# Patient Record
Sex: FEMALE | Race: WHITE | NOT HISPANIC OR LATINO | Employment: OTHER | URBAN - METROPOLITAN AREA
[De-identification: names, ages, dates, MRNs, and addresses within clinical notes are randomized per-mention and may not be internally consistent; named-entity substitution may affect disease eponyms.]

---

## 2018-01-10 PROBLEM — R13.10 DYSPHAGIA: Status: ACTIVE | Noted: 2018-01-10

## 2019-03-02 ENCOUNTER — HOSPITAL ENCOUNTER (EMERGENCY)
Facility: HOSPITAL | Age: 64
Discharge: HOME | End: 2019-03-02
Attending: EMERGENCY MEDICINE
Payer: MEDICAID

## 2019-03-02 VITALS
WEIGHT: 139.31 LBS | HEIGHT: 65 IN | BODY MASS INDEX: 23.21 KG/M2 | RESPIRATION RATE: 16 BRPM | OXYGEN SATURATION: 98 % | TEMPERATURE: 99 F | HEART RATE: 77 BPM

## 2019-03-02 DIAGNOSIS — R05.9 COUGH: ICD-10-CM

## 2019-03-02 DIAGNOSIS — J40 BRONCHITIS: Primary | ICD-10-CM

## 2019-03-02 PROCEDURE — 25000242 PHARM REV CODE 250 ALT 637 W/ HCPCS: Performed by: EMERGENCY MEDICINE

## 2019-03-02 PROCEDURE — 99283 EMERGENCY DEPT VISIT LOW MDM: CPT | Mod: 25

## 2019-03-02 PROCEDURE — 94640 AIRWAY INHALATION TREATMENT: CPT

## 2019-03-02 RX ORDER — ALBUTEROL SULFATE 90 UG/1
2 AEROSOL, METERED RESPIRATORY (INHALATION) EVERY 6 HOURS PRN
Qty: 1 INHALER | Refills: 0 | Status: SHIPPED | OUTPATIENT
Start: 2019-03-02 | End: 2019-08-10

## 2019-03-02 RX ORDER — LEVOTHYROXINE SODIUM 25 UG/1
25 TABLET ORAL DAILY
COMMUNITY
End: 2020-06-25 | Stop reason: SDUPTHER

## 2019-03-02 RX ORDER — ATORVASTATIN CALCIUM 10 MG/1
10 TABLET, FILM COATED ORAL DAILY
COMMUNITY
End: 2020-07-06 | Stop reason: SDUPTHER

## 2019-03-02 RX ORDER — PROPRANOLOL HYDROCHLORIDE 80 MG/1
80 TABLET ORAL DAILY
COMMUNITY
End: 2020-12-03 | Stop reason: SDUPTHER

## 2019-03-02 RX ORDER — METFORMIN HYDROCHLORIDE 500 MG/1
500 TABLET ORAL 2 TIMES DAILY WITH MEALS
COMMUNITY
End: 2020-05-20

## 2019-03-02 RX ORDER — LISINOPRIL 20 MG/1
20 TABLET ORAL DAILY
COMMUNITY
End: 2020-07-06 | Stop reason: SDUPTHER

## 2019-03-02 RX ORDER — IPRATROPIUM BROMIDE AND ALBUTEROL SULFATE 2.5; .5 MG/3ML; MG/3ML
3 SOLUTION RESPIRATORY (INHALATION)
Status: COMPLETED | OUTPATIENT
Start: 2019-03-02 | End: 2019-03-02

## 2019-03-02 RX ADMIN — IPRATROPIUM BROMIDE AND ALBUTEROL SULFATE 3 ML: .5; 3 SOLUTION RESPIRATORY (INHALATION) at 01:03

## 2019-03-02 NOTE — ED PROVIDER NOTES
Encounter Date: 3/2/2019    SCRIBE #1 NOTE: I, Olga Figueroa, am scribing for, and in the presence of, Rowdy Zuniga MD.       History     Chief Complaint   Patient presents with    Cough     x weeks       Time seen by provider: 1:45 PM on 03/02/2019    Liv Decker is a 64 y.o. female who presents to the ED with an onset of worsening and productive cough starting 7 weeks ago with associated SOB and wheezing. She reports light green phlegm with the cough. The patient reports a sore throat starting 2 days ago. She has been taking OTC Mucinex periodically. The patient denies history of lung complications, fever, vomiting, diarrhea, chest pain, leg swelling, blood in urine or stool, painful urination, or any other symptoms at this time. She smoked for 40+ years but quit 6 years ago.       The history is provided by the patient.     Review of patient's allergies indicates:  No Known Allergies  Past Medical History:   Diagnosis Date    Anemia     Encounter for blood transfusion     GERD (gastroesophageal reflux disease)     Hyperlipidemia     Ulcer of the stomach and intestine     pos. for h. pylori     Past Surgical History:   Procedure Laterality Date    APPENDECTOMY      CHOLECYSTECTOMY      COLONOSCOPY N/A 3/18/2016    [facility] END[facility]    DILATION-ESOPHAGUS N/A 1/10/2018    [facility] by [facility] END[facility]    ESOPHAGOGASTRODUODENOSCOPY  04/15/2016    with dil    ESOPHAGOGASTRODUODENOSCOPY (EGD) N/A 1/10/2018    [facility] by [facility] END[facility]    ESOPHAGOGASTRODUODENOSCOPY (EGD) N/A 4/15/2016    Performed by Robre [facility]aya Jr., [facility] at [facility] ENDO    ESOPHAGOGASTRODUODENOSCOPY (EGD) N/A 3/18/2016    Per[facility] by [facility]    EYE SURGERY      TONSILLECTOMY      TUBAL LIGATION       Family History   Problem Relation Age of Onset    Heart disease Mother     Diabetes Father      Social History     Tobacco Use     Smoking status: Former Smoker     Packs/day: 0.50     Years: 45.00     Pack years: 22.50     Start date: 1959     Last attempt to quit: 2014     Years since quittin.8    Smokeless tobacco: Never Used   Substance Use Topics    Alcohol use: No    Drug use: No     Review of Systems   Constitutional: Negative for chills and fever.   HENT: Positive for sore throat. Negative for nosebleeds.    Eyes: Negative for visual disturbance.   Respiratory: Positive for cough (productive), shortness of breath and wheezing.    Cardiovascular: Negative for chest pain, palpitations and leg swelling.   Gastrointestinal: Negative for abdominal pain, blood in stool, diarrhea, nausea and vomiting.   Genitourinary: Negative for dysuria and hematuria.   Musculoskeletal: Negative for back pain and neck pain.   Skin: Negative for rash.   Neurological: Negative for seizures, syncope and headaches.     Physical Exam     Initial Vitals [19 1322]   BP Pulse Resp Temp SpO2   -- 80 16 99.1 °F (37.3 °C) 96 %      MAP       --         Physical Exam    Nursing note and vitals reviewed.  Constitutional: She appears well-developed and well-nourished. She is not diaphoretic. No distress.   HENT:   Head: Normocephalic and atraumatic.   Eyes: EOM are normal. Pupils are equal, round, and reactive to light.   Neck: Normal range of motion. Neck supple.   Cardiovascular: Normal rate, regular rhythm, normal heart sounds and intact distal pulses. Exam reveals no gallop and no friction rub.    No murmur heard.  Pulmonary/Chest: Breath sounds normal. No respiratory distress. She has no wheezes. She has no rhonchi. She has no rales.   Musculoskeletal: She exhibits no edema.   Neurological: She is alert and oriented to person, place, and time.   Skin: Skin is warm and dry.   Psychiatric: She has a normal mood and affect. Her behavior is normal. Judgment and thought content normal.       ED Course   Procedures  Labs Reviewed - No data to  display      Imaging Results          X-Ray Chest PA And Lateral (Final result)  Result time 03/02/19 14:05:17    Final result by Rai Hanna MD (03/02/19 14:05:17)                 Impression:      No acute cardiopulmonary abnormality appreciated radiographically.      Electronically signed by: Tony Hanna MD  Date:    03/02/2019  Time:    14:05             Narrative:    EXAMINATION:  XR CHEST PA AND LATERAL    CLINICAL HISTORY:  Cough    TECHNIQUE:  PA and lateral views of the chest were performed.    COMPARISON:  None    FINDINGS:  The cardiomediastinal silhouette is normal in appearance.  No pulmonary vascular congestion appreciated. No airspace consolidation or pulmonary mass. No significant volume of pleural fluid or pneumothorax.  There is probable right apical pleuroparenchymal scarring.  The bones appear osteopenic..                                 Medical Decision Making:   History:   Old Medical Records: I decided to obtain old medical records.  Initial Assessment:   64-year-old female presented with a chief complaint of a cough.  Differential Diagnosis:   Initial differential diagnosis included but not limited to pneumonia, bronchitis, and medication reaction.  Clinical Tests:   Radiological Study: Ordered and Reviewed  ED Management:  The patient was urgently evaluated in the emergency department, her evaluation was significant for an elderly female with a normal lung exam.  The patient's chest x-ray showed no acute abnormalities per my independent interpretation.  The patient was treated with a respiratory nebulizer treatment, with improvement in her symptoms. The patient may have a bronchitis.  She is stable for discharge to home.  She will be discharged home with an albuterol inhaler and she is to follow up with her PCP for further care and workup.            Scribe Attestation:   Scribe #1: I performed the above scribed service and the documentation accurately describes the services I  performed. I attest to the accuracy of the note.           I, Dr. Rowdy Zuniga, personally performed the services described in this documentation. All medical record entries made by the scribe were at my direction and in my presence.  I have reviewed the chart and agree that the record reflects my personal performance and is accurate and complete. Rowdy Zuniga MD.  4:31 PM 03/02/2019       Clinical Impression:       ICD-10-CM ICD-9-CM   1. Bronchitis J40 490   2. Cough R05 786.2         Disposition:   Disposition: Discharged  Condition: Stable                        Rowdy Zuniga MD  03/02/19 2746

## 2019-08-10 ENCOUNTER — TELEPHONE (OUTPATIENT)
Dept: HEPATOLOGY | Facility: HOSPITAL | Age: 64
End: 2019-08-10

## 2019-08-10 ENCOUNTER — HOSPITAL ENCOUNTER (OUTPATIENT)
Facility: HOSPITAL | Age: 64
Discharge: HOME | End: 2019-08-10
Attending: EMERGENCY MEDICINE | Admitting: HOSPITALIST
Payer: MEDICAID

## 2019-08-10 ENCOUNTER — CLINICAL SUPPORT (OUTPATIENT)
Dept: CARDIOLOGY | Facility: HOSPITAL | Age: 64
End: 2019-08-10
Attending: SPECIALIST
Payer: MEDICAID

## 2019-08-10 ENCOUNTER — HOSPITAL ENCOUNTER (OUTPATIENT)
Dept: RADIOLOGY | Facility: HOSPITAL | Age: 64
Discharge: HOME | End: 2019-08-10
Attending: SPECIALIST
Payer: MEDICAID

## 2019-08-10 VITALS
DIASTOLIC BLOOD PRESSURE: 54 MMHG | HEIGHT: 61 IN | RESPIRATION RATE: 17 BRPM | OXYGEN SATURATION: 96 % | WEIGHT: 120.06 LBS | SYSTOLIC BLOOD PRESSURE: 108 MMHG | TEMPERATURE: 98 F | BODY MASS INDEX: 22.67 KG/M2 | HEART RATE: 74 BPM

## 2019-08-10 DIAGNOSIS — R07.9 CHEST PAIN: Primary | ICD-10-CM

## 2019-08-10 DIAGNOSIS — R07.9 CHEST PAIN, UNSPECIFIED TYPE: Primary | ICD-10-CM

## 2019-08-10 DIAGNOSIS — Z72.0 TOBACCO ABUSE: Chronic | ICD-10-CM

## 2019-08-10 DIAGNOSIS — I25.10 CAD (CORONARY ARTERY DISEASE): ICD-10-CM

## 2019-08-10 DIAGNOSIS — I10 ESSENTIAL HYPERTENSION: Chronic | ICD-10-CM

## 2019-08-10 DIAGNOSIS — R79.89 POSITIVE D-DIMER: ICD-10-CM

## 2019-08-10 DIAGNOSIS — R11.0 NAUSEA: ICD-10-CM

## 2019-08-10 DIAGNOSIS — E11.69 TYPE 2 DIABETES MELLITUS WITH OTHER SPECIFIED COMPLICATION, WITHOUT LONG-TERM CURRENT USE OF INSULIN: ICD-10-CM

## 2019-08-10 DIAGNOSIS — E78.2 MIXED HYPERLIPIDEMIA: Chronic | ICD-10-CM

## 2019-08-10 DIAGNOSIS — R61 DIAPHORESIS: ICD-10-CM

## 2019-08-10 PROBLEM — E78.5 HYPERLIPIDEMIA: Chronic | Status: ACTIVE | Noted: 2019-08-10

## 2019-08-10 PROBLEM — E11.9 TYPE 2 DIABETES MELLITUS: Status: ACTIVE | Noted: 2019-08-10

## 2019-08-10 LAB
ALBUMIN SERPL BCP-MCNC: 3.8 G/DL (ref 3.5–5.2)
ALP SERPL-CCNC: 53 U/L (ref 55–135)
ALT SERPL W/O P-5'-P-CCNC: 17 U/L (ref 10–44)
ANION GAP SERPL CALC-SCNC: 10 MMOL/L (ref 8–16)
APTT PPP: 34 SEC (ref 26.2–34.7)
AST SERPL-CCNC: 19 U/L (ref 10–40)
BASOPHILS # BLD AUTO: 0.06 K/UL (ref 0–0.2)
BASOPHILS NFR BLD: 0.5 % (ref 0–1.9)
BILIRUB SERPL-MCNC: 0.4 MG/DL (ref 0.1–1)
BNP SERPL-MCNC: 26 PG/ML (ref 0–99)
BUN SERPL-MCNC: 16 MG/DL (ref 8–23)
CALCIUM SERPL-MCNC: 9.6 MG/DL (ref 8.7–10.5)
CHLORIDE SERPL-SCNC: 107 MMOL/L (ref 95–110)
CO2 SERPL-SCNC: 26 MMOL/L (ref 23–29)
CREAT SERPL-MCNC: 0.7 MG/DL (ref 0.5–1.4)
D DIMER PPP IA.FEU-MCNC: 1.46 UG/ML FEU
DIFFERENTIAL METHOD: ABNORMAL
EOSINOPHIL # BLD AUTO: 0.1 K/UL (ref 0–0.5)
EOSINOPHIL NFR BLD: 1.1 % (ref 0–8)
ERYTHROCYTE [DISTWIDTH] IN BLOOD BY AUTOMATED COUNT: 15.1 % (ref 11.5–14.5)
EST. GFR  (AFRICAN AMERICAN): >60 ML/MIN/1.73 M^2
EST. GFR  (NON AFRICAN AMERICAN): >60 ML/MIN/1.73 M^2
ESTIMATED AVG GLUCOSE: 123 MG/DL (ref 68–131)
GLUCOSE SERPL-MCNC: 127 MG/DL (ref 70–110)
HBA1C MFR BLD HPLC: 5.9 % (ref 4.5–6.2)
HCT VFR BLD AUTO: 42.7 % (ref 37–48.5)
HGB BLD-MCNC: 13.4 G/DL (ref 12–16)
IMM GRANULOCYTES # BLD AUTO: 0.07 K/UL (ref 0–0.04)
IMM GRANULOCYTES NFR BLD AUTO: 0.6 % (ref 0–0.5)
INR PPP: 1.1
LYMPHOCYTES # BLD AUTO: 2.9 K/UL (ref 1–4.8)
LYMPHOCYTES NFR BLD: 24 % (ref 18–48)
MAGNESIUM SERPL-MCNC: 1.6 MG/DL (ref 1.6–2.6)
MCH RBC QN AUTO: 28 PG (ref 27–31)
MCHC RBC AUTO-ENTMCNC: 31.4 G/DL (ref 32–36)
MCV RBC AUTO: 89 FL (ref 82–98)
MONOCYTES # BLD AUTO: 0.8 K/UL (ref 0.3–1)
MONOCYTES NFR BLD: 6.3 % (ref 4–15)
NEUTROPHILS # BLD AUTO: 8.2 K/UL (ref 1.8–7.7)
NEUTROPHILS NFR BLD: 67.5 % (ref 38–73)
NRBC BLD-RTO: 0 /100 WBC
PLATELET # BLD AUTO: 221 K/UL (ref 150–350)
PMV BLD AUTO: 12.4 FL (ref 9.2–12.9)
POTASSIUM SERPL-SCNC: 3.9 MMOL/L (ref 3.5–5.1)
PROT SERPL-MCNC: 7.6 G/DL (ref 6–8.4)
PROTHROMBIN TIME: 13.5 SEC (ref 11.7–14)
RBC # BLD AUTO: 4.79 M/UL (ref 4–5.4)
SODIUM SERPL-SCNC: 143 MMOL/L (ref 136–145)
TROPONIN I SERPL DL<=0.01 NG/ML-MCNC: <0.03 NG/ML (ref 0.02–0.04)
WBC # BLD AUTO: 12.09 K/UL (ref 3.9–12.7)

## 2019-08-10 PROCEDURE — 25500020 PHARM REV CODE 255: Performed by: EMERGENCY MEDICINE

## 2019-08-10 PROCEDURE — 85730 THROMBOPLASTIN TIME PARTIAL: CPT

## 2019-08-10 PROCEDURE — 96374 THER/PROPH/DIAG INJ IV PUSH: CPT | Mod: XE

## 2019-08-10 PROCEDURE — 25000003 PHARM REV CODE 250: Performed by: NURSE PRACTITIONER

## 2019-08-10 PROCEDURE — 99285 EMERGENCY DEPT VISIT HI MDM: CPT

## 2019-08-10 PROCEDURE — 83036 HEMOGLOBIN GLYCOSYLATED A1C: CPT

## 2019-08-10 PROCEDURE — 83880 ASSAY OF NATRIURETIC PEPTIDE: CPT

## 2019-08-10 PROCEDURE — 83735 ASSAY OF MAGNESIUM: CPT

## 2019-08-10 PROCEDURE — 84484 ASSAY OF TROPONIN QUANT: CPT | Mod: 91

## 2019-08-10 PROCEDURE — 93017 CV STRESS TEST TRACING ONLY: CPT

## 2019-08-10 PROCEDURE — 63600175 PHARM REV CODE 636 W HCPCS: Performed by: NURSE PRACTITIONER

## 2019-08-10 PROCEDURE — 36415 COLL VENOUS BLD VENIPUNCTURE: CPT

## 2019-08-10 PROCEDURE — G0378 HOSPITAL OBSERVATION PER HR: HCPCS

## 2019-08-10 PROCEDURE — 96375 TX/PRO/DX INJ NEW DRUG ADDON: CPT | Mod: XE

## 2019-08-10 PROCEDURE — 85025 COMPLETE CBC W/AUTO DIFF WBC: CPT

## 2019-08-10 PROCEDURE — 85379 FIBRIN DEGRADATION QUANT: CPT

## 2019-08-10 PROCEDURE — 80053 COMPREHEN METABOLIC PANEL: CPT

## 2019-08-10 PROCEDURE — A9502 TC99M TETROFOSMIN: HCPCS

## 2019-08-10 PROCEDURE — 85610 PROTHROMBIN TIME: CPT

## 2019-08-10 PROCEDURE — 94760 N-INVAS EAR/PLS OXIMETRY 1: CPT

## 2019-08-10 PROCEDURE — 84484 ASSAY OF TROPONIN QUANT: CPT

## 2019-08-10 PROCEDURE — 93005 ELECTROCARDIOGRAM TRACING: CPT | Mod: XE

## 2019-08-10 RX ORDER — LEVOTHYROXINE SODIUM 25 UG/1
25 TABLET ORAL
Status: DISCONTINUED | OUTPATIENT
Start: 2019-08-10 | End: 2019-08-10 | Stop reason: HOSPADM

## 2019-08-10 RX ORDER — NITROGLYCERIN 0.4 MG/1
0.4 TABLET SUBLINGUAL EVERY 5 MIN PRN
Status: DISCONTINUED | OUTPATIENT
Start: 2019-08-10 | End: 2019-08-10 | Stop reason: HOSPADM

## 2019-08-10 RX ORDER — ACETAMINOPHEN 325 MG/1
650 TABLET ORAL EVERY 8 HOURS PRN
Status: DISCONTINUED | OUTPATIENT
Start: 2019-08-10 | End: 2019-08-10 | Stop reason: HOSPADM

## 2019-08-10 RX ORDER — ATORVASTATIN CALCIUM 10 MG/1
10 TABLET, FILM COATED ORAL DAILY
Status: DISCONTINUED | OUTPATIENT
Start: 2019-08-10 | End: 2019-08-10 | Stop reason: HOSPADM

## 2019-08-10 RX ORDER — NITROGLYCERIN 0.4 MG/1
0.4 TABLET SUBLINGUAL EVERY 5 MIN PRN
Qty: 60 TABLET | Refills: 0 | Status: SHIPPED | OUTPATIENT
Start: 2019-08-10 | End: 2020-02-14

## 2019-08-10 RX ORDER — ONDANSETRON 2 MG/ML
4 INJECTION INTRAMUSCULAR; INTRAVENOUS EVERY 8 HOURS PRN
Status: DISCONTINUED | OUTPATIENT
Start: 2019-08-10 | End: 2019-08-10 | Stop reason: HOSPADM

## 2019-08-10 RX ORDER — SODIUM CHLORIDE 0.9 % (FLUSH) 0.9 %
10 SYRINGE (ML) INJECTION
Status: DISCONTINUED | OUTPATIENT
Start: 2019-08-10 | End: 2019-08-10 | Stop reason: HOSPADM

## 2019-08-10 RX ORDER — BISACODYL 10 MG
10 SUPPOSITORY, RECTAL RECTAL DAILY PRN
Status: DISCONTINUED | OUTPATIENT
Start: 2019-08-10 | End: 2019-08-10 | Stop reason: HOSPADM

## 2019-08-10 RX ORDER — ENOXAPARIN SODIUM 100 MG/ML
40 INJECTION SUBCUTANEOUS EVERY 24 HOURS
Status: DISCONTINUED | OUTPATIENT
Start: 2019-08-10 | End: 2019-08-10 | Stop reason: HOSPADM

## 2019-08-10 RX ORDER — PANTOPRAZOLE SODIUM 40 MG/1
40 TABLET, DELAYED RELEASE ORAL DAILY
Status: DISCONTINUED | OUTPATIENT
Start: 2019-08-10 | End: 2019-08-10 | Stop reason: HOSPADM

## 2019-08-10 RX ORDER — MORPHINE SULFATE 2 MG/ML
1 INJECTION, SOLUTION INTRAMUSCULAR; INTRAVENOUS EVERY 4 HOURS PRN
Status: DISCONTINUED | OUTPATIENT
Start: 2019-08-10 | End: 2019-08-10 | Stop reason: HOSPADM

## 2019-08-10 RX ORDER — LISINOPRIL 20 MG/1
20 TABLET ORAL DAILY
Status: DISCONTINUED | OUTPATIENT
Start: 2019-08-10 | End: 2019-08-10 | Stop reason: HOSPADM

## 2019-08-10 RX ADMIN — MORPHINE SULFATE 1 MG: 2 INJECTION, SOLUTION INTRAMUSCULAR; INTRAVENOUS at 06:08

## 2019-08-10 RX ADMIN — IOHEXOL 100 ML: 350 INJECTION, SOLUTION INTRAVENOUS at 04:08

## 2019-08-10 RX ADMIN — PANTOPRAZOLE SODIUM 40 MG: 40 TABLET, DELAYED RELEASE ORAL at 09:08

## 2019-08-10 RX ADMIN — ONDANSETRON 4 MG: 2 INJECTION INTRAMUSCULAR; INTRAVENOUS at 05:08

## 2019-08-10 RX ADMIN — LISINOPRIL 20 MG: 20 TABLET ORAL at 09:08

## 2019-08-10 RX ADMIN — LEVOTHYROXINE SODIUM 25 MCG: 25 TABLET ORAL at 09:08

## 2019-08-10 RX ADMIN — ATORVASTATIN CALCIUM 10 MG: 10 TABLET, FILM COATED ORAL at 09:08

## 2019-08-10 RX ADMIN — NITROGLYCERIN 1 INCH: 20 OINTMENT TOPICAL at 03:08

## 2019-08-10 NOTE — NURSING
Instructed patient on discharge instructions, follow up appointment and new prescription. She verbalized understanding. Patient discharged home at this time.

## 2019-08-10 NOTE — HOSPITAL COURSE
"Admitted overnight for cardiac workup and PE ruled out. Cardiac labs and stress test negative. All other labs essentially normal. Non Cardiac CTA "There are scattered coronary arterial vascular calcifications, with the heart upper limits of normal in size."     Encouraged to stop smoking and discuss treatment with PCP.     Patient is pain free agreeable to discharge home and follow up with PCP as scheduled on 8/26/19.  NTG 0.4 SL ordered for d/c, NP did patient education with pt to verbalized understanding.   "

## 2019-08-10 NOTE — CONSULTS
[facility]  Cardiology  Consult Note    Patient Name: Liv Decker  MRN: [id number]  Admission Date: 8/10/2019  Hospital Length of Stay: 0 days  Code Status: Full Code   Attending Provider: Tony June DO   Consulting Provider: Conrado Edwards MD  Primary Care Physician: Gemma Banerjee Jr, MD  Principal Problem:Chest pain    Patient information was obtained from patient and ER records.     Consults  Subjective:     Chief Complaint:  Chest pain     HPI: Pt states today while at work around 10 pm she developed a sudden onset of left sided chest pain located to the area under her left breast with radiation to her left arm.  She describes the pain as a sharp sensation with associated nausea, diaphoresis and lightheadedness.  She rated the pain a 7/10 intensity at its worse.  She was give 4 ESASA and nitro spray with improvement in her chest pain PTA. No pain free will get a nuclear stress test today    Past Medical History:   Diagnosis Date    Anemia     Diabetes mellitus     Encounter for blood transfusion     GERD (gastroesophageal reflux disease)     Hyperlipidemia     MI (myocardial infarction)     Ulcer of the stomach and intestine     pos. for h. pylori       Past Surgical History:   Procedure Laterality Date    APPENDECTOMY      CHOLECYSTECTOMY      COLONOSCOPY N/A 3/18/2016    Performed by [facility] at [facility] ENDO    DILATION-ESOPHAGUS N/A 1/10/2018    Performed by [facility] Jr[facility] ENDO    ESOPHAGOGASTRODUODENOSCOPY  04/15/2016    with dil    ESOPHAGOGASTRODUODENOSCOPY (EGD) N/A 1/10/2018    Performed by Rober [facility] Jr[facility] ENDO    ESOPHAGOGASTRODUODENOSCOPY (EGD) N/A 4/15/2016    Performed by Rober [facility] Jr[facility] ENDO    ESOPHAGOGASTRODUODENOSCOPY (EGD) N/A 3/18/2016    [facility]    EYE SURGERY      TONSILLECTOMY      TUBAL LIGATION         Review of patient's allergies  indicates:  No Known Allergies    No current facility-administered medications on file prior to encounter.      Current Outpatient Medications on File Prior to Encounter   Medication Sig    atorvastatin (LIPITOR) 10 MG tablet Take 10 mg by mouth once daily.    levothyroxine (SYNTHROID) 25 MCG tablet Take 25 mcg by mouth once daily.    lisinopril (PRINIVIL,ZESTRIL) 20 MG tablet Take 20 mg by mouth once daily.    metFORMIN (GLUCOPHAGE) 500 MG tablet Take 500 mg by mouth 2 (two) times daily with meals.    pantoprazole (PROTONIX) 40 MG tablet Take 40 mg by mouth once daily.    propranolol (INDERAL) 80 MG tablet Take 80 mg by mouth once daily.     [DISCONTINUED] albuterol (PROVENTIL/VENTOLIN HFA) 90 mcg/actuation inhaler Inhale 2 puffs into the lungs every 6 (six) hours as needed for Wheezing or Shortness of Breath. Rescue    [DISCONTINUED] ondansetron (ZOFRAN) 4 MG tablet Take 1 tablet (4 mg total) by mouth every 8 (eight) hours as needed.    [DISCONTINUED] oxycodone-acetaminophen (PERCOCET) 5-325 mg per tablet Take 1-2 tablets by mouth every 4 (four) hours as needed for Pain.     Family History     Problem Relation (Age of Onset)    Diabetes Father    Heart disease Mother        Tobacco Use    Smoking status: Current Every Day Smoker     Packs/day: 0.25     Years: 45.00     Pack years: 11.25     Types: Cigarettes     Start date: 1959     Last attempt to quit: 2014     Years since quittin.2    Smokeless tobacco: Never Used   Substance and Sexual Activity    Alcohol use: No     Frequency: Never    Drug use: No    Sexual activity: Never     Review of Systems   Constitution: Negative.   Eyes: Negative.    Cardiovascular: Positive for chest pain.   Endocrine: Negative.    Skin: Negative.    Musculoskeletal: Negative.    Gastrointestinal: Negative.    Genitourinary: Negative.    Neurological: Negative.      Objective:     Vital Signs (Most Recent):  Temp: 98 °F (36.7 °C) (08/10/19 08)  Pulse: 70  (08/10/19 1000)  Resp: 20 (08/10/19 1000)  BP: 115/61 (08/10/19 0831)  SpO2: 97 % (08/10/19 0831) Vital Signs (24h Range):  Temp:  [97.6 °F (36.4 °C)-98 °F (36.7 °C)] 98 °F (36.7 °C)  Pulse:  [62-70] 70  Resp:  [18-33] 20  SpO2:  [97 %-99 %] 97 %  BP: (115-155)/(60-67) 115/61     Weight: 54.5 kg (120 lb 0.7 oz)  Body mass index is 22.68 kg/m².    SpO2: 97 %  O2 Device (Oxygen Therapy): room air    No intake or output data in the 24 hours ending 08/10/19 1051    Lines/Drains/Airways     Peripheral Intravenous Line                 Peripheral IV - Single Lumen 08/10/19 20 G Left Antecubital less than 1 day                Physical Exam   Constitutional: She is oriented to person, place, and time. She appears well-developed.   Eyes: Pupils are equal, round, and reactive to light.   Neck: Normal range of motion.   Cardiovascular: Normal rate.   Pulmonary/Chest: Effort normal.   Musculoskeletal: Normal range of motion.   Neurological: She is alert and oriented to person, place, and time.       Significant Labs:   Blood Culture: No results for input(s): LABBLOO in the last 48 hours., CMP   Recent Labs   Lab 08/10/19  0229      K 3.9      CO2 26   *   BUN 16   CREATININE 0.7   CALCIUM 9.6   PROT 7.6   ALBUMIN 3.8   BILITOT 0.4   ALKPHOS 53*   AST 19   ALT 17   ANIONGAP 10   ESTGFRAFRICA >60.0   EGFRNONAA >60.0   , CBC   Recent Labs   Lab 08/10/19  0229   WBC 12.09   HGB 13.4   HCT 42.7      , INR   Recent Labs   Lab 08/10/19  0229   INR 1.1   , Lipid Panel No results for input(s): CHOL, HDL, LDLCALC, TRIG, CHOLHDL in the last 48 hours. and Troponin   Recent Labs   Lab 08/10/19  0229 08/10/19  0824   TROPONINI <0.030 <0.030       Significant Imaging:   Assessment and Plan:     Active Diagnoses:    Diagnosis Date Noted POA    PRINCIPAL PROBLEM:  Chest pain [R07.9] 08/10/2019 Yes    Type 2 diabetes mellitus [E11.9] 08/10/2019 Unknown    Hyperlipidemia [E78.5] 08/10/2019 Yes     Chronic    Tobacco  abuse [Z72.0] 08/10/2019 Yes     Chronic    HTN (hypertension) [I10] 08/10/2019 Yes     Chronic      Problems Resolved During this Admission:       VTE Risk Mitigation (From admission, onward)        Ordered     enoxaparin injection 40 mg  Daily      08/10/19 0602     IP VTE HIGH RISK PATIENT  Once      08/10/19 0602          Thank you for your consult. I will sign off. Please contact us if you have any additional questions.    Conrado Edwards MD  Cardiology   [facility]

## 2019-08-10 NOTE — PROGRESS NOTES
@  11:10  PATIENT INJECTED WITH 9mCi Tc99m MYOVIEW IV FOR RESTING IMAGES.    REMAIN NPO STATUS.    YESI ZUÑIGA

## 2019-08-10 NOTE — PROGRESS NOTES
@  12:13  PATIENT INJECTED WITH 25mci Tc99m MYOVIEW IV FOR STRESS IMAGES.    RELEASE NPO STATUS FROM NUCLEAR MEDICINE.      YESI ZUÑIGA

## 2019-08-10 NOTE — H&P
[facility]  History & Physical    Patient Name: Liv Decker  MRN: [id number]  Admission Date: 8/10/2019  Attending Physician: Tony June MD  Primary Care Provider: Gemma Banerjee Jr, MD         Patient information was obtained from patient and ER records.     Subjective:     Principal Problem:Chest pain    Chief Complaint:   Chief Complaint   Patient presents with    Chest Pain     left side, worse with deep breathing        HPI: 64 year old  female presents to the ED withcomplaints of chest pain     PMHx: HTN, CAD, HLP, Hypothyroidism and tobacco use  Fx: CAD/T2DM, CHF    Pt states today while at work around 10 pm she developed a sudden onset of left sided chest pain located to the area under her left breast with radiation to her left arm.  She describes the pain as a sharp sensation with associated nausea, diaphoresis and lightheadedness.  She rated the pain a 7/10 intensity at its worse.  She was give 4 ESASA and nitro spray with improvement in her chest pain PTA.    Past Medical History:   Diagnosis Date    Anemia     Encounter for blood transfusion     GERD (gastroesophageal reflux disease)     Hyperlipidemia     MI (myocardial infarction)     Ulcer of the stomach and intestine     pos. for h. pylori       Past Surgical History:   Procedure Laterality Date    APPENDECTOMY      CHOLECYSTECTOMY      COLONOSCOPY N/A 3/18/2016    [facility]    DILATION-ESOPHAGUS N/A 1/10/2018    [facility]    ESOPHAGOGASTRODUODENOSCOPY  04/15/2016    with dil    ESOPHAGOGASTRODUODENOSCOPY (EGD) N/A 1/10/2018    [facility]    ESOPHAGOGASTRODUODENOSCOPY (EGD) N/A 4/15/2016    Per[facility] at [facility]    ESOPHAGOGASTRODUODENOSCOPY (EGD) N/A 3/18/2016    [facility] by [facility] at [facility]    EYE SURGERY       TONSILLECTOMY      TUBAL LIGATION         Review of patient's allergies indicates:  No Known Allergies    No current facility-administered medications on file prior to encounter.      Current Outpatient Medications on File Prior to Encounter   Medication Sig    atorvastatin (LIPITOR) 10 MG tablet Take 10 mg by mouth once daily.    levothyroxine (SYNTHROID) 25 MCG tablet Take 25 mcg by mouth once daily.    lisinopril (PRINIVIL,ZESTRIL) 20 MG tablet Take 20 mg by mouth once daily.    metFORMIN (GLUCOPHAGE) 500 MG tablet Take 500 mg by mouth 2 (two) times daily with meals.    pantoprazole (PROTONIX) 40 MG tablet Take 40 mg by mouth once daily.    propranolol (INDERAL) 80 MG tablet Take 80 mg by mouth once daily.     [DISCONTINUED] albuterol (PROVENTIL/VENTOLIN HFA) 90 mcg/actuation inhaler Inhale 2 puffs into the lungs every 6 (six) hours as needed for Wheezing or Shortness of Breath. Rescue    [DISCONTINUED] ondansetron (ZOFRAN) 4 MG tablet Take 1 tablet (4 mg total) by mouth every 8 (eight) hours as needed.    [DISCONTINUED] oxycodone-acetaminophen (PERCOCET) 5-325 mg per tablet Take 1-2 tablets by mouth every 4 (four) hours as needed for Pain.     Family History     Problem Relation (Age of Onset)    Diabetes Father    Heart disease Mother        Tobacco Use    Smoking status: Former Smoker     Packs/day: 0.50     Years: 45.00     Pack years: 22.50     Start date: 1959     Last attempt to quit: 2014     Years since quittin.2    Smokeless tobacco: Never Used   Substance and Sexual Activity    Alcohol use: No    Drug use: No    Sexual activity: Never     Review of Systems   Constitutional: Positive for diaphoresis and fatigue. Negative for activity change, chills and fever.   HENT: Negative for congestion, drooling, ear pain, hearing loss, nosebleeds, rhinorrhea, sinus pressure, sore throat and trouble swallowing.    Eyes: Negative for pain and visual disturbance.   Respiratory: Negative for  apnea, cough, choking, chest tightness, shortness of breath and wheezing.    Cardiovascular: Positive for chest pain. Negative for palpitations and leg swelling.   Gastrointestinal: Negative for abdominal distention, abdominal pain, blood in stool, constipation, diarrhea, nausea and vomiting.   Endocrine: Negative for polydipsia, polyphagia and polyuria.   Genitourinary: Negative for decreased urine volume, dysuria, flank pain, frequency and hematuria.   Musculoskeletal: Negative for back pain, gait problem, joint swelling, myalgias, neck pain and neck stiffness.   Skin: Negative for rash and wound.   Neurological: Positive for light-headedness and numbness. Negative for dizziness, tremors, seizures, syncope, facial asymmetry, speech difficulty, weakness and headaches.   Psychiatric/Behavioral: Negative for behavioral problems, confusion, hallucinations and sleep disturbance. The patient is not nervous/anxious.      Objective:     Vital Signs (Most Recent):  Temp: 97.6 °F (36.4 °C) (08/10/19 0227)  Pulse: 66 (08/10/19 0410)  Resp: 20 (08/10/19 0410)  BP: (!) 142/62 (08/10/19 0410)  SpO2: 98 % (08/10/19 0410) Vital Signs (24h Range):  Temp:  [97.6 °F (36.4 °C)] 97.6 °F (36.4 °C)  Pulse:  [64-67] 66  Resp:  [20-33] 20  SpO2:  [98 %-99 %] 98 %  BP: (130-155)/(61-67) 142/62     Weight: 54.4 kg (120 lb)  Body mass index is 22.67 kg/m².    Physical Exam   Constitutional: She is oriented to person, place, and time. She appears well-developed.   Eyes: Pupils are equal, round, and reactive to light.   Neck: Normal range of motion. Neck supple.   Cardiovascular: Normal rate, regular rhythm, normal heart sounds and intact distal pulses.   Pulmonary/Chest: Effort normal and breath sounds normal.   Abdominal: Soft. Bowel sounds are normal.   Musculoskeletal: Normal range of motion.   Neurological: She is oriented to person, place, and time.   Skin: Skin is warm and dry. Capillary refill takes less than 2 seconds.   Psychiatric:  She has a normal mood and affect.         CRANIAL NERVES     CN III, IV, VI   Pupils are equal, round, and reactive to light.    CN V   Facial sensation intact.     CN VII   Facial expression full, symmetric.     CN VIII   CN VIII normal.     CN IX, X   CN IX normal.   CN X normal.     CN XI   CN XI normal.     CN XII   CN XII normal.        Significant Labs:   CBC:   Recent Labs   Lab 08/10/19  0229   WBC 12.09   HGB 13.4   HCT 42.7        CMP:   Recent Labs   Lab 08/10/19  0229      K 3.9      CO2 26   *   BUN 16   CREATININE 0.7   CALCIUM 9.6   PROT 7.6   ALBUMIN 3.8   BILITOT 0.4   ALKPHOS 53*   AST 19   ALT 17   ANIONGAP 10   EGFRNONAA >60.0     Cardiac Markers:   Recent Labs   Lab 08/10/19  0229   BNP 26     Coagulation:   Recent Labs   Lab 08/10/19  0229   INR 1.1   APTT 34.0     Lipid Panel: No results for input(s): CHOL, HDL, LDLCALC, TRIG, CHOLHDL in the last 48 hours.  Magnesium:   Recent Labs   Lab 08/10/19  0229   MG 1.6     Troponin:   Recent Labs   Lab 08/10/19  0229   TROPONINI <0.030     TSH: No results for input(s): TSH in the last 4320 hours.  Urine Studies: No results for input(s): COLORU, APPEARANCEUA, PHUR, SPECGRAV, PROTEINUA, GLUCUA, KETONESU, BILIRUBINUA, OCCULTUA, NITRITE, UROBILINOGEN, LEUKOCYTESUR, RBCUA, WBCUA, BACTERIA, SQUAMEPITHEL, HYALINECASTS in the last 48 hours.    Invalid input(s): WRIGHTSUR  All pertinent labs within the past 24 hours have been reviewed.    Significant Imaging: I have reviewed all pertinent imaging results/findings within the past 24 hours.   Cta Chest Non-coronary (pe Study)    Result Date: 8/10/2019  CMS MANDATED QUALITY DATA-CT RADIATION-436 HISTORY: Acute chest pain, pulmonary embolism suspected, positive d-dimer. FINDINGS: Thin axial imaging through the chest was performed with 100 mL Omnipaque 350 IV contrast, with sagittal and coronal images, MIPS, and or 3D reconstructions performed. All CT exams at this facility use dose  modulation, iterative reconstruction, and or weight based dosing when appropriate to reduce radiation dose to as low as reasonably achievable. Comparison to multiple prior exams. There are no pulmonary arterial filling defects to suggest pulmonary thromboembolism. The central pulmonary arteries are normal in caliber. The aorta enhances normally, with scattered calcified plaque, and no aortic dissection or evidence of aortic intramural hematoma. There are scattered coronary arterial vascular calcifications, with the heart upper limits of normal in size. There is no pericardial effusion, with no mediastinal or hilar lymph node enlargement. No mediastinal mass or fluid collection. The lungs are normally expanded, with tiny scattered centrilobular nodular opacities with faint groundglass densities, in the apical segment of the right lower lobe and in the right upper lobe anteriorly, suggesting nonspecific bronchiolitis. There is apical subpleural atelectasis, with no consolidation or evidence of interstitial pulmonary edema. No pleural effusion or pneumothorax. The central airways are patent, with no bronchiectasis. Images of the upper abdomen show cholecystectomy clips, and are otherwise unremarkable.     IMPRESSION:   1. Negative for pulmonary thromboembolism.   2. Coronary arterial vascular calcifications.   3. Mild scattered nonspecific bronchiolitis in the right upper lobe and right lower lobe.     Electronically Signed by Raimundo KERNS on 8/10/2019 4:23 AM      Assessment/Plan:     * Chest pain  - Cardiac disease seen on CT of chest  - Trend CE/Troponin  - 2 D echo complete  - if negative stress test  -Cards Consult  - ASA-O2-statin- nitrate paste  -VTE LMWH      Type 2 diabetes mellitus  - Low dose Novolog s/s insulin  - Hold Metformin for now till all cardiac testing complete  HGA1C      Hyperlipidemia  -LFTs stable continue statin      Tobacco abuse  - cessation discussed and encourage  (pt states she  smokes about 8 cigarettes daily)      HTN (hypertension)  - continue home medications        VTE Risk Mitigation (From admission, onward)    None             Jayda Coker, NP  Department of Hospital Medicine   [facility]

## 2019-08-10 NOTE — ED PROVIDER NOTES
Encounter Date: 8/10/2019       History     Chief Complaint   Patient presents with    Chest Pain     left side, worse with deep breathing     HPI   64-year-old female with past medical history of hypertension, hyperlipidemia, diabetes, GERD, prior reported MI, who presents with chest pain that began at 11:30 p.m. tonight.  She reports gradual onset of pain, located in the lower middle/left chest, sharp, constant, worse with deep inspiration, no specific alleviating factors.  Was also diaphoretic and nauseous when the pain started, did not throw up.  Has had congestion and a dry cough for the last several days.  She received 325 of aspirin and 1 spray of nitroglycerin during transport with EMS.      Review of patient's allergies indicates:  No Known Allergies  Past Medical History:   Diagnosis Date    Anemia     Encounter for blood transfusion     GERD (gastroesophageal reflux disease)     Hyperlipidemia     MI (myocardial infarction)     Ulcer of the stomach and intestine     pos. for h. pylori     Past Surgical History:   Procedure Laterality Date    APPENDECTOMY      CHOLECYSTECTOMY      COLONOSCOPY N/A 3/18/2016    [facility]    DILATION-ESOPHAGUS N/A 1/10/2018    [facility] by [facility]    ESOPHAGOGASTRODUODENOSCOPY  04/15/2016    with dil    ESOPHAGOGASTRODUODENOSCOPY (EGD) N/A 1/10/2018    Performed by [facility] at [facility]    ESOPHAGOGASTRODUODENOSCOPY (EGD) N/A 4/15/2016    Performed by Rober [facility] at [facility] END[facility]    ESOPHAGOGASTRODUODENOSCOPY (EGD) N/A 3/18/2016    [facility] at [facility]    EYE SURGERY      TONSILLECTOMY      TUBAL LIGATION       Family History   Problem Relation Age of Onset    Heart disease Mother     Diabetes Father      Social History     Tobacco Use    Smoking status: Former Smoker     Packs/day: 0.50     Years: 45.00     Pack years: 22.50     Start  date: 1959     Last attempt to quit: 2014     Years since quittin.2    Smokeless tobacco: Never Used   Substance Use Topics    Alcohol use: No    Drug use: No     Review of Systems   Constitutional: Positive for diaphoresis. Negative for chills and fever.   HENT: Positive for congestion. Negative for sore throat.    Eyes: Negative for pain and discharge.   Respiratory: Positive for cough. Negative for shortness of breath.    Cardiovascular: Positive for chest pain. Negative for leg swelling.   Gastrointestinal: Positive for nausea. Negative for abdominal pain, constipation, diarrhea and vomiting.   Genitourinary: Negative for dysuria and hematuria.   Musculoskeletal: Negative for back pain and neck pain.   Skin: Negative for rash and wound.   Neurological: Negative for syncope and headaches.   Psychiatric/Behavioral: Negative for behavioral problems and confusion.       Physical Exam     Initial Vitals [08/10/19 0227]   BP Pulse Resp Temp SpO2   138/64 67 20 97.6 °F (36.4 °C) 99 %      MAP       --         Physical Exam    Nursing note and vitals reviewed.  Constitutional: She appears well-developed and well-nourished. She is not diaphoretic.    female who appears stated age, sitting upright in bed   HENT:   Head: Normocephalic and atraumatic.   Nose: Nose normal.   Mouth/Throat: Oropharynx is clear and moist. No oropharyngeal exudate.   Eyes: Conjunctivae and EOM are normal. Pupils are equal, round, and reactive to light.   Neck: Normal range of motion. No JVD present.   Cardiovascular: Normal rate, regular rhythm, normal heart sounds and intact distal pulses. Exam reveals no gallop and no friction rub.    No murmur heard.  2+ radial and DP pulses symmetric bilaterally   Pulmonary/Chest: Breath sounds normal. No stridor. No respiratory distress. She has no wheezes.   Abdominal: Soft. Bowel sounds are normal. She exhibits no distension. There is no tenderness. There is no rebound and no  guarding.   Musculoskeletal: She exhibits no edema or tenderness.   Neurological: She is alert and oriented to person, place, and time.   Skin: Skin is warm and dry. No rash noted.   Psychiatric: She has a normal mood and affect. Her behavior is normal. Thought content normal.         ED Course   Procedures  Labs Reviewed - No data to display  EKG Readings: (Independently Interpreted)   Initial Reading: No STEMI. Rhythm: Normal Sinus Rhythm. Heart Rate: 60. Ectopy: No Ectopy. Conduction: Normal. ST Segments: Normal ST Segments. T Waves Flipped: AVL.       Imaging Results    None                APC / Resident Notes:   SELIN Decker is a 64 y.o. female with PMH of hypertension, hyperlipidemia, diabetes, GERD, who presents with chest pain.  Vital signs notable for overall stability. Physical exam remarkable for no acute focal findings.  DDx - ACS, GERD, pneumonia, viral URI, costochondritis, aortic dissection.  Patient's age precludes Perc negative, but overall have a lower suspicion for PE  Will evaluate with workup including labs, chest x-ray.  EKG largely unremarkable as above.  Patient has already received aspirin and nitroglycerin during transport..  H Taiwo Robbins MD  EM - PGY3  2:52 AM      Patient did have improvement in her chest pain shortly after arrival to emergency department.  Her labs were significant for no major electrolyte abnormalities, normal renal function, no leukocytosis, stable hemoglobin.  D-dimer was positive, so head CT PE which was negative for pulmonary embolism but did show coronary calcifications.  Given patient's heart score of 4, believe she would benefit from admission for further cardiac workup and monitoring.  [facility] has admitted the patient.  5:25 AM                   Clinical Impression:       ICD-10-CM ICD-9-CM   1. Chest pain R07.9 786.50   2. Positive D-dimer R79.89 790.92   3. Nausea R11.0 787.02   4. Diaphoresis R61 780.8                                 Eulalio Robbins MD  Resident  08/10/19 0545

## 2019-08-10 NOTE — SUBJECTIVE & OBJECTIVE
Past Medical History:   Diagnosis Date    Anemia     Encounter for blood transfusion     GERD (gastroesophageal reflux disease)     Hyperlipidemia     MI (myocardial infarction)     Ulcer of the stomach and intestine     pos. for h. pylori       Past Surgical History:   Procedure Laterality Date    APPENDECTOMY      CHOLECYSTECTOMY      COLONOSCOPY N/A 3/18/2016    Performed by Rober Zelaya Jr., MD at [facility] END[facility]    DILATION-ESOPHAGUS N/A 1/10/2018    Performed by Rober Zelaya Jr., MD at [facility] END[facility]    ESOPHAGOGASTRODUODENOSCOPY  04/15/2016    with dil    ESOPHAGOGASTRODUODENOSCOPY (EGD) N/A 1/10/2018    Performed by Rober Zelaya Jr., MD at [facility] END[facility]    ESOPHAGOGASTRODUODENOSCOPY (EGD) N/A 4/15/2016    Performed by Rober Zelaya Jr., MD at [facility] END[facility]    ESOPHAGOGASTRODUODENOSCOPY (EGD) N/A 3/18/2016    [facility] at [facility]    EYE SURGERY      TONSILLECTOMY      TUBAL LIGATION         Review of patient's allergies indicates:  No Known Allergies    No current facility-administered medications on file prior to encounter.      Current Outpatient Medications on File Prior to Encounter   Medication Sig    atorvastatin (LIPITOR) 10 MG tablet Take 10 mg by mouth once daily.    levothyroxine (SYNTHROID) 25 MCG tablet Take 25 mcg by mouth once daily.    lisinopril (PRINIVIL,ZESTRIL) 20 MG tablet Take 20 mg by mouth once daily.    metFORMIN (GLUCOPHAGE) 500 MG tablet Take 500 mg by mouth 2 (two) times daily with meals.    pantoprazole (PROTONIX) 40 MG tablet Take 40 mg by mouth once daily.    propranolol (INDERAL) 80 MG tablet Take 80 mg by mouth once daily.     [DISCONTINUED] albuterol (PROVENTIL/VENTOLIN HFA) 90 mcg/actuation inhaler Inhale 2 puffs into the lungs every 6 (six) hours as needed for Wheezing or Shortness of Breath. Rescue    [DISCONTINUED] ondansetron (ZOFRAN) 4 MG tablet Take 1 tablet (4 mg total) by mouth every 8 (eight) hours as needed.     [DISCONTINUED] oxycodone-acetaminophen (PERCOCET) 5-325 mg per tablet Take 1-2 tablets by mouth every 4 (four) hours as needed for Pain.     Family History     Problem Relation (Age of Onset)    Diabetes Father    Heart disease Mother        Tobacco Use    Smoking status: Former Smoker     Packs/day: 0.50     Years: 45.00     Pack years: 22.50     Start date: 1959     Last attempt to quit: 2014     Years since quittin.2    Smokeless tobacco: Never Used   Substance and Sexual Activity    Alcohol use: No    Drug use: No    Sexual activity: Never     Review of Systems   Constitutional: Positive for diaphoresis and fatigue. Negative for activity change, chills and fever.   HENT: Negative for congestion, drooling, ear pain, hearing loss, nosebleeds, rhinorrhea, sinus pressure, sore throat and trouble swallowing.    Eyes: Negative for pain and visual disturbance.   Respiratory: Negative for apnea, cough, choking, chest tightness, shortness of breath and wheezing.    Cardiovascular: Positive for chest pain. Negative for palpitations and leg swelling.   Gastrointestinal: Negative for abdominal distention, abdominal pain, blood in stool, constipation, diarrhea, nausea and vomiting.   Endocrine: Negative for polydipsia, polyphagia and polyuria.   Genitourinary: Negative for decreased urine volume, dysuria, flank pain, frequency and hematuria.   Musculoskeletal: Negative for back pain, gait problem, joint swelling, myalgias, neck pain and neck stiffness.   Skin: Negative for rash and wound.   Neurological: Positive for light-headedness and numbness. Negative for dizziness, tremors, seizures, syncope, facial asymmetry, speech difficulty, weakness and headaches.   Psychiatric/Behavioral: Negative for behavioral problems, confusion, hallucinations and sleep disturbance. The patient is not nervous/anxious.      Objective:     Vital Signs (Most Recent):  Temp: 97.6 °F (36.4 °C) (08/10/19 0227)  Pulse: 66 (08/10/19  0410)  Resp: 20 (08/10/19 0410)  BP: (!) 142/62 (08/10/19 0410)  SpO2: 98 % (08/10/19 0410) Vital Signs (24h Range):  Temp:  [97.6 °F (36.4 °C)] 97.6 °F (36.4 °C)  Pulse:  [64-67] 66  Resp:  [20-33] 20  SpO2:  [98 %-99 %] 98 %  BP: (130-155)/(61-67) 142/62     Weight: 54.4 kg (120 lb)  Body mass index is 22.67 kg/m².    Physical Exam   Constitutional: She is oriented to person, place, and time. She appears well-developed.   Eyes: Pupils are equal, round, and reactive to light.   Neck: Normal range of motion. Neck supple.   Cardiovascular: Normal rate, regular rhythm, normal heart sounds and intact distal pulses.   Pulmonary/Chest: Effort normal and breath sounds normal.   Abdominal: Soft. Bowel sounds are normal.   Musculoskeletal: Normal range of motion.   Neurological: She is oriented to person, place, and time.   Skin: Skin is warm and dry. Capillary refill takes less than 2 seconds.   Psychiatric: She has a normal mood and affect.         CRANIAL NERVES     CN III, IV, VI   Pupils are equal, round, and reactive to light.    CN V   Facial sensation intact.     CN VII   Facial expression full, symmetric.     CN VIII   CN VIII normal.     CN IX, X   CN IX normal.   CN X normal.     CN XI   CN XI normal.     CN XII   CN XII normal.        Significant Labs:   CBC:   Recent Labs   Lab 08/10/19  0229   WBC 12.09   HGB 13.4   HCT 42.7        CMP:   Recent Labs   Lab 08/10/19  0229      K 3.9      CO2 26   *   BUN 16   CREATININE 0.7   CALCIUM 9.6   PROT 7.6   ALBUMIN 3.8   BILITOT 0.4   ALKPHOS 53*   AST 19   ALT 17   ANIONGAP 10   EGFRNONAA >60.0     Cardiac Markers:   Recent Labs   Lab 08/10/19  0229   BNP 26     Coagulation:   Recent Labs   Lab 08/10/19  0229   INR 1.1   APTT 34.0     Lipid Panel: No results for input(s): CHOL, HDL, LDLCALC, TRIG, CHOLHDL in the last 48 hours.  Magnesium:   Recent Labs   Lab 08/10/19  0229   MG 1.6     Troponin:   Recent Labs   Lab 08/10/19  0229   TROPONINI  <0.030     TSH: No results for input(s): TSH in the last 4320 hours.  Urine Studies: No results for input(s): COLORU, APPEARANCEUA, PHUR, SPECGRAV, PROTEINUA, GLUCUA, KETONESU, BILIRUBINUA, OCCULTUA, NITRITE, UROBILINOGEN, LEUKOCYTESUR, RBCUA, WBCUA, BACTERIA, SQUAMEPITHEL, HYALINECASTS in the last 48 hours.    Invalid input(s): WRIGHTSUR  All pertinent labs within the past 24 hours have been reviewed.    Significant Imaging: I have reviewed all pertinent imaging results/findings within the past 24 hours.   Cta Chest Non-coronary (pe Study)    Result Date: 8/10/2019  CMS MANDATED QUALITY DATA-CT RADIATION-436 HISTORY: Acute chest pain, pulmonary embolism suspected, positive d-dimer. FINDINGS: Thin axial imaging through the chest was performed with 100 mL Omnipaque 350 IV contrast, with sagittal and coronal images, MIPS, and or 3D reconstructions performed. All CT exams at this facility use dose modulation, iterative reconstruction, and or weight based dosing when appropriate to reduce radiation dose to as low as reasonably achievable. Comparison to multiple prior exams. There are no pulmonary arterial filling defects to suggest pulmonary thromboembolism. The central pulmonary arteries are normal in caliber. The aorta enhances normally, with scattered calcified plaque, and no aortic dissection or evidence of aortic intramural hematoma. There are scattered coronary arterial vascular calcifications, with the heart upper limits of normal in size. There is no pericardial effusion, with no mediastinal or hilar lymph node enlargement. No mediastinal mass or fluid collection. The lungs are normally expanded, with tiny scattered centrilobular nodular opacities with faint groundglass densities, in the apical segment of the right lower lobe and in the right upper lobe anteriorly, suggesting nonspecific bronchiolitis. There is apical subpleural atelectasis, with no consolidation or evidence of interstitial pulmonary edema. No  pleural effusion or pneumothorax. The central airways are patent, with no bronchiectasis. Images of the upper abdomen show cholecystectomy clips, and are otherwise unremarkable.     IMPRESSION:   1. Negative for pulmonary thromboembolism.   2. Coronary arterial vascular calcifications.   3. Mild scattered nonspecific bronchiolitis in the right upper lobe and right lower lobe.     Electronically Signed by Raimundo KERNS on 8/10/2019 4:23 AM

## 2019-08-10 NOTE — ASSESSMENT & PLAN NOTE
- Cardiac disease seen on CT of chest  - Trend CE/Troponin  - 2 D echo complete  - if negative stress test  -Cards Consult  - ASA-O2-statin- nitrate paste  -VTE LMWH

## 2019-08-10 NOTE — ASSESSMENT & PLAN NOTE
- Low dose Novolog s/s insulin  - Hold Metformin for now till all cardiac testing complete  HGA1C

## 2019-08-10 NOTE — PROGRESS NOTES
@  13:47   OBTAINED STRESS IMAGES.          @  14:30  NUCLEAR MEDICINE MYOPERFUSION STUDY COMPLETED.      YESI ZUÑIGA

## 2019-08-10 NOTE — DISCHARGE SUMMARY
"Counts include 234 beds at the [facility] Medicine  Discharge Summary      Patient Name: Liv Decker  MRN: [id number]  Admission Date: 8/10/2019  Hospital Length of Stay: 0 days  Discharge Date and Time:  08/10/2019 4:36 PM  Attending Physician: Tony June DO   Discharging Provider: Keke Guy NP  Primary Care Provider: Gemma Banerjee Jr, MD      HPI:   64 year old  female presents to the ED withcomplaints of chest pain     PMHx: HTN, CAD, HLP, Hypothyroidism and tobacco use  Fx: CAD/T2DM, CHF    Pt states today while at work around 10 pm she developed a sudden onset of left sided chest pain located to the area under her left breast with radiation to her left arm.  She describes the pain as a sharp sensation with associated nausea, diaphoresis and lightheadedness.  She rated the pain a 7/10 intensity at its worse.  She was give 4 ESASA and nitro spray with improvement in her chest pain PTA.    * No surgery found *      Hospital Course:   Admitted overnight for cardiac workup and PE ruled out. Cardiac labs and stress test negative. All other labs essentially normal. Non Cardiac CTA "There are scattered coronary arterial vascular calcifications, with the heart upper limits of normal in size."     Encouraged to stop smoking and discuss treatment with PCP.     Patient is pain free agreeable to discharge home and follow up with PCP as scheduled on 8/26/19.  NTG 0.4 SL ordered for d/c, NP did patient education with pt to verbalized understanding.      Consults:   Consults (From admission, onward)        Status Ordering Provider     Inpatient consult to Cardiology  Once     Provider:  Ciara Her MD    Completed SINA ANDRE      Patient was seen by Dr. Walls, neg stress test thus MD signed off.     * Chest pain-resolved as of 8/10/2019  - Cardiac disease seen on CT of chest  - Trend CE/Troponin  - 2 D echo complete  - if negative stress test  -Cards Consult  - ASA-O2-statin- " nitrate paste  -VTE LMWH      HTN (hypertension)  - continue home medications  - -continue outpatient regimen and POC with PCP          Tobacco abuse  - cessation discussed and encourage  (pt states she smokes about 8 cigarettes daily)      Hyperlipidemia  -LFTs stable continue statin  -continue outpatient regimen and POC with PCP      Type 2 diabetes mellitus  - HbgA1c is <7% well controlled  -continue outpatient regimen and POC with PCP            Final Active Diagnoses:    Diagnosis Date Noted POA    Type 2 diabetes mellitus [E11.9] 08/10/2019 Unknown    Hyperlipidemia [E78.5] 08/10/2019 Yes     Chronic    Tobacco abuse [Z72.0] 08/10/2019 Yes     Chronic    HTN (hypertension) [I10] 08/10/2019 Yes     Chronic      Problems Resolved During this Admission:    Diagnosis Date Noted Date Resolved POA    PRINCIPAL PROBLEM:  Chest pain [R07.9] 08/10/2019 08/10/2019 Yes       Discharged Condition: stable    Disposition: Home or Self Care    Follow Up:  Follow-up Information     Gemma Banerjee Jr, MD On 8/26/2019.    Specialty:  Family Medicine  Contact information:  [address] [place] [id number]  [phone]                 Patient Instructions:      Diet diabetic     Activity as tolerated       Significant Diagnostic Studies: Labs: All labs within the past 24 hours have been reviewed  Microbiology: Blood Culture No results found for: LABBLOO and Urine Culture  No results found for: LABURIN  Radiology: X-Ray: CXR: X-Ray Chest 1 View (CXR): No results found for this visit on 08/10/19.  CT scan: CT ABDOMEN PELVIS WITH CONTRAST: No results found for this visit on 08/10/19.  Nuclear Medicine:   Cardiac Graphics: Echocardiogram: 2D echo with color flow doppler: No results found for this or any previous visit. and Stress Test:     Pending Diagnostic Studies:     None         Medications:  Reconciled Home Medications:      Medication List      CONTINUE taking these medications    atorvastatin 10 MG tablet  Commonly  known as:  LIPITOR  Take 10 mg by mouth once daily.     levothyroxine 25 MCG tablet  Commonly known as:  SYNTHROID  Take 25 mcg by mouth once daily.     lisinopril 20 MG tablet  Commonly known as:  PRINIVIL,ZESTRIL  Take 20 mg by mouth once daily.     metFORMIN 500 MG tablet  Commonly known as:  GLUCOPHAGE  Take 500 mg by mouth 2 (two) times daily with meals.     pantoprazole 40 MG tablet  Commonly known as:  PROTONIX  Take 40 mg by mouth once daily.     propranolol 80 MG tablet  Commonly known as:  INDERAL  Take 80 mg by mouth once daily.            Indwelling Lines/Drains at time of discharge:   Lines/Drains/Airways          None          Time spent on the discharge of patient: 40 minutes  Patient was seen and examined on the date of discharge and determined to be suitable for discharge.    Patient discussed with Dr. Garcia.    Follow up with PCP 8/26 as scheduled.  Ntg 0.4 sl ordered for d/c, NP did patient edu with pt to verbalize understanding.   Encouraged to stop smoking        Keke Guy NP  Department of Hospital Medicine  formerly [facility]

## 2019-08-10 NOTE — HPI
64 year old  female presents to the ED withcomplaints of chest pain     PMHx: HTN, CAD, HLP, Hypothyroidism and tobacco use  Fx: CAD/T2DM, CHF    Pt states today while at work around 10 pm she developed a sudden onset of left sided chest pain located to the area under her left breast with radiation to her left arm.  She describes the pain as a sharp sensation with associated nausea, diaphoresis and lightheadedness.  She rated the pain a 7/10 intensity at its worse.  She was give 4 ESASA and nitro spray with improvement in her chest pain PTA.

## 2019-08-11 LAB
CV PHARM DOSE: 0.4 MG
CV STRESS BASE HR: 74 BPM
DIASTOLIC BLOOD PRESSURE: 71 MMHG
OHS CV CPX 85 PERCENT MAX PREDICTED HEART RATE MALE: 127
OHS CV CPX MAX PREDICTED HEART RATE: 150
OHS CV CPX PATIENT IS FEMALE: 1
OHS CV CPX PATIENT IS MALE: 0
OHS CV CPX PEAK DIASTOLIC BLOOD PRESSURE: 50 MMHG
OHS CV CPX PEAK HEAR RATE: 90 BPM
OHS CV CPX PEAK RATE PRESSURE PRODUCT: 9900
OHS CV CPX PEAK SYSTOLIC BLOOD PRESSURE: 110 MMHG
OHS CV CPX PERCENT MAX PREDICTED HEART RATE ACHIEVED: 60
OHS CV CPX RATE PRESSURE PRODUCT PRESENTING: 8954
STRESS ECHO POST EXERCISE DUR MIN: 4 MINUTES
STRESS ECHO TARGET HR: 132.6 BPM
SYSTOLIC BLOOD PRESSURE: 121 MMHG

## 2020-02-14 ENCOUNTER — OFFICE VISIT (OUTPATIENT)
Dept: FAMILY MEDICINE | Facility: CLINIC | Age: 65
End: 2020-02-14
Payer: MEDICARE

## 2020-02-14 VITALS
HEART RATE: 64 BPM | BODY MASS INDEX: 21.14 KG/M2 | SYSTOLIC BLOOD PRESSURE: 114 MMHG | OXYGEN SATURATION: 97 % | WEIGHT: 112 LBS | DIASTOLIC BLOOD PRESSURE: 80 MMHG | HEIGHT: 61 IN

## 2020-02-14 DIAGNOSIS — E03.9 ACQUIRED HYPOTHYROIDISM: ICD-10-CM

## 2020-02-14 DIAGNOSIS — Z11.59 ENCOUNTER FOR HEPATITIS C SCREENING TEST FOR LOW RISK PATIENT: ICD-10-CM

## 2020-02-14 DIAGNOSIS — E11.9 TYPE 2 DIABETES MELLITUS WITHOUT COMPLICATION, WITHOUT LONG-TERM CURRENT USE OF INSULIN: Primary | ICD-10-CM

## 2020-02-14 DIAGNOSIS — E55.9 VITAMIN D DEFICIENCY: ICD-10-CM

## 2020-02-14 DIAGNOSIS — E78.5 DYSLIPIDEMIA: ICD-10-CM

## 2020-02-14 DIAGNOSIS — K27.9 PUD (PEPTIC ULCER DISEASE): ICD-10-CM

## 2020-02-14 DIAGNOSIS — Z78.0 MENOPAUSE: ICD-10-CM

## 2020-02-14 DIAGNOSIS — F17.210 CIGARETTE SMOKER: ICD-10-CM

## 2020-02-14 DIAGNOSIS — I10 ESSENTIAL HYPERTENSION: ICD-10-CM

## 2020-02-14 DIAGNOSIS — Z12.31 SCREENING MAMMOGRAM, ENCOUNTER FOR: ICD-10-CM

## 2020-02-14 PROCEDURE — 99204 OFFICE O/P NEW MOD 45 MIN: CPT | Mod: S$GLB,,, | Performed by: NURSE PRACTITIONER

## 2020-02-14 PROCEDURE — 99406 PR TOBACCO USE CESSATION INTERMEDIATE 3-10 MINUTES: ICD-10-PCS | Mod: S$GLB,,, | Performed by: NURSE PRACTITIONER

## 2020-02-14 PROCEDURE — 3074F PR MOST RECENT SYSTOLIC BLOOD PRESSURE < 130 MM HG: ICD-10-PCS | Mod: S$GLB,,, | Performed by: NURSE PRACTITIONER

## 2020-02-14 PROCEDURE — 3074F SYST BP LT 130 MM HG: CPT | Mod: S$GLB,,, | Performed by: NURSE PRACTITIONER

## 2020-02-14 PROCEDURE — 3008F PR BODY MASS INDEX (BMI) DOCUMENTED: ICD-10-PCS | Mod: S$GLB,,, | Performed by: NURSE PRACTITIONER

## 2020-02-14 PROCEDURE — 1100F PTFALLS ASSESS-DOCD GE2>/YR: CPT | Mod: S$GLB,,, | Performed by: NURSE PRACTITIONER

## 2020-02-14 PROCEDURE — 3079F DIAST BP 80-89 MM HG: CPT | Mod: S$GLB,,, | Performed by: NURSE PRACTITIONER

## 2020-02-14 PROCEDURE — 3288F FALL RISK ASSESSMENT DOCD: CPT | Mod: S$GLB,,, | Performed by: NURSE PRACTITIONER

## 2020-02-14 PROCEDURE — 3288F PR FALLS RISK ASSESSMENT DOCUMENTED: ICD-10-PCS | Mod: S$GLB,,, | Performed by: NURSE PRACTITIONER

## 2020-02-14 PROCEDURE — 1100F PR PT FALLS ASSESS DOC 2+ FALLS/FALL W/INJURY/YR: ICD-10-PCS | Mod: S$GLB,,, | Performed by: NURSE PRACTITIONER

## 2020-02-14 PROCEDURE — 3044F PR MOST RECENT HEMOGLOBIN A1C LEVEL <7.0%: ICD-10-PCS | Mod: S$GLB,,, | Performed by: NURSE PRACTITIONER

## 2020-02-14 PROCEDURE — 99406 BEHAV CHNG SMOKING 3-10 MIN: CPT | Mod: S$GLB,,, | Performed by: NURSE PRACTITIONER

## 2020-02-14 PROCEDURE — 3044F HG A1C LEVEL LT 7.0%: CPT | Mod: S$GLB,,, | Performed by: NURSE PRACTITIONER

## 2020-02-14 PROCEDURE — 3008F BODY MASS INDEX DOCD: CPT | Mod: S$GLB,,, | Performed by: NURSE PRACTITIONER

## 2020-02-14 PROCEDURE — 3079F PR MOST RECENT DIASTOLIC BLOOD PRESSURE 80-89 MM HG: ICD-10-PCS | Mod: S$GLB,,, | Performed by: NURSE PRACTITIONER

## 2020-02-14 PROCEDURE — 99204 PR OFFICE/OUTPT VISIT, NEW, LEVL IV, 45-59 MIN: ICD-10-PCS | Mod: S$GLB,,, | Performed by: NURSE PRACTITIONER

## 2020-02-14 RX ORDER — ERGOCALCIFEROL 1.25 MG/1
50000 CAPSULE ORAL
COMMUNITY
End: 2020-05-20 | Stop reason: SDUPTHER

## 2020-02-14 NOTE — PROGRESS NOTES
SUBJECTIVE:    Patient ID: Liv Decker is a 65 y.o. female.    Chief Complaint: Establish Care (pt has list of meds tb//set up for foot exam// has an appt for eye exam// order for mammogram,dexa scan )    Pt here for new pt appt- [facility] Ban[facility]  Pt denies any c/o's today.  Hx of HTN- well controlled on medication  Hx of DM2 on metformin for past year or so- reports had labs drawn about 3 months ago for Dr. Banerjee and told everything looked good  Just restarted smoking about 3 months ago- smoking 1/2 ppd now but hx of 40pyh.  Denies any history of COPD however mild hyperexpansion noted on chest x-ray.  Denies any wheezing, occasional short of breath with heavy exertion though resolves quickly with rest, no use of inhalers.  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   Latest known visit with results is:   Admission on 08/10/2019, Discharged on 08/10/2019   Component Date Value Ref Range Status    WBC 08/10/2019 12.09  3.90 - 12.70 K/uL Final    RBC 08/10/2019 4.79  4.00 - 5.40 M/uL Final    Hemoglobin 08/10/2019 13.4  12.0 - 16.0 g/dL Final    Hematocrit 08/10/2019 42.7  37.0 - 48.5 % Final    Mean Corpuscular Volume 08/10/2019 89  82 - 98 fL Final    Mean Corpuscular Hemoglobin 08/10/2019 28.0  27.0 - 31.0 pg Final    Mean Corpuscular Hemoglobin Conc 08/10/2019 31.4* 32.0 - 36.0 g/dL Final    RDW 08/10/2019 15.1* 11.5 - 14.5 % Final    Platelets 08/10/2019 221  150 - 350 K/uL Final    MPV 08/10/2019 12.4  9.2 - 12.9 fL Final    Immature Granulocytes 08/10/2019 0.6* 0.0 - 0.5 % Final    Gran # (ANC) 08/10/2019 8.2* 1.8 - 7.7 K/uL Final    Immature Grans (Abs) 08/10/2019 0.07* 0.00 - 0.04 K/uL Final    Lymph # 08/10/2019 2.9  1.0 - 4.8 K/uL Final     Mono # 08/10/2019 0.8  0.3 - 1.0 K/uL Final    Eos # 08/10/2019 0.1  0.0 - 0.5 K/uL Final    Baso # 08/10/2019 0.06  0.00 - 0.20 K/uL Final    nRBC 08/10/2019 0  0 /100 WBC Final    Gran% 08/10/2019 67.5  38.0 - 73.0 % Final    Lymph% 08/10/2019 24.0  18.0 - 48.0 % Final    Mono% 08/10/2019 6.3  4.0 - 15.0 % Final    Eosinophil% 08/10/2019 1.1  0.0 - 8.0 % Final    Basophil% 08/10/2019 0.5  0.0 - 1.9 % Final    Differential Method 08/10/2019 Automated   Final    Sodium 08/10/2019 143  136 - 145 mmol/L Final    Potassium 08/10/2019 3.9  3.5 - 5.1 mmol/L Final    Chloride 08/10/2019 107  95 - 110 mmol/L Final    CO2 08/10/2019 26  23 - 29 mmol/L Final    Glucose 08/10/2019 127* 70 - 110 mg/dL Final    BUN, Bld 08/10/2019 16  8 - 23 mg/dL Final    Creatinine 08/10/2019 0.7  0.5 - 1.4 mg/dL Final    Calcium 08/10/2019 9.6  8.7 - 10.5 mg/dL Final    Total Protein 08/10/2019 7.6  6.0 - 8.4 g/dL Final    Albumin 08/10/2019 3.8  3.5 - 5.2 g/dL Final    Total Bilirubin 08/10/2019 0.4  0.1 - 1.0 mg/dL Final    Alkaline Phosphatase 08/10/2019 53* 55 - 135 U/L Final    AST 08/10/2019 19  10 - 40 U/L Final    ALT 08/10/2019 17  10 - 44 U/L Final    Anion Gap 08/10/2019 10  8 - 16 mmol/L Final    eGFR if African American 08/10/2019 >60.0  >60 mL/min/1.73 m^2 Final    eGFR if non African American 08/10/2019 >60.0  >60 mL/min/1.73 m^2 Final    Troponin I 08/10/2019 <0.030  0.020 - 0.040 ng/mL Final    BNP 08/10/2019 26  0 - 99 pg/mL Final    D-Dimer 08/10/2019 1.46* <0.50 ug/mL FEU Final    aPTT 08/10/2019 34.0  26.2 - 34.7 sec Final    Magnesium 08/10/2019 1.6  1.6 - 2.6 mg/dL Final    PT 08/10/2019 13.5  11.7 - 14.0 sec Final    INR 08/10/2019 1.1   Final    Hemoglobin A1C 08/10/2019 5.9  4.5 - 6.2 % Final    Estimated Avg Glucose 08/10/2019 123  68 - 131 mg/dL Final    Troponin I 08/10/2019 <0.030  0.020 - 0.040 ng/mL Final    Troponin I 08/10/2019 <0.030  0.020 - 0.040 ng/mL Final    Target  HR 08/10/2019 132.60  bpm Final    Exercise duration (min) 08/10/2019 4  minutes Final    dose 08/10/2019 0.4  mg Final    HR at rest 08/10/2019 74.0  bpm Final    Systolic blood pressure 08/10/2019 121.0  mmHg Final    Diastolic blood pressure 08/10/2019 71.0  mmHg Final    RPP 08/10/2019 8,954   Final    Peak HR 08/10/2019 90.0  bpm Final    Peak Systolic BP 08/10/2019 110.0  mmHg Final    Peak Diatolic BP 08/10/2019 50.0  mmHg Final    Peak RPP 08/10/2019 9,900   Final    85% Max Predicted HR 08/10/2019 127   Final    % Max HR Achieved 08/10/2019 60   Final    Max Predicted HR 08/10/2019 150   Final    OHS CV CPX PATIENT IS MALE 08/10/2019 0   Final    [facility] CV CPX PATIENT IS FEMALE 08/10/2019 1   Final       Past Medical History:   Diagnosis Date    Anemia     Diabetes mellitus     Encounter for blood transfusion     GERD (gastroesophageal reflux disease)     Hyperlipidemia     MI (myocardial infarction)     Ulcer of the stomach and intestine     pos. for h. pylori     Past Surgical History:   Procedure Laterality Date    APPENDECTOMY      CHOLECYSTECTOMY      COLONOSCOPY N/A 3/18/2016    Procedure: COLONOSCOPY;  Surgeon: Rober Zelaya Jr., MD;  Location: [facility];  Service: Endoscopy;  Laterality: N/A;    ESOPHAGOGASTRODUODENOSCOPY  04/15/2016    with dil    EYE SURGERY      TONSILLECTOMY      TUBAL LIGATION       Family History   Problem Relation Age of Onset    Heart disease Mother     Diabetes Father        Marital Status:   Alcohol History:  reports that she does not drink alcohol.  Tobacco History:  reports that she has been smoking cigarettes. She started smoking about 61 years ago. She has a 11.25 pack-year smoking history. She has never used smokeless tobacco.  Drug History:  reports that she does not use drugs.    Review of patient's allergies indicates:  No Known Allergies    Current Outpatient Medications:     atorvastatin (LIPITOR) 10 MG tablet, Take 10 mg by  "mouth once daily., Disp: , Rfl:     ergocalciferol (VITAMIN D2) 50,000 unit Cap, Take 50,000 Units by mouth every 7 days., Disp: , Rfl:     levothyroxine (SYNTHROID) 25 MCG tablet, Take 25 mcg by mouth once daily., Disp: , Rfl:     lisinopril (PRINIVIL,ZESTRIL) 20 MG tablet, Take 20 mg by mouth once daily., Disp: , Rfl:     metFORMIN (GLUCOPHAGE) 500 MG tablet, Take 500 mg by mouth 2 (two) times daily with meals., Disp: , Rfl:     pantoprazole (PROTONIX) 40 MG tablet, Take 40 mg by mouth once daily., Disp: , Rfl:     propranolol (INDERAL) 80 MG tablet, Take 80 mg by mouth once daily. , Disp: , Rfl:     Review of Systems   Constitutional: Negative for appetite change, chills, fever and unexpected weight change.   Respiratory: Negative for cough, shortness of breath and wheezing.    Cardiovascular: Positive for leg swelling (at end of shift). Negative for chest pain and palpitations.   Gastrointestinal: Negative for abdominal pain, constipation and diarrhea.   Genitourinary: Negative for dysuria, frequency and hematuria.   Musculoskeletal: Negative for back pain and gait problem.   Skin: Negative for rash.   Neurological: Positive for headaches (improved with propranolol). Negative for dizziness, syncope and numbness.   Psychiatric/Behavioral: Negative for dysphoric mood. The patient is not nervous/anxious.           Objective:      Vitals:    02/14/20 0901   BP: 114/80   Pulse: 64   SpO2: 97%   Weight: 50.8 kg (112 lb)   Height: 5' 1" (1.549 m)     Physical Exam   Constitutional: She is oriented to person, place, and time. She appears well-developed and well-nourished.   Thin white female   HENT:   Head: Normocephalic and atraumatic.   Right Ear: Tympanic membrane and ear canal normal.   Left Ear: Tympanic membrane and ear canal normal.   Mouth/Throat: Mucous membranes are normal. No posterior oropharyngeal erythema.   Neck: Neck supple. Carotid bruit is not present.   Cardiovascular: Normal rate and regular " rhythm. Exam reveals no gallop and no friction rub.   No murmur heard.  Pulses:       Dorsalis pedis pulses are 1+ on the right side, and 1+ on the left side.   Pulmonary/Chest: Effort normal and breath sounds normal. No respiratory distress. She has no wheezes. She has no rales.   Abdominal: Soft. She exhibits no distension. There is no tenderness.   Musculoskeletal: She exhibits no edema.        Right foot: There is no deformity.        Left foot: There is no deformity.   Feet:   Right Foot:   Protective Sensation: 5 sites tested. 5 sites sensed.   Skin Integrity: Negative for ulcer, erythema, warmth or callus.   Left Foot:   Protective Sensation: 5 sites tested. 5 sites sensed.   Skin Integrity: Negative for ulcer, erythema, warmth (Bilateral toes cool to touch left greater than right with slight purplish discoloration) or callus.   Lymphadenopathy:     She has no cervical adenopathy.   Neurological: She is alert and oriented to person, place, and time. She has normal strength. Gait normal.   Skin: Skin is warm and dry. No rash noted.   Psychiatric: She has a normal mood and affect.   Nursing note and vitals reviewed.      Assistance with smoking cessation was offered, including:  [x]  Medications  [x]  Counseling  []  Printed Information on Smoking Cessation  [x]  Referral to a Smoking Cessation Program    Patient was counseled regarding smoking for 3-10 minutes.    Assessment:       1. Type 2 diabetes mellitus without complication, without long-term current use of insulin    2. Essential hypertension    3. Dyslipidemia    4. Acquired hypothyroidism    5. PUD (peptic ulcer disease)    6. Menopause    7. Screening mammogram, encounter for    8. Cigarette smoker    9. Vitamin D deficiency    10. Encounter for hepatitis C screening test for low risk patient           Plan:       Type 2 diabetes mellitus without complication, without long-term current use of insulin  -     CBC auto differential; Future; Expected  date: 02/14/2020  -     Comprehensive metabolic panel; Future; Expected date: 02/14/2020  -     Lipid panel; Future; Expected date: 02/14/2020  -     TSH w/reflex to FT4; Future; Expected date: 02/14/2020  -     Microalbumin/creatinine urine ratio; Future  -     Urinalysis, Reflex to Urine Culture Urine, Clean Catch; Future  -     Hemoglobin A1c; Future; Expected date: 02/14/2020  -will request most recent labs from Dr. Banerjee however advised to repeat labs before follow-up visit in 3 months    Essential hypertension  -BP well controlled, continue current med    Dyslipidemia  -recheck lipids before next visit    Acquired hypothyroidism  -stable    PUD (peptic ulcer disease)  -well controlled on PPI    Menopause  -     DXA Bone Density Spine And Hip; Future; Expected date: 02/14/2020  -cautioned patient is high risk for osteoporosis giving thin build and smoking, will send for DEXA    Screening mammogram, encounter for  -     Mammo Digital Screening Bilat; Future; Expected date: 02/14/2020    Cigarette smoker  -smoking cessation counseling provided    Vitamin D deficiency  -     Vitamin D; Future; Expected date: 02/14/2020    Encounter for hepatitis C screening test for low risk patient  -     Hepatitis C Ab w/ Rfx to HCV RNA, Quant; Future; Expected date: 02/14/2020      Follow up in about 3 months (around 5/14/2020) for Labs before next visit, Diabetes, HTN.        2/14/2020 Liv Parrish NP

## 2020-02-17 ENCOUNTER — TELEPHONE (OUTPATIENT)
Dept: FAMILY MEDICINE | Facility: CLINIC | Age: 65
End: 2020-02-17

## 2020-02-28 ENCOUNTER — HOSPITAL ENCOUNTER (OUTPATIENT)
Dept: RADIOLOGY | Facility: HOSPITAL | Age: 65
Discharge: HOME | End: 2020-02-28
Attending: NURSE PRACTITIONER
Payer: MEDICARE

## 2020-02-28 DIAGNOSIS — Z12.31 SCREENING MAMMOGRAM, ENCOUNTER FOR: ICD-10-CM

## 2020-02-28 DIAGNOSIS — Z78.0 MENOPAUSE: ICD-10-CM

## 2020-02-28 PROCEDURE — 77067 SCR MAMMO BI INCL CAD: CPT | Mod: TC,PO

## 2020-02-28 PROCEDURE — 77080 DXA BONE DENSITY AXIAL: CPT | Mod: TC,PO

## 2020-02-28 NOTE — PROGRESS NOTES
Please call patient let her know bone density test shows osteoporosis which places her at increased risk for bone fractures.  I recommend treatment for osteoporosis however given history of peptic ulcer disease would recommend Prolia 60mg injections every 6 months instead of the oral medication usually used- will need prolia treatment plan started.  Also recommend calcium supplement 600-1200 mg daily and weight-bearing exercise.  Repeat DEXA in 2 years

## 2020-03-02 ENCOUNTER — TELEPHONE (OUTPATIENT)
Dept: FAMILY MEDICINE | Facility: CLINIC | Age: 65
End: 2020-03-02

## 2020-03-02 DIAGNOSIS — M81.0 AGE-RELATED OSTEOPOROSIS WITHOUT CURRENT PATHOLOGICAL FRACTURE: ICD-10-CM

## 2020-03-02 RX ORDER — CALCIUM CARBONATE 600 MG
600 TABLET ORAL DAILY
COMMUNITY

## 2020-03-02 NOTE — PROGRESS NOTES
Spoke to patient with mammogram and dexa scan results verbatim per Liv. Verbalized understanding on all. Calcium added to chart. Agrees to Prolia injection every 6 months.  I told patient to call our office if she doesn't hear anything on Prolia in the next week or so. To pool to get Prolia injection protocol going.

## 2020-03-02 NOTE — TELEPHONE ENCOUNTER
----- Message from Liv Parrish NP sent at 2/28/2020  3:19 PM CST -----  Please call patient let her know bone density test shows osteoporosis which places her at increased risk for bone fractures.  I recommend treatment for osteoporosis however given history of peptic ulcer disease would recommend Prolia 60mg injections every 6 months instead of the oral medication usually used- will need prolia treatment plan started.  Also recommend calcium supplement 600-1200 mg daily and weight-bearing exercise.  Repeat DEXA in 2 years    Result Notes for Mammo Digital Screening Bilat w/ Ben     Notes recorded by Liv Parrish NP on 2/28/2020 at 3:19 PM CST  Please let patient know mammogram was negative- repeat in 1 year

## 2020-03-03 PROBLEM — M81.0 AGE-RELATED OSTEOPOROSIS WITHOUT CURRENT PATHOLOGICAL FRACTURE: Status: ACTIVE | Noted: 2020-03-03

## 2020-04-16 ENCOUNTER — OFFICE VISIT (OUTPATIENT)
Dept: FAMILY MEDICINE | Facility: CLINIC | Age: 65
End: 2020-04-16
Payer: MEDICARE

## 2020-04-16 ENCOUNTER — OFFICE VISIT (OUTPATIENT)
Dept: PRIMARY CARE CLINIC | Facility: CLINIC | Age: 65
End: 2020-04-16
Payer: MEDICARE

## 2020-04-16 ENCOUNTER — TELEPHONE (OUTPATIENT)
Dept: FAMILY MEDICINE | Facility: CLINIC | Age: 65
End: 2020-04-16

## 2020-04-16 VITALS
TEMPERATURE: 98 F | OXYGEN SATURATION: 98 % | SYSTOLIC BLOOD PRESSURE: 118 MMHG | DIASTOLIC BLOOD PRESSURE: 72 MMHG | HEART RATE: 68 BPM

## 2020-04-16 DIAGNOSIS — E11.9 TYPE 2 DIABETES MELLITUS WITHOUT COMPLICATION, WITHOUT LONG-TERM CURRENT USE OF INSULIN: ICD-10-CM

## 2020-04-16 DIAGNOSIS — Z20.822 SUSPECTED COVID-19 VIRUS INFECTION: Primary | ICD-10-CM

## 2020-04-16 DIAGNOSIS — R05.9 COUGH: Primary | ICD-10-CM

## 2020-04-16 DIAGNOSIS — F17.210 CIGARETTE SMOKER: ICD-10-CM

## 2020-04-16 PROCEDURE — 3288F FALL RISK ASSESSMENT DOCD: CPT | Mod: 95,,, | Performed by: NURSE PRACTITIONER

## 2020-04-16 PROCEDURE — 3044F PR MOST RECENT HEMOGLOBIN A1C LEVEL <7.0%: ICD-10-PCS | Mod: 95,,, | Performed by: NURSE PRACTITIONER

## 2020-04-16 PROCEDURE — 99203 PR OFFICE/OUTPT VISIT, NEW, LEVL III, 30-44 MIN: ICD-10-PCS | Mod: S$GLB,,, | Performed by: INTERNAL MEDICINE

## 2020-04-16 PROCEDURE — 1100F PR PT FALLS ASSESS DOC 2+ FALLS/FALL W/INJURY/YR: ICD-10-PCS | Mod: 95,,, | Performed by: NURSE PRACTITIONER

## 2020-04-16 PROCEDURE — 3288F PR FALLS RISK ASSESSMENT DOCUMENTED: ICD-10-PCS | Mod: 95,,, | Performed by: NURSE PRACTITIONER

## 2020-04-16 PROCEDURE — U0002 COVID-19 LAB TEST NON-CDC: HCPCS

## 2020-04-16 PROCEDURE — 1100F PTFALLS ASSESS-DOCD GE2>/YR: CPT | Mod: 95,,, | Performed by: NURSE PRACTITIONER

## 2020-04-16 PROCEDURE — 99203 OFFICE O/P NEW LOW 30 MIN: CPT | Mod: S$GLB,,, | Performed by: INTERNAL MEDICINE

## 2020-04-16 PROCEDURE — 99212 OFFICE O/P EST SF 10 MIN: CPT | Mod: 95,,, | Performed by: NURSE PRACTITIONER

## 2020-04-16 PROCEDURE — 99212 PR OFFICE/OUTPT VISIT, EST, LEVL II, 10-19 MIN: ICD-10-PCS | Mod: 95,,, | Performed by: NURSE PRACTITIONER

## 2020-04-16 PROCEDURE — 3044F HG A1C LEVEL LT 7.0%: CPT | Mod: 95,,, | Performed by: NURSE PRACTITIONER

## 2020-04-16 RX ORDER — BENZONATATE 200 MG/1
200 CAPSULE ORAL 3 TIMES DAILY PRN
Qty: 30 CAPSULE | Refills: 0 | Status: SHIPPED | OUTPATIENT
Start: 2020-04-16 | End: 2020-04-26

## 2020-04-16 NOTE — PATIENT INSTRUCTIONS
Instructions for Patients with Confirmed or Suspected COVID-19    If you are awaiting your test result, you will either be called or it will be released to the patient portal.  If you have any questions about your test, please visit [email or web address] or call our COVID-19 information line at [phone].       Stay home and stay away from family members and friends. The CDC says, you can leave home after these three things have happened: 1) You have had no fever for at least 72 hours (that is three full days of no fever without the use of medicine that reduces fevers) 2) AND other symptoms have improved (for example, when your cough or shortness of breath have improved) 3) AND at least 7 days have passed since your symptoms first appeared.   Separate yourself from other people and animals in your home.   Call ahead before visiting your doctor.   Wear a facemask.   Cover your coughs and sneezes.   Wash your hands often with soap and water; hand  can be used, too.   Avoid sharing personal household items.   Wipe down surfaces used daily.   Monitor your symptoms. Seek prompt medical attention if your illness is worsening (e.g., difficulty breathing).    Before seeking care, call your healthcare provider.   If you have a medical emergency and need to call 911, notify the dispatch personnel that you have, or are being evaluated for COVID-19. If possible, put on a facemask before emergency medical services arrive.        Recommended precautions for household members, intimate partners, and caregivers in a home setting of a patient with symptomatic laboratory-confirmed COVID-19 or a patient under investigation.  Household members, intimate partners, and caregivers in the home setting awaiting tests results have close contact with a person with symptomatic, laboratory-confirmed COVID-19 or a person under investigation. Close contacts should monitor their health; they should call their  provider right away if they develop symptoms suggestive of COVID-19 (e.g., fever, cough, shortness of breath).    Close contacts should also follow these recommendations:   Make sure that you understand and can help the patient follow their provider's instructions for medication(s) and care. You should help the patient with basic needs in the home and provide support for getting groceries, prescriptions, and other personal needs.   Monitor the patient's symptoms. If the patient is getting sicker, call his or her healthcare provider and tell them that the patient has laboratory-confirmed COVID-19. If the patient has a medical emergency and you need to call 911, notify the dispatch personnel that the patient has, or is being evaluated for COVID-19.   Household members should stay in another room or be  from the patient. Household members should use a separate bedroom and bathroom, if available.   Prohibit visitors.   Household members should care for any pets in the home.   Make sure that shared spaces in the home have good air flow, such as by an air conditioner or an opened window, weather permitting.   Perform hand hygiene frequently. Wash your hands often with soap and water for at least 20 seconds or use an alcohol-based hand  (that contains > 60% alcohol) covering all surfaces of your hands and rubbing them together until they feel dry. Soap and water should be used preferentially.   Avoid touching your eyes, nose, and mouth.   The patient should wear a facemask. If the patient is not able to wear a facemask (for example, because it causes trouble breathing), caregivers should wear a mask when they are in the same room as the patient.   Wear a disposable facemask and gloves when you touch or have contact with the patient's blood, stool, or body fluids, such as saliva, sputum, nasal mucus, vomit, urine.  o Throw out disposable facemasks and gloves after using them. Do not  reuse.  o When removing personal protective equipment, first remove and dispose of gloves. Then, immediately clean your hands with soap and water or alcohol-based hand . Next, remove and dispose of facemask, and immediately clean your hands again with soap and water or alcohol-based hand .   You should not share dishes, drinking glasses, cups, eating utensils, towels, bedding, or other items with the patient. After the patient uses these items, you should wash them thoroughly (see below Wash laundry thoroughly).   Clean all high-touch surfaces, such as counters, tabletops, doorknobs, bathroom fixtures, toilets, phones, keyboards, tablets, and bedside tables, every day. Also, clean any surfaces that may have blood, stool, or body fluids on them.   Use a household cleaning spray or wipe, according to the label instructions. Labels contain instructions for safe and effective use of the cleaning product including precautions you should take when applying the product, such as wearing gloves and making sure you have good ventilation during use of the product.   Wash laundry thoroughly.  o Immediately remove and wash clothes or bedding that have blood, stool, or body fluids on them.  o Wear disposable gloves while handling soiled items and keep soiled items away from your body. Clean your hands (with soap and water or an alcohol-based hand ) immediately after removing your gloves.  o Read and follow directions on labels of laundry or clothing items and detergent. In general, using a normal laundry detergent according to washing machine instructions and dry thoroughly using the warmest temperatures recommended on the clothing label.   Place all used disposable gloves, facemasks, and other contaminated items in a lined container before disposing of them with other household waste. Clean your hands (with soap and water or an alcohol-based hand ) immediately after handling these  items. Soap and water should be used preferentially if hands are visibly dirty.   Discuss any additional questions with your state or local health department or healthcare provider. Check available hours when contacting your local health department.    For more information see CDC link below.      https://www.cdc.gov/coronavirus/2019-ncov/hcp/guidance-prevent-spread.html#precautions        Sources:  [facility], [facility] and \[facility]\""

## 2020-04-16 NOTE — PROGRESS NOTES
Subjective:        The chief complaint leading to consultation is: cough, congestion, sore throat  The patient location is:  Home  Visit type: Virtual visit with synchronous audio/video or audio only  This was a video visit in lieu of in-person visit due to the coronavirus emergency. Patient acknowledged and consented to the video visit encounter.     Pt reports started 3 days ago with progressive dry cough and chest congestion, mild sore throat, fairly severe HA. Denies any fever/chills. Tired, run down feeling. Reports yesterday felt slightly SOB, none today. Denies CP, n/v/d, no loss of taste/smell. 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  Admits still smokign but has tried to cut back some since she's not feeling well.      Past Surgical History:   Procedure Laterality Date    APPENDECTOMY      CHOLECYSTECTOMY      COLONOSCOPY N/A 3/18/2016    Procedure: COLONOSCOPY;  Surgeon: Rober Zelaya Jr., MD;  Location: [facility];  Service: Endoscopy;  Laterality: N/A;    ESOPHAGOGASTRODUODENOSCOPY  04/15/2016    with dil    EYE SURGERY      TONSILLECTOMY      TUBAL LIGATION       Past Medical History:   Diagnosis Date    Anemia     Diabetes mellitus     Encounter for blood transfusion     GERD (gastroesophageal reflux disease)     Hyperlipidemia     MI (myocardial infarction)     Ulcer of the stomach and intestine     pos. for h. pylori     Family History   Problem Relation Age of Onset    Heart disease Mother     Diabetes Father         Social History:   Marital Status:   Alcohol History:  reports that she does not drink alcohol.  Tobacco History:  reports that she has been smoking cigarettes. She started smoking about 61 years ago. She has a 11.25 pack-year smoking history. She has never used smokeless tobacco.  Drug History:  reports that she does not use drugs.    Review of patient's allergies  indicates:  No Known Allergies    Current Outpatient Medications   Medication Sig Dispense Refill    atorvastatin (LIPITOR) 10 MG tablet Take 10 mg by mouth once daily.      calcium carbonate (OS-JOSELYN) 600 mg calcium (1,500 mg) Tab Take 600 mg by mouth once daily.      ergocalciferol (VITAMIN D2) 50,000 unit Cap Take 50,000 Units by mouth every 7 days.      levothyroxine (SYNTHROID) 25 MCG tablet Take 25 mcg by mouth once daily.      lisinopril (PRINIVIL,ZESTRIL) 20 MG tablet Take 20 mg by mouth once daily.      metFORMIN (GLUCOPHAGE) 500 MG tablet Take 500 mg by mouth 2 (two) times daily with meals.      pantoprazole (PROTONIX) 40 MG tablet Take 40 mg by mouth once daily.      propranolol (INDERAL) 80 MG tablet Take 80 mg by mouth once daily.        No current facility-administered medications for this visit.        Review of Systems   Constitutional: Positive for fatigue. Negative for chills and fever.   HENT: Positive for rhinorrhea and sore throat. Negative for congestion, ear pain, postnasal drip and sinus pain.    Respiratory: Positive for cough and shortness of breath. Negative for wheezing.    Cardiovascular: Negative for chest pain.   Gastrointestinal: Negative for diarrhea, nausea and vomiting.   Skin: Negative for rash.   Neurological: Positive for headaches. Negative for dizziness.         Objective:        Physical Exam:   Physical Exam   Constitutional: She is oriented to person, place, and time. She appears well-developed and well-nourished.   No distress, talking in full sentences without dyspnea   Neurological: She is alert and oriented to person, place, and time.            Assessment:       1. Suspected Covid-19 Virus Infection    2. Type 2 diabetes mellitus without complication, without long-term current use of insulin    3. Cigarette smoker      Plan:   Suspected Covid-19 Virus Infection  -pt advised given symptoms and exposure to COVID + pt I recommend COVID19 testing- referred to  [facility] COVID clinic. Discussed importance of rest and hydration and needs to quit smoking completely given current illness. Reviewed worsening signs/symptoms to report which would prompt ER evaluation. Advised return to work instructions will be based on test results but for now no working for minimum of 72 hours.    Type 2 DM  -stable on last labs     Cigarette smoker  -strongly cautioned to quit smoking and advised smoking/COPD/DM place pt at higher risk for respiratory complications/death if she does have COVID19 infection    Follow up if symptoms worsen or fail to improve. Will f/u with pt after test results    Total time spent with patient: 15    Each patient to whom he or she provides medical services by telemedicine is:  (1) informed of the relationship between the physician and patient and the respective role of any other health care provider with respect to management of the patient; and (2) notified that he or she may decline to receive medical services by telemedicine and may withdraw from such care at any time.    This note was created using [employer] voice recognition software that occasionally misinterprets phrases or words.

## 2020-04-16 NOTE — PROGRESS NOTES
Patient ID: Liv Decker is a 65 y.o. female with diabetes, anemia, CAD s/p MI, HLD     Chief Complaint: No chief complaint on file.      HPI   HA, body ache and cough     Date of symptom onset  3 days        Recent Travel? 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   5 other people at home, no sx.           Past Surgical History:   Procedure Laterality Date    APPENDECTOMY      CHOLECYSTECTOMY      COLONOSCOPY N/A 3/18/2016    Procedure: COLONOSCOPY;  Surgeon: Rober Zelaya Jr., MD;  Location: [facility];  Service: Endoscopy;  Laterality: N/A;    ESOPHAGOGASTRODUODENOSCOPY  04/15/2016    with dil    EYE SURGERY      TONSILLECTOMY      TUBAL LIGATION         Past Medical History:   Diagnosis Date    Anemia     Diabetes mellitus     Encounter for blood transfusion     GERD (gastroesophageal reflux disease)     Hyperlipidemia     MI (myocardial infarction)     Ulcer of the stomach and intestine     pos. for h. pylori       Social History     Socioeconomic History    Marital status:      Spouse name: Not on file    Number of children: 4    Years of education: Not on file    Highest education level: Not on file   Occupational History    Occupation: CNA   Social Needs    Financial resource strain: Not on file    Food insecurity:     Worry: Not on file     Inability: Not on file    Transportation needs:     Medical: Not on file     Non-medical: Not on file   Tobacco Use    Smoking status: Current Every Day Smoker     Packs/day: 0.25     Years: 45.00     Pack years: 11.25     Types: Cigarettes     Start date: 1/1/1959    Smokeless tobacco: Never Used    Tobacco comment: current 1/4ppd, prior use was 1ppd for 45 years   Substance and Sexual Activity    Alcohol use: No     Frequency: Never    Drug use: No    Sexual activity: Never   Lifestyle    Physical activity:     Days per week: Not on file     Minutes per  session: Not on file    Stress: Not on file   Relationships    Social connections:     Talks on phone: Not on file     Gets together: Not on file     Attends Hindu service: Not on file     Active member of club or organization: Not on file     Attends meetings of clubs or organizations: Not on file     Relationship status: Not on file   Other Topics Concern    Not on file   Social History Narrative    Not on file       Review of Systems   Constitutional: Negative for fatigue and fever.   HENT: Negative for congestion and sore throat.    Respiratory: Negative for chest tightness.    Gastrointestinal: Negative for diarrhea, nausea and vomiting.        Objective:   LMP 01/10/1994 (LMP Unknown)     Physical Exam   Constitutional: She is oriented to person, place, and time. She appears well-developed and well-nourished.   HENT:   Head: Normocephalic.   Eyes: Conjunctivae and EOM are normal.   Cardiovascular: Normal rate and regular rhythm.   Pulmonary/Chest: Effort normal and breath sounds normal.   Lymphadenopathy:     She has no cervical adenopathy.   Neurological: She is alert and oriented to person, place, and time.   Vitals reviewed.          Lab Results   Component Value Date    WBC 12.09 08/10/2019    HGB 13.4 08/10/2019    HCT 42.7 08/10/2019     08/10/2019    ALT 17 08/10/2019    AST 19 08/10/2019     08/10/2019    K 3.9 08/10/2019     08/10/2019    CREATININE 0.7 08/10/2019    BUN 16 08/10/2019    CO2 26 08/10/2019    INR 1.1 08/10/2019    HGBA1C 5.9 08/10/2019       Current Outpatient Medications on File Prior to Visit   Medication Sig Dispense Refill    atorvastatin (LIPITOR) 10 MG tablet Take 10 mg by mouth once daily.      calcium carbonate (OS-JOSELYN) 600 mg calcium (1,500 mg) Tab Take 600 mg by mouth once daily.      ergocalciferol (VITAMIN D2) 50,000 unit Cap Take 50,000 Units by mouth every 7 days.      levothyroxine (SYNTHROID) 25 MCG tablet Take 25 mcg by mouth once daily.       lisinopril (PRINIVIL,ZESTRIL) 20 MG tablet Take 20 mg by mouth once daily.      metFORMIN (GLUCOPHAGE) 500 MG tablet Take 500 mg by mouth 2 (two) times daily with meals.      pantoprazole (PROTONIX) 40 MG tablet Take 40 mg by mouth once daily.      propranolol (INDERAL) 80 MG tablet Take 80 mg by mouth once daily.        No current facility-administered medications on file prior to visit.        Assessment:   65 y.o. female with with diabetes, anemia, CAD s/p MI, HLD presenting with sx concerning for viral illness     Plan:          Pt was tested for COVID in clinic today and that she would be notified of results as soon as available.   Discussed with patient that she could have COVID-19 or another viral illness and would take precautions for COVID such as limit travel outside of home.  Drink plenty of fluids. Take tylenol/ibuprofen as needed. Given instruction on when to seek further evaluation in urgent care or ED if develops worsening symptoms such as shortness of breath, chest pain.     I have prescribed tessalon for help with sx mgmt     I am referring pt to the covid home monitoring program .     Allie Lomas MD  Internal Medicine- Geriatrics

## 2020-04-16 NOTE — TELEPHONE ENCOUNTER
----- Message from Liv Moody sent at 4/16/2020 10:03 AM CDT -----  Pt is having a deep cough and headaches.Runny nose but no fever but is feeling very bad. Pt would like to be tested fo Covid 19. Please advise. Pt #[phone]

## 2020-04-17 LAB — SARS-COV-2 RNA RESP QL NAA+PROBE: NOT DETECTED

## 2020-04-30 ENCOUNTER — PATIENT MESSAGE (OUTPATIENT)
Dept: FAMILY MEDICINE | Facility: CLINIC | Age: 65
End: 2020-04-30

## 2020-05-05 ENCOUNTER — TELEPHONE (OUTPATIENT)
Dept: FAMILY MEDICINE | Facility: CLINIC | Age: 65
End: 2020-05-05

## 2020-05-05 NOTE — TELEPHONE ENCOUNTER
LMOR that fasting lab is due prior to office visit, if she feels comfortable and that she would need to go to [facility] [place] [facility].

## 2020-05-07 ENCOUNTER — TELEPHONE (OUTPATIENT)
Dept: FAMILY MEDICINE | Facility: CLINIC | Age: 65
End: 2020-05-07

## 2020-05-14 ENCOUNTER — TELEPHONE (OUTPATIENT)
Dept: FAMILY MEDICINE | Facility: CLINIC | Age: 65
End: 2020-05-14

## 2020-05-14 NOTE — TELEPHONE ENCOUNTER
----- Message from Lilly Eisenberg sent at 5/13/2020  4:56 PM CDT -----  vm @ 4:36 pt called stated she needs to cancel her appt davi due to work and would like a call back to r/s. cb # [phone]

## 2020-05-20 ENCOUNTER — OFFICE VISIT (OUTPATIENT)
Dept: FAMILY MEDICINE | Facility: CLINIC | Age: 65
End: 2020-05-20
Payer: MEDICARE

## 2020-05-20 VITALS
DIASTOLIC BLOOD PRESSURE: 82 MMHG | SYSTOLIC BLOOD PRESSURE: 124 MMHG | WEIGHT: 113 LBS | HEIGHT: 61 IN | HEART RATE: 72 BPM | BODY MASS INDEX: 21.34 KG/M2 | TEMPERATURE: 98 F

## 2020-05-20 DIAGNOSIS — E55.9 VITAMIN D DEFICIENCY: ICD-10-CM

## 2020-05-20 DIAGNOSIS — E78.5 DM TYPE 2 WITH DIABETIC DYSLIPIDEMIA: Primary | ICD-10-CM

## 2020-05-20 DIAGNOSIS — E78.5 DYSLIPIDEMIA: ICD-10-CM

## 2020-05-20 DIAGNOSIS — M81.0 AGE-RELATED OSTEOPOROSIS WITHOUT CURRENT PATHOLOGICAL FRACTURE: ICD-10-CM

## 2020-05-20 DIAGNOSIS — E11.69 DM TYPE 2 WITH DIABETIC DYSLIPIDEMIA: Primary | ICD-10-CM

## 2020-05-20 DIAGNOSIS — F17.210 CIGARETTE SMOKER: ICD-10-CM

## 2020-05-20 DIAGNOSIS — F32.A MILD DEPRESSION: ICD-10-CM

## 2020-05-20 DIAGNOSIS — I10 ESSENTIAL HYPERTENSION: Chronic | ICD-10-CM

## 2020-05-20 DIAGNOSIS — E03.9 ACQUIRED HYPOTHYROIDISM: ICD-10-CM

## 2020-05-20 PROCEDURE — 3079F DIAST BP 80-89 MM HG: CPT | Mod: S$GLB,,, | Performed by: NURSE PRACTITIONER

## 2020-05-20 PROCEDURE — 1101F PR PT FALLS ASSESS DOC 0-1 FALLS W/OUT INJ PAST YR: ICD-10-PCS | Mod: S$GLB,,, | Performed by: NURSE PRACTITIONER

## 2020-05-20 PROCEDURE — 1101F PT FALLS ASSESS-DOCD LE1/YR: CPT | Mod: S$GLB,,, | Performed by: NURSE PRACTITIONER

## 2020-05-20 PROCEDURE — 3044F PR MOST RECENT HEMOGLOBIN A1C LEVEL <7.0%: ICD-10-PCS | Mod: S$GLB,,, | Performed by: NURSE PRACTITIONER

## 2020-05-20 PROCEDURE — 3008F BODY MASS INDEX DOCD: CPT | Mod: S$GLB,,, | Performed by: NURSE PRACTITIONER

## 2020-05-20 PROCEDURE — 3044F HG A1C LEVEL LT 7.0%: CPT | Mod: S$GLB,,, | Performed by: NURSE PRACTITIONER

## 2020-05-20 PROCEDURE — 99214 OFFICE O/P EST MOD 30 MIN: CPT | Mod: S$GLB,,, | Performed by: NURSE PRACTITIONER

## 2020-05-20 PROCEDURE — 3074F PR MOST RECENT SYSTOLIC BLOOD PRESSURE < 130 MM HG: ICD-10-PCS | Mod: S$GLB,,, | Performed by: NURSE PRACTITIONER

## 2020-05-20 PROCEDURE — 3074F SYST BP LT 130 MM HG: CPT | Mod: S$GLB,,, | Performed by: NURSE PRACTITIONER

## 2020-05-20 PROCEDURE — 3008F PR BODY MASS INDEX (BMI) DOCUMENTED: ICD-10-PCS | Mod: S$GLB,,, | Performed by: NURSE PRACTITIONER

## 2020-05-20 PROCEDURE — 99214 PR OFFICE/OUTPT VISIT, EST, LEVL IV, 30-39 MIN: ICD-10-PCS | Mod: S$GLB,,, | Performed by: NURSE PRACTITIONER

## 2020-05-20 PROCEDURE — 3079F PR MOST RECENT DIASTOLIC BLOOD PRESSURE 80-89 MM HG: ICD-10-PCS | Mod: S$GLB,,, | Performed by: NURSE PRACTITIONER

## 2020-05-20 RX ORDER — METFORMIN HYDROCHLORIDE 1000 MG/1
1000 TABLET ORAL
Qty: 90 TABLET | Refills: 1 | Status: SHIPPED | OUTPATIENT
Start: 2020-05-20 | End: 2020-11-18 | Stop reason: SDUPTHER

## 2020-05-20 RX ORDER — METFORMIN HYDROCHLORIDE 500 MG/1
500 TABLET ORAL 2 TIMES DAILY WITH MEALS
Qty: 180 TABLET | Refills: 1 | Status: CANCELLED | OUTPATIENT
Start: 2020-05-20

## 2020-05-20 RX ORDER — FAMOTIDINE 20 MG/1
20 TABLET, FILM COATED ORAL DAILY
COMMUNITY
Start: 2020-04-14 | End: 2022-10-10

## 2020-05-20 RX ORDER — ERGOCALCIFEROL 1.25 MG/1
50000 CAPSULE ORAL
Qty: 12 CAPSULE | Refills: 1 | Status: SHIPPED | OUTPATIENT
Start: 2020-05-20 | End: 2020-08-18

## 2020-05-20 RX ORDER — ESCITALOPRAM OXALATE 10 MG/1
10 TABLET ORAL DAILY
Qty: 90 TABLET | Refills: 1 | Status: SHIPPED | OUTPATIENT
Start: 2020-05-20 | End: 2020-11-18 | Stop reason: SDUPTHER

## 2020-05-20 RX ORDER — METFORMIN HYDROCHLORIDE 1000 MG/1
1000 TABLET ORAL DAILY
COMMUNITY
Start: 2020-04-05 | End: 2020-05-20 | Stop reason: SDUPTHER

## 2020-05-20 NOTE — PROGRESS NOTES
SUBJECTIVE:    Patient ID: Liv Decker is a 65 y.o. female.    Chief Complaint: Follow-up (3 month//list of meds tb )    Pt here for regular f/u. Reports overall doing okay. Got over her respiratory illness from last month- COVID test was negative.  Doesn't monitor blood sugars at home  Admits to increase in stress- feeling anxious and mildly depressed over issues with her children. Had to kick her daughter out of the house but still has other kids still living with her. Denies SI, feels overwhelmed at times and anxious/worried.  Had eye exam in Feb, clinic on - Dr. 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 for h. pylori     Past Surgical History:   Procedure Laterality Date    APPENDECTOMY      CHOLECYSTECTOMY      COLONOSCOPY N/A 3/18/2016    Procedure: COLONOSCOPY;  Surgeon: Rober Zelaya Jr., MD;  [facility];  Service: Endoscopy;  Laterality: N/A;    ESOPHAGOGASTRODUODENOSCOPY  04/15/2016    with dil    EYE SURGERY      TONSILLECTOMY      TUBAL LIGATION       Family History   Problem Relation Age of Onset    Heart disease Mother     Diabetes Father        Marital Status:   Alcohol History:  reports that she does not drink alcohol.  Tobacco History:  reports that she has been smoking cigarettes. She started smoking about 61 years ago. She has a 22.50 pack-year smoking history. She has never used smokeless tobacco.  Drug History:  reports that she does not use drugs.    Health Maintenance  Topics with due status: Not Due       Topic Last Completion Date    Colonoscopy 01/01/2012    TETANUS VACCINE 03/21/2019    Foot Exam 02/14/2020    Mammogram 02/28/2020    DEXA SCAN 02/28/2020     Immunization History   Administered Date(s) Administered    Influenza 09/09/2009, 10/08/2010, 10/20/2011, 10/02/2012, 10/18/2013, 10/03/2014, 10/15/2015, 10/20/2016    Influenza - Quadrivalent - PF (6 months and older) 10/15/2018, 10/16/2019    Influenza A (H1N1) 2009 Monovalent - IM 10/23/2009    Pneumococcal Polysaccharide - 23 Valent 03/21/2019    Tdap 03/21/2019    Zoster 03/21/2019       Review of patient's allergies indicates:  No Known Allergies    Current Outpatient Medications:     atorvastatin (LIPITOR) 10 MG tablet, Take 10 mg by mouth once daily., Disp: , Rfl:     calcium carbonate (OS-JOSELYN) 600 mg calcium (1,500 mg) Tab, Take 600 mg by mouth once daily., Disp: , Rfl:     ergocalciferol (VITAMIN D2) 50,000 unit Cap, Take 1 capsule (50,000 Units total) by mouth every 7 days., Disp: 12 capsule, Rfl: 1    famotidine (PEPCID) 20 MG tablet, , Disp: , Rfl:     levothyroxine (SYNTHROID) 25 MCG tablet, Take 25 mcg by mouth once daily., Disp: , Rfl:     lisinopril (PRINIVIL,ZESTRIL) 20 MG tablet, Take 20 mg by mouth once daily., Disp: , Rfl:     metFORMIN (GLUCOPHAGE) 1000 MG tablet, Take 1 tablet (1,000 mg total) by mouth daily with breakfast., Disp: 90 tablet, Rfl: 1    pantoprazole (PROTONIX) 40 MG tablet, Take 40 mg by mouth once daily., Disp: , Rfl:     propranolol (INDERAL) 80 MG tablet, Take 80 mg by mouth once daily. , Disp: , Rfl:     escitalopram oxalate (LEXAPRO) 10 MG tablet, Take 1 tablet (10 mg total) by mouth once daily., Disp: 90 tablet, Rfl: 1    Review of Systems   Constitutional: Negative for appetite change, chills, fever and unexpected weight change.   Respiratory: Positive for cough (mild, nonproductive). Negative for shortness of breath and wheezing.    Cardiovascular: Positive for  "leg swelling (at end of shift). Negative for chest pain and palpitations.   Gastrointestinal: Negative for abdominal pain, constipation and diarrhea.   Genitourinary: Negative for dysuria, frequency and hematuria.   Musculoskeletal: Negative for back pain and gait problem.   Skin: Negative for rash.   Neurological: Negative for dizziness, syncope, numbness and headaches.   Psychiatric/Behavioral: Positive for dysphoric mood. Negative for self-injury and suicidal ideas. The patient is nervous/anxious.           Objective:      Vitals:    05/20/20 0803   BP: 124/82   Pulse: 72   Temp: 97.6 °F (36.4 °C)   Weight: 51.3 kg (113 lb)   Height: 5' 1" (1.549 m)     Physical Exam   Constitutional: She is oriented to person, place, and time. She appears well-developed and well-nourished.   Thin white female   HENT:   Head: Normocephalic and atraumatic.   Right Ear: Tympanic membrane and ear canal normal.   Left Ear: Tympanic membrane and ear canal normal.   Mouth/Throat: Mucous membranes are normal. No posterior oropharyngeal erythema.   Neck: Neck supple. Carotid bruit is not present.   Cardiovascular: Normal rate and regular rhythm. Exam reveals no gallop and no friction rub.   No murmur heard.  Pulmonary/Chest: Effort normal and breath sounds normal. No respiratory distress. She has no wheezes. She has no rales.   Abdominal: Soft. She exhibits no distension. There is no tenderness.   Musculoskeletal: She exhibits no edema.   Lymphadenopathy:     She has no cervical adenopathy.   Neurological: She is alert and oriented to person, place, and time. She has normal strength. Gait normal.   Skin: Skin is warm and dry. No rash noted.   Psychiatric: She has a normal mood and affect.   Nursing note and vitals reviewed.        Assessment:       1. DM type 2 with diabetic dyslipidemia    2. Essential hypertension    3. Dyslipidemia    4. Acquired hypothyroidism    5. Vitamin D deficiency    6. Mild depression    7. Age-related " osteoporosis without current pathological fracture    8. Cigarette smoker           Plan:       DM type 2 with diabetic dyslipidemia  -     metFORMIN (GLUCOPHAGE) 1000 MG tablet; Take 1 tablet (1,000 mg total) by mouth daily with breakfast.  Dispense: 90 tablet; Refill: 1  -pt reports only taking metformin once a day- read that it could be dangerous medication. Will have labs and A1C drawn today    Essential hypertension  -BP well controlled    Dyslipidemia  -labs to be drawn today    Acquired hypothyroidism    Vitamin D deficiency  -     ergocalciferol (VITAMIN D2) 50,000 unit Cap; Take 1 capsule (50,000 Units total) by mouth every 7 days.  Dispense: 12 capsule; Refill: 1    Mild depression  -     escitalopram oxalate (LEXAPRO) 10 MG tablet; Take 1 tablet (10 mg total) by mouth once daily.  Dispense: 90 tablet; Refill: 1  -pt admits to increase in anxiety and mild depressive symptoms- denies any SI. Discussed options and pt agreeable to start lexapro. Cautioned to call for any worsening in symptoms after starting med and will f/u in 8 weeks    Age-related osteoporosis without current pathological fracture  -hasn't received prolia injection yet d/t COVID19 restrictions    Cigarette smoker  Assistance with smoking cessation was offered, including:  []  Medications  [x]  Counseling  []  Printed Information on Smoking Cessation  [x]  Referral to a Smoking Cessation Program    Patient was counseled regarding smoking for 3-10 minutes. Pt admits to smoking a little more, back up to 1/2ppd- declines referral and states she's quit previously but wants to try and get her anxiety treated first      Follow up in about 8 weeks (around 7/15/2020) for Diabetes, HTN; labs today.        5/20/2020 Liv Parrish NP

## 2020-05-21 ENCOUNTER — PATIENT MESSAGE (OUTPATIENT)
Dept: FAMILY MEDICINE | Facility: CLINIC | Age: 65
End: 2020-05-21

## 2020-05-21 LAB
25(OH)D3 SERPL-MCNC: 88 NG/ML (ref 30–100)
ALBUMIN SERPL-MCNC: 4 G/DL (ref 3.6–5.1)
ALBUMIN/CREAT UR: 28 MCG/MG CREAT
ALBUMIN/GLOB SERPL: 1.7 (CALC) (ref 1–2.5)
ALP SERPL-CCNC: 52 U/L (ref 37–153)
ALT SERPL-CCNC: 8 U/L (ref 6–29)
AST SERPL-CCNC: 11 U/L (ref 10–35)
BASOPHILS # BLD AUTO: 66 CELLS/UL (ref 0–200)
BASOPHILS NFR BLD AUTO: 0.8 %
BILIRUB SERPL-MCNC: 0.3 MG/DL (ref 0.2–1.2)
BUN SERPL-MCNC: 17 MG/DL (ref 7–25)
BUN/CREAT SERPL: NORMAL (CALC) (ref 6–22)
CALCIUM SERPL-MCNC: 9.6 MG/DL (ref 8.6–10.4)
CHLORIDE SERPL-SCNC: 101 MMOL/L (ref 98–110)
CHOLEST SERPL-MCNC: 189 MG/DL
CHOLEST/HDLC SERPL: 4.2 (CALC)
CO2 SERPL-SCNC: 29 MMOL/L (ref 20–32)
CREAT SERPL-MCNC: 0.65 MG/DL (ref 0.5–0.99)
CREAT UR-MCNC: 47 MG/DL (ref 20–275)
EOSINOPHIL # BLD AUTO: 141 CELLS/UL (ref 15–500)
EOSINOPHIL NFR BLD AUTO: 1.7 %
ERYTHROCYTE [DISTWIDTH] IN BLOOD BY AUTOMATED COUNT: 14.3 % (ref 11–15)
GFRSERPLBLD MDRD-ARVRAT: 93 ML/MIN/1.73M2
GLOBULIN SER CALC-MCNC: 2.3 G/DL (CALC) (ref 1.9–3.7)
GLUCOSE SERPL-MCNC: 88 MG/DL (ref 65–99)
HBA1C MFR BLD: 5.5 % OF TOTAL HGB
HCT VFR BLD AUTO: 40.5 % (ref 35–45)
HCV AB S/CO SERPL IA: 0.1
HCV AB SERPL QL IA: NORMAL
HDLC SERPL-MCNC: 45 MG/DL
HGB BLD-MCNC: 13.3 G/DL (ref 11.7–15.5)
LDLC SERPL CALC-MCNC: 100 MG/DL (CALC)
LYMPHOCYTES # BLD AUTO: 2390 CELLS/UL (ref 850–3900)
LYMPHOCYTES NFR BLD AUTO: 28.8 %
MCH RBC QN AUTO: 29.3 PG (ref 27–33)
MCHC RBC AUTO-ENTMCNC: 32.8 G/DL (ref 32–36)
MCV RBC AUTO: 89.2 FL (ref 80–100)
MICROALBUMIN UR-MCNC: 1.3 MG/DL
MONOCYTES # BLD AUTO: 681 CELLS/UL (ref 200–950)
MONOCYTES NFR BLD AUTO: 8.2 %
NEUTROPHILS # BLD AUTO: 5022 CELLS/UL (ref 1500–7800)
NEUTROPHILS NFR BLD AUTO: 60.5 %
NONHDLC SERPL-MCNC: 144 MG/DL (CALC)
PLATELET # BLD AUTO: 168 THOUSAND/UL (ref 140–400)
PMV BLD REES-ECKER: 12.5 FL (ref 7.5–12.5)
POTASSIUM SERPL-SCNC: 4.9 MMOL/L (ref 3.5–5.3)
PROT SERPL-MCNC: 6.3 G/DL (ref 6.1–8.1)
RBC # BLD AUTO: 4.54 MILLION/UL (ref 3.8–5.1)
SODIUM SERPL-SCNC: 140 MMOL/L (ref 135–146)
TRIGL SERPL-MCNC: 332 MG/DL
TSH SERPL-ACNC: 2.29 MIU/L (ref 0.4–4.5)
WBC # BLD AUTO: 8.3 THOUSAND/UL (ref 3.8–10.8)

## 2020-05-22 PROBLEM — K76.0 FATTY LIVER: Status: ACTIVE | Noted: 2020-05-22

## 2020-05-22 LAB
APPEARANCE UR: CLEAR
BACTERIA #/AREA URNS HPF: ABNORMAL /HPF
BACTERIA UR CULT: ABNORMAL
BACTERIA UR CULT: NORMAL
BILIRUB UR QL STRIP: NEGATIVE
COLOR UR: YELLOW
GLUCOSE UR QL STRIP: NEGATIVE
HGB UR QL STRIP: NEGATIVE
HYALINE CASTS #/AREA URNS LPF: ABNORMAL /LPF
KETONES UR QL STRIP: NEGATIVE
LEUKOCYTE ESTERASE UR QL STRIP: ABNORMAL
NITRITE UR QL STRIP: NEGATIVE
PH UR STRIP: 5.5 [PH] (ref 5–8)
PROT UR QL STRIP: NEGATIVE
RBC #/AREA URNS HPF: ABNORMAL /HPF
SP GR UR STRIP: 1.02 (ref 1–1.03)
SQUAMOUS #/AREA URNS HPF: ABNORMAL /HPF
WBC #/AREA URNS HPF: ABNORMAL /HPF

## 2020-06-25 DIAGNOSIS — E03.9 ACQUIRED HYPOTHYROIDISM: ICD-10-CM

## 2020-06-25 DIAGNOSIS — K21.9 GASTROESOPHAGEAL REFLUX DISEASE, ESOPHAGITIS PRESENCE NOT SPECIFIED: Primary | ICD-10-CM

## 2020-06-25 RX ORDER — LEVOTHYROXINE SODIUM 25 UG/1
25 TABLET ORAL DAILY
Qty: 90 TABLET | Refills: 1 | Status: SHIPPED | OUTPATIENT
Start: 2020-06-25 | End: 2021-01-04 | Stop reason: SDUPTHER

## 2020-06-25 RX ORDER — PANTOPRAZOLE SODIUM 40 MG/1
40 TABLET, DELAYED RELEASE ORAL DAILY
Qty: 90 TABLET | Refills: 1 | Status: SHIPPED | OUTPATIENT
Start: 2020-06-25 | End: 2021-01-04 | Stop reason: SDUPTHER

## 2020-06-25 NOTE — TELEPHONE ENCOUNTER
----- Message from Donald Maravilla sent at 6/25/2020 12:11 PM CDT -----  Regarding: refills  Refills pantoprazol,    Levothyroxine  Pharm [facility]   Pt [phone]

## 2020-07-06 ENCOUNTER — TELEPHONE (OUTPATIENT)
Dept: FAMILY MEDICINE | Facility: CLINIC | Age: 65
End: 2020-07-06

## 2020-07-06 ENCOUNTER — LAB VISIT (OUTPATIENT)
Dept: PRIMARY CARE CLINIC | Facility: CLINIC | Age: 65
End: 2020-07-06
Payer: MEDICARE

## 2020-07-06 ENCOUNTER — OFFICE VISIT (OUTPATIENT)
Dept: FAMILY MEDICINE | Facility: CLINIC | Age: 65
End: 2020-07-06
Payer: MEDICARE

## 2020-07-06 VITALS
TEMPERATURE: 99 F | DIASTOLIC BLOOD PRESSURE: 70 MMHG | WEIGHT: 110 LBS | OXYGEN SATURATION: 95 % | HEART RATE: 86 BPM | BODY MASS INDEX: 20.77 KG/M2 | SYSTOLIC BLOOD PRESSURE: 160 MMHG | HEIGHT: 61 IN

## 2020-07-06 VITALS — TEMPERATURE: 98 F

## 2020-07-06 DIAGNOSIS — R05.9 COUGH: ICD-10-CM

## 2020-07-06 DIAGNOSIS — I10 ESSENTIAL HYPERTENSION: Chronic | ICD-10-CM

## 2020-07-06 DIAGNOSIS — R06.02 SHORTNESS OF BREATH: ICD-10-CM

## 2020-07-06 DIAGNOSIS — R05.9 COUGH: Primary | ICD-10-CM

## 2020-07-06 PROCEDURE — 99213 PR OFFICE/OUTPT VISIT, EST, LEVL III, 20-29 MIN: ICD-10-PCS | Mod: S$GLB,,, | Performed by: NURSE PRACTITIONER

## 2020-07-06 PROCEDURE — 1101F PT FALLS ASSESS-DOCD LE1/YR: CPT | Mod: S$GLB,,, | Performed by: NURSE PRACTITIONER

## 2020-07-06 PROCEDURE — U0003 INFECTIOUS AGENT DETECTION BY NUCLEIC ACID (DNA OR RNA); SEVERE ACUTE RESPIRATORY SYNDROME CORONAVIRUS 2 (SARS-COV-2) (CORONAVIRUS DISEASE [COVID-19]), AMPLIFIED PROBE TECHNIQUE, MAKING USE OF HIGH THROUGHPUT TECHNOLOGIES AS DESCRIBED BY CMS-2020-01-R: HCPCS

## 2020-07-06 PROCEDURE — 3008F BODY MASS INDEX DOCD: CPT | Mod: S$GLB,,, | Performed by: NURSE PRACTITIONER

## 2020-07-06 PROCEDURE — 3078F PR MOST RECENT DIASTOLIC BLOOD PRESSURE < 80 MM HG: ICD-10-PCS | Mod: S$GLB,,, | Performed by: NURSE PRACTITIONER

## 2020-07-06 PROCEDURE — 1101F PR PT FALLS ASSESS DOC 0-1 FALLS W/OUT INJ PAST YR: ICD-10-PCS | Mod: S$GLB,,, | Performed by: NURSE PRACTITIONER

## 2020-07-06 PROCEDURE — 3077F PR MOST RECENT SYSTOLIC BLOOD PRESSURE >= 140 MM HG: ICD-10-PCS | Mod: S$GLB,,, | Performed by: NURSE PRACTITIONER

## 2020-07-06 PROCEDURE — 99213 OFFICE O/P EST LOW 20 MIN: CPT | Mod: S$GLB,,, | Performed by: NURSE PRACTITIONER

## 2020-07-06 PROCEDURE — 3077F SYST BP >= 140 MM HG: CPT | Mod: S$GLB,,, | Performed by: NURSE PRACTITIONER

## 2020-07-06 PROCEDURE — 3078F DIAST BP <80 MM HG: CPT | Mod: S$GLB,,, | Performed by: NURSE PRACTITIONER

## 2020-07-06 PROCEDURE — 3008F PR BODY MASS INDEX (BMI) DOCUMENTED: ICD-10-PCS | Mod: S$GLB,,, | Performed by: NURSE PRACTITIONER

## 2020-07-06 RX ORDER — DOXYCYCLINE 100 MG/1
100 CAPSULE ORAL EVERY 12 HOURS
Qty: 14 CAPSULE | Refills: 0 | Status: SHIPPED | OUTPATIENT
Start: 2020-07-06 | End: 2020-08-05

## 2020-07-06 RX ORDER — PREDNISONE 20 MG/1
40 TABLET ORAL DAILY
Qty: 10 TABLET | Refills: 0 | Status: SHIPPED | OUTPATIENT
Start: 2020-07-06 | End: 2020-07-11

## 2020-07-06 RX ORDER — ATORVASTATIN CALCIUM 10 MG/1
10 TABLET, FILM COATED ORAL DAILY
Qty: 90 TABLET | Refills: 1 | Status: SHIPPED | OUTPATIENT
Start: 2020-07-06 | End: 2021-01-04 | Stop reason: SDUPTHER

## 2020-07-06 RX ORDER — LISINOPRIL 20 MG/1
20 TABLET ORAL DAILY
Qty: 90 TABLET | Refills: 1 | Status: SHIPPED | OUTPATIENT
Start: 2020-07-06 | End: 2021-01-04 | Stop reason: SDUPTHER

## 2020-07-06 RX ORDER — ALBUTEROL SULFATE 90 UG/1
2 AEROSOL, METERED RESPIRATORY (INHALATION) EVERY 6 HOURS PRN
Qty: 18 G | Refills: 5 | Status: SHIPPED | OUTPATIENT
Start: 2020-07-06 | End: 2021-08-25 | Stop reason: SDUPTHER

## 2020-07-06 NOTE — LETTER
July 6, 2020      [facility]  [address] [facility] [address]  [place] [address]  Phone: [phone]  Fax: [phone]       Patient: Liv Decker   YOB: 1955  Date of Visit: 07/06/2020    To Whom It May Concern:    Merary Decker  was at [facility] on 07/06/2020. She may return to work/school once COVID19 test resulted and is negative with no restrictions. If you have any questions or concerns, or if I can be of further assistance, please do not hesitate to contact me.    Sincerely,            Liv Parrish NP

## 2020-07-06 NOTE — TELEPHONE ENCOUNTER
----- Message from Donald Maravilla sent at 7/6/2020 10:52 AM CDT -----  Regarding: appt  Pt is coughing and sob she has been doing this since Wednesday wants to come in. No fever  Pt [phone]

## 2020-07-06 NOTE — TELEPHONE ENCOUNTER
----- Message from Ruth Lucero sent at 7/6/2020  9:43 AM CDT -----  Vm- patient left 2 voicemails she said she is having trouble breathing  [phone]

## 2020-07-06 NOTE — PROGRESS NOTES
Patient ID: Liv Decker is a 65 y.o. female.    Chief Complaint: Cough (since last Thursday, no bottles// SW)    66 y/o female here for sick visit. C/oing of SOB and coughing. Went with her daughter to [place] last Wednesday and started experiencing SOB and coughing on Thursday so she came home. Reports that she was at a party the Saturday before and now multiple people who were at the party are sick with fever. +cough with thick, green mucous that has minimally improved with Mucinex. Denies fever, chills, diarrhea or loss of taste/smell.  Smoking 2 cigarettes a day. Was last treated for similar sxs in April. Doesn't have any inhalers to use            Past Medical History:   Diagnosis Date    Anemia     Diabetes mellitus     Encounter for blood transfusion     GERD (gastroesophageal reflux disease)     Hyperlipidemia     MI (myocardial infarction)     Ulcer of the stomach and intestine     pos. for h. pylori     Past Surgical History:   Procedure Laterality Date    APPENDECTOMY      CHOLECYSTECTOMY      COLONOSCOPY N/A 3/18/2016    Procedure: COLONOSCOPY;  Surgeon: Rober Zelaya Jr., MD;  Location: [facility];  Service: Endoscopy;  Laterality: N/A;    ESOPHAGOGASTRODUODENOSCOPY  04/15/2016    with dil    EYE SURGERY      TONSILLECTOMY      TUBAL LIGATION           Tobacco History:  reports that she has been smoking cigarettes. She started smoking about 61 years ago. She has a 22.50 pack-year smoking history. She has never used smokeless tobacco.      Review of patient's allergies indicates:  No Known Allergies    Current Outpatient Medications:     albuterol (PROAIR HFA) 90 mcg/actuation inhaler, Inhale 2 puffs into the lungs every 6 (six) hours as needed for Wheezing or Shortness of Breath. Rescue, Disp: 18 g, Rfl: 5    atorvastatin (LIPITOR) 10 MG tablet, Take 1 tablet (10 mg total) by mouth once daily., Disp: 90 tablet, Rfl: 1    calcium carbonate (OS-JOSELYN) 600 mg calcium (1,500  mg) Tab, Take 600 mg by mouth once daily., Disp: , Rfl:     doxycycline (VIBRAMYCIN) 100 MG Cap, Take 1 capsule (100 mg total) by mouth every 12 (twelve) hours., Disp: 14 capsule, Rfl: 0    ergocalciferol (VITAMIN D2) 50,000 unit Cap, Take 1 capsule (50,000 Units total) by mouth every 7 days., Disp: 12 capsule, Rfl: 1    escitalopram oxalate (LEXAPRO) 10 MG tablet, Take 1 tablet (10 mg total) by mouth once daily., Disp: 90 tablet, Rfl: 1    famotidine (PEPCID) 20 MG tablet, , Disp: , Rfl:     levothyroxine (SYNTHROID) 25 MCG tablet, Take 1 tablet (25 mcg total) by mouth once daily., Disp: 90 tablet, Rfl: 1    lisinopriL (PRINIVIL,ZESTRIL) 20 MG tablet, Take 1 tablet (20 mg total) by mouth once daily., Disp: 90 tablet, Rfl: 1    metFORMIN (GLUCOPHAGE) 1000 MG tablet, Take 1 tablet (1,000 mg total) by mouth daily with breakfast., Disp: 90 tablet, Rfl: 1    pantoprazole (PROTONIX) 40 MG tablet, Take 1 tablet (40 mg total) by mouth once daily., Disp: 90 tablet, Rfl: 1    predniSONE (DELTASONE) 20 MG tablet, Take 2 tablets (40 mg total) by mouth once daily. for 5 days, Disp: 10 tablet, Rfl: 0    propranolol (INDERAL) 80 MG tablet, Take 80 mg by mouth once daily. , Disp: , Rfl:     Review of Systems   Constitutional: Negative for chills and fever.   HENT: Negative for sinus pain and sore throat.    Respiratory: Positive for cough (productive cough with thick green sputum), shortness of breath and wheezing.    Cardiovascular: Positive for palpitations (when SOB). Negative for chest pain and leg swelling.   Gastrointestinal: Negative for abdominal pain, constipation, diarrhea, nausea and vomiting.   Genitourinary: Negative for dysuria, frequency and hematuria.   Musculoskeletal: Negative for back pain and gait problem.   Skin: Negative for rash.   Neurological: Negative for dizziness (occasional with position changes) and headaches.   Psychiatric/Behavioral: Negative for dysphoric mood. The patient is  "nervous/anxious (worried about COVID19 and also missing work).           Objective:      Vitals:    07/06/20 1509 07/06/20 1511 07/06/20 1552   BP: (!) 168/78 (!) 160/70    Pulse: 86     Temp: 98.5 °F (36.9 °C)     SpO2: 97%  95%   Weight: 49.9 kg (110 lb)     Height: 5' 1" (1.549 m)       Physical Exam  Vitals signs and nursing note reviewed.   Constitutional:       General: She is not in acute distress.     Appearance: Normal appearance. She is well-developed.      Comments: Thin white female   HENT:      Head: Normocephalic and atraumatic.      Right Ear: Tympanic membrane and ear canal normal.      Left Ear: Tympanic membrane and ear canal normal.      Mouth/Throat:      Pharynx: No posterior oropharyngeal erythema.   Neck:      Musculoskeletal: Neck supple.      Vascular: No carotid bruit.   Cardiovascular:      Rate and Rhythm: Normal rate and regular rhythm.      Heart sounds: No murmur. No friction rub. No gallop.    Pulmonary:      Effort: Pulmonary effort is normal. No respiratory distress.      Breath sounds: No stridor. Wheezing present. No rales.      Comments: Insp/Exp WH upper lobes bilat  Abdominal:      General: There is no distension.      Palpations: Abdomen is soft.      Tenderness: There is no abdominal tenderness.   Musculoskeletal:      Right lower leg: No edema.      Left lower leg: No edema.   Lymphadenopathy:      Cervical: No cervical adenopathy.   Skin:     General: Skin is warm and dry.      Findings: No rash.   Neurological:      Mental Status: She is alert and oriented to person, place, and time.      Gait: Gait normal.           Assessment:       1. Cough    2. Essential hypertension           Plan:       Cough  Comments:  Discussed that sx are likely d/t COPD exacerbation, but given recent contacts will send pt for COVID-19 testing. Advised to isolate until results are back. Discussed rescue inhaler use and also given #2 samples of anoro to use daily. Will need PFTs in 4-6 weeks. " Cautoined if SOB worsens recommend ER  Orders:  -     Cancel: COVID-19 Routine Screening  -     albuterol (PROAIR HFA) 90 mcg/actuation inhaler; Inhale 2 puffs into the lungs every 6 (six) hours as needed for Wheezing or Shortness of Breath. Rescue  Dispense: 18 g; Refill: 5  -     predniSONE (DELTASONE) 20 MG tablet; Take 2 tablets (40 mg total) by mouth once daily. for 5 days  Dispense: 10 tablet; Refill: 0  -     doxycycline (VIBRAMYCIN) 100 MG Cap; Take 1 capsule (100 mg total) by mouth every 12 (twelve) hours.  Dispense: 14 capsule; Refill: 0    Essential hypertension  Comments:  Pt reports home BP readings are lower- 130s/70s this morning. Continue current medications and recheck at next visit    -     lisinopriL (PRINIVIL,ZESTRIL) 20 MG tablet; Take 1 tablet (20 mg total) by mouth once daily.  Dispense: 90 tablet; Refill: 1  -     atorvastatin (LIPITOR) 10 MG tablet; Take 1 tablet (10 mg total) by mouth once daily.  Dispense: 90 tablet; Refill: 1      Follow up for as scheduled in 2 weeks.        7/6/2020 Liv Parrish NP

## 2020-07-06 NOTE — TELEPHONE ENCOUNTER
----- Message from Ruth Lucero sent at 7/6/2020  9:38 AM CDT -----  Vm- patient is calling and wants to be seen today

## 2020-07-06 NOTE — TELEPHONE ENCOUNTER
----- Message from Ruth Lucero sent at 7/6/2020 11:43 AM CDT -----  Vm-  patient is calling the nurse back

## 2020-07-08 LAB — SARS-COV-2 RNA RESP QL NAA+PROBE: NOT DETECTED

## 2020-07-09 ENCOUNTER — TELEPHONE (OUTPATIENT)
Dept: FAMILY MEDICINE | Facility: CLINIC | Age: 65
End: 2020-07-09

## 2020-07-09 NOTE — TELEPHONE ENCOUNTER
----- Message from Liv Parrish NP sent at 7/8/2020  7:57 AM CDT -----  Please call patient let her know COVID-19 test is negative

## 2020-07-15 ENCOUNTER — TELEPHONE (OUTPATIENT)
Dept: FAMILY MEDICINE | Facility: CLINIC | Age: 65
End: 2020-07-15

## 2020-07-15 NOTE — TELEPHONE ENCOUNTER
Spoke to pt regarding her appt on 7/22. Offered virtual visit pt refused wants to keep in office appt. Pt advised to wear mask//call upon arrival

## 2020-07-27 ENCOUNTER — TELEPHONE (OUTPATIENT)
Dept: FAMILY MEDICINE | Facility: CLINIC | Age: 65
End: 2020-07-27

## 2020-08-04 ENCOUNTER — TELEPHONE (OUTPATIENT)
Dept: FAMILY MEDICINE | Facility: CLINIC | Age: 65
End: 2020-08-04

## 2020-08-04 ENCOUNTER — TELEPHONE (OUTPATIENT)
Dept: INFUSION THERAPY | Facility: HOSPITAL | Age: 65
End: 2020-08-04

## 2020-08-04 DIAGNOSIS — M81.0 AGE-RELATED OSTEOPOROSIS WITHOUT CURRENT PATHOLOGICAL FRACTURE: ICD-10-CM

## 2020-08-04 DIAGNOSIS — E55.9 VITAMIN D DEFICIENCY: Primary | ICD-10-CM

## 2020-08-05 ENCOUNTER — OFFICE VISIT (OUTPATIENT)
Dept: FAMILY MEDICINE | Facility: CLINIC | Age: 65
End: 2020-08-05
Payer: MEDICARE

## 2020-08-05 VITALS
SYSTOLIC BLOOD PRESSURE: 112 MMHG | HEART RATE: 80 BPM | WEIGHT: 114.81 LBS | TEMPERATURE: 98 F | DIASTOLIC BLOOD PRESSURE: 84 MMHG | BODY MASS INDEX: 21.68 KG/M2 | HEIGHT: 61 IN

## 2020-08-05 DIAGNOSIS — E78.5 DYSLIPIDEMIA: ICD-10-CM

## 2020-08-05 DIAGNOSIS — F17.210 CIGARETTE SMOKER: ICD-10-CM

## 2020-08-05 DIAGNOSIS — I10 ESSENTIAL HYPERTENSION: ICD-10-CM

## 2020-08-05 DIAGNOSIS — M81.0 AGE-RELATED OSTEOPOROSIS WITHOUT CURRENT PATHOLOGICAL FRACTURE: ICD-10-CM

## 2020-08-05 DIAGNOSIS — Z12.2 ENCOUNTER FOR SCREENING FOR MALIGNANT NEOPLASM OF RESPIRATORY ORGANS: ICD-10-CM

## 2020-08-05 DIAGNOSIS — E78.5 DM TYPE 2 WITH DIABETIC DYSLIPIDEMIA: Primary | ICD-10-CM

## 2020-08-05 DIAGNOSIS — E03.9 ACQUIRED HYPOTHYROIDISM: ICD-10-CM

## 2020-08-05 DIAGNOSIS — E11.69 DM TYPE 2 WITH DIABETIC DYSLIPIDEMIA: Primary | ICD-10-CM

## 2020-08-05 PROCEDURE — 3008F BODY MASS INDEX DOCD: CPT | Mod: S$GLB,,, | Performed by: NURSE PRACTITIONER

## 2020-08-05 PROCEDURE — 1101F PR PT FALLS ASSESS DOC 0-1 FALLS W/OUT INJ PAST YR: ICD-10-PCS | Mod: S$GLB,,, | Performed by: NURSE PRACTITIONER

## 2020-08-05 PROCEDURE — 3074F SYST BP LT 130 MM HG: CPT | Mod: S$GLB,,, | Performed by: NURSE PRACTITIONER

## 2020-08-05 PROCEDURE — 3008F PR BODY MASS INDEX (BMI) DOCUMENTED: ICD-10-PCS | Mod: S$GLB,,, | Performed by: NURSE PRACTITIONER

## 2020-08-05 PROCEDURE — 3079F PR MOST RECENT DIASTOLIC BLOOD PRESSURE 80-89 MM HG: ICD-10-PCS | Mod: S$GLB,,, | Performed by: NURSE PRACTITIONER

## 2020-08-05 PROCEDURE — 1101F PT FALLS ASSESS-DOCD LE1/YR: CPT | Mod: S$GLB,,, | Performed by: NURSE PRACTITIONER

## 2020-08-05 PROCEDURE — 3079F DIAST BP 80-89 MM HG: CPT | Mod: S$GLB,,, | Performed by: NURSE PRACTITIONER

## 2020-08-05 PROCEDURE — 3074F PR MOST RECENT SYSTOLIC BLOOD PRESSURE < 130 MM HG: ICD-10-PCS | Mod: S$GLB,,, | Performed by: NURSE PRACTITIONER

## 2020-08-05 PROCEDURE — 99214 PR OFFICE/OUTPT VISIT, EST, LEVL IV, 30-39 MIN: ICD-10-PCS | Mod: S$GLB,,, | Performed by: NURSE PRACTITIONER

## 2020-08-05 PROCEDURE — 3044F HG A1C LEVEL LT 7.0%: CPT | Mod: S$GLB,,, | Performed by: NURSE PRACTITIONER

## 2020-08-05 PROCEDURE — 3044F PR MOST RECENT HEMOGLOBIN A1C LEVEL <7.0%: ICD-10-PCS | Mod: S$GLB,,, | Performed by: NURSE PRACTITIONER

## 2020-08-05 PROCEDURE — 99214 OFFICE O/P EST MOD 30 MIN: CPT | Mod: S$GLB,,, | Performed by: NURSE PRACTITIONER

## 2020-08-05 NOTE — PROGRESS NOTES
SUBJECTIVE:    Patient ID: Liv Decker is a 65 y.o. female.    Chief Complaint: Diabetes (follow up) and Bottles not brought    Pt here for regular f/u. Reports overall doing much better since last visit- got over her bronchitis/COPD exacerbation. Still smoking but has cut back to about 1/3ppd. Hasn't had to use resuce inhaler recently      Lab Visit on 07/06/2020   Component Date Value Ref Range Status    SARS-CoV2 (COVID-19) Qualitative P* 07/06/2020 Not Detected  Not Detected Final   Office Visit on 04/16/2020   Component Date Value Ref Range Status    SARS-CoV2 (COVID-19) Qualitative P* 04/16/2020 Not Detected  Not Detected Final   Office Visit on 02/14/2020   Component Date Value Ref Range Status    WBC 05/20/2020 8.3  3.8 - 10.8 Thousand/uL Final    RBC 05/20/2020 4.54  3.80 - 5.10 Million/uL Final    Hemoglobin 05/20/2020 13.3  11.7 - 15.5 g/dL Final    Hematocrit 05/20/2020 40.5  35.0 - 45.0 % Final    Mean Corpuscular Volume 05/20/2020 89.2  80.0 - 100.0 fL Final    Mean Corpuscular Hemoglobin 05/20/2020 29.3  27.0 - 33.0 pg Final    Mean Corpuscular Hemoglobin Conc 05/20/2020 32.8  32.0 - 36.0 g/dL Final    RDW 05/20/2020 14.3  11.0 - 15.0 % Final    Platelets 05/20/2020 168  140 - 400 Thousand/uL Final    MPV 05/20/2020 12.5  7.5 - 12.5 fL Final    Neutrophils Absolute 05/20/2020 5,022  1,500 - 7,800 cells/uL Final    Lymph # 05/20/2020 2,390  850 - 3,900 cells/uL Final    Mono # 05/20/2020 681  200 - 950 cells/uL Final    Eos # 05/20/2020 141  15 - 500 cells/uL Final    Baso # 05/20/2020 66  0 - 200 cells/uL Final    Neutrophils Relative 05/20/2020 60.5  % Final    Lymph% 05/20/2020 28.8  % Final    Mono% 05/20/2020 8.2  % Final    Eosinophil% 05/20/2020 1.7  % Final    Basophil% 05/20/2020 0.8  % Final    Glucose 05/20/2020 88  65 - 99 mg/dL Final    BUN, Bld 05/20/2020 17  7 - 25 mg/dL Final    Creatinine 05/20/2020 0.65  0.50 - 0.99 mg/dL Final    eGFR if non   05/20/2020 93  > OR = 60 mL/min/1.73m2 Final    eGFR if  05/20/2020 108  > OR = 60 mL/min/1.73m2 Final    BUN/Creatinine Ratio 05/20/2020 NOT APPLICABLE  6 - 22 (calc) Final    Sodium 05/20/2020 140  135 - 146 mmol/L Final    Potassium 05/20/2020 4.9  3.5 - 5.3 mmol/L Final    Chloride 05/20/2020 101  98 - 110 mmol/L Final    CO2 05/20/2020 29  20 - 32 mmol/L Final    Calcium 05/20/2020 9.6  8.6 - 10.4 mg/dL Final    Total Protein 05/20/2020 6.3  6.1 - 8.1 g/dL Final    Albumin 05/20/2020 4.0  3.6 - 5.1 g/dL Final    Globulin, Total 05/20/2020 2.3  1.9 - 3.7 g/dL (calc) Final    Albumin/Globulin Ratio 05/20/2020 1.7  1.0 - 2.5 (calc) Final    Total Bilirubin 05/20/2020 0.3  0.2 - 1.2 mg/dL Final    Alkaline Phosphatase 05/20/2020 52  37 - 153 U/L Final    AST 05/20/2020 11  10 - 35 U/L Final    ALT 05/20/2020 8  6 - 29 U/L Final    Cholesterol 05/20/2020 189  <200 mg/dL Final    HDL 05/20/2020 45* > OR = 50 mg/dL Final    Triglycerides 05/20/2020 332* <150 mg/dL Final    LDL Cholesterol 05/20/2020 100* mg/dL (calc) Final    Hdl/Cholesterol Ratio 05/20/2020 4.2  <5.0 (calc) Final    Non HDL Chol. (LDL+VLDL) 05/20/2020 144* <130 mg/dL (calc) Final    TSH w/reflex to FT4 05/20/2020 2.29  0.40 - 4.50 mIU/L Final    Creatinine, Random Ur 05/20/2020 47  20 - 275 mg/dL Final    Microalb, Ur 05/20/2020 1.3  See Note: mg/dL Final    Microalb Creat Ratio 05/20/2020 28  <30 mcg/mg creat Final    Color, UA 05/20/2020 YELLOW  YELLOW Final    Appearance, UA 05/20/2020 CLEAR  CLEAR Final    Specific Gravity, UA 05/20/2020 1.016  1.001 - 1.035 Final    pH, UA 05/20/2020 5.5  5.0 - 8.0 Final    Glucose, UA 05/20/2020 NEGATIVE  NEGATIVE Final    Bilirubin, UA 05/20/2020 NEGATIVE  NEGATIVE Final    Ketones, UA 05/20/2020 NEGATIVE  NEGATIVE Final    Occult Blood UA 05/20/2020 NEGATIVE  NEGATIVE Final    Protein, UA 05/20/2020 NEGATIVE  NEGATIVE Final    Nitrite, UA  05/20/2020 NEGATIVE  NEGATIVE Final    Leukocytes, UA 05/20/2020 1+* NEGATIVE Final    WBC Casts, UA 05/20/2020 0-5  < OR = 5 /HPF Final    RBC Casts, UA 05/20/2020 NONE SEEN  < OR = 2 /HPF Final    Squam Epithel, UA 05/20/2020 0-5  < OR = 5 /HPF Final    Bacteria, UA 05/20/2020 NONE SEEN  NONE SEEN /HPF Final    Hyaline Casts, UA 05/20/2020 NONE SEEN  NONE SEEN /LPF Final    Reflexive Urine Culture 05/20/2020 CULTURE INDICATED - RESULTS TO FOLLOW   Final    Hemoglobin A1C 05/20/2020 5.5  <5.7 % of total Hgb Final    Vitamin D, 25-OH, Total 05/20/2020 88  30 - 100 ng/mL Final    Urine Culture, Routine 05/20/2020    Final    Hepatitis C Ab 05/20/2020 NON-REACTIVE  NON-REACTIVE Final    Signal/Cutoff 05/20/2020 0.10  <1.00 Final       Past Medical History:   Diagnosis Date    Anemia     Diabetes mellitus     Encounter for blood transfusion     GERD (gastroesophageal reflux disease)     Hyperlipidemia     MI (myocardial infarction)     Ulcer of the stomach and intestine     pos. for h. pylori     Past Surgical History:   Procedure Laterality Date    APPENDECTOMY      CHOLECYSTECTOMY      COLONOSCOPY N/A 3/18/2016    Procedure: COLONOSCOPY;  Surgeon: Rober Zelaya Jr., MD;  Location: [facility];  Service: Endoscopy;  Laterality: N/A;    ESOPHAGOGASTRODUODENOSCOPY  04/15/2016    with dil    EYE SURGERY      TONSILLECTOMY      TUBAL LIGATION       Family History   Problem Relation Age of Onset    Heart disease Mother     Diabetes Father        Marital Status:   Alcohol History:  reports no history of alcohol use.  Tobacco History:  reports that she has been smoking cigarettes. She started smoking about 61 years ago. She has a 22.50 pack-year smoking history. She has never used smokeless tobacco.  Drug History:  reports no history of drug use.    Health Maintenance Topics with due status: Not Due       Topic Last Completion Date    Colorectal Cancer Screening 01/01/2012    TETANUS  VACCINE 03/21/2019    Influenza Vaccine 10/16/2019    Mammogram 02/28/2020    DEXA SCAN 02/28/2020    Foot Exam 05/20/2020    Lipid Panel 05/20/2020    Hemoglobin A1c 05/20/2020     Immunization History   Administered Date(s) Administered    Influenza 09/09/2009, 10/08/2010, 10/20/2011, 10/02/2012, 10/18/2013, 10/03/2014, 10/15/2015, 10/20/2016    Influenza - Quadrivalent - PF (6 months and older) 10/15/2018, 10/16/2019    Influenza A (H1N1) 2009 Monovalent - IM 10/23/2009    Pneumococcal Polysaccharide - 23 Valent 03/21/2019    Tdap 03/21/2019    Zoster 03/21/2019       Review of patient's allergies indicates:  No Known Allergies    Current Outpatient Medications:     albuterol (PROAIR HFA) 90 mcg/actuation inhaler, Inhale 2 puffs into the lungs every 6 (six) hours as needed for Wheezing or Shortness of Breath. Rescue, Disp: 18 g, Rfl: 5    atorvastatin (LIPITOR) 10 MG tablet, Take 1 tablet (10 mg total) by mouth once daily., Disp: 90 tablet, Rfl: 1    calcium carbonate (OS-JOSELYN) 600 mg calcium (1,500 mg) Tab, Take 600 mg by mouth once daily., Disp: , Rfl:     ergocalciferol (VITAMIN D2) 50,000 unit Cap, Take 1 capsule (50,000 Units total) by mouth every 7 days., Disp: 12 capsule, Rfl: 1    escitalopram oxalate (LEXAPRO) 10 MG tablet, Take 1 tablet (10 mg total) by mouth once daily., Disp: 90 tablet, Rfl: 1    famotidine (PEPCID) 20 MG tablet, , Disp: , Rfl:     levothyroxine (SYNTHROID) 25 MCG tablet, Take 1 tablet (25 mcg total) by mouth once daily., Disp: 90 tablet, Rfl: 1    lisinopriL (PRINIVIL,ZESTRIL) 20 MG tablet, Take 1 tablet (20 mg total) by mouth once daily., Disp: 90 tablet, Rfl: 1    metFORMIN (GLUCOPHAGE) 1000 MG tablet, Take 1 tablet (1,000 mg total) by mouth daily with breakfast., Disp: 90 tablet, Rfl: 1    pantoprazole (PROTONIX) 40 MG tablet, Take 1 tablet (40 mg total) by mouth once daily., Disp: 90 tablet, Rfl: 1    propranolol (INDERAL) 80 MG tablet, Take 80 mg by mouth once  "daily. , Disp: , Rfl:     Review of Systems   Constitutional: Negative for chills and fever.   HENT: Negative for sinus pain and sore throat.    Respiratory: Positive for cough (improved, occas white) and shortness of breath. Negative for wheezing.    Cardiovascular: Negative for chest pain, palpitations and leg swelling.   Gastrointestinal: Negative for abdominal pain, constipation, diarrhea, nausea and vomiting.   Genitourinary: Negative for dysuria, frequency and hematuria.   Musculoskeletal: Negative for back pain and gait problem.   Skin: Negative for rash.   Neurological: Negative for dizziness (occasional with position changes) and headaches.          Objective:      Vitals:    08/05/20 1536   BP: 112/84   Pulse: 80   Temp: 97.8 °F (36.6 °C)   Weight: 52.1 kg (114 lb 12.8 oz)   Height: 5' 1" (1.549 m)     Physical Exam  Vitals signs and nursing note reviewed.   Constitutional:       General: She is not in acute distress.     Appearance: Normal appearance. She is well-developed.      Comments: Thin white female   HENT:      Head: Normocephalic and atraumatic.      Right Ear: Tympanic membrane and ear canal normal.      Left Ear: Tympanic membrane and ear canal normal.      Mouth/Throat:      Pharynx: No posterior oropharyngeal erythema.   Neck:      Musculoskeletal: Neck supple.      Vascular: No carotid bruit.   Cardiovascular:      Rate and Rhythm: Normal rate and regular rhythm.      Heart sounds: No murmur. No friction rub. No gallop.    Pulmonary:      Effort: Pulmonary effort is normal. No respiratory distress.      Breath sounds: No stridor. No wheezing or rales.      Comments: Slightly diminished throughout  Abdominal:      General: There is no distension.      Palpations: Abdomen is soft.      Tenderness: There is no abdominal tenderness.   Musculoskeletal:      Right lower leg: No edema.      Left lower leg: No edema.   Lymphadenopathy:      Cervical: No cervical adenopathy.   Skin:     General: Skin " is warm and dry.      Findings: No rash.   Neurological:      General: No focal deficit present.      Mental Status: She is alert and oriented to person, place, and time.      Gait: Gait normal.           Assessment:       1. DM type 2 with diabetic dyslipidemia    2. Dyslipidemia    3. Acquired hypothyroidism    4. Essential hypertension    5. Age-related osteoporosis without current pathological fracture    6. Cigarette smoker    7. Encounter for screening for malignant neoplasm of respiratory organs           Plan:       DM type 2 with diabetic dyslipidemia  Comments:  well controlled   Orders:  -     Hemoglobin A1C; Future; Expected date: 08/05/2020    Dyslipidemia  Comments:  TG elevated on last lipids, recheck before next visit  Orders:  -     Lipid Panel; Future; Expected date: 08/05/2020  -     Comprehensive metabolic panel; Future; Expected date: 08/05/2020    Acquired hypothyroidism  Comments:  stable    Essential hypertension  Comments:  BP well controlled    Age-related osteoporosis without current pathological fracture  Comments:  prolia injection scheduled for next week    Cigarette smoker  Comments:  smoking cessation counseling provided- strongly encouraged her to quit/cut back further. pt agrees to referral, plan for PFTs at next visit  Orders:  -     CT Chest Lung Screening Low Dose; Future; Expected date: 08/05/2020  -     Ambulatory referral/consult to Smoking Cessation Program; Future; Expected date: 08/12/2020    Encounter for screening for malignant neoplasm of respiratory organs  -     CT Chest Lung Screening Low Dose; Future; Expected date: 08/05/2020    Assistance with smoking cessation was offered, including:  [x]  Medications  [x]  Counseling  []  Printed Information on Smoking Cessation  [x]  Referral to a Smoking Cessation Program    Patient was counseled regarding smoking for 3-10 minutes.    Follow up in about 4 months (around 12/5/2020) for Diabetes, HTN, Labs before next visit,  lipids.        8/5/2020 Liv Parrish NP

## 2020-08-07 LAB
ALBUMIN SERPL-MCNC: 3.9 G/DL (ref 3.6–5.1)
ALBUMIN/GLOB SERPL: 1.8 (CALC) (ref 1–2.5)
ALP SERPL-CCNC: 50 U/L (ref 37–153)
ALT SERPL-CCNC: 8 U/L (ref 6–29)
AST SERPL-CCNC: 12 U/L (ref 10–35)
BILIRUB SERPL-MCNC: 0.4 MG/DL (ref 0.2–1.2)
BUN SERPL-MCNC: 12 MG/DL (ref 7–25)
BUN/CREAT SERPL: ABNORMAL (CALC) (ref 6–22)
CALCIUM SERPL-MCNC: 9.2 MG/DL (ref 8.6–10.4)
CHLORIDE SERPL-SCNC: 104 MMOL/L (ref 98–110)
CHOLEST SERPL-MCNC: 153 MG/DL
CHOLEST/HDLC SERPL: 3.9 (CALC)
CO2 SERPL-SCNC: 30 MMOL/L (ref 20–32)
CREAT SERPL-MCNC: 0.64 MG/DL (ref 0.5–0.99)
GFRSERPLBLD MDRD-ARVRAT: 94 ML/MIN/1.73M2
GLOBULIN SER CALC-MCNC: 2.2 G/DL (CALC) (ref 1.9–3.7)
GLUCOSE SERPL-MCNC: 107 MG/DL (ref 65–99)
HBA1C MFR BLD: 5.4 % OF TOTAL HGB
HDLC SERPL-MCNC: 39 MG/DL
LDLC SERPL CALC-MCNC: 81 MG/DL (CALC)
NONHDLC SERPL-MCNC: 114 MG/DL (CALC)
POTASSIUM SERPL-SCNC: 4.4 MMOL/L (ref 3.5–5.3)
PROT SERPL-MCNC: 6.1 G/DL (ref 6.1–8.1)
SODIUM SERPL-SCNC: 140 MMOL/L (ref 135–146)
TRIGL SERPL-MCNC: 240 MG/DL

## 2020-08-12 ENCOUNTER — INFUSION (OUTPATIENT)
Dept: INFUSION THERAPY | Facility: HOSPITAL | Age: 65
End: 2020-08-12
Attending: NURSE PRACTITIONER
Payer: MEDICARE

## 2020-08-12 VITALS
DIASTOLIC BLOOD PRESSURE: 79 MMHG | OXYGEN SATURATION: 98 % | BODY MASS INDEX: 21.86 KG/M2 | TEMPERATURE: 97 F | HEART RATE: 71 BPM | WEIGHT: 115.69 LBS | SYSTOLIC BLOOD PRESSURE: 175 MMHG | RESPIRATION RATE: 18 BRPM

## 2020-08-12 DIAGNOSIS — M81.0 AGE-RELATED OSTEOPOROSIS WITHOUT CURRENT PATHOLOGICAL FRACTURE: Primary | ICD-10-CM

## 2020-08-12 PROCEDURE — 96372 THER/PROPH/DIAG INJ SC/IM: CPT

## 2020-08-12 PROCEDURE — 63600175 PHARM REV CODE 636 W HCPCS: Mod: JG | Performed by: NURSE PRACTITIONER

## 2020-08-12 RX ADMIN — DENOSUMAB 60 MG: 60 INJECTION SUBCUTANEOUS at 02:08

## 2020-08-12 NOTE — PLAN OF CARE
Patient given prolia per MD orders. Tolerated well. Patient calcium 9.2 creatinine 0.64. taking supplements and denies recent dental work.

## 2020-08-13 ENCOUNTER — TELEPHONE (OUTPATIENT)
Dept: INFUSION THERAPY | Facility: HOSPITAL | Age: 65
End: 2020-08-13

## 2020-08-26 ENCOUNTER — HOSPITAL ENCOUNTER (OUTPATIENT)
Dept: RADIOLOGY | Facility: HOSPITAL | Age: 65
Discharge: HOME | End: 2020-08-26
Attending: NURSE PRACTITIONER
Payer: MEDICARE

## 2020-08-26 DIAGNOSIS — F17.210 CIGARETTE SMOKER: ICD-10-CM

## 2020-08-26 DIAGNOSIS — Z12.2 ENCOUNTER FOR SCREENING FOR MALIGNANT NEOPLASM OF RESPIRATORY ORGANS: ICD-10-CM

## 2020-08-26 PROCEDURE — G0297 LDCT FOR LUNG CA SCREEN: HCPCS | Mod: TC,PO

## 2020-09-02 ENCOUNTER — TELEPHONE (OUTPATIENT)
Dept: SMOKING CESSATION | Facility: CLINIC | Age: 65
End: 2020-09-02

## 2020-09-02 NOTE — TELEPHONE ENCOUNTER
I called patient for a scheduled tobacco cessation intake appointment today. She didn't make it in to the clinic and I was unable to reach her by phone at this time. I did leave a message with my name & contact number for a return call.

## 2020-09-16 ENCOUNTER — TELEPHONE (OUTPATIENT)
Dept: SMOKING CESSATION | Facility: CLINIC | Age: 65
End: 2020-09-16

## 2020-09-16 NOTE — TELEPHONE ENCOUNTER
I called patient today to offer her an opportunity to reschedule her tobacco cessation intake appointment. I was unable to reach her today. I did leave my name and contact number for a return call.

## 2020-10-08 ENCOUNTER — TELEPHONE (OUTPATIENT)
Dept: SMOKING CESSATION | Facility: CLINIC | Age: 65
End: 2020-10-08

## 2020-10-08 NOTE — TELEPHONE ENCOUNTER
I called patient today for tobacco cessation follow up, but I had to leave message. I did leave my name and contact number ([phone]) for a return call.

## 2020-10-24 ENCOUNTER — IMMUNIZATION (OUTPATIENT)
Dept: URGENT CARE | Facility: CLINIC | Age: 65
End: 2020-10-24
Payer: MEDICARE

## 2020-10-24 DIAGNOSIS — Z23 NEED FOR INFLUENZA VACCINATION: ICD-10-CM

## 2020-10-24 PROCEDURE — G0008 ADMIN INFLUENZA VIRUS VAC: HCPCS | Mod: S$GLB,,, | Performed by: EMERGENCY MEDICINE

## 2020-10-24 PROCEDURE — G0008 PR ADMIN INFLUENZA VIRUS VAC: ICD-10-PCS | Mod: S$GLB,,, | Performed by: EMERGENCY MEDICINE

## 2020-10-24 PROCEDURE — 90694 PR FLU VACCINE, QAIIV, INACTIV, NO PRSV, 0.5 ML, IM: ICD-10-PCS | Mod: S$GLB,,, | Performed by: EMERGENCY MEDICINE

## 2020-10-24 PROCEDURE — 90694 VACC AIIV4 NO PRSRV 0.5ML IM: CPT | Mod: S$GLB,,, | Performed by: EMERGENCY MEDICINE

## 2020-11-09 DIAGNOSIS — E11.69 DM TYPE 2 WITH DIABETIC DYSLIPIDEMIA: ICD-10-CM

## 2020-11-09 DIAGNOSIS — E78.5 DM TYPE 2 WITH DIABETIC DYSLIPIDEMIA: ICD-10-CM

## 2020-11-09 DIAGNOSIS — E55.9 VITAMIN D DEFICIENCY: Primary | ICD-10-CM

## 2020-11-09 RX ORDER — ERGOCALCIFEROL 1.25 MG/1
50000 CAPSULE ORAL
Qty: 12 CAPSULE | Refills: 1 | Status: SHIPPED | OUTPATIENT
Start: 2020-11-09 | End: 2021-06-07 | Stop reason: SDUPTHER

## 2020-11-09 NOTE — TELEPHONE ENCOUNTER
----- Message from Mireya Mosher sent at 11/9/2020  9:48 AM CST -----  Regarding: Refill  Contact: Liv Decker  Needs refill on her Vitamin D  Send to [facility] on    Pt# [phone]

## 2020-11-18 DIAGNOSIS — E11.69 DM TYPE 2 WITH DIABETIC DYSLIPIDEMIA: ICD-10-CM

## 2020-11-18 DIAGNOSIS — F32.A MILD DEPRESSION: ICD-10-CM

## 2020-11-18 DIAGNOSIS — E78.5 DM TYPE 2 WITH DIABETIC DYSLIPIDEMIA: ICD-10-CM

## 2020-11-18 RX ORDER — METFORMIN HYDROCHLORIDE 1000 MG/1
1000 TABLET ORAL
Qty: 90 TABLET | Refills: 1 | Status: SHIPPED | OUTPATIENT
Start: 2020-11-18 | End: 2021-09-21 | Stop reason: SDUPTHER

## 2020-11-18 RX ORDER — ESCITALOPRAM OXALATE 10 MG/1
10 TABLET ORAL DAILY
Qty: 90 TABLET | Refills: 1 | Status: SHIPPED | OUTPATIENT
Start: 2020-11-18 | End: 2021-06-07 | Stop reason: SDUPTHER

## 2020-11-18 NOTE — TELEPHONE ENCOUNTER
----- Message from Lilly Eisenberg sent at 11/18/2020 12:26 PM CST -----  Pt calling for refills on Metformin 1000 mg and Escitalopram 20 mg to [facility] on [address].    # [phone]

## 2020-11-20 ENCOUNTER — OFFICE VISIT (OUTPATIENT)
Dept: FAMILY MEDICINE | Facility: CLINIC | Age: 65
End: 2020-11-20
Payer: MEDICARE

## 2020-11-20 VITALS
HEIGHT: 61 IN | SYSTOLIC BLOOD PRESSURE: 168 MMHG | WEIGHT: 116 LBS | BODY MASS INDEX: 21.9 KG/M2 | HEART RATE: 88 BPM | DIASTOLIC BLOOD PRESSURE: 84 MMHG

## 2020-11-20 DIAGNOSIS — J44.1 COPD EXACERBATION: Primary | ICD-10-CM

## 2020-11-20 PROCEDURE — 3077F SYST BP >= 140 MM HG: CPT | Mod: S$GLB,,, | Performed by: PHYSICIAN ASSISTANT

## 2020-11-20 PROCEDURE — 99213 OFFICE O/P EST LOW 20 MIN: CPT | Mod: S$GLB,,, | Performed by: PHYSICIAN ASSISTANT

## 2020-11-20 PROCEDURE — 3077F PR MOST RECENT SYSTOLIC BLOOD PRESSURE >= 140 MM HG: ICD-10-PCS | Mod: S$GLB,,, | Performed by: PHYSICIAN ASSISTANT

## 2020-11-20 PROCEDURE — 3079F PR MOST RECENT DIASTOLIC BLOOD PRESSURE 80-89 MM HG: ICD-10-PCS | Mod: S$GLB,,, | Performed by: PHYSICIAN ASSISTANT

## 2020-11-20 PROCEDURE — 3079F DIAST BP 80-89 MM HG: CPT | Mod: S$GLB,,, | Performed by: PHYSICIAN ASSISTANT

## 2020-11-20 PROCEDURE — 3008F BODY MASS INDEX DOCD: CPT | Mod: S$GLB,,, | Performed by: PHYSICIAN ASSISTANT

## 2020-11-20 PROCEDURE — 3008F PR BODY MASS INDEX (BMI) DOCUMENTED: ICD-10-PCS | Mod: S$GLB,,, | Performed by: PHYSICIAN ASSISTANT

## 2020-11-20 PROCEDURE — 99213 PR OFFICE/OUTPT VISIT, EST, LEVL III, 20-29 MIN: ICD-10-PCS | Mod: S$GLB,,, | Performed by: PHYSICIAN ASSISTANT

## 2020-11-20 RX ORDER — PREDNISONE 20 MG/1
20 TABLET ORAL 2 TIMES DAILY
Qty: 10 TABLET | Refills: 0 | Status: SHIPPED | OUTPATIENT
Start: 2020-11-20 | End: 2020-11-25

## 2020-11-20 RX ORDER — AZITHROMYCIN 250 MG/1
TABLET, FILM COATED ORAL
Qty: 6 TABLET | Refills: 0 | Status: SHIPPED | OUTPATIENT
Start: 2020-11-20 | End: 2020-11-25

## 2020-11-20 NOTE — PATIENT INSTRUCTIONS
COPD Flare    You have had a flare-up of your COPD.  COPD, or chronic obstructive pulmonary disease, is a common lung disease. It causes your airways to become irritated and narrower. This makes it harder for you to breathe. Emphysema and chronic bronchitis are both types of COPD. This is a chronic condition, which means you always have it. Sometimes it gets worse. When this happens, it is called a flare-up.  Symptoms of COPD  People with COPD may have symptoms most of the time. In a flare-up, your symptoms get worse. These symptoms may mean you are having a flare-up:  · Shortness of breath, shallow or rapid breathing, or wheezing that gets worse  · Lung infection  · Cough that gets worse  · More mucus, thicker mucus or mucus of a different color  · Tiredness, decreased energy, or trouble doing your usual activities  · Fever  · Chest tightness  · Your symptoms dont get better even when you use your usual medicines, inhalers, and nebulizer  · Trouble talking  · You feel confused  Causes of flare-ups  Unfortunately, a flare-up can happen even though you did everything right, and you followed your doctors instructions. Some causes of flare-ups are:  · Smoking or secondhand smoke  · Colds, the flu, or respiratory infections  · Air pollution  · Sudden change in the weather  · Dust, irritating chemicals, or strong fumes  · Not taking your medicines as prescribed  Home care  Here are some things you can do at home to treat a flare-up:  · Try not to panic. This makes it harder to breathe, and keeps you from doing the right things.  · Dont smoke or be around others who are smoking.  · Try to drink more fluids than usual during a flare-up, unless your doctor has told you not to because of heart and kidney problems. More fluids can help loosen the mucus.  · Use your inhalers and nebulizer, if you have one, as you have been told to.  · If you were given antibiotics, take them until they are used up or your doctor tells you  to stop. Its important to finish the antibiotics, even though you feel better. This will make sure the infection has cleared.  · If you were given prednisone or another steroid, finish it even if you feel better.  Preventing a flare-up  Even though flare-ups happen, the best way to treat one is to prevent it before it starts. Here are some pointers:  · Dont smoke or be around others who are smoking.  · Take your medicines as you have been told.  · Talk with your doctor about getting a flu shot every year. Also find out if you need a pneumonia shot.  · If there is a weather advisory warning to stay indoors, try to stay inside when possible.  · Try to eat healthy and get plenty of sleep.  · Try to avoid things that usually set you off, like dust, chemical fumes, hairsprays, or strong perfumes.  Follow-up care  Follow up with your healthcare provider, or as advised.  If a culture was done, you will be told if your treatment needs to be changed. You can call as directed for the results.  If X-rays were done, you will be notified of any new findings that may affect your care.  Call 911  Call 911 if any of these occur:  · You have trouble breathing  · You feel confused or its difficult to wake you up  · You faint or lose consciousness  · You have a rapid heart rate  · You have new pain in your chest, arm, shoulder, neck or upper back  When to seek medical advice  Call your healthcare provider right away if any of these occur:  · Wheezing or shortness of breath gets worse  · You need to use your inhalers more often than usual without relief  · Fever of 100.4°F (38ºC) or higher, or as directed by your healthcare provider  · Coughing up lots of dark-colored or bloody mucus (sputum)  · Chest pain with each breath  · You do not start to get better within 24 hours  · Swelling of your ankles gets worse  · Dizziness or weakness  Date Last Reviewed: 9/1/2016  © 2019-0759 [employer]. [address] [facility],  [place] [address]. All rights reserved. This information is not intended as a substitute for professional medical care. Always follow your healthcare professional's instructions.

## 2020-11-20 NOTE — PROGRESS NOTES
SUBJECTIVE:    Patient ID: Liv Decker is a 65 y.o. female.    Chief Complaint: Cough (no bottles, no refills , eye exam done 2/2020-DJ)    64 yo wf presents for urgent evaluation of cough that started earlier this week. Hx of COPD and had previously been on maintenance inhaler but it seems that she ran out of samples and never called to get full script sent in. She was planning to get a spirometry done as well but has failed to get this as well. CT of the lungs low dose was normal in Aug. She just wants to get on top of this before it gets worse. Thinks that her sputum may be getting thicker..      Office Visit on 08/05/2020   Component Date Value Ref Range Status    Cholesterol 08/06/2020 153  <200 mg/dL Final    HDL 08/06/2020 39* > OR = 50 mg/dL Final    Triglycerides 08/06/2020 240* <150 mg/dL Final    LDL Cholesterol 08/06/2020 81  mg/dL (calc) Final    HDL/Cholesterol Ratio 08/06/2020 3.9  <5.0 (calc) Final    Non HDL Chol. (LDL+VLDL) 08/06/2020 114  <130 mg/dL (calc) Final    Glucose 08/06/2020 107* 65 - 99 mg/dL Final    BUN 08/06/2020 12  7 - 25 mg/dL Final    Creatinine 08/06/2020 0.64  0.50 - 0.99 mg/dL Final    eGFR if non African American 08/06/2020 94  > OR = 60 mL/min/1.73m2 Final    eGFR if African American 08/06/2020 109  > OR = 60 mL/min/1.73m2 Final    BUN/Creatinine Ratio 08/06/2020 NOT APPLICABLE  6 - 22 (calc) Final    Sodium 08/06/2020 140  135 - 146 mmol/L Final    Potassium 08/06/2020 4.4  3.5 - 5.3 mmol/L Final    Chloride 08/06/2020 104  98 - 110 mmol/L Final    CO2 08/06/2020 30  20 - 32 mmol/L Final    Calcium 08/06/2020 9.2  8.6 - 10.4 mg/dL Final    Total Protein 08/06/2020 6.1  6.1 - 8.1 g/dL Final    Albumin 08/06/2020 3.9  3.6 - 5.1 g/dL Final    Globulin, Total 08/06/2020 2.2  1.9 - 3.7 g/dL (calc) Final    Albumin/Globulin Ratio 08/06/2020 1.8  1.0 - 2.5 (calc) Final    Total Bilirubin 08/06/2020 0.4  0.2 - 1.2 mg/dL Final    Alkaline  Phosphatase 08/06/2020 50  37 - 153 U/L Final    AST 08/06/2020 12  10 - 35 U/L Final    ALT 08/06/2020 8  6 - 29 U/L Final    Hemoglobin A1C 08/06/2020 5.4  <5.7 % of total Hgb Final   Lab Visit on 07/06/2020   Component Date Value Ref Range Status    SARS-CoV2 (COVID-19) Qualitative P* 07/06/2020 Not Detected  Not Detected Final       Past Medical History:   Diagnosis Date    Anemia     Diabetes mellitus     Encounter for blood transfusion     GERD (gastroesophageal reflux disease)     Hyperlipidemia     MI (myocardial infarction)     Ulcer of the stomach and intestine     pos. for h. pylori     Past Surgical History:   Procedure Laterality Date    APPENDECTOMY      CHOLECYSTECTOMY      COLONOSCOPY N/A 3/18/2016    Procedure: COLONOSCOPY;  Surgeon: Rober Zelaya Jr., MD;  Location: [facility];  Service: Endoscopy;  Laterality: N/A;    ESOPHAGOGASTRODUODENOSCOPY  04/15/2016    with dil    EYE SURGERY      TONSILLECTOMY      TUBAL LIGATION       Family History   Problem Relation Age of Onset    Heart disease Mother     Diabetes Father        Marital Status:   Alcohol History:  reports no history of alcohol use.  Tobacco History:  reports that she has been smoking cigarettes. She started smoking about 61 years ago. She has a 22.50 pack-year smoking history. She has never used smokeless tobacco.  Drug History:  reports no history of drug use.    Review of patient's allergies indicates:  No Known Allergies    Current Outpatient Medications:     albuterol (PROAIR HFA) 90 mcg/actuation inhaler, Inhale 2 puffs into the lungs every 6 (six) hours as needed for Wheezing or Shortness of Breath. Rescue, Disp: 18 g, Rfl: 5    atorvastatin (LIPITOR) 10 MG tablet, Take 1 tablet (10 mg total) by mouth once daily., Disp: 90 tablet, Rfl: 1    azithromycin (Z-LUDMILA) 250 MG tablet, Take 2 tablets by mouth on day 1; Take 1 tablet by mouth on days 2-5, Disp: 6 tablet, Rfl: 0    calcium carbonate (OS-JOSELYN)  600 mg calcium (1,500 mg) Tab, Take 600 mg by mouth once daily., Disp: , Rfl:     ergocalciferol (ERGOCALCIFEROL) 50,000 unit Cap, Take 1 capsule (50,000 Units total) by mouth every 7 days., Disp: 12 capsule, Rfl: 1    escitalopram oxalate (LEXAPRO) 10 MG tablet, Take 1 tablet (10 mg total) by mouth once daily., Disp: 90 tablet, Rfl: 1    famotidine (PEPCID) 20 MG tablet, , Disp: , Rfl:     levothyroxine (SYNTHROID) 25 MCG tablet, Take 1 tablet (25 mcg total) by mouth once daily., Disp: 90 tablet, Rfl: 1    lisinopriL (PRINIVIL,ZESTRIL) 20 MG tablet, Take 1 tablet (20 mg total) by mouth once daily., Disp: 90 tablet, Rfl: 1    metFORMIN (GLUCOPHAGE) 1000 MG tablet, Take 1 tablet (1,000 mg total) by mouth daily with breakfast., Disp: 90 tablet, Rfl: 1    pantoprazole (PROTONIX) 40 MG tablet, Take 1 tablet (40 mg total) by mouth once daily., Disp: 90 tablet, Rfl: 1    predniSONE (DELTASONE) 20 MG tablet, Take 1 tablet (20 mg total) by mouth 2 (two) times daily. for 5 days, Disp: 10 tablet, Rfl: 0    propranolol (INDERAL) 80 MG tablet, Take 80 mg by mouth once daily. , Disp: , Rfl:     Review of Systems   Constitutional: Negative for chills and fever.   HENT: Negative for sinus pain and sore throat.    Respiratory: Positive for cough (productive cough with thick green sputum), shortness of breath and wheezing.    Cardiovascular: Positive for palpitations (when SOB). Negative for chest pain and leg swelling.   Gastrointestinal: Negative for abdominal pain, constipation, diarrhea, nausea and vomiting.   Genitourinary: Negative for dysuria, frequency and hematuria.   Musculoskeletal: Negative for back pain and gait problem.   Skin: Negative for rash.   Neurological: Negative for dizziness (occasional with position changes) and headaches.   Psychiatric/Behavioral: Negative for dysphoric mood. The patient is nervous/anxious (worried about COVID19 and also missing work).           Objective:      Vitals:    11/20/20  "1119 11/20/20 1123   BP: (!) 160/90 (!) 168/84   Pulse: 88 88   Weight: 52.6 kg (116 lb)    Height: 5' 1" (1.549 m)      Physical Exam  Vitals signs and nursing note reviewed.   Constitutional:       General: She is not in acute distress.     Appearance: Normal appearance. She is well-developed.      Comments: Thin white female   HENT:      Head: Normocephalic and atraumatic.      Right Ear: Tympanic membrane and ear canal normal.      Left Ear: Tympanic membrane and ear canal normal.      Mouth/Throat:      Pharynx: No posterior oropharyngeal erythema.   Neck:      Musculoskeletal: Neck supple.      Vascular: No carotid bruit.   Cardiovascular:      Rate and Rhythm: Normal rate and regular rhythm.      Heart sounds: No murmur. No friction rub. No gallop.    Pulmonary:      Effort: Pulmonary effort is normal. No respiratory distress.      Breath sounds: No stridor. No wheezing or rales.      Comments: Slightly diminished throughout  Abdominal:      General: There is no distension.      Palpations: Abdomen is soft.      Tenderness: There is no abdominal tenderness.   Musculoskeletal:      Right lower leg: No edema.      Left lower leg: No edema.   Lymphadenopathy:      Cervical: No cervical adenopathy.   Skin:     General: Skin is warm and dry.      Findings: No rash.   Neurological:      General: No focal deficit present.      Mental Status: She is alert and oriented to person, place, and time.      Gait: Gait normal.           Assessment:       1. COPD exacerbation         Plan:       COPD exacerbation  Comments:  zpack/prednisone today. .Rest and push fluids. Consider anoro maintenance and PFTs at next followup as scheduled with Liv.  Orders:  -     azithromycin (Z-LUDMILA) 250 MG tablet; Take 2 tablets by mouth on day 1; Take 1 tablet by mouth on days 2-5  Dispense: 6 tablet; Refill: 0  -     predniSONE (DELTASONE) 20 MG tablet; Take 1 tablet (20 mg total) by mouth 2 (two) times daily. for 5 days  Dispense: 10 " tablet; Refill: 0      Follow up if symptoms worsen or fail to improve, for as scheduled.        11/20/2020 Varghese Newell PA-C

## 2020-11-30 ENCOUNTER — TELEPHONE (OUTPATIENT)
Dept: FAMILY MEDICINE | Facility: CLINIC | Age: 65
End: 2020-11-30

## 2020-12-03 RX ORDER — PROPRANOLOL HYDROCHLORIDE 80 MG/1
80 TABLET ORAL DAILY
Qty: 90 TABLET | Refills: 1 | Status: SHIPPED | OUTPATIENT
Start: 2020-12-03 | End: 2021-06-07 | Stop reason: SDUPTHER

## 2020-12-03 NOTE — TELEPHONE ENCOUNTER
----- Message from Lilly Eisenberg sent at 12/3/2020 11:45 AM CST -----  Pt calling for refill on Propranolol 80 mg to [facility] on  cb # [phone]

## 2020-12-07 ENCOUNTER — TELEPHONE (OUTPATIENT)
Dept: FAMILY MEDICINE | Facility: CLINIC | Age: 65
End: 2020-12-07

## 2020-12-07 ENCOUNTER — HOSPITAL ENCOUNTER (OUTPATIENT)
Dept: RADIOLOGY | Facility: HOSPITAL | Age: 65
Discharge: HOME | End: 2020-12-07
Attending: NURSE PRACTITIONER
Payer: MEDICARE

## 2020-12-07 ENCOUNTER — OFFICE VISIT (OUTPATIENT)
Dept: FAMILY MEDICINE | Facility: CLINIC | Age: 65
End: 2020-12-07
Payer: MEDICARE

## 2020-12-07 VITALS
SYSTOLIC BLOOD PRESSURE: 138 MMHG | TEMPERATURE: 98 F | DIASTOLIC BLOOD PRESSURE: 78 MMHG | HEART RATE: 100 BPM | OXYGEN SATURATION: 97 % | HEIGHT: 61 IN | WEIGHT: 114 LBS | BODY MASS INDEX: 21.52 KG/M2

## 2020-12-07 DIAGNOSIS — J44.1 COPD EXACERBATION: ICD-10-CM

## 2020-12-07 DIAGNOSIS — R06.02 SHORTNESS OF BREATH: ICD-10-CM

## 2020-12-07 DIAGNOSIS — R05.9 COUGH: ICD-10-CM

## 2020-12-07 DIAGNOSIS — R06.02 SHORTNESS OF BREATH: Primary | ICD-10-CM

## 2020-12-07 PROCEDURE — 99213 OFFICE O/P EST LOW 20 MIN: CPT | Mod: S$GLB,,, | Performed by: NURSE PRACTITIONER

## 2020-12-07 PROCEDURE — 3008F PR BODY MASS INDEX (BMI) DOCUMENTED: ICD-10-PCS | Mod: S$GLB,,, | Performed by: NURSE PRACTITIONER

## 2020-12-07 PROCEDURE — 99213 PR OFFICE/OUTPT VISIT, EST, LEVL III, 20-29 MIN: ICD-10-PCS | Mod: S$GLB,,, | Performed by: NURSE PRACTITIONER

## 2020-12-07 PROCEDURE — 3078F DIAST BP <80 MM HG: CPT | Mod: S$GLB,,, | Performed by: NURSE PRACTITIONER

## 2020-12-07 PROCEDURE — 71046 X-RAY EXAM CHEST 2 VIEWS: CPT | Mod: TC,PO

## 2020-12-07 PROCEDURE — 3075F SYST BP GE 130 - 139MM HG: CPT | Mod: S$GLB,,, | Performed by: NURSE PRACTITIONER

## 2020-12-07 PROCEDURE — 3075F PR MOST RECENT SYSTOLIC BLOOD PRESS GE 130-139MM HG: ICD-10-PCS | Mod: S$GLB,,, | Performed by: NURSE PRACTITIONER

## 2020-12-07 PROCEDURE — 3078F PR MOST RECENT DIASTOLIC BLOOD PRESSURE < 80 MM HG: ICD-10-PCS | Mod: S$GLB,,, | Performed by: NURSE PRACTITIONER

## 2020-12-07 PROCEDURE — 3008F BODY MASS INDEX DOCD: CPT | Mod: S$GLB,,, | Performed by: NURSE PRACTITIONER

## 2020-12-07 RX ORDER — PREDNISONE 10 MG/1
TABLET ORAL
Qty: 28 TABLET | Refills: 0 | Status: SHIPPED | OUTPATIENT
Start: 2020-12-07 | End: 2020-12-16

## 2020-12-07 RX ORDER — CODEINE PHOSPHATE AND GUAIFENESIN 10; 100 MG/5ML; MG/5ML
10 SOLUTION ORAL EVERY 6 HOURS PRN
Qty: 160 ML | Refills: 0 | Status: SHIPPED | OUTPATIENT
Start: 2020-12-07 | End: 2020-12-09 | Stop reason: SDUPTHER

## 2020-12-07 NOTE — TELEPHONE ENCOUNTER
----- Message from Gill Valdez NP sent at 12/7/2020  9:37 AM CST -----  Please let pt know lungs look okay- no signs of infection. Continue medications as we discussed.

## 2020-12-07 NOTE — PROGRESS NOTES
SUBJECTIVE:    Patient ID: Liv Decker is a 65 y.o. female.    Chief Complaint: Cough (- 2 weeks, no bottles, declines refills, declines eye exam, discuss pna-DJ) and Shortness of Breath    Patient presents with complaints of continued cough and shortness of breath.  Seen on 11/20 with similar symptoms.  At that time was given a Z-Jerrod and 5 days of prednisone.  Patient states she felt mildly better and then all symptoms came back.  History of COPD still current everyday smoker.  However, reports she has not smoked since symptoms started.  On chart review, it appears there are plans for patient to have PFTs completed soon.  Currently using albuterol inhaler 2-3 times per day.  Reports cough but not really coughing much up.  Also feels like she cannot catch her breath.  Denies any wheezing.  Denies fever or chills.  Sats 97% in office.      Office Visit on 08/05/2020   Component Date Value Ref Range Status    Cholesterol 08/06/2020 153  <200 mg/dL Final    HDL 08/06/2020 39* > OR = 50 mg/dL Final    Triglycerides 08/06/2020 240* <150 mg/dL Final    LDL Cholesterol 08/06/2020 81  mg/dL (calc) Final    HDL/Cholesterol Ratio 08/06/2020 3.9  <5.0 (calc) Final    Non HDL Chol. (LDL+VLDL) 08/06/2020 114  <130 mg/dL (calc) Final    Glucose 08/06/2020 107* 65 - 99 mg/dL Final    BUN 08/06/2020 12  7 - 25 mg/dL Final    Creatinine 08/06/2020 0.64  0.50 - 0.99 mg/dL Final    eGFR if non African American 08/06/2020 94  > OR = 60 mL/min/1.73m2 Final    eGFR if African American 08/06/2020 109  > OR = 60 mL/min/1.73m2 Final    BUN/Creatinine Ratio 08/06/2020 NOT APPLICABLE  6 - 22 (calc) Final    Sodium 08/06/2020 140  135 - 146 mmol/L Final    Potassium 08/06/2020 4.4  3.5 - 5.3 mmol/L Final    Chloride 08/06/2020 104  98 - 110 mmol/L Final    CO2 08/06/2020 30  20 - 32 mmol/L Final    Calcium 08/06/2020 9.2  8.6 - 10.4 mg/dL Final    Total Protein 08/06/2020 6.1  6.1 - 8.1 g/dL Final    Albumin  08/06/2020 3.9  3.6 - 5.1 g/dL Final    Globulin, Total 08/06/2020 2.2  1.9 - 3.7 g/dL (calc) Final    Albumin/Globulin Ratio 08/06/2020 1.8  1.0 - 2.5 (calc) Final    Total Bilirubin 08/06/2020 0.4  0.2 - 1.2 mg/dL Final    Alkaline Phosphatase 08/06/2020 50  37 - 153 U/L Final    AST 08/06/2020 12  10 - 35 U/L Final    ALT 08/06/2020 8  6 - 29 U/L Final    Hemoglobin A1C 08/06/2020 5.4  <5.7 % of total Hgb Final   Lab Visit on 07/06/2020   Component Date Value Ref Range Status    SARS-CoV2 (COVID-19) Qualitative P* 07/06/2020 Not Detected  Not Detected Final       Past Medical History:   Diagnosis Date    Anemia     Diabetes mellitus     Encounter for blood transfusion     GERD (gastroesophageal reflux disease)     Hyperlipidemia     MI (myocardial infarction)     Ulcer of the stomach and intestine     pos. for h. pylori     Past Surgical History:   Procedure Laterality Date    APPENDECTOMY      CHOLECYSTECTOMY      COLONOSCOPY N/A 3/18/2016    Procedure: COLONOSCOPY;  Surgeon: Rober Zelaya Jr., MD;  Location: [facility];  Service: Endoscopy;  Laterality: N/A;    ESOPHAGOGASTRODUODENOSCOPY  04/15/2016    with dil    EYE SURGERY      TONSILLECTOMY      TUBAL LIGATION       Family History   Problem Relation Age of Onset    Heart disease Mother     Diabetes Father        Marital Status:   Alcohol History:  reports no history of alcohol use.  Tobacco History:  reports that she has been smoking cigarettes. She started smoking about 61 years ago. She has a 22.50 pack-year smoking history. She has never used smokeless tobacco.  Drug History:  reports no history of drug use.    Review of patient's allergies indicates:  No Known Allergies    Current Outpatient Medications:     albuterol (PROAIR HFA) 90 mcg/actuation inhaler, Inhale 2 puffs into the lungs every 6 (six) hours as needed for Wheezing or Shortness of Breath. Rescue, Disp: 18 g, Rfl: 5    atorvastatin (LIPITOR) 10 MG tablet,  Take 1 tablet (10 mg total) by mouth once daily., Disp: 90 tablet, Rfl: 1    calcium carbonate (OS-JOSELYN) 600 mg calcium (1,500 mg) Tab, Take 600 mg by mouth once daily., Disp: , Rfl:     ergocalciferol (ERGOCALCIFEROL) 50,000 unit Cap, Take 1 capsule (50,000 Units total) by mouth every 7 days., Disp: 12 capsule, Rfl: 1    escitalopram oxalate (LEXAPRO) 10 MG tablet, Take 1 tablet (10 mg total) by mouth once daily., Disp: 90 tablet, Rfl: 1    famotidine (PEPCID) 20 MG tablet, , Disp: , Rfl:     guaifenesin-codeine 100-10 mg/5 ml (TUSSI-ORGANIDIN NR)  mg/5 mL syrup, Take 10 mLs by mouth every 6 (six) hours as needed for Cough., Disp: 160 mL, Rfl: 0    levothyroxine (SYNTHROID) 25 MCG tablet, Take 1 tablet (25 mcg total) by mouth once daily., Disp: 90 tablet, Rfl: 1    lisinopriL (PRINIVIL,ZESTRIL) 20 MG tablet, Take 1 tablet (20 mg total) by mouth once daily., Disp: 90 tablet, Rfl: 1    metFORMIN (GLUCOPHAGE) 1000 MG tablet, Take 1 tablet (1,000 mg total) by mouth daily with breakfast., Disp: 90 tablet, Rfl: 1    pantoprazole (PROTONIX) 40 MG tablet, Take 1 tablet (40 mg total) by mouth once daily., Disp: 90 tablet, Rfl: 1    predniSONE (DELTASONE) 10 MG tablet, Take 2 tablets (20 mg total) by mouth 2 (two) times daily for 5 days, THEN 1 tablet (10 mg total) 2 (two) times daily for 4 days., Disp: 28 tablet, Rfl: 0    propranoloL (INDERAL) 80 MG tablet, Take 1 tablet (80 mg total) by mouth once daily., Disp: 90 tablet, Rfl: 1    Review of Systems   Constitutional: Negative for chills and fever.   HENT: Negative for congestion, ear pain, sinus pressure, sinus pain and sore throat.    Eyes: Negative for pain and redness.   Respiratory: Positive for cough, chest tightness and shortness of breath. Negative for wheezing.    Cardiovascular: Negative for chest pain.   Gastrointestinal: Negative for nausea and vomiting.   Genitourinary: Negative for dysuria.   Musculoskeletal: Negative for myalgias and neck  "pain.   Skin: Negative.    Neurological: Negative for dizziness, weakness and headaches.          Objective:      Vitals:    12/07/20 0823   BP: 138/78   Pulse: 100   Temp: 98.1 °F (36.7 °C)   SpO2: 97%   Weight: 51.7 kg (114 lb)   Height: 5' 1" (1.549 m)     Body mass index is 21.54 kg/m².  Physical Exam  Constitutional:       Appearance: She is well-developed.   HENT:      Head: Normocephalic.      Nose: Nose normal.   Eyes:      Pupils: Pupils are equal, round, and reactive to light.   Neck:      Musculoskeletal: Normal range of motion.   Cardiovascular:      Rate and Rhythm: Normal rate and regular rhythm.   Pulmonary:      Effort: Pulmonary effort is normal.      Breath sounds: Examination of the right-lower field reveals rhonchi. Rhonchi present. No wheezing.   Abdominal:      Palpations: Abdomen is soft.   Musculoskeletal: Normal range of motion.   Lymphadenopathy:      Cervical: No cervical adenopathy.   Skin:     General: Skin is warm and dry.   Neurological:      Mental Status: She is alert and oriented to person, place, and time.           Assessment:       1. Shortness of breath    2. COPD exacerbation    3. Cough         Plan:       Shortness of breath  -     X-Ray Chest PA And Lateral; Future; Expected date: 12/07/2020    COPD exacerbation  -     predniSONE (DELTASONE) 10 MG tablet; Take 2 tablets (20 mg total) by mouth 2 (two) times daily for 5 days, THEN 1 tablet (10 mg total) 2 (two) times daily for 4 days.  Dispense: 28 tablet; Refill: 0  -     guaifenesin-codeine 100-10 mg/5 ml (TUSSI-ORGANIDIN NR)  mg/5 mL syrup; Take 10 mLs by mouth every 6 (six) hours as needed for Cough.  Dispense: 160 mL; Refill: 0    Cough  -     guaifenesin-codeine 100-10 mg/5 ml (TUSSI-ORGANIDIN NR)  mg/5 mL syrup; Take 10 mLs by mouth every 6 (six) hours as needed for Cough.  Dispense: 160 mL; Refill: 0    Will get CXR to r/o signs of pneumonia. Will treat with longer steroid taper and cough medication given. " Consider PFTs once she is feeling better.    Follow up if symptoms worsen or fail to improve.

## 2020-12-08 LAB
25(OH)D3 SERPL-MCNC: 91 NG/ML (ref 30–100)
HBA1C MFR BLD: 5.7 % OF TOTAL HGB

## 2020-12-09 ENCOUNTER — OFFICE VISIT (OUTPATIENT)
Dept: FAMILY MEDICINE | Facility: CLINIC | Age: 65
End: 2020-12-09
Payer: MEDICARE

## 2020-12-09 VITALS
SYSTOLIC BLOOD PRESSURE: 138 MMHG | HEART RATE: 78 BPM | OXYGEN SATURATION: 95 % | WEIGHT: 116 LBS | DIASTOLIC BLOOD PRESSURE: 86 MMHG | BODY MASS INDEX: 21.9 KG/M2 | HEIGHT: 61 IN

## 2020-12-09 DIAGNOSIS — F17.210 CIGARETTE SMOKER: ICD-10-CM

## 2020-12-09 DIAGNOSIS — J44.9 CHRONIC OBSTRUCTIVE PULMONARY DISEASE, UNSPECIFIED COPD TYPE: Primary | ICD-10-CM

## 2020-12-09 DIAGNOSIS — E11.69 DM TYPE 2 WITH DIABETIC DYSLIPIDEMIA: ICD-10-CM

## 2020-12-09 DIAGNOSIS — E78.5 DM TYPE 2 WITH DIABETIC DYSLIPIDEMIA: ICD-10-CM

## 2020-12-09 DIAGNOSIS — M81.0 AGE-RELATED OSTEOPOROSIS WITHOUT CURRENT PATHOLOGICAL FRACTURE: ICD-10-CM

## 2020-12-09 DIAGNOSIS — I10 ESSENTIAL HYPERTENSION: ICD-10-CM

## 2020-12-09 DIAGNOSIS — E03.9 ACQUIRED HYPOTHYROIDISM: ICD-10-CM

## 2020-12-09 DIAGNOSIS — E78.5 DYSLIPIDEMIA: ICD-10-CM

## 2020-12-09 PROCEDURE — 99406 BEHAV CHNG SMOKING 3-10 MIN: CPT | Mod: S$GLB,,, | Performed by: NURSE PRACTITIONER

## 2020-12-09 PROCEDURE — 1101F PR PT FALLS ASSESS DOC 0-1 FALLS W/OUT INJ PAST YR: ICD-10-PCS | Mod: S$GLB,,, | Performed by: NURSE PRACTITIONER

## 2020-12-09 PROCEDURE — 3288F FALL RISK ASSESSMENT DOCD: CPT | Mod: S$GLB,,, | Performed by: NURSE PRACTITIONER

## 2020-12-09 PROCEDURE — 3044F HG A1C LEVEL LT 7.0%: CPT | Mod: S$GLB,,, | Performed by: NURSE PRACTITIONER

## 2020-12-09 PROCEDURE — 99214 PR OFFICE/OUTPT VISIT, EST, LEVL IV, 30-39 MIN: ICD-10-PCS | Mod: S$GLB,,, | Performed by: NURSE PRACTITIONER

## 2020-12-09 PROCEDURE — 1101F PT FALLS ASSESS-DOCD LE1/YR: CPT | Mod: S$GLB,,, | Performed by: NURSE PRACTITIONER

## 2020-12-09 PROCEDURE — 3008F PR BODY MASS INDEX (BMI) DOCUMENTED: ICD-10-PCS | Mod: S$GLB,,, | Performed by: NURSE PRACTITIONER

## 2020-12-09 PROCEDURE — 3079F PR MOST RECENT DIASTOLIC BLOOD PRESSURE 80-89 MM HG: ICD-10-PCS | Mod: S$GLB,,, | Performed by: NURSE PRACTITIONER

## 2020-12-09 PROCEDURE — 99406 PR TOBACCO USE CESSATION INTERMEDIATE 3-10 MINUTES: ICD-10-PCS | Mod: S$GLB,,, | Performed by: NURSE PRACTITIONER

## 2020-12-09 PROCEDURE — 3288F PR FALLS RISK ASSESSMENT DOCUMENTED: ICD-10-PCS | Mod: S$GLB,,, | Performed by: NURSE PRACTITIONER

## 2020-12-09 PROCEDURE — 3008F BODY MASS INDEX DOCD: CPT | Mod: S$GLB,,, | Performed by: NURSE PRACTITIONER

## 2020-12-09 PROCEDURE — 3044F PR MOST RECENT HEMOGLOBIN A1C LEVEL <7.0%: ICD-10-PCS | Mod: S$GLB,,, | Performed by: NURSE PRACTITIONER

## 2020-12-09 PROCEDURE — 3079F DIAST BP 80-89 MM HG: CPT | Mod: S$GLB,,, | Performed by: NURSE PRACTITIONER

## 2020-12-09 PROCEDURE — 99214 OFFICE O/P EST MOD 30 MIN: CPT | Mod: S$GLB,,, | Performed by: NURSE PRACTITIONER

## 2020-12-09 PROCEDURE — 3075F SYST BP GE 130 - 139MM HG: CPT | Mod: S$GLB,,, | Performed by: NURSE PRACTITIONER

## 2020-12-09 PROCEDURE — 3075F PR MOST RECENT SYSTOLIC BLOOD PRESS GE 130-139MM HG: ICD-10-PCS | Mod: S$GLB,,, | Performed by: NURSE PRACTITIONER

## 2020-12-09 RX ORDER — CODEINE PHOSPHATE AND GUAIFENESIN 10; 100 MG/5ML; MG/5ML
10 SOLUTION ORAL EVERY 6 HOURS PRN
Qty: 160 ML | Refills: 0 | Status: SHIPPED | OUTPATIENT
Start: 2020-12-09 | End: 2021-08-25

## 2020-12-09 RX ORDER — UMECLIDINIUM BROMIDE AND VILANTEROL TRIFENATATE 62.5; 25 UG/1; UG/1
1 POWDER RESPIRATORY (INHALATION) DAILY
Qty: 60 EACH | Refills: 5 | Status: SHIPPED | OUTPATIENT
Start: 2020-12-09 | End: 2021-08-25 | Stop reason: SDUPTHER

## 2020-12-09 NOTE — PROGRESS NOTES
SUBJECTIVE:    Patient ID: Liv Decker is a 65 y.o. female.    Chief Complaint: Follow-up (No bottles - declined eye exam right now - tk)    Pt here for regular f/u. Seen here 2 days ago with Gill for persistent cough, WH and SOB. Was initially treated for COPD exacerbation late November with abx/steroids. Gill extended steroids and gave her sample of trelegy. Pt reports overall feeling much better. Continues to cough but not as bad, able to lay down at night and sleep now taking cough medication. Still SOB with exertion.  Reports hasn't smoked for 2 weeks! Asking about chantix RX which she's taken in the past      Refill on 11/09/2020   Component Date Value Ref Range Status    Vitamin D, 25-OH, Total 12/07/2020 91  30 - 100 ng/mL Final    Hemoglobin A1C 12/07/2020 5.7* <5.7 % of total Hgb Final   Office Visit on 08/05/2020   Component Date Value Ref Range Status    Cholesterol 08/06/2020 153  <200 mg/dL Final    HDL 08/06/2020 39* > OR = 50 mg/dL Final    Triglycerides 08/06/2020 240* <150 mg/dL Final    LDL Cholesterol 08/06/2020 81  mg/dL (calc) Final    HDL/Cholesterol Ratio 08/06/2020 3.9  <5.0 (calc) Final    Non HDL Chol. (LDL+VLDL) 08/06/2020 114  <130 mg/dL (calc) Final    Glucose 08/06/2020 107* 65 - 99 mg/dL Final    BUN 08/06/2020 12  7 - 25 mg/dL Final    Creatinine 08/06/2020 0.64  0.50 - 0.99 mg/dL Final    eGFR if non African American 08/06/2020 94  > OR = 60 mL/min/1.73m2 Final    eGFR if African American 08/06/2020 109  > OR = 60 mL/min/1.73m2 Final    BUN/Creatinine Ratio 08/06/2020 NOT APPLICABLE  6 - 22 (calc) Final    Sodium 08/06/2020 140  135 - 146 mmol/L Final    Potassium 08/06/2020 4.4  3.5 - 5.3 mmol/L Final    Chloride 08/06/2020 104  98 - 110 mmol/L Final    CO2 08/06/2020 30  20 - 32 mmol/L Final    Calcium 08/06/2020 9.2  8.6 - 10.4 mg/dL Final    Total Protein 08/06/2020 6.1  6.1 - 8.1 g/dL Final    Albumin 08/06/2020 3.9  3.6 - 5.1 g/dL Final     Globulin, Total 08/06/2020 2.2  1.9 - 3.7 g/dL (calc) Final    Albumin/Globulin Ratio 08/06/2020 1.8  1.0 - 2.5 (calc) Final    Total Bilirubin 08/06/2020 0.4  0.2 - 1.2 mg/dL Final    Alkaline Phosphatase 08/06/2020 50  37 - 153 U/L Final    AST 08/06/2020 12  10 - 35 U/L Final    ALT 08/06/2020 8  6 - 29 U/L Final    Hemoglobin A1C 08/06/2020 5.4  <5.7 % of total Hgb Final   Lab Visit on 07/06/2020   Component Date Value Ref Range Status    SARS-CoV2 (COVID-19) Qualitative P* 07/06/2020 Not Detected  Not Detected Final       Past Medical History:   Diagnosis Date    Anemia     Diabetes mellitus     Encounter for blood transfusion     GERD (gastroesophageal reflux disease)     Hyperlipidemia     MI (myocardial infarction)     Ulcer of the stomach and intestine     pos. for h. pylori     Past Surgical History:   Procedure Laterality Date    APPENDECTOMY      CHOLECYSTECTOMY      COLONOSCOPY N/A 3/18/2016    Procedure: COLONOSCOPY;  Surgeon: Rober Zelaya Jr., MD;  Location: [facility];  Service: Endoscopy;  Laterality: N/A;    ESOPHAGOGASTRODUODENOSCOPY  04/15/2016    with dil    EYE SURGERY      TONSILLECTOMY      TUBAL LIGATION       Family History   Problem Relation Age of Onset    Heart disease Mother     Diabetes Father        Marital Status:   Alcohol History:  reports no history of alcohol use.  Tobacco History:  reports that she has been smoking cigarettes. She started smoking about 61 years ago. She has a 22.50 pack-year smoking history. She has never used smokeless tobacco.  Drug History:  reports no history of drug use.    Health Maintenance Topics with due status: Not Due       Topic Last Completion Date    Colorectal Cancer Screening 01/01/2012    TETANUS VACCINE 03/21/2019    Mammogram 02/28/2020    DEXA SCAN 02/28/2020    Foot Exam 05/20/2020    Lipid Panel 08/06/2020    Hemoglobin A1c 12/07/2020     Immunization History   Administered Date(s) Administered     Influenza 09/09/2009, 10/08/2010, 10/20/2011, 10/02/2012, 10/18/2013, 10/03/2014, 10/15/2015, 10/20/2016    Influenza - Quadrivalent - PF *Preferred* (6 months and older) 10/15/2018, 10/16/2019    Influenza A (H1N1) 2009 Monovalent - IM 10/23/2009    Pneumococcal Polysaccharide - 23 Valent 03/21/2019    Tdap 03/21/2019    Zoster 03/21/2019       Review of patient's allergies indicates:  No Known Allergies    Current Outpatient Medications:     albuterol (PROAIR HFA) 90 mcg/actuation inhaler, Inhale 2 puffs into the lungs every 6 (six) hours as needed for Wheezing or Shortness of Breath. Rescue, Disp: 18 g, Rfl: 5    atorvastatin (LIPITOR) 10 MG tablet, Take 1 tablet (10 mg total) by mouth once daily., Disp: 90 tablet, Rfl: 1    calcium carbonate (OS-JOSELYN) 600 mg calcium (1,500 mg) Tab, Take 600 mg by mouth once daily., Disp: , Rfl:     ergocalciferol (ERGOCALCIFEROL) 50,000 unit Cap, Take 1 capsule (50,000 Units total) by mouth every 7 days., Disp: 12 capsule, Rfl: 1    escitalopram oxalate (LEXAPRO) 10 MG tablet, Take 1 tablet (10 mg total) by mouth once daily., Disp: 90 tablet, Rfl: 1    famotidine (PEPCID) 20 MG tablet, , Disp: , Rfl:     guaifenesin-codeine 100-10 mg/5 ml (TUSSI-ORGANIDIN NR)  mg/5 mL syrup, Take 10 mLs by mouth every 6 (six) hours as needed for Cough., Disp: 160 mL, Rfl: 0    levothyroxine (SYNTHROID) 25 MCG tablet, Take 1 tablet (25 mcg total) by mouth once daily., Disp: 90 tablet, Rfl: 1    lisinopriL (PRINIVIL,ZESTRIL) 20 MG tablet, Take 1 tablet (20 mg total) by mouth once daily., Disp: 90 tablet, Rfl: 1    metFORMIN (GLUCOPHAGE) 1000 MG tablet, Take 1 tablet (1,000 mg total) by mouth daily with breakfast., Disp: 90 tablet, Rfl: 1    pantoprazole (PROTONIX) 40 MG tablet, Take 1 tablet (40 mg total) by mouth once daily., Disp: 90 tablet, Rfl: 1    predniSONE (DELTASONE) 10 MG tablet, Take 2 tablets (20 mg total) by mouth 2 (two) times daily for 5 days, THEN 1 tablet (10  "mg total) 2 (two) times daily for 4 days., Disp: 28 tablet, Rfl: 0    propranoloL (INDERAL) 80 MG tablet, Take 1 tablet (80 mg total) by mouth once daily., Disp: 90 tablet, Rfl: 1    umeclidinium-vilanteroL (ANORO ELLIPTA) 62.5-25 mcg/actuation DsDv, Inhale 1 puff into the lungs once daily. Controller, Disp: 60 each, Rfl: 5    Review of Systems   Constitutional: Negative for chills and fever.   HENT: Negative for sinus pain and sore throat.    Respiratory: Positive for cough (improving, nonproductive), shortness of breath and wheezing.    Cardiovascular: Negative for chest pain, palpitations and leg swelling.   Gastrointestinal: Negative for abdominal pain, constipation, diarrhea, nausea and vomiting.   Genitourinary: Negative for dysuria, frequency and hematuria.   Musculoskeletal: Negative for back pain and gait problem.   Skin: Negative for rash.   Neurological: Negative for dizziness (occasional with position changes) and headaches.          Objective:      Vitals:    12/09/20 1511   BP: 138/86   Pulse: 78   SpO2: 95%   Weight: 52.6 kg (116 lb)   Height: 5' 1" (1.549 m)     Physical Exam  Vitals signs and nursing note reviewed.   Constitutional:       General: She is not in acute distress.     Appearance: Normal appearance. She is well-developed.      Comments: Thin white female   HENT:      Head: Normocephalic and atraumatic.      Right Ear: Tympanic membrane and ear canal normal.      Left Ear: Tympanic membrane and ear canal normal.   Neck:      Musculoskeletal: Neck supple.      Vascular: No carotid bruit.   Cardiovascular:      Rate and Rhythm: Normal rate and regular rhythm.      Heart sounds: No murmur. No friction rub. No gallop.    Pulmonary:      Effort: Pulmonary effort is normal. No respiratory distress.      Breath sounds: No wheezing or rales.      Comments: Slightly diminished throughout otherwise clear  Abdominal:      General: There is no distension.      Palpations: Abdomen is soft.      " Tenderness: There is no abdominal tenderness.   Musculoskeletal:      Right lower leg: No edema.      Left lower leg: No edema.   Lymphadenopathy:      Cervical: No cervical adenopathy.   Skin:     General: Skin is warm and dry.      Findings: No rash.   Neurological:      General: No focal deficit present.      Mental Status: She is alert and oriented to person, place, and time.      Gait: Gait normal.           Assessment:       1. Chronic obstructive pulmonary disease, unspecified COPD type    2. DM type 2 with diabetic dyslipidemia    3. Dyslipidemia    4. Acquired hypothyroidism    5. Essential hypertension    6. Age-related osteoporosis without current pathological fracture    7. Cigarette smoker           Plan:       Chronic obstructive pulmonary disease, unspecified COPD type  Comments:  improved back on steroids- recommend PFTs in january and add anoro daily after trelegy sample completed  Orders:  -     Complete PFT with bronchodilator; Future; Expected date: 01/07/2021  -     Ambulatory referral/consult to Smoking Cessation Program; Future; Expected date: 12/16/2020  -     guaifenesin-codeine 100-10 mg/5 ml (TUSSI-ORGANIDIN NR)  mg/5 mL syrup; Take 10 mLs by mouth every 6 (six) hours as needed for Cough.  Dispense: 160 mL; Refill: 0  -     umeclidinium-vilanteroL (ANORO ELLIPTA) 62.5-25 mcg/actuation DsDv; Inhale 1 puff into the lungs once daily. Controller  Dispense: 60 each; Refill: 5    DM type 2 with diabetic dyslipidemia  Comments:  reviewed recent labs, well controlled    Dyslipidemia  Comments:  LDL well controlled though TG remain high on last labs    Acquired hypothyroidism  Comments:  stable    Essential hypertension  Comments:  BP well controlled    Age-related osteoporosis without current pathological fracture  Comments:  now on prolia    Cigarette smoker  Comments:  pt requesting referral to cessation clinic to see if they can provide assistance with chantix  Orders:  -     Ambulatory  referral/consult to Smoking Cessation Program; Future; Expected date: 12/16/2020    Assistance with smoking cessation was offered, including:  [x]  Medications  [x]  Counseling  []  Printed Information on Smoking Cessation  [x]  Referral to a Smoking Cessation Program    Patient was counseled regarding smoking for 3-10 minutes.    Follow up in about 3 months (around 3/9/2021) for Diabetes, COPD.        12/9/2020 Liv Parrish NP

## 2020-12-21 ENCOUNTER — TELEPHONE (OUTPATIENT)
Dept: SMOKING CESSATION | Facility: CLINIC | Age: 65
End: 2020-12-21

## 2020-12-21 NOTE — TELEPHONE ENCOUNTER
I called patient today for tobacco cessation follow up,she was schedule for clinic intake visit this morning at 8:00 am. She did not make the appointment so I called to offer her an opportunity to reschedule.  I had to leave message at this time.  I did leave my name and contact number ([phone]) for a return call.

## 2021-01-04 DIAGNOSIS — E03.9 ACQUIRED HYPOTHYROIDISM: ICD-10-CM

## 2021-01-04 DIAGNOSIS — K21.9 GASTROESOPHAGEAL REFLUX DISEASE: ICD-10-CM

## 2021-01-04 RX ORDER — ATORVASTATIN CALCIUM 10 MG/1
10 TABLET, FILM COATED ORAL DAILY
Qty: 90 TABLET | Refills: 1 | Status: SHIPPED | OUTPATIENT
Start: 2021-01-04 | End: 2021-07-14 | Stop reason: SDUPTHER

## 2021-01-04 RX ORDER — LISINOPRIL 20 MG/1
20 TABLET ORAL DAILY
Qty: 90 TABLET | Refills: 1 | Status: SHIPPED | OUTPATIENT
Start: 2021-01-04 | End: 2021-07-14 | Stop reason: SDUPTHER

## 2021-01-04 RX ORDER — LEVOTHYROXINE SODIUM 25 UG/1
25 TABLET ORAL DAILY
Qty: 90 TABLET | Refills: 1 | Status: SHIPPED | OUTPATIENT
Start: 2021-01-04 | End: 2021-07-14 | Stop reason: SDUPTHER

## 2021-01-04 RX ORDER — PANTOPRAZOLE SODIUM 40 MG/1
40 TABLET, DELAYED RELEASE ORAL DAILY
Qty: 90 TABLET | Refills: 1 | Status: SHIPPED | OUTPATIENT
Start: 2021-01-04 | End: 2021-03-31

## 2021-02-17 ENCOUNTER — TELEPHONE (OUTPATIENT)
Dept: INFUSION THERAPY | Facility: HOSPITAL | Age: 66
End: 2021-02-17

## 2021-03-02 LAB
ALBUMIN SERPL-MCNC: 4.1 G/DL (ref 3.6–5.1)
ALBUMIN/CREAT UR: 52 MCG/MG CREAT
ALBUMIN/GLOB SERPL: 1.8 (CALC) (ref 1–2.5)
ALP SERPL-CCNC: 43 U/L (ref 37–153)
ALT SERPL-CCNC: 7 U/L (ref 6–29)
APPEARANCE UR: CLEAR
AST SERPL-CCNC: 10 U/L (ref 10–35)
BACTERIA #/AREA URNS HPF: ABNORMAL /HPF
BACTERIA UR CULT: ABNORMAL
BASOPHILS # BLD AUTO: 57 CELLS/UL (ref 0–200)
BASOPHILS NFR BLD AUTO: 0.8 %
BILIRUB SERPL-MCNC: 0.3 MG/DL (ref 0.2–1.2)
BILIRUB UR QL STRIP: NEGATIVE
BUN SERPL-MCNC: 9 MG/DL (ref 7–25)
BUN/CREAT SERPL: NORMAL (CALC) (ref 6–22)
CALCIUM SERPL-MCNC: 9.5 MG/DL (ref 8.6–10.4)
CHLORIDE SERPL-SCNC: 103 MMOL/L (ref 98–110)
CHOLEST SERPL-MCNC: 167 MG/DL
CHOLEST/HDLC SERPL: 3.6 (CALC)
CO2 SERPL-SCNC: 31 MMOL/L (ref 20–32)
COLOR UR: YELLOW
CREAT SERPL-MCNC: 0.54 MG/DL (ref 0.5–0.99)
CREAT UR-MCNC: 122 MG/DL (ref 20–275)
EOSINOPHIL # BLD AUTO: 107 CELLS/UL (ref 15–500)
EOSINOPHIL NFR BLD AUTO: 1.5 %
ERYTHROCYTE [DISTWIDTH] IN BLOOD BY AUTOMATED COUNT: 15.6 % (ref 11–15)
GFRSERPLBLD MDRD-ARVRAT: 98 ML/MIN/1.73M2
GLOBULIN SER CALC-MCNC: 2.3 G/DL (CALC) (ref 1.9–3.7)
GLUCOSE SERPL-MCNC: 124 MG/DL (ref 65–139)
GLUCOSE UR QL STRIP: NEGATIVE
HBA1C MFR BLD: 5.8 % OF TOTAL HGB
HCT VFR BLD AUTO: 39.8 % (ref 35–45)
HDLC SERPL-MCNC: 47 MG/DL
HGB BLD-MCNC: 12.9 G/DL (ref 11.7–15.5)
HGB UR QL STRIP: NEGATIVE
HYALINE CASTS #/AREA URNS LPF: ABNORMAL /LPF
KETONES UR QL STRIP: NEGATIVE
LDLC SERPL CALC-MCNC: 88 MG/DL (CALC)
LEUKOCYTE ESTERASE UR QL STRIP: NEGATIVE
LYMPHOCYTES # BLD AUTO: 1910 CELLS/UL (ref 850–3900)
LYMPHOCYTES NFR BLD AUTO: 26.9 %
MCH RBC QN AUTO: 27.2 PG (ref 27–33)
MCHC RBC AUTO-ENTMCNC: 32.4 G/DL (ref 32–36)
MCV RBC AUTO: 83.8 FL (ref 80–100)
MICROALBUMIN UR-MCNC: 6.3 MG/DL
MONOCYTES # BLD AUTO: 788 CELLS/UL (ref 200–950)
MONOCYTES NFR BLD AUTO: 11.1 %
NEUTROPHILS # BLD AUTO: 4239 CELLS/UL (ref 1500–7800)
NEUTROPHILS NFR BLD AUTO: 59.7 %
NITRITE UR QL STRIP: NEGATIVE
NONHDLC SERPL-MCNC: 120 MG/DL (CALC)
PH UR STRIP: 5.5 [PH] (ref 5–8)
PLATELET # BLD AUTO: 206 THOUSAND/UL (ref 140–400)
PMV BLD REES-ECKER: 12.2 FL (ref 7.5–12.5)
POTASSIUM SERPL-SCNC: 4.2 MMOL/L (ref 3.5–5.3)
PROT SERPL-MCNC: 6.4 G/DL (ref 6.1–8.1)
PROT UR QL STRIP: ABNORMAL
RBC # BLD AUTO: 4.75 MILLION/UL (ref 3.8–5.1)
RBC #/AREA URNS HPF: ABNORMAL /HPF
SODIUM SERPL-SCNC: 142 MMOL/L (ref 135–146)
SP GR UR STRIP: 1.02 (ref 1–1.03)
SQUAMOUS #/AREA URNS HPF: ABNORMAL /HPF
TRIGL SERPL-MCNC: 218 MG/DL
TSH SERPL-ACNC: 3.23 MIU/L (ref 0.4–4.5)
WBC # BLD AUTO: 7.1 THOUSAND/UL (ref 3.8–10.8)
WBC #/AREA URNS HPF: ABNORMAL /HPF

## 2021-03-03 ENCOUNTER — INFUSION (OUTPATIENT)
Dept: INFUSION THERAPY | Facility: HOSPITAL | Age: 66
End: 2021-03-03
Attending: NURSE PRACTITIONER
Payer: MEDICARE

## 2021-03-03 VITALS
HEART RATE: 78 BPM | DIASTOLIC BLOOD PRESSURE: 78 MMHG | WEIGHT: 117.19 LBS | BODY MASS INDEX: 21.57 KG/M2 | HEIGHT: 62 IN | TEMPERATURE: 98 F | SYSTOLIC BLOOD PRESSURE: 133 MMHG | OXYGEN SATURATION: 94 % | RESPIRATION RATE: 15 BRPM

## 2021-03-03 DIAGNOSIS — M81.0 AGE-RELATED OSTEOPOROSIS WITHOUT CURRENT PATHOLOGICAL FRACTURE: Primary | ICD-10-CM

## 2021-03-03 PROCEDURE — 96372 THER/PROPH/DIAG INJ SC/IM: CPT

## 2021-03-03 PROCEDURE — 63600175 PHARM REV CODE 636 W HCPCS: Mod: JG | Performed by: NURSE PRACTITIONER

## 2021-03-03 RX ADMIN — DENOSUMAB 60 MG: 60 INJECTION SUBCUTANEOUS at 01:03

## 2021-03-04 ENCOUNTER — TELEPHONE (OUTPATIENT)
Dept: INFUSION THERAPY | Facility: HOSPITAL | Age: 66
End: 2021-03-04

## 2021-03-09 ENCOUNTER — TELEPHONE (OUTPATIENT)
Dept: FAMILY MEDICINE | Facility: CLINIC | Age: 66
End: 2021-03-09

## 2021-03-31 ENCOUNTER — OFFICE VISIT (OUTPATIENT)
Dept: FAMILY MEDICINE | Facility: CLINIC | Age: 66
End: 2021-03-31
Payer: MEDICARE

## 2021-03-31 VITALS
WEIGHT: 114 LBS | OXYGEN SATURATION: 98 % | SYSTOLIC BLOOD PRESSURE: 118 MMHG | BODY MASS INDEX: 21.52 KG/M2 | HEART RATE: 80 BPM | HEIGHT: 61 IN | DIASTOLIC BLOOD PRESSURE: 62 MMHG

## 2021-03-31 DIAGNOSIS — F17.210 CIGARETTE SMOKER: ICD-10-CM

## 2021-03-31 DIAGNOSIS — J44.9 CHRONIC OBSTRUCTIVE PULMONARY DISEASE, UNSPECIFIED COPD TYPE: ICD-10-CM

## 2021-03-31 DIAGNOSIS — E11.69 DM TYPE 2 WITH DIABETIC DYSLIPIDEMIA: ICD-10-CM

## 2021-03-31 DIAGNOSIS — K27.9 PUD (PEPTIC ULCER DISEASE): Primary | ICD-10-CM

## 2021-03-31 DIAGNOSIS — Z12.31 OTHER SCREENING MAMMOGRAM: ICD-10-CM

## 2021-03-31 DIAGNOSIS — I10 ESSENTIAL HYPERTENSION: ICD-10-CM

## 2021-03-31 DIAGNOSIS — Z23 NEED FOR VACCINATION WITH 13-POLYVALENT PNEUMOCOCCAL CONJUGATE VACCINE: ICD-10-CM

## 2021-03-31 DIAGNOSIS — E78.5 DYSLIPIDEMIA: ICD-10-CM

## 2021-03-31 DIAGNOSIS — E03.9 ACQUIRED HYPOTHYROIDISM: ICD-10-CM

## 2021-03-31 DIAGNOSIS — E78.5 DM TYPE 2 WITH DIABETIC DYSLIPIDEMIA: ICD-10-CM

## 2021-03-31 DIAGNOSIS — M81.0 AGE-RELATED OSTEOPOROSIS WITHOUT CURRENT PATHOLOGICAL FRACTURE: ICD-10-CM

## 2021-03-31 PROCEDURE — 99214 PR OFFICE/OUTPT VISIT, EST, LEVL IV, 30-39 MIN: ICD-10-PCS | Mod: 25,S$GLB,, | Performed by: NURSE PRACTITIONER

## 2021-03-31 PROCEDURE — 1159F PR MEDICATION LIST DOCUMENTED IN MEDICAL RECORD: ICD-10-PCS | Mod: S$GLB,,, | Performed by: NURSE PRACTITIONER

## 2021-03-31 PROCEDURE — 90670 PNEUMOCOCCAL CONJUGATE VACCINE 13-VALENT LESS THAN 5YO & GREATER THAN: ICD-10-PCS | Mod: S$GLB,,, | Performed by: NURSE PRACTITIONER

## 2021-03-31 PROCEDURE — 3044F PR MOST RECENT HEMOGLOBIN A1C LEVEL <7.0%: ICD-10-PCS | Mod: S$GLB,,, | Performed by: NURSE PRACTITIONER

## 2021-03-31 PROCEDURE — 3008F BODY MASS INDEX DOCD: CPT | Mod: S$GLB,,, | Performed by: NURSE PRACTITIONER

## 2021-03-31 PROCEDURE — 3074F SYST BP LT 130 MM HG: CPT | Mod: S$GLB,,, | Performed by: NURSE PRACTITIONER

## 2021-03-31 PROCEDURE — 3044F HG A1C LEVEL LT 7.0%: CPT | Mod: S$GLB,,, | Performed by: NURSE PRACTITIONER

## 2021-03-31 PROCEDURE — 99214 OFFICE O/P EST MOD 30 MIN: CPT | Mod: 25,S$GLB,, | Performed by: NURSE PRACTITIONER

## 2021-03-31 PROCEDURE — 3078F PR MOST RECENT DIASTOLIC BLOOD PRESSURE < 80 MM HG: ICD-10-PCS | Mod: S$GLB,,, | Performed by: NURSE PRACTITIONER

## 2021-03-31 PROCEDURE — 1159F MED LIST DOCD IN RCRD: CPT | Mod: S$GLB,,, | Performed by: NURSE PRACTITIONER

## 2021-03-31 PROCEDURE — 90670 PCV13 VACCINE IM: CPT | Mod: S$GLB,,, | Performed by: NURSE PRACTITIONER

## 2021-03-31 PROCEDURE — 3074F PR MOST RECENT SYSTOLIC BLOOD PRESSURE < 130 MM HG: ICD-10-PCS | Mod: S$GLB,,, | Performed by: NURSE PRACTITIONER

## 2021-03-31 PROCEDURE — G0009 PNEUMOCOCCAL CONJUGATE VACCINE 13-VALENT LESS THAN 5YO & GREATER THAN: ICD-10-PCS | Mod: S$GLB,,, | Performed by: NURSE PRACTITIONER

## 2021-03-31 PROCEDURE — 3008F PR BODY MASS INDEX (BMI) DOCUMENTED: ICD-10-PCS | Mod: S$GLB,,, | Performed by: NURSE PRACTITIONER

## 2021-03-31 PROCEDURE — 3078F DIAST BP <80 MM HG: CPT | Mod: S$GLB,,, | Performed by: NURSE PRACTITIONER

## 2021-03-31 PROCEDURE — G0009 ADMIN PNEUMOCOCCAL VACCINE: HCPCS | Mod: S$GLB,,, | Performed by: NURSE PRACTITIONER

## 2021-03-31 RX ORDER — PANTOPRAZOLE SODIUM 40 MG/1
TABLET, DELAYED RELEASE ORAL
Qty: 90 TABLET | Refills: 1 | Status: SHIPPED | OUTPATIENT
Start: 2021-03-31 | End: 2021-08-23 | Stop reason: SDUPTHER

## 2021-03-31 RX ORDER — SUCRALFATE 1 G/1
1 TABLET ORAL 2 TIMES DAILY
Qty: 60 TABLET | Refills: 2 | Status: SHIPPED | OUTPATIENT
Start: 2021-03-31

## 2021-06-07 DIAGNOSIS — F32.A MILD DEPRESSION: ICD-10-CM

## 2021-06-07 DIAGNOSIS — E55.9 VITAMIN D DEFICIENCY: ICD-10-CM

## 2021-06-07 RX ORDER — PROPRANOLOL HYDROCHLORIDE 80 MG/1
80 TABLET ORAL DAILY
Qty: 90 TABLET | Refills: 1 | Status: SHIPPED | OUTPATIENT
Start: 2021-06-07 | End: 2021-12-13 | Stop reason: SDUPTHER

## 2021-06-07 RX ORDER — ESCITALOPRAM OXALATE 10 MG/1
10 TABLET ORAL DAILY
Qty: 90 TABLET | Refills: 1 | Status: SHIPPED | OUTPATIENT
Start: 2021-06-07 | End: 2021-12-13 | Stop reason: SDUPTHER

## 2021-06-07 RX ORDER — ERGOCALCIFEROL 1.25 MG/1
50000 CAPSULE ORAL
Qty: 12 CAPSULE | Refills: 1 | Status: SHIPPED | OUTPATIENT
Start: 2021-06-07 | End: 2021-08-23 | Stop reason: SDUPTHER

## 2021-07-14 DIAGNOSIS — E03.9 ACQUIRED HYPOTHYROIDISM: ICD-10-CM

## 2021-07-14 RX ORDER — ATORVASTATIN CALCIUM 10 MG/1
10 TABLET, FILM COATED ORAL DAILY
Qty: 90 TABLET | Refills: 1 | Status: SHIPPED | OUTPATIENT
Start: 2021-07-14 | End: 2022-03-17 | Stop reason: SDUPTHER

## 2021-07-14 RX ORDER — LEVOTHYROXINE SODIUM 25 UG/1
25 TABLET ORAL DAILY
Qty: 90 TABLET | Refills: 1 | Status: SHIPPED | OUTPATIENT
Start: 2021-07-14 | End: 2021-09-21

## 2021-07-14 RX ORDER — LISINOPRIL 20 MG/1
20 TABLET ORAL DAILY
Qty: 90 TABLET | Refills: 1 | Status: SHIPPED | OUTPATIENT
Start: 2021-07-14 | End: 2022-02-24 | Stop reason: SDUPTHER

## 2021-08-23 DIAGNOSIS — K27.9 PUD (PEPTIC ULCER DISEASE): ICD-10-CM

## 2021-08-23 DIAGNOSIS — E55.9 VITAMIN D DEFICIENCY: ICD-10-CM

## 2021-08-23 RX ORDER — ERGOCALCIFEROL 1.25 MG/1
50000 CAPSULE ORAL
Qty: 12 CAPSULE | Refills: 1 | Status: SHIPPED | OUTPATIENT
Start: 2021-08-23 | End: 2021-12-13 | Stop reason: SDUPTHER

## 2021-08-23 RX ORDER — PANTOPRAZOLE SODIUM 40 MG/1
TABLET, DELAYED RELEASE ORAL
Qty: 90 TABLET | Refills: 1 | Status: SHIPPED | OUTPATIENT
Start: 2021-08-23 | End: 2022-02-24 | Stop reason: SDUPTHER

## 2021-08-25 ENCOUNTER — OFFICE VISIT (OUTPATIENT)
Dept: FAMILY MEDICINE | Facility: CLINIC | Age: 66
End: 2021-08-25
Payer: MEDICARE

## 2021-08-25 VITALS
BODY MASS INDEX: 21.14 KG/M2 | HEIGHT: 61 IN | SYSTOLIC BLOOD PRESSURE: 120 MMHG | HEART RATE: 82 BPM | WEIGHT: 112 LBS | DIASTOLIC BLOOD PRESSURE: 68 MMHG

## 2021-08-25 DIAGNOSIS — E03.9 ACQUIRED HYPOTHYROIDISM: ICD-10-CM

## 2021-08-25 DIAGNOSIS — J44.9 CHRONIC OBSTRUCTIVE PULMONARY DISEASE, UNSPECIFIED COPD TYPE: ICD-10-CM

## 2021-08-25 DIAGNOSIS — F17.210 NICOTINE DEPENDENCE, CIGARETTES, UNCOMPLICATED: ICD-10-CM

## 2021-08-25 DIAGNOSIS — E11.69 DM TYPE 2 WITH DIABETIC DYSLIPIDEMIA: Primary | ICD-10-CM

## 2021-08-25 DIAGNOSIS — I10 ESSENTIAL HYPERTENSION: ICD-10-CM

## 2021-08-25 DIAGNOSIS — M81.0 AGE-RELATED OSTEOPOROSIS WITHOUT CURRENT PATHOLOGICAL FRACTURE: ICD-10-CM

## 2021-08-25 DIAGNOSIS — E78.5 DYSLIPIDEMIA: ICD-10-CM

## 2021-08-25 DIAGNOSIS — F17.210 CIGARETTE SMOKER: ICD-10-CM

## 2021-08-25 DIAGNOSIS — E78.5 DM TYPE 2 WITH DIABETIC DYSLIPIDEMIA: Primary | ICD-10-CM

## 2021-08-25 LAB — HBA1C MFR BLD: 5.6 %

## 2021-08-25 PROCEDURE — 83036 POCT HEMOGLOBIN A1C: ICD-10-PCS | Mod: QW,,, | Performed by: NURSE PRACTITIONER

## 2021-08-25 PROCEDURE — 83036 HEMOGLOBIN GLYCOSYLATED A1C: CPT | Mod: QW,,, | Performed by: NURSE PRACTITIONER

## 2021-08-25 PROCEDURE — 99214 OFFICE O/P EST MOD 30 MIN: CPT | Mod: S$GLB,,, | Performed by: NURSE PRACTITIONER

## 2021-08-25 PROCEDURE — 99214 PR OFFICE/OUTPT VISIT, EST, LEVL IV, 30-39 MIN: ICD-10-PCS | Mod: S$GLB,,, | Performed by: NURSE PRACTITIONER

## 2021-08-25 RX ORDER — ALBUTEROL SULFATE 90 UG/1
2 AEROSOL, METERED RESPIRATORY (INHALATION) EVERY 6 HOURS PRN
Qty: 18 G | Refills: 5 | Status: SHIPPED | OUTPATIENT
Start: 2021-08-25 | End: 2023-06-20 | Stop reason: SDUPTHER

## 2021-08-25 RX ORDER — UMECLIDINIUM BROMIDE AND VILANTEROL TRIFENATATE 62.5; 25 UG/1; UG/1
1 POWDER RESPIRATORY (INHALATION) DAILY
Qty: 60 EACH | Refills: 5 | Status: SHIPPED | OUTPATIENT
Start: 2021-08-25 | End: 2022-08-23 | Stop reason: SDUPTHER

## 2021-08-26 ENCOUNTER — TELEPHONE (OUTPATIENT)
Dept: FAMILY MEDICINE | Facility: CLINIC | Age: 66
End: 2021-08-26

## 2021-09-09 ENCOUNTER — TELEPHONE (OUTPATIENT)
Dept: FAMILY MEDICINE | Facility: CLINIC | Age: 66
End: 2021-09-09

## 2021-09-18 LAB
ALBUMIN SERPL-MCNC: 3.9 G/DL (ref 3.6–5.1)
ALBUMIN/CREAT UR: 30 MCG/MG CREAT
ALBUMIN/GLOB SERPL: 1.8 (CALC) (ref 1–2.5)
ALP SERPL-CCNC: 34 U/L (ref 37–153)
ALT SERPL-CCNC: 8 U/L (ref 6–29)
APPEARANCE UR: CLEAR
AST SERPL-CCNC: 11 U/L (ref 10–35)
BACTERIA #/AREA URNS HPF: NORMAL /HPF
BACTERIA UR CULT: NORMAL
BASOPHILS # BLD AUTO: 19 CELLS/UL (ref 0–200)
BASOPHILS NFR BLD AUTO: 0.3 %
BILIRUB SERPL-MCNC: 0.3 MG/DL (ref 0.2–1.2)
BILIRUB UR QL STRIP: NEGATIVE
BUN SERPL-MCNC: 18 MG/DL (ref 7–25)
BUN/CREAT SERPL: ABNORMAL (CALC) (ref 6–22)
CALCIUM SERPL-MCNC: 9.6 MG/DL (ref 8.6–10.4)
CAOX CRY #/AREA URNS HPF: NORMAL /HPF
CHLORIDE SERPL-SCNC: 102 MMOL/L (ref 98–110)
CHOLEST SERPL-MCNC: 177 MG/DL
CHOLEST/HDLC SERPL: 4.5 (CALC)
CO2 SERPL-SCNC: 30 MMOL/L (ref 20–32)
COLOR UR: YELLOW
CREAT SERPL-MCNC: 0.77 MG/DL (ref 0.5–0.99)
CREAT UR-MCNC: 97 MG/DL (ref 20–275)
EOSINOPHIL # BLD AUTO: 120 CELLS/UL (ref 15–500)
EOSINOPHIL NFR BLD AUTO: 1.9 %
ERYTHROCYTE [DISTWIDTH] IN BLOOD BY AUTOMATED COUNT: 13.9 % (ref 11–15)
GLOBULIN SER CALC-MCNC: 2.2 G/DL (CALC) (ref 1.9–3.7)
GLUCOSE SERPL-MCNC: 84 MG/DL (ref 65–99)
GLUCOSE UR QL STRIP: NEGATIVE
HBA1C MFR BLD: 5.3 % OF TOTAL HGB
HCT VFR BLD AUTO: 37.7 % (ref 35–45)
HDLC SERPL-MCNC: 39 MG/DL
HGB BLD-MCNC: 12.5 G/DL (ref 11.7–15.5)
HGB UR QL STRIP: NEGATIVE
HYALINE CASTS #/AREA URNS LPF: NORMAL /LPF
KETONES UR QL STRIP: NEGATIVE
LDLC SERPL CALC-MCNC: ABNORMAL MG/DL (CALC)
LEUKOCYTE ESTERASE UR QL STRIP: NEGATIVE
LYMPHOCYTES # BLD AUTO: 2268 CELLS/UL (ref 850–3900)
LYMPHOCYTES NFR BLD AUTO: 36 %
MCH RBC QN AUTO: 30.3 PG (ref 27–33)
MCHC RBC AUTO-ENTMCNC: 33.2 G/DL (ref 32–36)
MCV RBC AUTO: 91.5 FL (ref 80–100)
MICROALBUMIN UR-MCNC: 2.9 MG/DL
MONOCYTES # BLD AUTO: 573 CELLS/UL (ref 200–950)
MONOCYTES NFR BLD AUTO: 9.1 %
NEUTROPHILS # BLD AUTO: 3320 CELLS/UL (ref 1500–7800)
NEUTROPHILS NFR BLD AUTO: 52.7 %
NITRITE UR QL STRIP: NEGATIVE
NONHDLC SERPL-MCNC: 138 MG/DL (CALC)
PH UR STRIP: NORMAL [PH] (ref 5–8)
PLATELET # BLD AUTO: 130 THOUSAND/UL (ref 140–400)
PMV BLD REES-ECKER: 12 FL (ref 7.5–12.5)
POTASSIUM SERPL-SCNC: 4.2 MMOL/L (ref 3.5–5.3)
PROT SERPL-MCNC: 6.1 G/DL (ref 6.1–8.1)
PROT UR QL STRIP: NEGATIVE
RBC # BLD AUTO: 4.12 MILLION/UL (ref 3.8–5.1)
RBC #/AREA URNS HPF: NORMAL /HPF
SODIUM SERPL-SCNC: 139 MMOL/L (ref 135–146)
SP GR UR STRIP: 1.02 (ref 1–1.03)
SQUAMOUS #/AREA URNS HPF: NORMAL /HPF
T4 FREE SERPL-MCNC: 0.9 NG/DL (ref 0.8–1.8)
TRIGL SERPL-MCNC: 504 MG/DL
TSH SERPL-ACNC: 5.16 MIU/L (ref 0.4–4.5)
WBC # BLD AUTO: 6.3 THOUSAND/UL (ref 3.8–10.8)
WBC #/AREA URNS HPF: NORMAL /HPF

## 2021-09-20 DIAGNOSIS — E11.69 DM TYPE 2 WITH DIABETIC DYSLIPIDEMIA: ICD-10-CM

## 2021-09-20 DIAGNOSIS — E78.5 DM TYPE 2 WITH DIABETIC DYSLIPIDEMIA: ICD-10-CM

## 2021-09-21 RX ORDER — ICOSAPENT ETHYL 1000 MG/1
2 CAPSULE ORAL 2 TIMES DAILY
Qty: 360 CAPSULE | Refills: 1 | Status: SHIPPED | OUTPATIENT
Start: 2021-09-21 | End: 2022-03-30 | Stop reason: SDUPTHER

## 2021-09-21 RX ORDER — LEVOTHYROXINE SODIUM 50 UG/1
50 TABLET ORAL
Qty: 90 TABLET | Refills: 0 | Status: SHIPPED | OUTPATIENT
Start: 2021-09-21 | End: 2022-05-12 | Stop reason: SDUPTHER

## 2021-09-21 RX ORDER — METFORMIN HYDROCHLORIDE 1000 MG/1
1000 TABLET ORAL
Qty: 90 TABLET | Refills: 1 | Status: SHIPPED | OUTPATIENT
Start: 2021-09-21 | End: 2022-04-18 | Stop reason: SDUPTHER

## 2021-09-22 ENCOUNTER — INFUSION (OUTPATIENT)
Dept: INFUSION THERAPY | Facility: HOSPITAL | Age: 66
End: 2021-09-22
Attending: NURSE PRACTITIONER
Payer: MEDICARE

## 2021-09-22 VITALS
HEIGHT: 61 IN | WEIGHT: 113.13 LBS | DIASTOLIC BLOOD PRESSURE: 78 MMHG | BODY MASS INDEX: 21.36 KG/M2 | RESPIRATION RATE: 16 BRPM | OXYGEN SATURATION: 98 % | HEART RATE: 70 BPM | SYSTOLIC BLOOD PRESSURE: 134 MMHG | TEMPERATURE: 98 F

## 2021-09-22 DIAGNOSIS — M81.0 AGE-RELATED OSTEOPOROSIS WITHOUT CURRENT PATHOLOGICAL FRACTURE: Primary | ICD-10-CM

## 2021-09-22 PROCEDURE — 63600175 PHARM REV CODE 636 W HCPCS: Mod: JG | Performed by: NURSE PRACTITIONER

## 2021-09-22 PROCEDURE — 96372 THER/PROPH/DIAG INJ SC/IM: CPT

## 2021-09-22 RX ADMIN — DENOSUMAB 60 MG: 60 INJECTION SUBCUTANEOUS at 02:09

## 2021-11-10 ENCOUNTER — TELEPHONE (OUTPATIENT)
Dept: FAMILY MEDICINE | Facility: CLINIC | Age: 66
End: 2021-11-10
Payer: MEDICAID

## 2021-11-10 RX ORDER — TIZANIDINE 4 MG/1
4 TABLET ORAL EVERY 8 HOURS PRN
Qty: 21 TABLET | Refills: 0 | Status: SHIPPED | OUTPATIENT
Start: 2021-11-10 | End: 2021-11-20

## 2021-11-10 RX ORDER — NAPROXEN 500 MG/1
500 TABLET ORAL 2 TIMES DAILY PRN
Qty: 20 TABLET | Refills: 0 | Status: SHIPPED | OUTPATIENT
Start: 2021-11-10 | End: 2022-03-30

## 2021-12-13 DIAGNOSIS — E55.9 VITAMIN D DEFICIENCY: ICD-10-CM

## 2021-12-13 DIAGNOSIS — F32.A MILD DEPRESSION: ICD-10-CM

## 2021-12-13 RX ORDER — PROPRANOLOL HYDROCHLORIDE 80 MG/1
80 TABLET ORAL DAILY
Qty: 90 TABLET | Refills: 1 | Status: SHIPPED | OUTPATIENT
Start: 2021-12-13 | End: 2022-03-30

## 2021-12-13 RX ORDER — ERGOCALCIFEROL 1.25 MG/1
50000 CAPSULE ORAL
Qty: 12 CAPSULE | Refills: 1 | Status: SHIPPED | OUTPATIENT
Start: 2021-12-13 | End: 2022-02-24 | Stop reason: SDUPTHER

## 2021-12-13 RX ORDER — ESCITALOPRAM OXALATE 10 MG/1
10 TABLET ORAL DAILY
Qty: 90 TABLET | Refills: 1 | Status: SHIPPED | OUTPATIENT
Start: 2021-12-13 | End: 2022-03-30 | Stop reason: SDUPTHER

## 2021-12-13 NOTE — TELEPHONE ENCOUNTER
Spoke with pt asked her if she could come in for a NV pt stated she has to work the next 2 days and does not know if she can come in. Rx set up for you to send in

## 2021-12-13 NOTE — TELEPHONE ENCOUNTER
----- Message from Vandana Chacon sent at 12/13/2021  3:41 PM CST -----  Patient need a refill of her escitalopram oxalate,ergocalciferol.and her propranolol  called into [facility] on [employer] and  if any questions please give her a call at [phone]

## 2022-01-25 ENCOUNTER — CLINICAL SUPPORT (OUTPATIENT)
Dept: FAMILY MEDICINE | Facility: CLINIC | Age: 67
End: 2022-01-25
Payer: MEDICARE

## 2022-01-25 VITALS — TEMPERATURE: 98 F

## 2022-01-25 DIAGNOSIS — Z23 NEEDS FLU SHOT: Primary | ICD-10-CM

## 2022-01-25 PROCEDURE — 90662 FLU VACCINE - QUADRIVALENT - HIGH DOSE (65+) PRESERVATIVE FREE IM: ICD-10-PCS | Mod: S$GLB,,, | Performed by: NURSE PRACTITIONER

## 2022-01-25 PROCEDURE — G0008 ADMIN INFLUENZA VIRUS VAC: HCPCS | Mod: S$GLB,,, | Performed by: NURSE PRACTITIONER

## 2022-01-25 PROCEDURE — G0008 FLU VACCINE - QUADRIVALENT - HIGH DOSE (65+) PRESERVATIVE FREE IM: ICD-10-PCS | Mod: S$GLB,,, | Performed by: NURSE PRACTITIONER

## 2022-01-25 PROCEDURE — 90662 IIV NO PRSV INCREASED AG IM: CPT | Mod: S$GLB,,, | Performed by: NURSE PRACTITIONER

## 2022-02-24 DIAGNOSIS — E55.9 VITAMIN D DEFICIENCY: ICD-10-CM

## 2022-02-24 DIAGNOSIS — K27.9 PUD (PEPTIC ULCER DISEASE): ICD-10-CM

## 2022-02-24 RX ORDER — PANTOPRAZOLE SODIUM 40 MG/1
TABLET, DELAYED RELEASE ORAL
Qty: 90 TABLET | Refills: 1 | Status: SHIPPED | OUTPATIENT
Start: 2022-02-24 | End: 2022-08-23 | Stop reason: SDUPTHER

## 2022-02-24 RX ORDER — ERGOCALCIFEROL 1.25 MG/1
50000 CAPSULE ORAL
Qty: 12 CAPSULE | Refills: 1 | Status: SHIPPED | OUTPATIENT
Start: 2022-02-24 | End: 2022-07-14 | Stop reason: SDUPTHER

## 2022-02-24 RX ORDER — LISINOPRIL 20 MG/1
20 TABLET ORAL DAILY
Qty: 90 TABLET | Refills: 1 | Status: SHIPPED | OUTPATIENT
Start: 2022-02-24 | End: 2022-08-23 | Stop reason: SDUPTHER

## 2022-02-24 NOTE — TELEPHONE ENCOUNTER
----- Message from Kristel Daniel sent at 2/24/2022  8:56 AM CST -----  Pt needs refill on vitamin d, lisinopril, pantoprazole   [facility] on    [phone]

## 2022-03-09 ENCOUNTER — TELEPHONE (OUTPATIENT)
Dept: FAMILY MEDICINE | Facility: CLINIC | Age: 67
End: 2022-03-09
Payer: MEDICARE

## 2022-03-09 DIAGNOSIS — M81.0 AGE-RELATED OSTEOPOROSIS WITHOUT CURRENT PATHOLOGICAL FRACTURE: Primary | ICD-10-CM

## 2022-03-09 NOTE — TELEPHONE ENCOUNTER
----- Message from Taylor Mejias sent at 3/9/2022 11:06 AM CST -----  Please review patient's Appointment Desk for an upcoming PROLIA INJECTION appointment.       The following are needed for this appointment.   Please contact patient for any tests or follow-up needed:      ORDER.  Current / active in the Epic Therapy Plan, signed within a year.  LABS. Serum Calcium within 6 weeks of treatment.    DEXA SCAN within the last 2 years   DENTAL PRECAUTION CONFIRMED WITH PATIENT. No recent issues (infections, etc). No invasive procedures recently done or planned (root canal, extraction, implants, etc.)  A patient will need to bring a Dental Clearance signed by patient's dental provider if there are recent dental issues/procedures within the last 3 months.   FOLLOW-UP APPOINTMENT within the last 12 months with the diagnosis mentioned in the visit notes.         Any item unavailable by the day prior to the scheduled appointment will cause the appointment to be CANCELLED.        To reschedule appointments, please contact [facility] INFUSION SCHEDULING POOL or call [phone] or [phone].       Thank you,  [facility], Infusion Department

## 2022-03-16 ENCOUNTER — TELEPHONE (OUTPATIENT)
Dept: FAMILY MEDICINE | Facility: CLINIC | Age: 67
End: 2022-03-16
Payer: MEDICARE

## 2022-03-16 NOTE — TELEPHONE ENCOUNTER
----- Message from Kristel Daniel sent at 3/16/2022  2:00 PM CDT -----  Vm- pt needs to make an appt and get a refill, lmor to get when she needed to be seen and what medication she needs   [phone]

## 2022-03-17 DIAGNOSIS — I10 PRIMARY HYPERTENSION: Primary | ICD-10-CM

## 2022-03-17 DIAGNOSIS — E11.22 TYPE 2 DIABETES MELLITUS WITH STAGE 3 CHRONIC KIDNEY DISEASE, WITH LONG-TERM CURRENT USE OF INSULIN, UNSPECIFIED WHETHER STAGE 3A OR 3B CKD: ICD-10-CM

## 2022-03-17 DIAGNOSIS — N18.30 TYPE 2 DIABETES MELLITUS WITH STAGE 3 CHRONIC KIDNEY DISEASE, WITH LONG-TERM CURRENT USE OF INSULIN, UNSPECIFIED WHETHER STAGE 3A OR 3B CKD: ICD-10-CM

## 2022-03-17 DIAGNOSIS — E78.5 DYSLIPIDEMIA: ICD-10-CM

## 2022-03-17 DIAGNOSIS — Z79.4 TYPE 2 DIABETES MELLITUS WITH STAGE 3 CHRONIC KIDNEY DISEASE, WITH LONG-TERM CURRENT USE OF INSULIN, UNSPECIFIED WHETHER STAGE 3A OR 3B CKD: ICD-10-CM

## 2022-03-17 RX ORDER — ATORVASTATIN CALCIUM 10 MG/1
10 TABLET, FILM COATED ORAL DAILY
Qty: 90 TABLET | Refills: 0 | Status: SHIPPED | OUTPATIENT
Start: 2022-03-17 | End: 2022-08-23 | Stop reason: SDUPTHER

## 2022-03-17 NOTE — TELEPHONE ENCOUNTER
Pt has been informed that she needs fasting labs done before her appt on 3/30. Pt verbalzied understanding, refill request has been sent to .

## 2022-03-17 NOTE — TELEPHONE ENCOUNTER
----- Message from Kristel Daniel sent at 3/17/2022  3:02 PM CDT -----  Pt needs refill on atorvastatin   [facility] on    [phone]

## 2022-03-17 NOTE — TELEPHONE ENCOUNTER
Let pt know we will send in refill but she needs fasting labs done before her appt on the 30th with Liv.  Then send to  to send in.

## 2022-03-29 ENCOUNTER — PATIENT MESSAGE (OUTPATIENT)
Dept: FAMILY MEDICINE | Facility: CLINIC | Age: 67
End: 2022-03-29
Payer: MEDICARE

## 2022-03-30 ENCOUNTER — HOSPITAL ENCOUNTER (OUTPATIENT)
Dept: RADIOLOGY | Facility: HOSPITAL | Age: 67
Discharge: HOME | End: 2022-03-30
Attending: NURSE PRACTITIONER
Payer: MEDICARE

## 2022-03-30 ENCOUNTER — OFFICE VISIT (OUTPATIENT)
Dept: FAMILY MEDICINE | Facility: CLINIC | Age: 67
End: 2022-03-30
Payer: MEDICARE

## 2022-03-30 VITALS
WEIGHT: 108.19 LBS | BODY MASS INDEX: 20.42 KG/M2 | HEIGHT: 61 IN | OXYGEN SATURATION: 99 % | SYSTOLIC BLOOD PRESSURE: 120 MMHG | DIASTOLIC BLOOD PRESSURE: 70 MMHG | HEART RATE: 74 BPM

## 2022-03-30 DIAGNOSIS — F32.A MILD DEPRESSION: ICD-10-CM

## 2022-03-30 DIAGNOSIS — M81.0 AGE-RELATED OSTEOPOROSIS WITHOUT CURRENT PATHOLOGICAL FRACTURE: ICD-10-CM

## 2022-03-30 DIAGNOSIS — K27.9 PUD (PEPTIC ULCER DISEASE): Primary | ICD-10-CM

## 2022-03-30 DIAGNOSIS — E11.69 DM TYPE 2 WITH DIABETIC DYSLIPIDEMIA: ICD-10-CM

## 2022-03-30 DIAGNOSIS — E78.5 DM TYPE 2 WITH DIABETIC DYSLIPIDEMIA: ICD-10-CM

## 2022-03-30 DIAGNOSIS — E78.5 DYSLIPIDEMIA: ICD-10-CM

## 2022-03-30 DIAGNOSIS — Z12.31 OTHER SCREENING MAMMOGRAM: ICD-10-CM

## 2022-03-30 DIAGNOSIS — I10 ESSENTIAL HYPERTENSION: ICD-10-CM

## 2022-03-30 DIAGNOSIS — E03.9 ACQUIRED HYPOTHYROIDISM: ICD-10-CM

## 2022-03-30 DIAGNOSIS — R11.2 NON-INTRACTABLE VOMITING WITH NAUSEA, UNSPECIFIED VOMITING TYPE: ICD-10-CM

## 2022-03-30 LAB
ALBUMIN SERPL-MCNC: 3.9 G/DL (ref 3.6–5.1)
ALBUMIN/CREAT UR: 9 MCG/MG CREAT
ALBUMIN/GLOB SERPL: 1.8 (CALC) (ref 1–2.5)
ALP SERPL-CCNC: 34 U/L (ref 37–153)
ALT SERPL-CCNC: 10 U/L (ref 6–29)
APPEARANCE UR: ABNORMAL
AST SERPL-CCNC: 14 U/L (ref 10–35)
BACTERIA #/AREA URNS HPF: ABNORMAL /HPF
BACTERIA UR CULT: ABNORMAL
BACTERIA UR CULT: ABNORMAL
BASOPHILS # BLD AUTO: 69 CELLS/UL (ref 0–200)
BASOPHILS NFR BLD AUTO: 0.9 %
BILIRUB SERPL-MCNC: 0.5 MG/DL (ref 0.2–1.2)
BILIRUB UR QL STRIP: NEGATIVE
BUN SERPL-MCNC: 23 MG/DL (ref 7–25)
BUN/CREAT SERPL: ABNORMAL (CALC) (ref 6–22)
CALCIUM SERPL-MCNC: 10.2 MG/DL (ref 8.6–10.4)
CHLORIDE SERPL-SCNC: 96 MMOL/L (ref 98–110)
CHOLEST SERPL-MCNC: 132 MG/DL
CHOLEST/HDLC SERPL: 3.4 (CALC)
CO2 SERPL-SCNC: 31 MMOL/L (ref 20–32)
COLOR UR: YELLOW
CREAT SERPL-MCNC: 0.76 MG/DL (ref 0.5–0.99)
CREAT UR-MCNC: 93 MG/DL (ref 20–275)
EOSINOPHIL # BLD AUTO: 154 CELLS/UL (ref 15–500)
EOSINOPHIL NFR BLD AUTO: 2 %
ERYTHROCYTE [DISTWIDTH] IN BLOOD BY AUTOMATED COUNT: 12.9 % (ref 11–15)
GLOBULIN SER CALC-MCNC: 2.2 G/DL (CALC) (ref 1.9–3.7)
GLUCOSE SERPL-MCNC: 103 MG/DL (ref 65–99)
GLUCOSE UR QL STRIP: NEGATIVE
HBA1C MFR BLD: 5.5 % OF TOTAL HGB
HCT VFR BLD AUTO: 40.5 % (ref 35–45)
HDLC SERPL-MCNC: 39 MG/DL
HGB BLD-MCNC: 13.1 G/DL (ref 11.7–15.5)
HGB UR QL STRIP: NEGATIVE
HYALINE CASTS #/AREA URNS LPF: ABNORMAL /LPF
KETONES UR QL STRIP: NEGATIVE
LDLC SERPL CALC-MCNC: 65 MG/DL (CALC)
LEUKOCYTE ESTERASE UR QL STRIP: ABNORMAL
LYMPHOCYTES # BLD AUTO: 2510 CELLS/UL (ref 850–3900)
LYMPHOCYTES NFR BLD AUTO: 32.6 %
MCH RBC QN AUTO: 29.4 PG (ref 27–33)
MCHC RBC AUTO-ENTMCNC: 32.3 G/DL (ref 32–36)
MCV RBC AUTO: 91 FL (ref 80–100)
MICROALBUMIN UR-MCNC: 0.8 MG/DL
MONOCYTES # BLD AUTO: 685 CELLS/UL (ref 200–950)
MONOCYTES NFR BLD AUTO: 8.9 %
NEUTROPHILS # BLD AUTO: 4281 CELLS/UL (ref 1500–7800)
NEUTROPHILS NFR BLD AUTO: 55.6 %
NITRITE UR QL STRIP: NEGATIVE
NONHDLC SERPL-MCNC: 93 MG/DL (CALC)
PH UR STRIP: 7.5 [PH] (ref 5–8)
PLATELET # BLD AUTO: 126 THOUSAND/UL (ref 140–400)
PMV BLD REES-ECKER: 12.7 FL (ref 7.5–12.5)
POTASSIUM SERPL-SCNC: 3.9 MMOL/L (ref 3.5–5.3)
PROT SERPL-MCNC: 6.1 G/DL (ref 6.1–8.1)
PROT UR QL STRIP: NEGATIVE
RBC # BLD AUTO: 4.45 MILLION/UL (ref 3.8–5.1)
RBC #/AREA URNS HPF: ABNORMAL /HPF
SODIUM SERPL-SCNC: 136 MMOL/L (ref 135–146)
SP GR UR STRIP: 1.02 (ref 1–1.03)
SQUAMOUS #/AREA URNS HPF: ABNORMAL /HPF
TRIGL SERPL-MCNC: 213 MG/DL
TSH SERPL-ACNC: 1.89 MIU/L (ref 0.4–4.5)
WBC # BLD AUTO: 7.7 THOUSAND/UL (ref 3.8–10.8)
WBC #/AREA URNS HPF: ABNORMAL /HPF

## 2022-03-30 PROCEDURE — 1160F RVW MEDS BY RX/DR IN RCRD: CPT | Mod: S$GLB,,, | Performed by: NURSE PRACTITIONER

## 2022-03-30 PROCEDURE — 3074F SYST BP LT 130 MM HG: CPT | Mod: S$GLB,,, | Performed by: NURSE PRACTITIONER

## 2022-03-30 PROCEDURE — 99214 OFFICE O/P EST MOD 30 MIN: CPT | Mod: S$GLB,,, | Performed by: NURSE PRACTITIONER

## 2022-03-30 PROCEDURE — 99214 PR OFFICE/OUTPT VISIT, EST, LEVL IV, 30-39 MIN: ICD-10-PCS | Mod: S$GLB,,, | Performed by: NURSE PRACTITIONER

## 2022-03-30 PROCEDURE — 3044F HG A1C LEVEL LT 7.0%: CPT | Mod: S$GLB,,, | Performed by: NURSE PRACTITIONER

## 2022-03-30 PROCEDURE — 3061F PR NEG MICROALBUMINURIA RESULT DOCUMENTED/REVIEW: ICD-10-PCS | Mod: S$GLB,,, | Performed by: NURSE PRACTITIONER

## 2022-03-30 PROCEDURE — 1160F PR REVIEW ALL MEDS BY PRESCRIBER/CLIN PHARMACIST DOCUMENTED: ICD-10-PCS | Mod: S$GLB,,, | Performed by: NURSE PRACTITIONER

## 2022-03-30 PROCEDURE — 3066F PR DOCUMENTATION OF TREATMENT FOR NEPHROPATHY: ICD-10-PCS | Mod: S$GLB,,, | Performed by: NURSE PRACTITIONER

## 2022-03-30 PROCEDURE — 3288F PR FALLS RISK ASSESSMENT DOCUMENTED: ICD-10-PCS | Mod: S$GLB,,, | Performed by: NURSE PRACTITIONER

## 2022-03-30 PROCEDURE — 1101F PR PT FALLS ASSESS DOC 0-1 FALLS W/OUT INJ PAST YR: ICD-10-PCS | Mod: S$GLB,,, | Performed by: NURSE PRACTITIONER

## 2022-03-30 PROCEDURE — 3078F DIAST BP <80 MM HG: CPT | Mod: S$GLB,,, | Performed by: NURSE PRACTITIONER

## 2022-03-30 PROCEDURE — 3044F PR MOST RECENT HEMOGLOBIN A1C LEVEL <7.0%: ICD-10-PCS | Mod: S$GLB,,, | Performed by: NURSE PRACTITIONER

## 2022-03-30 PROCEDURE — 77063 BREAST TOMOSYNTHESIS BI: CPT | Mod: TC,PO

## 2022-03-30 PROCEDURE — 4010F PR ACE/ARB THEARPY RXD/TAKEN: ICD-10-PCS | Mod: S$GLB,,, | Performed by: NURSE PRACTITIONER

## 2022-03-30 PROCEDURE — 1159F PR MEDICATION LIST DOCUMENTED IN MEDICAL RECORD: ICD-10-PCS | Mod: S$GLB,,, | Performed by: NURSE PRACTITIONER

## 2022-03-30 PROCEDURE — 3078F PR MOST RECENT DIASTOLIC BLOOD PRESSURE < 80 MM HG: ICD-10-PCS | Mod: S$GLB,,, | Performed by: NURSE PRACTITIONER

## 2022-03-30 PROCEDURE — 3061F NEG MICROALBUMINURIA REV: CPT | Mod: S$GLB,,, | Performed by: NURSE PRACTITIONER

## 2022-03-30 PROCEDURE — 3288F FALL RISK ASSESSMENT DOCD: CPT | Mod: S$GLB,,, | Performed by: NURSE PRACTITIONER

## 2022-03-30 PROCEDURE — 3008F BODY MASS INDEX DOCD: CPT | Mod: S$GLB,,, | Performed by: NURSE PRACTITIONER

## 2022-03-30 PROCEDURE — 1101F PT FALLS ASSESS-DOCD LE1/YR: CPT | Mod: S$GLB,,, | Performed by: NURSE PRACTITIONER

## 2022-03-30 PROCEDURE — 4010F ACE/ARB THERAPY RXD/TAKEN: CPT | Mod: S$GLB,,, | Performed by: NURSE PRACTITIONER

## 2022-03-30 PROCEDURE — 3008F PR BODY MASS INDEX (BMI) DOCUMENTED: ICD-10-PCS | Mod: S$GLB,,, | Performed by: NURSE PRACTITIONER

## 2022-03-30 PROCEDURE — 77080 DXA BONE DENSITY AXIAL: CPT | Mod: TC,PO

## 2022-03-30 PROCEDURE — 3074F PR MOST RECENT SYSTOLIC BLOOD PRESSURE < 130 MM HG: ICD-10-PCS | Mod: S$GLB,,, | Performed by: NURSE PRACTITIONER

## 2022-03-30 PROCEDURE — 1159F MED LIST DOCD IN RCRD: CPT | Mod: S$GLB,,, | Performed by: NURSE PRACTITIONER

## 2022-03-30 PROCEDURE — 3066F NEPHROPATHY DOC TX: CPT | Mod: S$GLB,,, | Performed by: NURSE PRACTITIONER

## 2022-03-30 RX ORDER — ESCITALOPRAM OXALATE 20 MG/1
20 TABLET ORAL DAILY
Qty: 90 TABLET | Refills: 1 | Status: SHIPPED | OUTPATIENT
Start: 2022-03-30 | End: 2022-08-23 | Stop reason: SDUPTHER

## 2022-03-30 RX ORDER — ICOSAPENT ETHYL 1000 MG/1
2 CAPSULE ORAL 2 TIMES DAILY
Qty: 360 CAPSULE | Refills: 1 | Status: SHIPPED | OUTPATIENT
Start: 2022-03-30 | End: 2022-04-18 | Stop reason: SDUPTHER

## 2022-03-30 NOTE — PROGRESS NOTES
SUBJECTIVE:    Patient ID: Liv Decker is a 67 y.o. female.    Chief Complaint: Annual Exam (No bottles, reviewed from list, eye exam to be scheduled, foot exam setup, havent been able to eat x 1 week due to stomach hurting//dp)    Pt here for regular f/u- DM/COPD  Pt rpeorts for the past week has had increase in heartburn symptoms and nausea- reports anything she tries to eat she will throw it back up. Denies fever/chills. No change in BMs, having regular soft brown stool, no black/bloody stool. Reports called and has appt with Dr. Villegas 4/12/2022. Last had EGD in 2018 which showed nonbleeding gastric ulcer. Denies any recent NSAIDs. Reports started drinking protein meal replacement shake about 2 a day and tolerating that.   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 Granddaughter causes her a lot of stress and admits feels depressed about the situation      Refill on 03/17/2022   Component Date Value Ref Range Status    WBC 03/28/2022 7.7  3.8 - 10.8 Thousand/uL Final    RBC 03/28/2022 4.45  3.80 - 5.10 Million/uL Final    Hemoglobin 03/28/2022 13.1  11.7 - 15.5 g/dL Final    Hematocrit 03/28/2022 40.5  35.0 - 45.0 % Final    MCV 03/28/2022 91.0  80.0 - 100.0 fL Final    MCH 03/28/2022 29.4  27.0 - 33.0 pg Final    MCHC 03/28/2022 32.3  32.0 - 36.0 g/dL Final    RDW 03/28/2022 12.9  11.0 - 15.0 % Final    Platelets 03/28/2022 126 (A) 140 - 400 Thousand/uL Final    MPV 03/28/2022 12.7 (A) 7.5 - 12.5 fL Final    Neutrophils, Abs 03/28/2022 4,281  1,500 - 7,800 cells/uL Final    Lymph # 03/28/2022 2,510  850 - 3,900 cells/uL Final    Mono # 03/28/2022 685  200 - 950 cells/uL Final    Eos # 03/28/2022 154  15 - 500 cells/uL Final    Baso # 03/28/2022 69  0 - 200 cells/uL Final    Neutrophils Relative 03/28/2022 55.6  % Final    Lymph % 03/28/2022 32.6  % Final    Mono % 03/28/2022 8.9  % Final    Eosinophil % 03/28/2022  2.0  % Final    Basophil % 03/28/2022 0.9  % Final    Glucose 03/28/2022 103 (A) 65 - 99 mg/dL Final    BUN 03/28/2022 23  7 - 25 mg/dL Final    Creatinine 03/28/2022 0.76  0.50 - 0.99 mg/dL Final    eGFR if non African American 03/28/2022 81  > OR = 60 mL/min/1.73m2 Final    eGFR if  03/28/2022 94  > OR = 60 mL/min/1.73m2 Final    BUN/Creatinine Ratio 03/28/2022 NOT APPLICABLE  6 - 22 (calc) Final    Sodium 03/28/2022 136  135 - 146 mmol/L Final    Potassium 03/28/2022 3.9  3.5 - 5.3 mmol/L Final    Chloride 03/28/2022 96 (A) 98 - 110 mmol/L Final    CO2 03/28/2022 31  20 - 32 mmol/L Final    Calcium 03/28/2022 10.2  8.6 - 10.4 mg/dL Final    Total Protein 03/28/2022 6.1  6.1 - 8.1 g/dL Final    Albumin 03/28/2022 3.9  3.6 - 5.1 g/dL Final    Globulin, Total 03/28/2022 2.2  1.9 - 3.7 g/dL (calc) Final    Albumin/Globulin Ratio 03/28/2022 1.8  1.0 - 2.5 (calc) Final    Total Bilirubin 03/28/2022 0.5  0.2 - 1.2 mg/dL Final    Alkaline Phosphatase 03/28/2022 34 (A) 37 - 153 U/L Final    AST 03/28/2022 14  10 - 35 U/L Final    ALT 03/28/2022 10  6 - 29 U/L Final    Cholesterol 03/28/2022 132  <200 mg/dL Final    HDL 03/28/2022 39 (A) > OR = 50 mg/dL Final    Triglycerides 03/28/2022 213 (A) <150 mg/dL Final    LDL Cholesterol 03/28/2022 65  mg/dL (calc) Final    HDL/Cholesterol Ratio 03/28/2022 3.4  <5.0 (calc) Final    Non HDL Chol. (LDL+VLDL) 03/28/2022 93  <130 mg/dL (calc) Final    Creatinine, Urine 03/28/2022 93  20 - 275 mg/dL Final    Microalb, Ur 03/28/2022 0.8  See Note: mg/dL Final    Microalb/Creat Ratio 03/28/2022 9  <30 mcg/mg creat Final    TSH w/reflex to FT4 03/28/2022 1.89  0.40 - 4.50 mIU/L Final    Color, UA 03/28/2022 YELLOW  YELLOW Final    Appearance, UA 03/28/2022 CLOUDY (A) CLEAR Final    Specific [place], UA 03/28/2022 1.016  1.001 - 1.035 Final    pH, UA 03/28/2022 7.5  5.0 - 8.0 Final    Glucose, UA 03/28/2022 NEGATIVE  NEGATIVE Final     Bilirubin, UA 03/28/2022 NEGATIVE  NEGATIVE Final    Ketones, UA 03/28/2022 NEGATIVE  NEGATIVE Final    Occult Blood UA 03/28/2022 NEGATIVE  NEGATIVE Final    Protein, UA 03/28/2022 NEGATIVE  NEGATIVE Final    Nitrite, UA 03/28/2022 NEGATIVE  NEGATIVE Final    Leukocytes, UA 03/28/2022 2+ (A) NEGATIVE Final    WBC Casts, UA 03/28/2022 6-10 (A) < OR = 5 /HPF Final    RBC Casts, UA 03/28/2022 NONE SEEN  < OR = 2 /HPF Final    Squam Epithel, UA 03/28/2022 10-20 (A) < OR = 5 /HPF Final    Bacteria, UA 03/28/2022 MODERATE (A) NONE SEEN /HPF Final    Hyaline Casts, UA 03/28/2022 NONE SEEN  NONE SEEN /LPF Final    Reflexive Urine Culture 03/28/2022    Final    Urine Culture, Routine 03/28/2022    Final    Hemoglobin A1C 03/28/2022 5.5  <5.7 % of total Hgb Final       Past Medical History:   Diagnosis Date    Anemia     Diabetes mellitus     Encounter for blood transfusion     GERD (gastroesophageal reflux disease)     Hyperlipidemia     MI (myocardial infarction)     Ulcer of the stomach and intestine     pos. for h. pylori     Past Surgical History:   Procedure Laterality Date    APPENDECTOMY      CHOLECYSTECTOMY      COLONOSCOPY N/A 3/18/2016    Procedure: COLONOSCOPY;  Surgeon: Rober Zelaya Jr., MD;  Location: [facility];  Service: Endoscopy;  Laterality: N/A;    ESOPHAGOGASTRODUODENOSCOPY  04/15/2016    with dil    EYE SURGERY      TONSILLECTOMY      TUBAL LIGATION       Family History   Problem Relation Age of Onset    Heart disease Mother     Diabetes Father        The 10-year CVD risk score (D'Agostino et al., 2008) is: 23.2%    Values used to calculate the score:      Age: 67 years      Sex: Female      Diabetic: Yes      Tobacco smoker: Yes      Systolic Blood Pressure: 120 mmHg      Is BP treated: Yes      HDL Cholesterol: 39 mg/dL      Total Cholesterol: 132 mg/dL     Marital Status:   Alcohol History:  reports no history of alcohol use.  Tobacco History:  reports that she  has been smoking cigarettes. She started smoking about 63 years ago. She has a 22.50 pack-year smoking history. She has never used smokeless tobacco.  Drug History:  reports no history of drug use.    Health Maintenance Topics with due status: Not Due       Topic Last Completion Date    TETANUS VACCINE 03/21/2019    Pneumococcal Vaccines (Age 65+) 03/31/2021    Colorectal Cancer Screening 05/05/2021    Diabetes Urine Screening 03/28/2022    Lipid Panel 03/28/2022    Hemoglobin A1c 03/28/2022    Low Dose Statin 03/30/2022    Mammogram 03/30/2022    DEXA Scan 03/30/2022     Immunization History   Administered Date(s) Administered    COVID-19, MRNA, LN-S, PF (MODERNA FULL 0.5 ML DOSE) 01/15/2021, 02/18/2021    Influenza 09/09/2009, 10/08/2010, 10/20/2011, 10/02/2012, 10/18/2013, 10/03/2014, 10/15/2015, 10/20/2016    Influenza - Quadrivalent 10/03/2014    Influenza - Quadrivalent - High Dose - PF (65 years and older) 01/25/2022    Influenza - Quadrivalent - PF *Preferred* (6 months and older) 10/20/2016, 10/15/2018, 10/16/2019    Influenza A (H1N1) 2009 Monovalent - IM 10/23/2009    Influenza Split 09/09/2009, 10/08/2010, 10/20/2011, 10/02/2012, 10/18/2013, 10/15/2015, 10/15/2018, 10/07/2019    Pneumococcal Conjugate - 13 Valent 03/31/2021    Pneumococcal Polysaccharide - 23 Valent 03/21/2019    Tdap 03/21/2019, 03/21/2019    Zoster 03/21/2019       Review of patient's allergies indicates:  No Known Allergies    Current Outpatient Medications:     albuterol (PROAIR HFA) 90 mcg/actuation inhaler, Inhale 2 puffs into the lungs every 6 (six) hours as needed for Wheezing or Shortness of Breath. Rescue, Disp: 18 g, Rfl: 5    atorvastatin (LIPITOR) 10 MG tablet, Take 1 tablet (10 mg total) by mouth once daily., Disp: 90 tablet, Rfl: 0    calcium carbonate (OS-JOSELYN) 600 mg calcium (1,500 mg) Tab, Take 600 mg by mouth once daily., Disp: , Rfl:     ergocalciferol (ERGOCALCIFEROL) 50,000 unit Cap, Take 1 capsule  (50,000 Units total) by mouth every 7 days., Disp: 12 capsule, Rfl: 1    famotidine (PEPCID) 20 MG tablet, , Disp: , Rfl:     levothyroxine (SYNTHROID) 50 MCG tablet, Take 1 tablet (50 mcg total) by mouth before breakfast. Monday - Friday., Disp: 90 tablet, Rfl: 0    lisinopriL (PRINIVIL,ZESTRIL) 20 MG tablet, Take 1 tablet (20 mg total) by mouth once daily., Disp: 90 tablet, Rfl: 1    metFORMIN (GLUCOPHAGE) 1000 MG tablet, Take 1 tablet (1,000 mg total) by mouth daily with breakfast., Disp: 90 tablet, Rfl: 1    pantoprazole (PROTONIX) 40 MG tablet, 1 tablet twice a day for 4 weeks then once daily, Disp: 90 tablet, Rfl: 1    sucralfate (CARAFATE) 1 gram tablet, Take 1 tablet (1 g total) by mouth 2 (two) times daily. Take on empty stomach, do not eat or take medication for at least 1 hour, Disp: 60 tablet, Rfl: 2    umeclidinium-vilanteroL (ANORO ELLIPTA) 62.5-25 mcg/actuation DsDv, Inhale 1 puff into the lungs once daily. Controller, Disp: 60 each, Rfl: 5    EScitalopram oxalate (LEXAPRO) 20 MG tablet, Take 1 tablet (20 mg total) by mouth once daily., Disp: 90 tablet, Rfl: 1    icosapent ethyL (VASCEPA) 1 gram Cap, Take 2 capsules (2 g total) by mouth 2 (two) times a day., Disp: 360 capsule, Rfl: 1    Review of Systems   Constitutional: Positive for appetite change and unexpected weight change (wt down 9lbs over past year). Negative for chills and fever.   HENT: Negative for sore throat and trouble swallowing.    Respiratory: Positive for shortness of breath (with heavy exertion only). Negative for cough and wheezing.    Cardiovascular: Negative for chest pain, palpitations and leg swelling.   Gastrointestinal: Positive for nausea and vomiting. Negative for abdominal pain, blood in stool, constipation and diarrhea.        C/o's of increase in heartburn the past week   Genitourinary: Negative for dysuria, frequency and hematuria.   Musculoskeletal: Positive for back pain (chronic LBP, hasn't been too bad  "recently). Negative for gait problem.   Skin: Negative for rash.   Neurological: Negative for dizziness, syncope, speech difficulty and headaches.   Psychiatric/Behavioral: Positive for dysphoric mood. Negative for self-injury and suicidal ideas. The patient is not nervous/anxious.           Objective:      Vitals:    03/30/22 0930   BP: 120/70   Pulse: 74   SpO2: 99%   Weight: 49.1 kg (108 lb 3.2 oz)   Height: 5' 1" (1.549 m)     Physical Exam  Vitals and nursing note reviewed.   Constitutional:       General: She is not in acute distress.     Appearance: Normal appearance. She is well-developed.      Comments: Thin frail white female, appears older than stated age   HENT:      Head: Normocephalic and atraumatic.      Right Ear: Tympanic membrane and ear canal normal.      Left Ear: Tympanic membrane and ear canal normal.   Neck:      Vascular: No carotid bruit.   Cardiovascular:      Rate and Rhythm: Normal rate and regular rhythm.      Heart sounds: No murmur heard.    No friction rub. No gallop.   Pulmonary:      Effort: Pulmonary effort is normal. No respiratory distress.      Breath sounds: No wheezing or rales.      Comments: Diminished throughout otherwise clear  Abdominal:      General: There is no distension.      Palpations: Abdomen is soft.      Tenderness: There is abdominal tenderness (Epigastric). There is no guarding or rebound.   Musculoskeletal:      Cervical back: Neck supple.      Right lower leg: No edema.      Left lower leg: No edema.   Lymphadenopathy:      Cervical: No cervical adenopathy.   Skin:     General: Skin is warm and dry.      Findings: No rash.   Neurological:      General: No focal deficit present.      Mental Status: She is alert and oriented to person, place, and time.      Gait: Gait normal.           Assessment:       1. PUD (peptic ulcer disease)    2. Non-intractable vomiting with nausea, unspecified vomiting type    3. DM type 2 with diabetic dyslipidemia    4. Mild " depression    5. Dyslipidemia    6. Acquired hypothyroidism    7. Essential hypertension    8. Age-related osteoporosis without current pathological fracture           Plan:       PUD (peptic ulcer disease)  Comments:  Recommend increasing PPI to b.i.d. till she can see Dr. Villegas for EGD.   Orders:  -     CT Abdomen Pelvis W Wo Contrast; Future; Expected date: 03/30/2022    Non-intractable vomiting with nausea, unspecified vomiting type  Comments:  Giving nausea/vomiting, epigastric pain and weight loss will send for CT scan.  Patient cautioned if symptoms worsen would recommend ER.  Recommend protein supplements 3 times a day   Orders:  -     CT Abdomen Pelvis W Wo Contrast; Future; Expected date: 03/30/2022    DM type 2 with diabetic dyslipidemia  Comments:  A1c well controlled at 5.5%    Mild depression  Comments:  Patient admits to increasing depression anxiety related to family issues.  Agreeable to increasing Lexapro dose.  Offered referral to therapy which she declines  Orders:  -     EScitalopram oxalate (LEXAPRO) 20 MG tablet; Take 1 tablet (20 mg total) by mouth once daily.  Dispense: 90 tablet; Refill: 1    Dyslipidemia  Comments:  Reviewed recent labs with patient, TG improved and LDL well controlled  Orders:  -     icosapent ethyL (VASCEPA) 1 gram Cap; Take 2 capsules (2 g total) by mouth 2 (two) times a day.  Dispense: 360 capsule; Refill: 1    Acquired hypothyroidism  Comments:  Stable    Essential hypertension  Comments:  BP well controlled    Age-related osteoporosis without current pathological fracture  Comments:  reviewed recent DEXA with pt, improving on prolia      Follow up in about 6 weeks (around 5/11/2022).          Counseled on age and gender appropriate medical preventative services, including cancer screenings, immunizations, overall nutritional health, need for a consistent exercise regimen and an overall push towards maintaining a vigorous and active lifestyle.      3/30/2022 Liv BENNETT  JIM Parrish

## 2022-04-06 ENCOUNTER — INFUSION (OUTPATIENT)
Dept: INFUSION THERAPY | Facility: HOSPITAL | Age: 67
End: 2022-04-06
Attending: NURSE PRACTITIONER
Payer: MEDICARE

## 2022-04-06 VITALS
WEIGHT: 108.25 LBS | RESPIRATION RATE: 18 BRPM | HEIGHT: 61 IN | TEMPERATURE: 98 F | SYSTOLIC BLOOD PRESSURE: 127 MMHG | OXYGEN SATURATION: 100 % | HEART RATE: 64 BPM | BODY MASS INDEX: 20.44 KG/M2 | DIASTOLIC BLOOD PRESSURE: 67 MMHG

## 2022-04-06 DIAGNOSIS — M81.0 AGE-RELATED OSTEOPOROSIS WITHOUT CURRENT PATHOLOGICAL FRACTURE: Primary | ICD-10-CM

## 2022-04-06 PROCEDURE — 96372 THER/PROPH/DIAG INJ SC/IM: CPT

## 2022-04-06 PROCEDURE — 63600175 PHARM REV CODE 636 W HCPCS: Mod: JG | Performed by: NURSE PRACTITIONER

## 2022-04-06 RX ADMIN — DENOSUMAB 60 MG: 60 INJECTION SUBCUTANEOUS at 08:04

## 2022-04-06 NOTE — PLAN OF CARE
Problem: Fatigue  Goal: Improved Activity Tolerance  Intervention: Promote Improved Energy  Flowsheets (Taken 4/6/2022 3213)  Fatigue Management: fatigue-related activity identified  Activity Management: Ambulated -L4

## 2022-04-18 DIAGNOSIS — E78.5 DYSLIPIDEMIA: ICD-10-CM

## 2022-04-18 DIAGNOSIS — E78.5 DM TYPE 2 WITH DIABETIC DYSLIPIDEMIA: ICD-10-CM

## 2022-04-18 DIAGNOSIS — E11.69 DM TYPE 2 WITH DIABETIC DYSLIPIDEMIA: ICD-10-CM

## 2022-04-18 RX ORDER — ICOSAPENT ETHYL 1000 MG/1
2 CAPSULE ORAL 2 TIMES DAILY
Qty: 360 CAPSULE | Refills: 1 | Status: SHIPPED | OUTPATIENT
Start: 2022-04-18 | End: 2022-11-11 | Stop reason: SDUPTHER

## 2022-04-18 RX ORDER — METFORMIN HYDROCHLORIDE 1000 MG/1
1000 TABLET ORAL
Qty: 90 TABLET | Refills: 1 | Status: SHIPPED | OUTPATIENT
Start: 2022-04-18 | End: 2022-07-14 | Stop reason: SDUPTHER

## 2022-04-18 NOTE — TELEPHONE ENCOUNTER
----- Message from Lilly Eisenberg sent at 4/18/2022  3:38 PM CDT -----  Pt calling for refills on Metformin 1000 mg, Vascepa [facility]  [phone]

## 2022-05-12 ENCOUNTER — OFFICE VISIT (OUTPATIENT)
Dept: FAMILY MEDICINE | Facility: CLINIC | Age: 67
End: 2022-05-12
Payer: MEDICARE

## 2022-05-12 VITALS
DIASTOLIC BLOOD PRESSURE: 76 MMHG | HEART RATE: 66 BPM | WEIGHT: 109.81 LBS | SYSTOLIC BLOOD PRESSURE: 136 MMHG | HEIGHT: 61 IN | OXYGEN SATURATION: 99 % | BODY MASS INDEX: 20.73 KG/M2

## 2022-05-12 DIAGNOSIS — K27.9 PUD (PEPTIC ULCER DISEASE): ICD-10-CM

## 2022-05-12 DIAGNOSIS — E11.69 DM TYPE 2 WITH DIABETIC DYSLIPIDEMIA: Primary | ICD-10-CM

## 2022-05-12 DIAGNOSIS — Z79.899 OTHER LONG TERM (CURRENT) DRUG THERAPY: ICD-10-CM

## 2022-05-12 DIAGNOSIS — F17.210 CIGARETTE SMOKER: ICD-10-CM

## 2022-05-12 DIAGNOSIS — K31.1 PYLORIC STENOSIS IN ADULT: ICD-10-CM

## 2022-05-12 DIAGNOSIS — E78.5 DYSLIPIDEMIA: ICD-10-CM

## 2022-05-12 DIAGNOSIS — I10 ESSENTIAL HYPERTENSION: ICD-10-CM

## 2022-05-12 DIAGNOSIS — R01.1 CARDIAC MURMUR: ICD-10-CM

## 2022-05-12 DIAGNOSIS — E03.9 ACQUIRED HYPOTHYROIDISM: ICD-10-CM

## 2022-05-12 DIAGNOSIS — E78.5 DM TYPE 2 WITH DIABETIC DYSLIPIDEMIA: Primary | ICD-10-CM

## 2022-05-12 DIAGNOSIS — J44.9 CHRONIC OBSTRUCTIVE PULMONARY DISEASE, UNSPECIFIED COPD TYPE: ICD-10-CM

## 2022-05-12 DIAGNOSIS — M81.0 AGE-RELATED OSTEOPOROSIS WITHOUT CURRENT PATHOLOGICAL FRACTURE: ICD-10-CM

## 2022-05-12 PROCEDURE — 3288F PR FALLS RISK ASSESSMENT DOCUMENTED: ICD-10-PCS | Mod: CPTII,S$GLB,, | Performed by: NURSE PRACTITIONER

## 2022-05-12 PROCEDURE — 99214 PR OFFICE/OUTPT VISIT, EST, LEVL IV, 30-39 MIN: ICD-10-PCS | Mod: S$GLB,,, | Performed by: NURSE PRACTITIONER

## 2022-05-12 PROCEDURE — 3075F SYST BP GE 130 - 139MM HG: CPT | Mod: CPTII,S$GLB,, | Performed by: NURSE PRACTITIONER

## 2022-05-12 PROCEDURE — 1160F PR REVIEW ALL MEDS BY PRESCRIBER/CLIN PHARMACIST DOCUMENTED: ICD-10-PCS | Mod: CPTII,S$GLB,, | Performed by: NURSE PRACTITIONER

## 2022-05-12 PROCEDURE — 1160F RVW MEDS BY RX/DR IN RCRD: CPT | Mod: CPTII,S$GLB,, | Performed by: NURSE PRACTITIONER

## 2022-05-12 PROCEDURE — 3008F BODY MASS INDEX DOCD: CPT | Mod: CPTII,S$GLB,, | Performed by: NURSE PRACTITIONER

## 2022-05-12 PROCEDURE — 99214 OFFICE O/P EST MOD 30 MIN: CPT | Mod: S$GLB,,, | Performed by: NURSE PRACTITIONER

## 2022-05-12 PROCEDURE — 3061F NEG MICROALBUMINURIA REV: CPT | Mod: CPTII,S$GLB,, | Performed by: NURSE PRACTITIONER

## 2022-05-12 PROCEDURE — 3078F DIAST BP <80 MM HG: CPT | Mod: CPTII,S$GLB,, | Performed by: NURSE PRACTITIONER

## 2022-05-12 PROCEDURE — 3075F PR MOST RECENT SYSTOLIC BLOOD PRESS GE 130-139MM HG: ICD-10-PCS | Mod: CPTII,S$GLB,, | Performed by: NURSE PRACTITIONER

## 2022-05-12 PROCEDURE — 3008F PR BODY MASS INDEX (BMI) DOCUMENTED: ICD-10-PCS | Mod: CPTII,S$GLB,, | Performed by: NURSE PRACTITIONER

## 2022-05-12 PROCEDURE — 4010F ACE/ARB THERAPY RXD/TAKEN: CPT | Mod: CPTII,S$GLB,, | Performed by: NURSE PRACTITIONER

## 2022-05-12 PROCEDURE — 3288F FALL RISK ASSESSMENT DOCD: CPT | Mod: CPTII,S$GLB,, | Performed by: NURSE PRACTITIONER

## 2022-05-12 PROCEDURE — 3044F HG A1C LEVEL LT 7.0%: CPT | Mod: CPTII,S$GLB,, | Performed by: NURSE PRACTITIONER

## 2022-05-12 PROCEDURE — 3066F NEPHROPATHY DOC TX: CPT | Mod: CPTII,S$GLB,, | Performed by: NURSE PRACTITIONER

## 2022-05-12 PROCEDURE — 1159F MED LIST DOCD IN RCRD: CPT | Mod: CPTII,S$GLB,, | Performed by: NURSE PRACTITIONER

## 2022-05-12 PROCEDURE — 4010F PR ACE/ARB THEARPY RXD/TAKEN: ICD-10-PCS | Mod: CPTII,S$GLB,, | Performed by: NURSE PRACTITIONER

## 2022-05-12 PROCEDURE — 3061F PR NEG MICROALBUMINURIA RESULT DOCUMENTED/REVIEW: ICD-10-PCS | Mod: CPTII,S$GLB,, | Performed by: NURSE PRACTITIONER

## 2022-05-12 PROCEDURE — 3066F PR DOCUMENTATION OF TREATMENT FOR NEPHROPATHY: ICD-10-PCS | Mod: CPTII,S$GLB,, | Performed by: NURSE PRACTITIONER

## 2022-05-12 PROCEDURE — 1101F PT FALLS ASSESS-DOCD LE1/YR: CPT | Mod: CPTII,S$GLB,, | Performed by: NURSE PRACTITIONER

## 2022-05-12 PROCEDURE — 1101F PR PT FALLS ASSESS DOC 0-1 FALLS W/OUT INJ PAST YR: ICD-10-PCS | Mod: CPTII,S$GLB,, | Performed by: NURSE PRACTITIONER

## 2022-05-12 PROCEDURE — 3044F PR MOST RECENT HEMOGLOBIN A1C LEVEL <7.0%: ICD-10-PCS | Mod: CPTII,S$GLB,, | Performed by: NURSE PRACTITIONER

## 2022-05-12 PROCEDURE — 3078F PR MOST RECENT DIASTOLIC BLOOD PRESSURE < 80 MM HG: ICD-10-PCS | Mod: CPTII,S$GLB,, | Performed by: NURSE PRACTITIONER

## 2022-05-12 PROCEDURE — 1159F PR MEDICATION LIST DOCUMENTED IN MEDICAL RECORD: ICD-10-PCS | Mod: CPTII,S$GLB,, | Performed by: NURSE PRACTITIONER

## 2022-05-12 RX ORDER — LEVOTHYROXINE SODIUM 50 UG/1
50 TABLET ORAL
Qty: 90 TABLET | Refills: 1 | Status: SHIPPED | OUTPATIENT
Start: 2022-05-12 | End: 2022-11-11 | Stop reason: SDUPTHER

## 2022-05-12 RX ORDER — VARENICLINE TARTRATE 0.5 (11)-1
KIT ORAL
Qty: 1 EACH | Refills: 0 | Status: SHIPPED | OUTPATIENT
Start: 2022-05-12 | End: 2022-08-23 | Stop reason: SDUPTHER

## 2022-05-12 RX ORDER — VARENICLINE TARTRATE 1 MG/1
1 TABLET, FILM COATED ORAL 2 TIMES DAILY
Qty: 60 TABLET | Refills: 3 | Status: SHIPPED | OUTPATIENT
Start: 2022-05-12 | End: 2022-08-23 | Stop reason: SDUPTHER

## 2022-05-12 NOTE — PROGRESS NOTES
SUBJECTIVE:    Patient ID: Liv Decker is a 67 y.o. female.    Chief Complaint: Follow-up (Bottles brought. Pt is here for a 6 week f/u appointment and medication refills.//Decline eye exam referral today.//)    Pt here for regular f/u- DM/COPD/GERD/weight loss  Pt reports went for EGD earlier this week with Dr. Villegas- showed severe pyloric stenosis, unable to pass with scope, nonbleeding gastric ulcer and gastroparesis- plans for repeat EGD next week at the hospital using pediatric scope. Advised to continue PPI BID indefinitely and carafate BID. Reports still vomiting undigested food at least once a day. No further weight loss since March but hasn't been able to gain any weight- drinking protein shake daily  Still smoking, about 1/2ppd- reports only time she's been successful quitting is with chantix.  Was previously referred to smoking cessation clinic but patient states she is unable to attend sessions due to her work schedule            Refill on 03/17/2022   Component Date Value Ref Range Status    WBC 03/28/2022 7.7  3.8 - 10.8 Thousand/uL Final    RBC 03/28/2022 4.45  3.80 - 5.10 Million/uL Final    Hemoglobin 03/28/2022 13.1  11.7 - 15.5 g/dL Final    Hematocrit 03/28/2022 40.5  35.0 - 45.0 % Final    MCV 03/28/2022 91.0  80.0 - 100.0 fL Final    MCH 03/28/2022 29.4  27.0 - 33.0 pg Final    MCHC 03/28/2022 32.3  32.0 - 36.0 g/dL Final    RDW 03/28/2022 12.9  11.0 - 15.0 % Final    Platelets 03/28/2022 126 (A) 140 - 400 Thousand/uL Final    MPV 03/28/2022 12.7 (A) 7.5 - 12.5 fL Final    Neutrophils, Abs 03/28/2022 4,281  1,500 - 7,800 cells/uL Final    Lymph # 03/28/2022 2,510  850 - 3,900 cells/uL Final    Mono # 03/28/2022 685  200 - 950 cells/uL Final    Eos # 03/28/2022 154  15 - 500 cells/uL Final    Baso # 03/28/2022 69  0 - 200 cells/uL Final    Neutrophils Relative 03/28/2022 55.6  % Final    Lymph % 03/28/2022 32.6  % Final    Mono % 03/28/2022 8.9  % Final    Eosinophil %  03/28/2022 2.0  % Final    Basophil % 03/28/2022 0.9  % Final    Glucose 03/28/2022 103 (A) 65 - 99 mg/dL Final    BUN 03/28/2022 23  7 - 25 mg/dL Final    Creatinine 03/28/2022 0.76  0.50 - 0.99 mg/dL Final    eGFR if non African American 03/28/2022 81  > OR = 60 mL/min/1.73m2 Final    eGFR if  03/28/2022 94  > OR = 60 mL/min/1.73m2 Final    BUN/Creatinine Ratio 03/28/2022 NOT APPLICABLE  6 - 22 (calc) Final    Sodium 03/28/2022 136  135 - 146 mmol/L Final    Potassium 03/28/2022 3.9  3.5 - 5.3 mmol/L Final    Chloride 03/28/2022 96 (A) 98 - 110 mmol/L Final    CO2 03/28/2022 31  20 - 32 mmol/L Final    Calcium 03/28/2022 10.2  8.6 - 10.4 mg/dL Final    Total Protein 03/28/2022 6.1  6.1 - 8.1 g/dL Final    Albumin 03/28/2022 3.9  3.6 - 5.1 g/dL Final    Globulin, Total 03/28/2022 2.2  1.9 - 3.7 g/dL (calc) Final    Albumin/Globulin Ratio 03/28/2022 1.8  1.0 - 2.5 (calc) Final    Total Bilirubin 03/28/2022 0.5  0.2 - 1.2 mg/dL Final    Alkaline Phosphatase 03/28/2022 34 (A) 37 - 153 U/L Final    AST 03/28/2022 14  10 - 35 U/L Final    ALT 03/28/2022 10  6 - 29 U/L Final    Cholesterol 03/28/2022 132  <200 mg/dL Final    HDL 03/28/2022 39 (A) > OR = 50 mg/dL Final    Triglycerides 03/28/2022 213 (A) <150 mg/dL Final    LDL Cholesterol 03/28/2022 65  mg/dL (calc) Final    HDL/Cholesterol Ratio 03/28/2022 3.4  <5.0 (calc) Final    Non HDL Chol. (LDL+VLDL) 03/28/2022 93  <130 mg/dL (calc) Final    Creatinine, Urine 03/28/2022 93  20 - 275 mg/dL Final    Microalb, Ur 03/28/2022 0.8  See Note: mg/dL Final    Microalb/Creat Ratio 03/28/2022 9  <30 mcg/mg creat Final    TSH w/reflex to FT4 03/28/2022 1.89  0.40 - 4.50 mIU/L Final    Color, UA 03/28/2022 YELLOW  YELLOW Final    Appearance, UA 03/28/2022 CLOUDY (A) CLEAR Final    Specific [place], UA 03/28/2022 1.016  1.001 - 1.035 Final    pH, UA 03/28/2022 7.5  5.0 - 8.0 Final    Glucose, UA 03/28/2022 NEGATIVE  NEGATIVE  Final    Bilirubin, UA 03/28/2022 NEGATIVE  NEGATIVE Final    Ketones, UA 03/28/2022 NEGATIVE  NEGATIVE Final    Occult Blood UA 03/28/2022 NEGATIVE  NEGATIVE Final    Protein, UA 03/28/2022 NEGATIVE  NEGATIVE Final    Nitrite, UA 03/28/2022 NEGATIVE  NEGATIVE Final    Leukocytes, UA 03/28/2022 2+ (A) NEGATIVE Final    WBC Casts, UA 03/28/2022 6-10 (A) < OR = 5 /HPF Final    RBC Casts, UA 03/28/2022 NONE SEEN  < OR = 2 /HPF Final    Squam Epithel, UA 03/28/2022 10-20 (A) < OR = 5 /HPF Final    Bacteria, UA 03/28/2022 MODERATE (A) NONE SEEN /HPF Final    Hyaline Casts, UA 03/28/2022 NONE SEEN  NONE SEEN /LPF Final    Reflexive Urine Culture 03/28/2022    Final    Urine Culture, Routine 03/28/2022    Final    Hemoglobin A1C 03/28/2022 5.5  <5.7 % of total Hgb Final       Past Medical History:   Diagnosis Date    Anemia     Diabetes mellitus     Encounter for blood transfusion     GERD (gastroesophageal reflux disease)     Hyperlipidemia     MI (myocardial infarction)     Ulcer of the stomach and intestine     pos. for h. pylori     Past Surgical History:   Procedure Laterality Date    APPENDECTOMY      CHOLECYSTECTOMY      COLONOSCOPY N/A 3/18/2016    Procedure: COLONOSCOPY;  Surgeon: Rober Zelaya Jr., MD;  [facility];  Service: Endoscopy;  Laterality: N/A;    ESOPHAGOGASTRODUODENOSCOPY  04/15/2016    with dil    EYE SURGERY      TONSILLECTOMY      TUBAL LIGATION       Family History   Problem Relation Age of Onset    Heart disease Mother     Diabetes Father        The 10-year CVD risk score (D'Agostino et al., 2008) is: 31.3%    Values used to calculate the score:      Age: 67 years      Sex: Female      Diabetic: Yes      Tobacco smoker: Yes      Systolic Blood Pressure: 136 mmHg      Is BP treated: Yes      HDL Cholesterol: 39 mg/dL      Total Cholesterol: 132 mg/dL     Marital Status:   Alcohol History:  reports no history of alcohol use.  Tobacco History:   reports that she has been smoking cigarettes. She started smoking about 63 years ago. She has a 22.50 pack-year smoking history. She has never used smokeless tobacco.  Drug History:  reports no history of drug use.    Health Maintenance Topics with due status: Not Due       Topic Last Completion Date    TETANUS VACCINE 03/21/2019    Pneumococcal Vaccines (Age 65+) 03/31/2021    Colorectal Cancer Screening 05/05/2021    Diabetes Urine Screening 03/28/2022    Lipid Panel 03/28/2022    Hemoglobin A1c 03/28/2022    Mammogram 03/30/2022    DEXA Scan 03/30/2022    Low Dose Statin 05/12/2022    Foot Exam 05/12/2022     Immunization History   Administered Date(s) Administered    COVID-19, MRNA, LN-S, PF (MODERNA FULL 0.5 ML DOSE) 01/15/2021, 02/18/2021    Influenza 09/09/2009, 10/08/2010, 10/20/2011, 10/02/2012, 10/18/2013, 10/03/2014, 10/15/2015, 10/20/2016    Influenza - Quadrivalent 10/03/2014    Influenza - Quadrivalent - High Dose - PF (65 years and older) 01/25/2022    Influenza - Quadrivalent - PF *Preferred* (6 months and older) 10/20/2016, 10/15/2018, 10/16/2019    Influenza A (H1N1) 2009 Monovalent - IM 10/23/2009    Influenza Split 09/09/2009, 10/08/2010, 10/20/2011, 10/02/2012, 10/18/2013, 10/15/2015, 10/15/2018, 10/07/2019    Pneumococcal Conjugate - 13 Valent 03/31/2021    Pneumococcal Polysaccharide - 23 Valent 03/21/2019    Tdap 03/21/2019, 03/21/2019    Zoster 03/21/2019       Review of patient's allergies indicates:  No Known Allergies    Current Outpatient Medications:     albuterol (PROAIR HFA) 90 mcg/actuation inhaler, Inhale 2 puffs into the lungs every 6 (six) hours as needed for Wheezing or Shortness of Breath. Rescue, Disp: 18 g, Rfl: 5    atorvastatin (LIPITOR) 10 MG tablet, Take 1 tablet (10 mg total) by mouth once daily., Disp: 90 tablet, Rfl: 0    calcium carbonate (OS-JOSELYN) 600 mg calcium (1,500 mg) Tab, Take 600 mg by mouth once daily., Disp: , Rfl:     ergocalciferol  (ERGOCALCIFEROL) 50,000 unit Cap, Take 1 capsule (50,000 Units total) by mouth every 7 days., Disp: 12 capsule, Rfl: 1    EScitalopram oxalate (LEXAPRO) 20 MG tablet, Take 1 tablet (20 mg total) by mouth once daily., Disp: 90 tablet, Rfl: 1    famotidine (PEPCID) 20 MG tablet, , Disp: , Rfl:     icosapent ethyL (VASCEPA) 1 gram Cap, Take 2 capsules (2 g total) by mouth 2 (two) times a day., Disp: 360 capsule, Rfl: 1    lisinopriL (PRINIVIL,ZESTRIL) 20 MG tablet, Take 1 tablet (20 mg total) by mouth once daily., Disp: 90 tablet, Rfl: 1    metFORMIN (GLUCOPHAGE) 1000 MG tablet, Take 1 tablet (1,000 mg total) by mouth daily with breakfast., Disp: 90 tablet, Rfl: 1    pantoprazole (PROTONIX) 40 MG tablet, 1 tablet twice a day for 4 weeks then once daily, Disp: 90 tablet, Rfl: 1    sucralfate (CARAFATE) 1 gram tablet, Take 1 tablet (1 g total) by mouth 2 (two) times daily. Take on empty stomach, do not eat or take medication for at least 1 hour, Disp: 60 tablet, Rfl: 2    umeclidinium-vilanteroL (ANORO ELLIPTA) 62.5-25 mcg/actuation DsDv, Inhale 1 puff into the lungs once daily. Controller, Disp: 60 each, Rfl: 5    levothyroxine (SYNTHROID) 50 MCG tablet, Take 1 tablet (50 mcg total) by mouth before breakfast., Disp: 90 tablet, Rfl: 1    varenicline (CHANTIX STARTING MONTH BOX) 0.5 mg (11)- 1 mg (42) tablet, Take one 0.5mg tab by mouth once daily X3 days,then increase to one 0.5mg tab twice daily X4 days,then increase to one 1mg tab twice daily, Disp: 1 each, Rfl: 0    varenicline (CHANTIX) 1 mg Tab, Take 1 tablet (1 mg total) by mouth 2 (two) times daily. Begin after completing starter brad, Disp: 60 tablet, Rfl: 3    Review of Systems   Constitutional: Positive for appetite change (eating smaller amts d/t fear of vomiting) and unexpected weight change (wt up 1lb since March visit, down 8lbs total over past year;). Negative for chills and fever.   HENT: Negative for sore throat and trouble swallowing.   "  Respiratory: Positive for shortness of breath (with heavy exertion only). Negative for cough and wheezing.    Cardiovascular: Negative for chest pain, palpitations and leg swelling.   Gastrointestinal: Positive for nausea and vomiting (vomiting undigested food). Negative for abdominal pain, blood in stool, constipation and diarrhea.   Genitourinary: Negative for dysuria, frequency and hematuria.   Musculoskeletal: Positive for back pain (chronic LBP stable). Negative for gait problem.   Skin: Negative for rash.   Neurological: Negative for dizziness, syncope, speech difficulty, numbness and headaches.   Psychiatric/Behavioral: Negative for dysphoric mood, self-injury and suicidal ideas. The patient is not nervous/anxious.           Objective:      Vitals:    05/12/22 0805   BP: 136/76   Pulse: 66   SpO2: 99%   Weight: 49.8 kg (109 lb 12.8 oz)   Height: 5' 1" (1.549 m)     Physical Exam  Vitals and nursing note reviewed.   Constitutional:       General: She is not in acute distress.     Appearance: Normal appearance. She is well-developed.      Comments: Thin frail white female, appears older than stated age   HENT:      Head: Normocephalic and atraumatic.      Right Ear: Tympanic membrane and ear canal normal.      Left Ear: Tympanic membrane and ear canal normal.   Neck:      Vascular: No carotid bruit.   Cardiovascular:      Rate and Rhythm: Normal rate and regular rhythm.      Heart sounds: Murmur heard.    Systolic murmur is present with a grade of 3/6.    No friction rub. No gallop.   Pulmonary:      Effort: Pulmonary effort is normal. No respiratory distress.      Breath sounds: No wheezing or rales.      Comments: Diminished throughout otherwise clear  Abdominal:      General: There is no distension.      Palpations: Abdomen is soft.      Tenderness: There is abdominal tenderness (Epigastric). There is no guarding or rebound.   Musculoskeletal:      Cervical back: Neck supple.      Right lower leg: No " edema.      Left lower leg: No edema.   Lymphadenopathy:      Cervical: No cervical adenopathy.   Skin:     General: Skin is warm and dry.      Findings: No rash.   Neurological:      General: No focal deficit present.      Mental Status: She is alert and oriented to person, place, and time.      Gait: Gait normal.           Assessment:       1. DM type 2 with diabetic dyslipidemia    2. PUD (peptic ulcer disease)    3. Pyloric stenosis in adult    4. Chronic obstructive pulmonary disease, unspecified COPD type    5. Essential hypertension    6. Dyslipidemia    7. Acquired hypothyroidism    8. Cigarette smoker    9. Cardiac murmur    10. Age-related osteoporosis without current pathological fracture    11. Other long term (current) drug therapy            Plan:       DM type 2 with diabetic dyslipidemia  Comments:  Stable on last A1c 5.5%  Orders:  -     Foot Exam Performed  -     Comprehensive Metabolic Panel; Future; Expected date: 05/12/2022  -     Hemoglobin A1C; Future; Expected date: 05/12/2022  -     Vitamin D; Future; Expected date: 05/12/2022  -     Vitamin B12; Future; Expected date: 05/12/2022  -     Lipid Panel; Future; Expected date: 05/12/2022  -     TSH w/reflex to FT4; Future; Expected date: 05/12/2022    PUD (peptic ulcer disease)  Comments:  Recent EGD revealed nonbleeding gastric ulcer, gastroparesis and severe pyloric [id number] Dr. Villegas as scheduled    Pyloric stenosis in adult  Comments:  Scheduled for repeat EGD next week for attempts to dilate pyloric stenosis    Chronic obstructive pulmonary disease, unspecified COPD type  Comments:  Stable though she continues to smoke.  Stressed importance of quitting smoking specially given weight loss as well as COPD/DM, overdue for f/u LDCT  Orders:  -     Ambulatory referral/consult to Smoking Cessation Program; Future; Expected date: 05/19/2022    Essential hypertension  Comments:  BP well controlled    Dyslipidemia  Comments:  TG mildly  elevated though improved with LDL well controlled on last labs    Acquired hypothyroidism  Comments:  Stable on last labs  Orders:  -     levothyroxine (SYNTHROID) 50 MCG tablet; Take 1 tablet (50 mcg total) by mouth before breakfast.  Dispense: 90 tablet; Refill: 1    Cigarette smoker  Comments:  Will prescribe Chantix again and see if insurance will cover, also referred back to smoking cessation clinic. cautioned on inc risk smoking poses to her health  Orders:  -     varenicline (CHANTIX STARTING MONTH BOX) 0.5 mg (11)- 1 mg (42) tablet; Take one 0.5mg tab by mouth once daily X3 days,then increase to one 0.5mg tab twice daily X4 days,then increase to one 1mg tab twice daily  Dispense: 1 each; Refill: 0  -     varenicline (CHANTIX) 1 mg Tab; Take 1 tablet (1 mg total) by mouth 2 (two) times daily. Begin after completing starter brad  Dispense: 60 tablet; Refill: 3  -     Ambulatory referral/consult to Smoking Cessation Program; Future; Expected date: 05/19/2022    Cardiac murmur  Comments:  Grade 3/6 systolic murmur, patient reports saw Dr. Edwards back in 2019-will request copy of echo    Age-related osteoporosis without current pathological fracture  Comments:  Stable on Prolia  Orders:  -     Vitamin D; Future; Expected date: 05/12/2022  -     Vitamin B12; Future; Expected date: 05/12/2022    Other long term (current) drug therapy   -     Vitamin B12; Future; Expected date: 05/12/2022      Follow up in about 3 months (around 8/12/2022) for Diabetes, Labs before next visit.          Counseled on age and gender appropriate medical preventative services, including cancer screenings, immunizations, overall nutritional health, need for a consistent exercise regimen and an overall push towards maintaining a vigorous and active lifestyle.      5/12/2022 Liv Parrish NP

## 2022-05-17 DIAGNOSIS — E78.5 DM TYPE 2 WITH DIABETIC DYSLIPIDEMIA: Primary | ICD-10-CM

## 2022-05-17 DIAGNOSIS — E11.69 DM TYPE 2 WITH DIABETIC DYSLIPIDEMIA: Primary | ICD-10-CM

## 2022-05-17 RX ORDER — LANCETS
1 EACH MISCELLANEOUS DAILY
Qty: 100 EACH | Refills: 3 | Status: ON HOLD | OUTPATIENT
Start: 2022-05-17 | End: 2022-10-15 | Stop reason: HOSPADM

## 2022-05-17 RX ORDER — INSULIN PUMP SYRINGE, 3 ML
EACH MISCELLANEOUS
Qty: 1 EACH | Refills: 0 | Status: ON HOLD | OUTPATIENT
Start: 2022-05-17 | End: 2022-10-15 | Stop reason: HOSPADM

## 2022-05-19 ENCOUNTER — HOSPITAL ENCOUNTER (OUTPATIENT)
Dept: RADIOLOGY | Facility: HOSPITAL | Age: 67
Discharge: HOME | End: 2022-05-19
Attending: INTERNAL MEDICINE
Payer: MEDICARE

## 2022-05-19 ENCOUNTER — HOSPITAL ENCOUNTER (OUTPATIENT)
Dept: PREADMISSION TESTING | Facility: HOSPITAL | Age: 67
Discharge: HOME | End: 2022-05-19
Attending: INTERNAL MEDICINE
Payer: MEDICARE

## 2022-05-19 VITALS
SYSTOLIC BLOOD PRESSURE: 147 MMHG | BODY MASS INDEX: 20.73 KG/M2 | WEIGHT: 109.81 LBS | HEIGHT: 61 IN | HEART RATE: 62 BPM | DIASTOLIC BLOOD PRESSURE: 66 MMHG | OXYGEN SATURATION: 99 % | TEMPERATURE: 98 F | RESPIRATION RATE: 18 BRPM

## 2022-05-19 DIAGNOSIS — Z01.818 PRE-OP TESTING: Primary | ICD-10-CM

## 2022-05-19 DIAGNOSIS — Z01.818 PRE-OP TESTING: ICD-10-CM

## 2022-05-19 PROCEDURE — 93005 ELECTROCARDIOGRAM TRACING: CPT | Performed by: SPECIALIST

## 2022-05-19 PROCEDURE — 71046 X-RAY EXAM CHEST 2 VIEWS: CPT | Mod: TC

## 2022-05-19 PROCEDURE — 93010 EKG 12-LEAD: ICD-10-PCS | Mod: ,,, | Performed by: SPECIALIST

## 2022-05-19 PROCEDURE — 93010 ELECTROCARDIOGRAM REPORT: CPT | Mod: ,,, | Performed by: SPECIALIST

## 2022-05-19 NOTE — DISCHARGE INSTRUCTIONS
To confirm, Your doctor has instructed you that procedure is scheduled for: May 20     1 Person can come with you the day of surgery     Endoscopy nurse will call the afternoon prior to surgery between 4:00 and 6:00 PM with the final arrival time.      Please  Enter at Garage Parking and come through front entrance.  Do not park in [facility] parking as you will have to walk to Registration .   Check in at registration.   After registration, proceed past gift shop and through glass door ( [facility]) Check in at the nurses station to the left.   Do not eat or drink anything after midnight the night before your surgery - THIS INCLUDES  WATER, GUM, MINTS AND CANDY.  YOU MAY BRUSH YOUR TEETH BUT DO NOT SWALLOW     TAKE ONLY THESE MEDICATIONS WITH A SMALL SIP OF WATER THE MORNING OF YOUR PROCEDURE: Use inhalers     Do not take Lisinopril or Metformin am of procedure; Do not take any Goody powder prior to procedure ;       ONLY if you are diabetic, check your sugar in the morning before your procedure.       Do not take any diabetic medicines or insulin the morning of surgery .     PLEASE NOTE:  The surgery schedule has many variables which may affect the time of your surgery case.  Family members should be available if your surgery time changes.  Plan to be here the day of your procedure between 4-6 hours.      DO NOT TAKE THESE MEDICATIONS 5-7 DAYS PRIOR to your procedure or per your surgeon's request: ASPIRIN, ALEVE, ADVIL, IBUPROFEN,  MARIELA SELTZER, BC , FISH OIL , VITAMIN E, HERBALS  (May take Tylenol)                                                        IMPORTANT INSTRUCTIONS      Do not smoke, vape or drink alcoholic beverages 24 hours prior to your procedure.  Shower the night before  and sleep in a bed with clean sheets.  Do not sleep with a pet in the bed.     If you wear contact lenses, dentures, hearing aids or glasses, bring a container to put them in during surgery and give to a family  member for safe keeping.    Please leave all jewelry, piercing's and valuables at home.   Wear rubber soled shoes (no flip flops).  ONLY for patients requiring bowel prep, written instructions will be given by your doctor's office.  If your doctor has scheduled you for an overnight stay, bring a small overnight bag with any personal items you need.    Make arrangements in advance for transportation home by a responsible adult.      You must make arrangements for transportation, TAXI'S, UBER'S OR LYFTS ARE NOT ALLOWED.        If you have any questions about these instructions, call (Monday - Friday)  Endoscopy [phone]

## 2022-05-19 NOTE — PRE-PROCEDURE INSTRUCTIONS
Pre op instructions for EGD - npo after midnight; advised not to take lisinopril or metformin am of procedure; states will only use inhaler prior to exam; advised to not take any more goody powder prior to procedure; verbalized understanding; questions answered; states has several nicks from dog on right hand .

## 2022-05-20 ENCOUNTER — ANESTHESIA EVENT (OUTPATIENT)
Dept: SURGERY | Facility: HOSPITAL | Age: 67
End: 2022-05-20
Payer: MEDICARE

## 2022-05-20 ENCOUNTER — HOSPITAL ENCOUNTER (OUTPATIENT)
Facility: HOSPITAL | Age: 67
Discharge: HOME | End: 2022-05-20
Attending: INTERNAL MEDICINE | Admitting: INTERNAL MEDICINE
Payer: MEDICARE

## 2022-05-20 ENCOUNTER — ANESTHESIA (OUTPATIENT)
Dept: SURGERY | Facility: HOSPITAL | Age: 67
End: 2022-05-20
Payer: MEDICARE

## 2022-05-20 VITALS
HEART RATE: 75 BPM | SYSTOLIC BLOOD PRESSURE: 137 MMHG | OXYGEN SATURATION: 100 % | HEART RATE: 79 BPM | DIASTOLIC BLOOD PRESSURE: 71 MMHG | SYSTOLIC BLOOD PRESSURE: 123 MMHG | OXYGEN SATURATION: 97 % | RESPIRATION RATE: 19 BRPM | TEMPERATURE: 98 F | DIASTOLIC BLOOD PRESSURE: 56 MMHG | RESPIRATION RATE: 18 BRPM

## 2022-05-20 DIAGNOSIS — R13.10 DYSPHAGIA: ICD-10-CM

## 2022-05-20 LAB — GLUCOSE SERPL-MCNC: 87 MG/DL (ref 70–110)

## 2022-05-20 PROCEDURE — 27000284 HC CANNULA NASAL: Performed by: ANESTHESIOLOGY

## 2022-05-20 PROCEDURE — 37000009 HC ANESTHESIA EA ADD 15 MINS: Performed by: INTERNAL MEDICINE

## 2022-05-20 PROCEDURE — C1726 CATH, BAL DIL, NON-VASCULAR: HCPCS | Performed by: INTERNAL MEDICINE

## 2022-05-20 PROCEDURE — 43249 ESOPH EGD DILATION <30 MM: CPT | Performed by: INTERNAL MEDICINE

## 2022-05-20 PROCEDURE — 25000003 PHARM REV CODE 250: Performed by: NURSE ANESTHETIST, CERTIFIED REGISTERED

## 2022-05-20 PROCEDURE — 37000008 HC ANESTHESIA 1ST 15 MINUTES: Performed by: INTERNAL MEDICINE

## 2022-05-20 PROCEDURE — 63600175 PHARM REV CODE 636 W HCPCS: Performed by: NURSE ANESTHETIST, CERTIFIED REGISTERED

## 2022-05-20 PROCEDURE — 27000671 HC TUBING MICROBORE EXT: Performed by: ANESTHESIOLOGY

## 2022-05-20 RX ORDER — PROPOFOL 10 MG/ML
VIAL (ML) INTRAVENOUS
Status: DISCONTINUED | OUTPATIENT
Start: 2022-05-20 | End: 2022-05-20

## 2022-05-20 RX ORDER — LIDOCAINE HYDROCHLORIDE 20 MG/ML
INJECTION, SOLUTION EPIDURAL; INFILTRATION; INTRACAUDAL; PERINEURAL
Status: DISCONTINUED | OUTPATIENT
Start: 2022-05-20 | End: 2022-05-20

## 2022-05-20 RX ORDER — SODIUM CHLORIDE 9 MG/ML
INJECTION, SOLUTION INTRAVENOUS CONTINUOUS PRN
Status: DISCONTINUED | OUTPATIENT
Start: 2022-05-20 | End: 2022-05-20

## 2022-05-20 RX ADMIN — PROPOFOL 20 MG: 10 INJECTION, EMULSION INTRAVENOUS at 11:05

## 2022-05-20 RX ADMIN — LIDOCAINE HYDROCHLORIDE 50 MG: 20 INJECTION, SOLUTION EPIDURAL; INFILTRATION; INTRACAUDAL; PERINEURAL at 11:05

## 2022-05-20 RX ADMIN — PROPOFOL 20 MG: 10 INJECTION, EMULSION INTRAVENOUS at 12:05

## 2022-05-20 RX ADMIN — PROPOFOL 50 MG: 10 INJECTION, EMULSION INTRAVENOUS at 11:05

## 2022-05-20 RX ADMIN — SODIUM CHLORIDE: 0.9 INJECTION, SOLUTION INTRAVENOUS at 11:05

## 2022-05-20 RX ADMIN — GLYCOPYRROLATE 0.2 MG: 0.2 INJECTION, SOLUTION INTRAMUSCULAR; INTRAVITREAL at 11:05

## 2022-05-20 NOTE — ANESTHESIA PREPROCEDURE EVALUATION
05/20/2022  Liv Decker is a 67 y.o., female.      Pre-op Assessment    I have reviewed the Patient Summary Reports.     I have reviewed the Nursing Notes. I have reviewed the NPO Status.   I have reviewed the Medications.     Review of Systems  Anesthesia Hx:  No problems with previous Anesthesia  Neg history of prior surgery. Denies Family Hx of Anesthesia complications.   Denies Personal Hx of Anesthesia complications.   Social:  Smoker, No Alcohol Use    Cardiovascular:   Hypertension Past MI (15 yrs ago)     Hepatic/GI:   PUD, GERD Liver disease: Fatty liver.    Endocrine:   Diabetes, type 2 Hypothyroidism    Psych:   depression          Physical Exam  General: Well nourished and Alert    Airway:  Mallampati: II   Mouth Opening: Normal  TM Distance: Normal  Tongue: Normal  Neck ROM: Normal ROM    Dental:  Dentures    Chest/Lungs:  Clear to auscultation, Normal Respiratory Rate    Heart:  Rate: Normal  Rhythm: Regular Rhythm  Sounds: Normal        Anesthesia Plan  Type of Anesthesia, risks & benefits discussed:    Anesthesia Type: Gen Natural Airway  Intra-op Monitoring Plan: Standard ASA Monitors  Induction:  IV  Informed Consent: Informed consent signed with the Patient and all parties understand the risks and agree with anesthesia plan.  All questions answered. Patient consented to blood products? Yes  ASA Score: 3  Day of Surgery Review of History & Physical: H&P Update referred to the surgeon/provider.    Ready For Surgery From Anesthesia Perspective.     .

## 2022-05-20 NOTE — ANESTHESIA POSTPROCEDURE EVALUATION
Anesthesia Post Evaluation    Patient: Liv Decker    Procedure(s) Performed: Procedure(s) (LRB):  EGD (ESOPHAGOGASTRODUODENOSCOPY) (N/A)    Final Anesthesia Type: general      Patient location during evaluation: PACU  Patient participation: Yes- Able to Participate  Level of consciousness: awake and alert and oriented  Post-procedure vital signs: reviewed and stable  Pain management: adequate  Airway patency: patent    PONV status at discharge: No PONV  Anesthetic complications: no      Cardiovascular status: blood pressure returned to baseline, hemodynamically stable and stable  Respiratory status: unassisted, spontaneous ventilation and room air  Hydration status: euvolemic  Follow-up not needed.          Vitals Value Taken Time   /56 05/20/22 1217   Temp 36.5 °C (97.7 °F) 05/20/22 1217   Pulse 74 05/20/22 1217   Resp 16 05/20/22 1217   SpO2 98 % 05/20/22 1217         No case tracking events are documented in the log.      Pain/Vincent Score: Vincent Score: 10 (5/20/2022 12:19 PM)

## 2022-05-20 NOTE — H&P
[facility]  Gastroenterology  H&P    Patient Name: Liv Decker  MRN: [id number]  Admission Date: 5/20/2022  Code Status: Full Code    Attending Provider: Justine Villegas MD   Primary Care Physician: Liv Parrish NP  Principal Problem:<principal problem not specified>    Subjective:     History of Present Illness:  67-year-old patient with a history of pyloric channel ulcer and stenosis presents back with recurrent vomiting weight loss and nausea.  She is still smoking half a pack cigarettes a day she was taking naproxen and ibuprofen weekly.  EGD was performed at [facility] however the pyloric stenosis could not be traversed and wheeze she was rescheduled here for therapy of her pyloric stenosis with availability of pediatric endoscope    Past Medical History:   Diagnosis Date    Anemia     Depression     Diabetes mellitus 2021    Encounter for blood transfusion     GERD (gastroesophageal reflux disease)     Hyperlipidemia     Hypertension     MI (myocardial infarction) 2007    Osteoporosis     Thyroid disease     Ulcer of the stomach and intestine     pos. for h. pylori    Weight loss 2022    food comes up        Past Surgical History:   Procedure Laterality Date    APPENDECTOMY      CHOLECYSTECTOMY      COLONOSCOPY N/A 3/18/2016    Procedure: COLONOSCOPY;  Surgeon: Rober Zelaya Jr., MD;  Location: [facility];  Service: Endoscopy;  Laterality: N/A;    ESOPHAGOGASTRODUODENOSCOPY  04/15/2016    with dil    EYE SURGERY      as a child    TONSILLECTOMY      TUBAL LIGATION         Review of patient's allergies indicates:  No Known Allergies  Family History     Problem Relation (Age of Onset)    Diabetes Father    Heart disease Mother        Tobacco Use    Smoking status: Current Every Day Smoker     Packs/day: 0.50     Years: 45.00     Pack years: 22.50     Types: Cigarettes     Start date: 1/1/1959    Smokeless tobacco: Never Used    Tobacco comment: current  1/4ppd, prior use was 1ppd for 45 years   Substance and Sexual Activity    Alcohol use: No    Drug use: No    Sexual activity: Never     Review of Systems  Objective:     Vital Signs (Most Recent):  Temp: 97.9 °F (36.6 °C) (05/20/22 1016)  Pulse: 63 (05/20/22 1016)  Resp: 14 (05/20/22 1016)  BP: 134/61 (05/20/22 1016)  SpO2: 99 % (ra) (05/20/22 1016) Vital Signs (24h Range):  Temp:  [97.9 °F (36.6 °C)] 97.9 °F (36.6 °C)  Pulse:  [63] 63  Resp:  [14] 14  SpO2:  [99 %] 99 %  BP: (134)/(61) 134/61        There is no height or weight on file to calculate BMI.    No intake or output data in the 24 hours ending 05/20/22 1145    Lines/Drains/Airways     None                 Physical Exam  GENERAL: No fever, chills, weight loss  SKIN: No rashes, jaundice, pruritus  HEENT: No rhinorrhea, epistaxis, vision changes.  No trauma, tinnitus, lymphadenopathy or pharyngitis  CV: No chest pain, palpitations, edima or LEZAMA  PULM: No cough or sputum production.  No wheezing.  GI: AS IN HPI  URINARY:  No hematuria, dysuria  MS: No change in muscle or joint pain, weakness, or ROM  Neuro: No focal neurologic changes  PSYCH: No change in mood or personality.  No suicidal ideation.  ENDOCRINE: No fatigue    Significant Labs:  Recent Lab Results       05/20/22  1037        POC Glucose 87             Significant Imaging:  Imaging results within the past 24 hours have been reviewed.    Assessment/Plan:     There are no hospital problems to display for this patient.      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  Patient here for endoscopic therapy  .  Patient has been counseled to discontinue nicotine and NSAIDs indefinitely  Justine Villegas MD  Gastroenterology  [facility]

## 2022-05-20 NOTE — [employer] PATIENT INSTRUCTIONS
Discharge Summary/Instructions after an Endoscopic Procedure  Patient Name: Liv Decker  Patient MRN: [id number]  Patient YOB: 1955  Friday, May 20, 2022  Justine Villegas MD  RESTRICTIONS:  During your procedure today, you received medications for sedation.  These   medications may affect your judgment, balance and coordination.  Therefore,   for 24 hours, you have the following restrictions:   - DO NOT drive a car, operate machinery, make legal/financial decisions,   sign important papers or drink alcohol.    ACTIVITY:  Today: no heavy lifting, straining or running due to procedural   sedation/anesthesia.  The following day: return to full activity including work.  DIET:  Eat and drink normally unless instructed otherwise.     TREATMENT FOR COMMON SIDE EFFECTS:  - Mild abdominal pain, nausea, belching, bloating or excessive gas:  rest,   eat lightly and use a heating pad.  - Sore Throat: treat with throat lozenges and/or gargle with warm salt   water.  - Because air was used during the procedure, expelling large amounts of air   from your rectum or belching is normal.  - If a bowel prep was taken, you may not have a bowel movement for 1-3 days.    This is normal.  SYMPTOMS TO WATCH FOR AND REPORT TO YOUR PHYSICIAN:  1. Abdominal pain or bloating, other than gas cramps.  2. Chest pain.  3. Back pain.  4. Signs of infection such as: chills or fever occurring within 24 hours   after the procedure.  5. Rectal bleeding, which would show as bright red, maroon, or black stools.   (A tablespoon of blood from the rectum is not serious, especially if   hemorrhoids are present.)  6. Vomiting.  7. Weakness or dizziness.  GO DIRECTLY TO THE NEAREST EMERGENCY ROOM IF YOU HAVE ANY OF THE FOLLOWING:      Difficulty breathing              Chills and/or fever over 101 F   Persistent vomiting and/or vomiting blood   Severe abdominal pain   Severe chest pain   Black, tarry stools   Bleeding- more than one  tablespoon   Any other symptom or condition that you feel may need urgent attention  Your doctor recommends these additional instructions:  If any biopsies were taken, your doctors clinic will contact you in 1 to 2   weeks with any results.  - Use Protonix (pantoprazole) 40 mg PO BID indefinitely.   - Repeat upper endoscopy in 5 weeks for retreatment.   - Perform a CT scan (computed tomography) of abdomen with contrast and   pelvis with contrast at the next available appointment.   - Discharge patient to home (with escort).  For questions, problems or results please call your physician - Justine Villegas MD at Work:  [phone].  [facility], EMERGENCY ROOM PHONE NUMBER: [phone]  IF A COMPLICATION OR EMERGENCY SITUATION ARISES AND YOU ARE UNABLE TO REACH   YOUR PHYSICIAN - GO DIRECTLY TO THE EMERGENCY ROOM.  Justine Villegas MD  5/20/2022 12:23:36 PM  This report has been verified and signed electronically.  Dear patient,  As a result of recent federal legislation (The Federal Cures Act), you may   receive lab or pathology results from your procedure in your [facility]   account before your physician is able to contact you. Your physician or   their representative will relay the results to you with their   recommendations at their soonest availability.  Thank you,  [employer]

## 2022-05-20 NOTE — TRANSFER OF CARE
Anesthesia Transfer of Care Note    Patient: Liv Decker    Procedure(s) Performed: Procedure(s) (LRB):  EGD (ESOPHAGOGASTRODUODENOSCOPY) (N/A)    Patient location: GI    Anesthesia Type: MAC    Transport from OR: Transported from OR on room air with adequate spontaneous ventilation    Post pain: adequate analgesia    Post assessment: no apparent anesthetic complications and tolerated procedure well    Post vital signs: stable    Level of consciousness: awake and responds to stimulation    Nausea/Vomiting: no nausea/vomiting    Complications: none    Transfer of care protocol was followedComments: Pt remaining in endo to be recovered. Pt in NAD. VSS. Report to RN per protocol.         Last vitals:   Visit Vitals  /60 (BP Location: Left arm, Patient Position: Lying)   Pulse 72   Temp 36.3 °C (97.3 °F) (Temporal)   Resp 20   LMP 01/10/1994 (LMP Unknown)   SpO2 99%

## 2022-05-23 DIAGNOSIS — R63.4 LOSS OF WEIGHT: Primary | ICD-10-CM

## 2022-07-14 DIAGNOSIS — E55.9 VITAMIN D DEFICIENCY: ICD-10-CM

## 2022-07-14 DIAGNOSIS — E78.5 DM TYPE 2 WITH DIABETIC DYSLIPIDEMIA: ICD-10-CM

## 2022-07-14 DIAGNOSIS — I10 PRIMARY HYPERTENSION: Primary | ICD-10-CM

## 2022-07-14 DIAGNOSIS — E11.69 DM TYPE 2 WITH DIABETIC DYSLIPIDEMIA: ICD-10-CM

## 2022-07-14 RX ORDER — METFORMIN HYDROCHLORIDE 1000 MG/1
1000 TABLET ORAL
Qty: 90 TABLET | Refills: 1 | Status: SHIPPED | OUTPATIENT
Start: 2022-07-14 | End: 2022-10-03 | Stop reason: SDUPTHER

## 2022-07-14 RX ORDER — ERGOCALCIFEROL 1.25 MG/1
50000 CAPSULE ORAL
Qty: 12 CAPSULE | Refills: 1 | Status: SHIPPED | OUTPATIENT
Start: 2022-07-14 | End: 2023-01-17 | Stop reason: SDUPTHER

## 2022-07-14 NOTE — TELEPHONE ENCOUNTER
----- Message from Abdoulaye Corrales MA sent at 7/14/2022  2:58 PM CDT -----  Pt calling for a refill on her metformin,vitamin d2, and propanolol.

## 2022-07-20 ENCOUNTER — TELEPHONE (OUTPATIENT)
Dept: FAMILY MEDICINE | Facility: CLINIC | Age: 67
End: 2022-07-20

## 2022-07-20 NOTE — TELEPHONE ENCOUNTER
----- Message from Abdoulaye Corrales MA sent at 7/20/2022 10:13 AM CDT -----  Pt calling for a refill on her propanolol and famotidine. # [phone]

## 2022-07-20 NOTE — TELEPHONE ENCOUNTER
Spoke to pt we have not prescribed the famotidine. Pt states not to worry about it as the last time she had it filled was 2020. Propranolol was sent on 7/14/22

## 2022-07-25 DIAGNOSIS — I10 PRIMARY HYPERTENSION: ICD-10-CM

## 2022-07-25 NOTE — TELEPHONE ENCOUNTER
----- Message from Prema Rodriguez sent at 7/25/2022  3:34 PM CDT -----  VM 2:32  Refill  Propanolol [facility]   pt's #  [phone]  GH

## 2022-07-25 NOTE — TELEPHONE ENCOUNTER
----- Message from Prema Rodriguez sent at 7/25/2022  3:44 PM CDT -----  Refill propanolol [facility] . The patient called back to check to see if this was called in.  pt's # [phone] GH

## 2022-07-26 ENCOUNTER — TELEPHONE (OUTPATIENT)
Dept: FAMILY MEDICINE | Facility: CLINIC | Age: 67
End: 2022-07-26

## 2022-07-26 RX ORDER — PROPRANOLOL HYDROCHLORIDE 80 MG/1
80 TABLET ORAL DAILY
Qty: 90 TABLET | Refills: 1 | Status: CANCELLED | OUTPATIENT
Start: 2022-07-26 | End: 2023-07-26

## 2022-07-26 RX ORDER — PROPRANOLOL HYDROCHLORIDE 80 MG/1
80 CAPSULE, EXTENDED RELEASE ORAL DAILY
Qty: 90 CAPSULE | Refills: 1 | Status: SHIPPED | OUTPATIENT
Start: 2022-07-26 | End: 2022-10-03 | Stop reason: SDUPTHER

## 2022-07-26 NOTE — TELEPHONE ENCOUNTER
Earlier today I sent in the propranolol XL (Inderal LA) to see if that version is covered- can we check with the pharmacy.

## 2022-07-26 NOTE — TELEPHONE ENCOUNTER
----- Message from Liv Moody sent at 7/26/2022  4:22 PM CDT -----  Yes a PA is required for Innopran XL 80 mg capsules. Please advise. Thank you

## 2022-08-17 ENCOUNTER — TELEPHONE (OUTPATIENT)
Dept: FAMILY MEDICINE | Facility: CLINIC | Age: 67
End: 2022-08-17

## 2022-08-17 DIAGNOSIS — F17.210 CIGARETTE SMOKER: ICD-10-CM

## 2022-08-17 RX ORDER — VARENICLINE TARTRATE 0.5 (11)-1
KIT ORAL
Qty: 1 EACH | Refills: 0 | Status: CANCELLED | OUTPATIENT
Start: 2022-08-17

## 2022-08-18 NOTE — TELEPHONE ENCOUNTER
Spoke with pt in regards to medication refill request. Verbalized to the pt that she has an up coming appointment on 08/23/2022. Pt states that she has enough medication to last her until her appointment.

## 2022-08-18 NOTE — TELEPHONE ENCOUNTER
Pt is needing a refill on her Chantix. Last office visit 05/12/2022. Next office 08/23/2022.       Left message on voice mail for pt's to call back in regards to recent medication requested.

## 2022-08-23 ENCOUNTER — OFFICE VISIT (OUTPATIENT)
Dept: FAMILY MEDICINE | Facility: CLINIC | Age: 67
End: 2022-08-23
Payer: MEDICARE

## 2022-08-23 VITALS
SYSTOLIC BLOOD PRESSURE: 134 MMHG | HEART RATE: 68 BPM | BODY MASS INDEX: 20.77 KG/M2 | DIASTOLIC BLOOD PRESSURE: 66 MMHG | HEIGHT: 61 IN | WEIGHT: 110 LBS

## 2022-08-23 DIAGNOSIS — F32.A MILD DEPRESSION: ICD-10-CM

## 2022-08-23 DIAGNOSIS — J44.9 CHRONIC OBSTRUCTIVE PULMONARY DISEASE, UNSPECIFIED COPD TYPE: ICD-10-CM

## 2022-08-23 DIAGNOSIS — K31.1 PYLORIC STENOSIS IN ADULT: ICD-10-CM

## 2022-08-23 DIAGNOSIS — E78.5 DYSLIPIDEMIA: ICD-10-CM

## 2022-08-23 DIAGNOSIS — E78.5 DM TYPE 2 WITH DIABETIC DYSLIPIDEMIA: Primary | ICD-10-CM

## 2022-08-23 DIAGNOSIS — I10 ESSENTIAL HYPERTENSION: ICD-10-CM

## 2022-08-23 DIAGNOSIS — F17.210 NICOTINE DEPENDENCE, CIGARETTES, UNCOMPLICATED: ICD-10-CM

## 2022-08-23 DIAGNOSIS — E11.69 DM TYPE 2 WITH DIABETIC DYSLIPIDEMIA: Primary | ICD-10-CM

## 2022-08-23 DIAGNOSIS — E03.9 ACQUIRED HYPOTHYROIDISM: ICD-10-CM

## 2022-08-23 PROCEDURE — 3075F SYST BP GE 130 - 139MM HG: CPT | Mod: CPTII,S$GLB,, | Performed by: NURSE PRACTITIONER

## 2022-08-23 PROCEDURE — 3061F NEG MICROALBUMINURIA REV: CPT | Mod: CPTII,S$GLB,, | Performed by: NURSE PRACTITIONER

## 2022-08-23 PROCEDURE — 3044F PR MOST RECENT HEMOGLOBIN A1C LEVEL <7.0%: ICD-10-PCS | Mod: CPTII,S$GLB,, | Performed by: NURSE PRACTITIONER

## 2022-08-23 PROCEDURE — 4010F PR ACE/ARB THEARPY RXD/TAKEN: ICD-10-PCS | Mod: CPTII,S$GLB,, | Performed by: NURSE PRACTITIONER

## 2022-08-23 PROCEDURE — 4010F ACE/ARB THERAPY RXD/TAKEN: CPT | Mod: CPTII,S$GLB,, | Performed by: NURSE PRACTITIONER

## 2022-08-23 PROCEDURE — 99214 OFFICE O/P EST MOD 30 MIN: CPT | Mod: S$GLB,,, | Performed by: NURSE PRACTITIONER

## 2022-08-23 PROCEDURE — 3078F PR MOST RECENT DIASTOLIC BLOOD PRESSURE < 80 MM HG: ICD-10-PCS | Mod: CPTII,S$GLB,, | Performed by: NURSE PRACTITIONER

## 2022-08-23 PROCEDURE — 3061F PR NEG MICROALBUMINURIA RESULT DOCUMENTED/REVIEW: ICD-10-PCS | Mod: CPTII,S$GLB,, | Performed by: NURSE PRACTITIONER

## 2022-08-23 PROCEDURE — 3075F PR MOST RECENT SYSTOLIC BLOOD PRESS GE 130-139MM HG: ICD-10-PCS | Mod: CPTII,S$GLB,, | Performed by: NURSE PRACTITIONER

## 2022-08-23 PROCEDURE — 3066F NEPHROPATHY DOC TX: CPT | Mod: CPTII,S$GLB,, | Performed by: NURSE PRACTITIONER

## 2022-08-23 PROCEDURE — 3078F DIAST BP <80 MM HG: CPT | Mod: CPTII,S$GLB,, | Performed by: NURSE PRACTITIONER

## 2022-08-23 PROCEDURE — 3044F HG A1C LEVEL LT 7.0%: CPT | Mod: CPTII,S$GLB,, | Performed by: NURSE PRACTITIONER

## 2022-08-23 PROCEDURE — 1159F PR MEDICATION LIST DOCUMENTED IN MEDICAL RECORD: ICD-10-PCS | Mod: CPTII,S$GLB,, | Performed by: NURSE PRACTITIONER

## 2022-08-23 PROCEDURE — 3008F BODY MASS INDEX DOCD: CPT | Mod: CPTII,S$GLB,, | Performed by: NURSE PRACTITIONER

## 2022-08-23 PROCEDURE — 1159F MED LIST DOCD IN RCRD: CPT | Mod: CPTII,S$GLB,, | Performed by: NURSE PRACTITIONER

## 2022-08-23 PROCEDURE — 1160F RVW MEDS BY RX/DR IN RCRD: CPT | Mod: CPTII,S$GLB,, | Performed by: NURSE PRACTITIONER

## 2022-08-23 PROCEDURE — 3066F PR DOCUMENTATION OF TREATMENT FOR NEPHROPATHY: ICD-10-PCS | Mod: CPTII,S$GLB,, | Performed by: NURSE PRACTITIONER

## 2022-08-23 PROCEDURE — 1160F PR REVIEW ALL MEDS BY PRESCRIBER/CLIN PHARMACIST DOCUMENTED: ICD-10-PCS | Mod: CPTII,S$GLB,, | Performed by: NURSE PRACTITIONER

## 2022-08-23 PROCEDURE — 3008F PR BODY MASS INDEX (BMI) DOCUMENTED: ICD-10-PCS | Mod: CPTII,S$GLB,, | Performed by: NURSE PRACTITIONER

## 2022-08-23 PROCEDURE — 99214 PR OFFICE/OUTPT VISIT, EST, LEVL IV, 30-39 MIN: ICD-10-PCS | Mod: S$GLB,,, | Performed by: NURSE PRACTITIONER

## 2022-08-23 RX ORDER — PANTOPRAZOLE SODIUM 40 MG/1
40 TABLET, DELAYED RELEASE ORAL 2 TIMES DAILY
Qty: 180 TABLET | Refills: 1 | Status: SHIPPED | OUTPATIENT
Start: 2022-08-23 | End: 2022-10-03 | Stop reason: SDUPTHER

## 2022-08-23 RX ORDER — UMECLIDINIUM BROMIDE AND VILANTEROL TRIFENATATE 62.5; 25 UG/1; UG/1
1 POWDER RESPIRATORY (INHALATION) DAILY
Qty: 60 EACH | Refills: 5 | Status: SHIPPED | OUTPATIENT
Start: 2022-08-23 | End: 2022-10-10

## 2022-08-23 RX ORDER — LISINOPRIL 20 MG/1
20 TABLET ORAL DAILY
Qty: 90 TABLET | Refills: 1 | Status: ON HOLD | OUTPATIENT
Start: 2022-08-23 | End: 2022-10-15 | Stop reason: SDUPTHER

## 2022-08-23 RX ORDER — VARENICLINE TARTRATE 1 MG/1
1 TABLET, FILM COATED ORAL 2 TIMES DAILY
Qty: 60 TABLET | Refills: 3 | Status: ON HOLD | OUTPATIENT
Start: 2022-08-23 | End: 2022-10-15 | Stop reason: HOSPADM

## 2022-08-23 RX ORDER — VARENICLINE TARTRATE 0.5 (11)-1
KIT ORAL
Qty: 1 EACH | Refills: 0 | Status: ON HOLD | OUTPATIENT
Start: 2022-08-23 | End: 2022-10-15 | Stop reason: HOSPADM

## 2022-08-23 RX ORDER — ATORVASTATIN CALCIUM 10 MG/1
10 TABLET, FILM COATED ORAL DAILY
Qty: 90 TABLET | Refills: 1 | Status: SHIPPED | OUTPATIENT
Start: 2022-08-23 | End: 2023-04-18 | Stop reason: SDUPTHER

## 2022-08-23 RX ORDER — ESCITALOPRAM OXALATE 20 MG/1
20 TABLET ORAL DAILY
Qty: 90 TABLET | Refills: 1 | Status: SHIPPED | OUTPATIENT
Start: 2022-08-23 | End: 2022-10-03 | Stop reason: SDUPTHER

## 2022-08-23 NOTE — PROGRESS NOTES
SUBJECTIVE:    Patient ID: Liv Decker is a 67 y.o. female.    Chief Complaint: Diabetes (3mth, no bottles, Eye exam pt will sched// SW)    Pt here for regular f/u- DM/COPD/GERD/weight loss  Pt reports overall she's been feeling better. Since last visit patient underwent repeat EGD with dilation of pyloric stenosis.  Dr. Villegas had recommended repeat EGD 5 weeks later and continue with Protonix 40 mg b.i.d. indefinitely- pt admits she didn't schedule the f/u EGD yet. Reports nausea/vomiting issues have resolved. Weight up 1lb since May  Reports still smoking, about 1/2 ppd- hasn't started the chantix again but states she does want to try and quit      Admission on 05/20/2022, Discharged on 05/20/2022   Component Date Value Ref Range Status    POC Glucose 05/20/2022 87  70 - 110 Final   Lab Visit on 05/19/2022   Component Date Value Ref Range Status    WBC 05/19/2022 8.88  3.90 - 12.70 K/uL Final    RBC 05/19/2022 4.60  4.00 - 5.40 M/uL Final    Hemoglobin 05/19/2022 13.7  12.0 - 16.0 g/dL Final    Hematocrit 05/19/2022 41.2  37.0 - 48.5 % Final    MCV 05/19/2022 90  82 - 98 fL Final    MCH 05/19/2022 29.8  27.0 - 31.0 pg Final    MCHC 05/19/2022 33.3  32.0 - 36.0 g/dL Final    RDW 05/19/2022 14.2  11.5 - 14.5 % Final    Platelets 05/19/2022 125 (A) 150 - 450 K/uL Final    MPV 05/19/2022 11.0  9.2 - 12.9 fL Final    Immature Granulocytes 05/19/2022 0.3  0.0 - 0.5 % Final    Gran # (ANC) 05/19/2022 5.7  1.8 - 7.7 K/uL Final    Immature Grans (Abs) 05/19/2022 0.03  0.00 - 0.04 K/uL Final    Lymph # 05/19/2022 2.2  1.0 - 4.8 K/uL Final    Mono # 05/19/2022 0.7  0.3 - 1.0 K/uL Final    Eos # 05/19/2022 0.2  0.0 - 0.5 K/uL Final    Baso # 05/19/2022 0.05  0.00 - 0.20 K/uL Final    nRBC 05/19/2022 0  0 /100 WBC Final    Gran % 05/19/2022 64.4  38.0 - 73.0 % Final    Lymph % 05/19/2022 24.8  18.0 - 48.0 % Final    Mono % 05/19/2022 7.9  4.0 - 15.0 % Final    Eosinophil % 05/19/2022 2.0  0.0 - 8.0  % Final    Basophil % 05/19/2022 0.6  0.0 - 1.9 % Final    Differential Method 05/19/2022 Automated   Final    Sodium 05/19/2022 135 (A) 136 - 145 mmol/L Final    Potassium 05/19/2022 4.1  3.5 - 5.1 mmol/L Final    Chloride 05/19/2022 100  95 - 110 mmol/L Final    CO2 05/19/2022 27  23 - 29 mmol/L Final    Glucose 05/19/2022 104  70 - 110 mg/dL Final    BUN 05/19/2022 14  8 - 23 mg/dL Final    Creatinine 05/19/2022 0.5  0.5 - 1.4 mg/dL Final    Calcium 05/19/2022 9.2  8.7 - 10.5 mg/dL Final    Anion Gap 05/19/2022 8  8 - 16 mmol/L Final    eGFR if African American 05/19/2022 >60.0  >60 mL/min/1.73 m^2 Final    eGFR if non African American 05/19/2022 >60.0  >60 mL/min/1.73 m^2 Final   Refill on 03/17/2022   Component Date Value Ref Range Status    WBC 03/28/2022 7.7  3.8 - 10.8 Thousand/uL Final    RBC 03/28/2022 4.45  3.80 - 5.10 Million/uL Final    Hemoglobin 03/28/2022 13.1  11.7 - 15.5 g/dL Final    Hematocrit 03/28/2022 40.5  35.0 - 45.0 % Final    MCV 03/28/2022 91.0  80.0 - 100.0 fL Final    MCH 03/28/2022 29.4  27.0 - 33.0 pg Final    MCHC 03/28/2022 32.3  32.0 - 36.0 g/dL Final    RDW 03/28/2022 12.9  11.0 - 15.0 % Final    Platelets 03/28/2022 126 (A) 140 - 400 Thousand/uL Final    MPV 03/28/2022 12.7 (A) 7.5 - 12.5 fL Final    Neutrophils, Abs 03/28/2022 4,281  1,500 - 7,800 cells/uL Final    Lymph # 03/28/2022 2,510  850 - 3,900 cells/uL Final    Mono # 03/28/2022 685  200 - 950 cells/uL Final    Eos # 03/28/2022 154  15 - 500 cells/uL Final    Baso # 03/28/2022 69  0 - 200 cells/uL Final    Neutrophils Relative 03/28/2022 55.6  % Final    Lymph % 03/28/2022 32.6  % Final    Mono % 03/28/2022 8.9  % Final    Eosinophil % 03/28/2022 2.0  % Final    Basophil % 03/28/2022 0.9  % Final    Glucose 03/28/2022 103 (A) 65 - 99 mg/dL Final    BUN 03/28/2022 23  7 - 25 mg/dL Final    Creatinine 03/28/2022 0.76  0.50 - 0.99 mg/dL Final    eGFR if non  03/28/2022  81  > OR = 60 mL/min/1.73m2 Final    eGFR if  03/28/2022 94  > OR = 60 mL/min/1.73m2 Final    BUN/Creatinine Ratio 03/28/2022 NOT APPLICABLE  6 - 22 (calc) Final    Sodium 03/28/2022 136  135 - 146 mmol/L Final    Potassium 03/28/2022 3.9  3.5 - 5.3 mmol/L Final    Chloride 03/28/2022 96 (A) 98 - 110 mmol/L Final    CO2 03/28/2022 31  20 - 32 mmol/L Final    Calcium 03/28/2022 10.2  8.6 - 10.4 mg/dL Final    Total Protein 03/28/2022 6.1  6.1 - 8.1 g/dL Final    Albumin 03/28/2022 3.9  3.6 - 5.1 g/dL Final    Globulin, Total 03/28/2022 2.2  1.9 - 3.7 g/dL (calc) Final    Albumin/Globulin Ratio 03/28/2022 1.8  1.0 - 2.5 (calc) Final    Total Bilirubin 03/28/2022 0.5  0.2 - 1.2 mg/dL Final    Alkaline Phosphatase 03/28/2022 34 (A) 37 - 153 U/L Final    AST 03/28/2022 14  10 - 35 U/L Final    ALT 03/28/2022 10  6 - 29 U/L Final    Cholesterol 03/28/2022 132  <200 mg/dL Final    HDL 03/28/2022 39 (A) > OR = 50 mg/dL Final    Triglycerides 03/28/2022 213 (A) <150 mg/dL Final    LDL Cholesterol 03/28/2022 65  mg/dL (calc) Final    HDL/Cholesterol Ratio 03/28/2022 3.4  <5.0 (calc) Final    Non HDL Chol. (LDL+VLDL) 03/28/2022 93  <130 mg/dL (calc) Final    Creatinine, Urine 03/28/2022 93  20 - 275 mg/dL Final    Microalb, Ur 03/28/2022 0.8  See Note: mg/dL Final    Microalb/Creat Ratio 03/28/2022 9  <30 mcg/mg creat Final    TSH w/reflex to FT4 03/28/2022 1.89  0.40 - 4.50 mIU/L Final    Color, UA 03/28/2022 YELLOW  YELLOW Final    Appearance, UA 03/28/2022 CLOUDY (A) CLEAR Final    Specific [place], UA 03/28/2022 1.016  1.001 - 1.035 Final    pH, UA 03/28/2022 7.5  5.0 - 8.0 Final    Glucose, UA 03/28/2022 NEGATIVE  NEGATIVE Final    Bilirubin, UA 03/28/2022 NEGATIVE  NEGATIVE Final    Ketones, UA 03/28/2022 NEGATIVE  NEGATIVE Final    Occult Blood UA 03/28/2022 NEGATIVE  NEGATIVE Final    Protein, UA 03/28/2022 NEGATIVE  NEGATIVE Final    Nitrite, UA 03/28/2022 NEGATIVE   NEGATIVE Final    Leukocytes, UA 03/28/2022 2+ (A) NEGATIVE Final    WBC Casts, UA 03/28/2022 6-10 (A) < OR = 5 /HPF Final    RBC Casts, UA 03/28/2022 NONE SEEN  < OR = 2 /HPF Final    Squam Epithel, UA 03/28/2022 10-20 (A) < OR = 5 /HPF Final    Bacteria, UA 03/28/2022 MODERATE (A) NONE SEEN /HPF Final    Hyaline Casts, UA 03/28/2022 NONE SEEN  NONE SEEN /LPF Final    Reflexive Urine Culture 03/28/2022    Final    Urine Culture, Routine 03/28/2022    Final    Hemoglobin A1C 03/28/2022 5.5  <5.7 % of total Hgb Final       Past Medical History:   Diagnosis Date    Anemia     Depression     Diabetes mellitus 2021    Encounter for blood transfusion     GERD (gastroesophageal reflux disease)     Hyperlipidemia     Hypertension     MI (myocardial infarction) 2007    Osteoporosis     Thyroid disease     Ulcer of the stomach and intestine     pos. for h. pylori    Weight loss 2022    food comes up      Past Surgical History:   Procedure Laterality Date    APPENDECTOMY      CHOLECYSTECTOMY      COLONOSCOPY N/A 3/18/2016    Procedure: COLONOSCOPY;  Surgeon: Rober Zelaya Jr., MD;  Location: [facility];  Service: Endoscopy;  Laterality: N/A;    ESOPHAGOGASTRODUODENOSCOPY  04/15/2016    with dil    ESOPHAGOGASTRODUODENOSCOPY N/A 5/20/2022    Procedure: EGD (ESOPHAGOGASTRODUODENOSCOPY);  Surgeon: Justine Villegas MD;  [facility];  Service: Endoscopy;  Laterality: N/A;    EYE SURGERY      as a child    TONSILLECTOMY      TUBAL LIGATION       Family History   Problem Relation Age of Onset    Heart disease Mother     Diabetes Father        The 10-year CVD risk score (MERYL'Agoino et al., 2008) is: 30.3%    Values used to calculate the score:      Age: 67 years      Sex: Female      Diabetic: Yes      Tobacco smoker: Yes      Systolic Blood Pressure: 134 mmHg      Is BP treated: Yes      HDL Cholesterol: 39 mg/dL      Total Cholesterol: 132 mg/dL     Marital Status:   Alcohol  History:  reports no history of alcohol use.  Tobacco History:  reports that she has been smoking cigarettes. She started smoking about 63 years ago. She has a 22.50 pack-year smoking history. She has never used smokeless tobacco.  Drug History:  reports no history of drug use.    Health Maintenance Topics with due status: Not Due       Topic Last Completion Date    TETANUS VACCINE 03/21/2019    Pneumococcal Vaccines (Age 65+) 03/31/2021    Colorectal Cancer Screening 05/05/2021    Influenza Vaccine 01/25/2022    Diabetes Urine Screening 03/28/2022    Lipid Panel 03/28/2022    Hemoglobin A1c 03/28/2022    Mammogram 03/30/2022    DEXA Scan 03/30/2022    Foot Exam 05/12/2022    Low Dose Statin 05/20/2022     Immunization History   Administered Date(s) Administered    COVID-19, MRNA, LN-S, PF (MODERNA FULL 0.5 ML DOSE) 01/15/2021, 02/18/2021    Influenza 09/09/2009, 10/08/2010, 10/20/2011, 10/02/2012, 10/18/2013, 10/03/2014, 10/15/2015, 10/20/2016    Influenza - Quadrivalent 10/03/2014    Influenza - Quadrivalent - High Dose - PF (65 years and older) 01/25/2022    Influenza - Quadrivalent - PF *Preferred* (6 months and older) 10/20/2016, 10/15/2018, 10/16/2019    Influenza A (H1N1) 2009 Monovalent - IM 10/23/2009    Influenza Split 09/09/2009, 10/08/2010, 10/20/2011, 10/02/2012, 10/18/2013, 10/15/2015, 10/15/2018, 10/07/2019    Pneumococcal Conjugate - 13 Valent 03/31/2021    Pneumococcal Polysaccharide - 23 Valent 03/21/2019    Tdap 03/21/2019, 03/21/2019    Zoster 03/21/2019       Review of patient's allergies indicates:  No Known Allergies    Current Outpatient Medications:     albuterol (PROAIR HFA) 90 mcg/actuation inhaler, Inhale 2 puffs into the lungs every 6 (six) hours as needed for Wheezing or Shortness of Breath. Rescue, Disp: 18 g, Rfl: 5    aspirin-acetaminophen-caffeine 500-325-65 mg PwPk, Take 1 tablet by mouth 2 (two) times a day., Disp: , Rfl:     atorvastatin (LIPITOR) 10 MG tablet, Take  1 tablet (10 mg total) by mouth once daily., Disp: 90 tablet, Rfl: 1    blood sugar diagnostic Strp, 1 each by In Vitro route once daily., Disp: 100 each, Rfl: 3    blood-glucose meter kit, Use as instructed, Disp: 1 each, Rfl: 0    calcium carbonate (OS-JOSELYN) 600 mg calcium (1,500 mg) Tab, Take 600 mg by mouth once daily., Disp: , Rfl:     ergocalciferol (ERGOCALCIFEROL) 50,000 unit Cap, Take 1 capsule (50,000 Units total) by mouth every 7 days., Disp: 12 capsule, Rfl: 1    EScitalopram oxalate (LEXAPRO) 20 MG tablet, Take 1 tablet (20 mg total) by mouth once daily., Disp: 90 tablet, Rfl: 1    famotidine (PEPCID) 20 MG tablet, Take 20 mg by mouth Daily., Disp: , Rfl:     icosapent ethyL (VASCEPA) 1 gram Cap, Take 2 capsules (2 g total) by mouth 2 (two) times a day., Disp: 360 capsule, Rfl: 1    lancets Misc, 1 each by Misc.(Non-Drug; Combo Route) route once daily., Disp: 100 each, Rfl: 3    levothyroxine (SYNTHROID) 50 MCG tablet, Take 1 tablet (50 mcg total) by mouth before breakfast., Disp: 90 tablet, Rfl: 1    lisinopriL (PRINIVIL,ZESTRIL) 20 MG tablet, Take 1 tablet (20 mg total) by mouth once daily., Disp: 90 tablet, Rfl: 1    metFORMIN (GLUCOPHAGE) 1000 MG tablet, Take 1 tablet (1,000 mg total) by mouth daily with breakfast., Disp: 90 tablet, Rfl: 1    pantoprazole (PROTONIX) 40 MG tablet, Take 1 tablet (40 mg total) by mouth 2 (two) times daily., Disp: 180 tablet, Rfl: 1    propranoloL (INDERAL LA) 80 MG 24 hr capsule, Take 1 capsule (80 mg total) by mouth once daily., Disp: 90 capsule, Rfl: 1    sucralfate (CARAFATE) 1 gram tablet, Take 1 tablet (1 g total) by mouth 2 (two) times daily. Take on empty stomach, do not eat or take medication for at least 1 hour, Disp: 60 tablet, Rfl: 2    umeclidinium-vilanteroL (ANORO ELLIPTA) 62.5-25 mcg/actuation DsDv, Inhale 1 puff into the lungs once daily. Controller, Disp: 60 each, Rfl: 5    varenicline (CHANTIX STARTING MONTH BOX) 0.5 mg (11)- 1 mg (42)  "tablet, Take one 0.5mg tab by mouth once daily X3 days,then increase to one 0.5mg tab twice daily X4 days,then increase to one 1mg tab twice daily, Disp: 1 each, Rfl: 0    varenicline (CHANTIX) 1 mg Tab, Take 1 tablet (1 mg total) by mouth 2 (two) times daily. Begin after completing starter brad, Disp: 60 tablet, Rfl: 3    Review of Systems   Constitutional: Negative for appetite change (reports eating better), chills, fever and unexpected weight change (wt up 1lb since May visit).   HENT: Negative for sore throat and trouble swallowing.    Respiratory: Positive for shortness of breath (with heavy exertion only). Negative for cough and wheezing.    Cardiovascular: Negative for chest pain, palpitations and leg swelling.   Gastrointestinal: Negative for abdominal pain, blood in stool, constipation, diarrhea, nausea and vomiting.   Genitourinary: Negative for dysuria, frequency and hematuria.   Musculoskeletal: Positive for back pain (chronic LBP stable). Negative for gait problem.   Skin: Negative for rash.   Neurological: Negative for dizziness, syncope, speech difficulty, numbness and headaches.   Psychiatric/Behavioral: Negative for dysphoric mood, self-injury and suicidal ideas. The patient is not nervous/anxious.           Objective:      Vitals:    08/23/22 0752   BP: 134/66   Pulse: 68   Weight: 49.9 kg (110 lb)   Height: 5' 1" (1.549 m)     Physical Exam  Vitals and nursing note reviewed.   Constitutional:       General: She is not in acute distress.     Appearance: Normal appearance. She is well-developed.      Comments: Thin frail white female, appears older than stated age   HENT:      Head: Normocephalic and atraumatic.      Right Ear: Tympanic membrane and ear canal normal.      Left Ear: Tympanic membrane and ear canal normal.   Neck:      Vascular: No carotid bruit.   Cardiovascular:      Rate and Rhythm: Normal rate and regular rhythm.      Heart sounds: Murmur heard.    Systolic murmur is present with " a grade of 3/6.    No friction rub. No gallop.   Pulmonary:      Effort: Pulmonary effort is normal. No respiratory distress.      Breath sounds: No wheezing or rales.      Comments: Diminished throughout otherwise clear  Abdominal:      General: There is no distension.      Palpations: Abdomen is soft.      Tenderness: There is no abdominal tenderness. There is no guarding or rebound.   Musculoskeletal:      Cervical back: Neck supple.      Right lower leg: No edema.      Left lower leg: No edema.   Lymphadenopathy:      Cervical: No cervical adenopathy.   Skin:     General: Skin is warm and dry.      Findings: No rash.   Neurological:      General: No focal deficit present.      Mental Status: She is alert and oriented to person, place, and time.      Gait: Gait normal.           Assessment:       1. DM type 2 with diabetic dyslipidemia    2. Pyloric stenosis in adult    3. Chronic obstructive pulmonary disease, unspecified COPD type    4. Essential hypertension    5. Dyslipidemia    6. Acquired hypothyroidism    7. Mild depression    8. Nicotine dependence, cigarettes, uncomplicated           Plan:       DM type 2 with diabetic dyslipidemia  Comments:  Well controlled on last labs, due for repeat labs next month  Orders:  -     Hemoglobin A1C; Future; Expected date: 08/23/2022    Pyloric stenosis in adult  Comments:  Status post dilatation with improvement in symptoms.  Patient advised she needs to schedule follow-up EGD with Dr. Villegas  Orders:  -     pantoprazole (PROTONIX) 40 MG tablet; Take 1 tablet (40 mg total) by mouth 2 (two) times daily.  Dispense: 180 tablet; Refill: 1    Chronic obstructive pulmonary disease, unspecified COPD type  Comments:  Stable though continues to smoke, patient states she would like to try Chantix again  Orders:  -     umeclidinium-vilanteroL (ANORO ELLIPTA) 62.5-25 mcg/actuation DsDv; Inhale 1 puff into the lungs once daily. Controller  Dispense: 60 each; Refill: 5    Essential  hypertension  Comments:  BP well controlled  Orders:  -     Comprehensive Metabolic Panel; Future; Expected date: 08/23/2022  -     TSH w/reflex to FT4; Future; Expected date: 08/23/2022  -     lisinopriL (PRINIVIL,ZESTRIL) 20 MG tablet; Take 1 tablet (20 mg total) by mouth once daily.  Dispense: 90 tablet; Refill: 1    Dyslipidemia  Comments:  Improved on last labs  Orders:  -     Lipid Panel; Future; Expected date: 08/23/2022  -     atorvastatin (LIPITOR) 10 MG tablet; Take 1 tablet (10 mg total) by mouth once daily.  Dispense: 90 tablet; Refill: 1    Acquired hypothyroidism  Comments:  Stable    Mild depression  Comments:  Stable on current med  Orders:  -     EScitalopram oxalate (LEXAPRO) 20 MG tablet; Take 1 tablet (20 mg total) by mouth once daily.  Dispense: 90 tablet; Refill: 1    Nicotine dependence, cigarettes, uncomplicated  Comments:  Smoking cessation counseling provided, Chantix refill sent, declines referral to smoking cessation clinic  Orders:  -     varenicline (CHANTIX STARTING MONTH BOX) 0.5 mg (11)- 1 mg (42) tablet; Take one 0.5mg tab by mouth once daily X3 days,then increase to one 0.5mg tab twice daily X4 days,then increase to one 1mg tab twice daily  Dispense: 1 each; Refill: 0  -     varenicline (CHANTIX) 1 mg Tab; Take 1 tablet (1 mg total) by mouth 2 (two) times daily. Begin after completing starter brad  Dispense: 60 tablet; Refill: 3  -     CT Chest Lung Screening Low Dose; Future; Expected date: 08/23/2022    Assistance with smoking cessation was offered, including:  [x]  Medications  [x]  Counseling  []  Printed Information on Smoking Cessation  [x]  Referral to a Smoking Cessation Program    Patient was counseled regarding smoking for 3-10 minutes.    Follow up in about 4 months (around 12/23/2022) for Diabetes, COPD, HTN.          Counseled on age and gender appropriate medical preventative services, including cancer screenings, immunizations, overall nutritional health, need for a  consistent exercise regimen and an overall push towards maintaining a vigorous and active lifestyle.      8/23/2022 Liv Parrish NP

## 2022-08-25 ENCOUNTER — TELEPHONE (OUTPATIENT)
Dept: FAMILY MEDICINE | Facility: CLINIC | Age: 67
End: 2022-08-25

## 2022-08-25 DIAGNOSIS — J44.9 CHRONIC OBSTRUCTIVE PULMONARY DISEASE, UNSPECIFIED COPD TYPE: Primary | ICD-10-CM

## 2022-08-25 RX ORDER — CODEINE PHOSPHATE AND GUAIFENESIN 10; 100 MG/5ML; MG/5ML
5 SOLUTION ORAL 3 TIMES DAILY PRN
Qty: 120 ML | Refills: 0 | Status: SHIPPED | OUTPATIENT
Start: 2022-08-25 | End: 2022-09-04

## 2022-09-14 ENCOUNTER — TELEPHONE (OUTPATIENT)
Dept: FAMILY MEDICINE | Facility: CLINIC | Age: 67
End: 2022-09-14

## 2022-09-14 NOTE — TELEPHONE ENCOUNTER
----- Message from Cristin Mayen sent at 9/14/2022  2:28 PM CDT -----  Regarding: PATIENT ORDERS    TEST ORDERED:   CT Chest Lung Screening Low Dose    STATUS:    Please, be advised that several attempts  to schedule this test with patient were                       made to no avail.  We are removing this order from our workqueue.                      The test order will remain in the patient's chart.                       Thank you!                        Cristin

## 2022-09-14 NOTE — TELEPHONE ENCOUNTER
----- Message from Kristel Daniel sent at 9/14/2022  3:39 PM CDT -----  - pt is returning a call   [phone]

## 2022-09-28 ENCOUNTER — TELEPHONE (OUTPATIENT)
Dept: FAMILY MEDICINE | Facility: CLINIC | Age: 67
End: 2022-09-28

## 2022-09-28 NOTE — TELEPHONE ENCOUNTER
----- Message from Taylor Mejias sent at 9/28/2022 11:23 AM CDT -----  Please review patient's Appointment Desk for an upcoming PROLIA INJECTION appointment.       The following are needed for this appointment.   Please contact patient for any tests or follow-up needed:      ORDER.  Current / active in the Epic Therapy Plan, signed within a year.  LABS. Serum Calcium within 6 weeks of treatment.    DEXA SCAN within the last 2 years   DENTAL PRECAUTION CONFIRMED WITH PATIENT. No recent issues (infections, etc). No invasive procedures recently done or planned (root canal, extraction, implants, etc.)  A patient will need to bring a Dental Clearance signed by patient's dental provider if there are recent dental issues/procedures within the last 3 months.   FOLLOW-UP APPOINTMENT within the last 12 months with the diagnosis mentioned in the visit notes.         Any item unavailable by the day prior to the scheduled appointment will cause the appointment to be CANCELLED.        To reschedule appointments, please contact [facility] INFUSION SCHEDULING POOL or call [phone] or [phone].       Thank you,  [facility], Infusion Department

## 2022-10-03 DIAGNOSIS — E11.69 DM TYPE 2 WITH DIABETIC DYSLIPIDEMIA: ICD-10-CM

## 2022-10-03 DIAGNOSIS — E78.5 DM TYPE 2 WITH DIABETIC DYSLIPIDEMIA: ICD-10-CM

## 2022-10-03 DIAGNOSIS — F32.A MILD DEPRESSION: ICD-10-CM

## 2022-10-03 DIAGNOSIS — K31.1 PYLORIC STENOSIS IN ADULT: ICD-10-CM

## 2022-10-03 RX ORDER — PROPRANOLOL HYDROCHLORIDE 80 MG/1
80 CAPSULE, EXTENDED RELEASE ORAL DAILY
Qty: 90 CAPSULE | Refills: 1 | Status: SHIPPED | OUTPATIENT
Start: 2022-10-03 | End: 2023-04-18 | Stop reason: SDUPTHER

## 2022-10-03 RX ORDER — PANTOPRAZOLE SODIUM 40 MG/1
40 TABLET, DELAYED RELEASE ORAL 2 TIMES DAILY
Qty: 180 TABLET | Refills: 1 | Status: SHIPPED | OUTPATIENT
Start: 2022-10-03 | End: 2023-03-14 | Stop reason: SDUPTHER

## 2022-10-03 RX ORDER — METFORMIN HYDROCHLORIDE 1000 MG/1
1000 TABLET ORAL
Qty: 90 TABLET | Refills: 1 | Status: SHIPPED | OUTPATIENT
Start: 2022-10-03 | End: 2023-04-18 | Stop reason: SDUPTHER

## 2022-10-03 RX ORDER — ESCITALOPRAM OXALATE 20 MG/1
20 TABLET ORAL DAILY
Qty: 90 TABLET | Refills: 1 | Status: SHIPPED | OUTPATIENT
Start: 2022-10-03 | End: 2023-04-18 | Stop reason: SDUPTHER

## 2022-10-03 NOTE — TELEPHONE ENCOUNTER
----- Message from Kristel Daniel sent at 10/3/2022  3:27 PM CDT -----  Pt needs refill on metformin, escitalopram, propanolol, pantoprazole   [place]   [phone]

## 2022-10-10 ENCOUNTER — CLINICAL SUPPORT (OUTPATIENT)
Dept: FAMILY MEDICINE | Facility: CLINIC | Age: 67
End: 2022-10-10
Payer: MEDICARE

## 2022-10-10 ENCOUNTER — OFFICE VISIT (OUTPATIENT)
Dept: FAMILY MEDICINE | Facility: CLINIC | Age: 67
End: 2022-10-10
Payer: MEDICARE

## 2022-10-10 VITALS
OXYGEN SATURATION: 98 % | DIASTOLIC BLOOD PRESSURE: 78 MMHG | WEIGHT: 109.81 LBS | BODY MASS INDEX: 20.73 KG/M2 | HEART RATE: 61 BPM | HEIGHT: 61 IN | SYSTOLIC BLOOD PRESSURE: 132 MMHG

## 2022-10-10 DIAGNOSIS — Z23 NEED FOR INFLUENZA VACCINATION: Primary | ICD-10-CM

## 2022-10-10 DIAGNOSIS — J44.1 COPD EXACERBATION: Primary | ICD-10-CM

## 2022-10-10 PROCEDURE — 3044F HG A1C LEVEL LT 7.0%: CPT | Mod: CPTII,S$GLB,, | Performed by: NURSE PRACTITIONER

## 2022-10-10 PROCEDURE — 4010F ACE/ARB THERAPY RXD/TAKEN: CPT | Mod: CPTII,S$GLB,, | Performed by: NURSE PRACTITIONER

## 2022-10-10 PROCEDURE — 1101F PT FALLS ASSESS-DOCD LE1/YR: CPT | Mod: CPTII,S$GLB,, | Performed by: NURSE PRACTITIONER

## 2022-10-10 PROCEDURE — 90662 FLU VACCINE - QUADRIVALENT - HIGH DOSE (65+) PRESERVATIVE FREE IM: ICD-10-PCS | Mod: S$GLB,,, | Performed by: NURSE PRACTITIONER

## 2022-10-10 PROCEDURE — 3288F PR FALLS RISK ASSESSMENT DOCUMENTED: ICD-10-PCS | Mod: CPTII,S$GLB,, | Performed by: NURSE PRACTITIONER

## 2022-10-10 PROCEDURE — G0008 ADMIN INFLUENZA VIRUS VAC: HCPCS | Mod: S$GLB,,, | Performed by: NURSE PRACTITIONER

## 2022-10-10 PROCEDURE — 3078F DIAST BP <80 MM HG: CPT | Mod: CPTII,S$GLB,, | Performed by: NURSE PRACTITIONER

## 2022-10-10 PROCEDURE — 3078F PR MOST RECENT DIASTOLIC BLOOD PRESSURE < 80 MM HG: ICD-10-PCS | Mod: CPTII,S$GLB,, | Performed by: NURSE PRACTITIONER

## 2022-10-10 PROCEDURE — 3288F FALL RISK ASSESSMENT DOCD: CPT | Mod: CPTII,S$GLB,, | Performed by: NURSE PRACTITIONER

## 2022-10-10 PROCEDURE — G0008 FLU VACCINE - QUADRIVALENT - HIGH DOSE (65+) PRESERVATIVE FREE IM: ICD-10-PCS | Mod: S$GLB,,, | Performed by: NURSE PRACTITIONER

## 2022-10-10 PROCEDURE — 1101F PR PT FALLS ASSESS DOC 0-1 FALLS W/OUT INJ PAST YR: ICD-10-PCS | Mod: CPTII,S$GLB,, | Performed by: NURSE PRACTITIONER

## 2022-10-10 PROCEDURE — 1160F PR REVIEW ALL MEDS BY PRESCRIBER/CLIN PHARMACIST DOCUMENTED: ICD-10-PCS | Mod: CPTII,S$GLB,, | Performed by: NURSE PRACTITIONER

## 2022-10-10 PROCEDURE — 3061F NEG MICROALBUMINURIA REV: CPT | Mod: CPTII,S$GLB,, | Performed by: NURSE PRACTITIONER

## 2022-10-10 PROCEDURE — 1160F RVW MEDS BY RX/DR IN RCRD: CPT | Mod: CPTII,S$GLB,, | Performed by: NURSE PRACTITIONER

## 2022-10-10 PROCEDURE — 90662 IIV NO PRSV INCREASED AG IM: CPT | Mod: S$GLB,,, | Performed by: NURSE PRACTITIONER

## 2022-10-10 PROCEDURE — 4010F PR ACE/ARB THEARPY RXD/TAKEN: ICD-10-PCS | Mod: CPTII,S$GLB,, | Performed by: NURSE PRACTITIONER

## 2022-10-10 PROCEDURE — 3061F PR NEG MICROALBUMINURIA RESULT DOCUMENTED/REVIEW: ICD-10-PCS | Mod: CPTII,S$GLB,, | Performed by: NURSE PRACTITIONER

## 2022-10-10 PROCEDURE — 3008F PR BODY MASS INDEX (BMI) DOCUMENTED: ICD-10-PCS | Mod: CPTII,S$GLB,, | Performed by: NURSE PRACTITIONER

## 2022-10-10 PROCEDURE — 99214 OFFICE O/P EST MOD 30 MIN: CPT | Mod: 25,S$GLB,, | Performed by: NURSE PRACTITIONER

## 2022-10-10 PROCEDURE — 1159F PR MEDICATION LIST DOCUMENTED IN MEDICAL RECORD: ICD-10-PCS | Mod: CPTII,S$GLB,, | Performed by: NURSE PRACTITIONER

## 2022-10-10 PROCEDURE — 3008F BODY MASS INDEX DOCD: CPT | Mod: CPTII,S$GLB,, | Performed by: NURSE PRACTITIONER

## 2022-10-10 PROCEDURE — 3044F PR MOST RECENT HEMOGLOBIN A1C LEVEL <7.0%: ICD-10-PCS | Mod: CPTII,S$GLB,, | Performed by: NURSE PRACTITIONER

## 2022-10-10 PROCEDURE — 3075F SYST BP GE 130 - 139MM HG: CPT | Mod: CPTII,S$GLB,, | Performed by: NURSE PRACTITIONER

## 2022-10-10 PROCEDURE — 1159F MED LIST DOCD IN RCRD: CPT | Mod: CPTII,S$GLB,, | Performed by: NURSE PRACTITIONER

## 2022-10-10 PROCEDURE — 3075F PR MOST RECENT SYSTOLIC BLOOD PRESS GE 130-139MM HG: ICD-10-PCS | Mod: CPTII,S$GLB,, | Performed by: NURSE PRACTITIONER

## 2022-10-10 PROCEDURE — 3066F PR DOCUMENTATION OF TREATMENT FOR NEPHROPATHY: ICD-10-PCS | Mod: CPTII,S$GLB,, | Performed by: NURSE PRACTITIONER

## 2022-10-10 PROCEDURE — 99214 PR OFFICE/OUTPT VISIT, EST, LEVL IV, 30-39 MIN: ICD-10-PCS | Mod: 25,S$GLB,, | Performed by: NURSE PRACTITIONER

## 2022-10-10 PROCEDURE — 3066F NEPHROPATHY DOC TX: CPT | Mod: CPTII,S$GLB,, | Performed by: NURSE PRACTITIONER

## 2022-10-10 RX ORDER — PREDNISONE 20 MG/1
40 TABLET ORAL DAILY
Qty: 10 TABLET | Refills: 0 | Status: ON HOLD | OUTPATIENT
Start: 2022-10-10 | End: 2022-10-15 | Stop reason: HOSPADM

## 2022-10-10 RX ORDER — AMOXICILLIN AND CLAVULANATE POTASSIUM 875; 125 MG/1; MG/1
1 TABLET, FILM COATED ORAL 2 TIMES DAILY
Qty: 14 TABLET | Refills: 0 | Status: ON HOLD | OUTPATIENT
Start: 2022-10-10 | End: 2022-10-15 | Stop reason: HOSPADM

## 2022-10-10 NOTE — PROGRESS NOTES
Patient ID: Liv Decker is a 67 y.o. female.    Chief Complaint: Cough (No bottles.//Pt c/o productive cough and shortness of breath x 5 weeks//Decline eye exam.//OLMAN )    Pt here for sick visit- pt reports she quit smoking 5 weeks ago and since then has had worsening productive cough, occas green and worsening SOB/wheeze. Reports cough and wheeze worse at night when she lays down. +PND. Denies fever/chills, sore throat, headache or myalgias. No CP.  Pt reports doing well with chantix, doesn't have the urge to smoke. Had to cancel LDCT d/t work schedule and has never called to reschedule          Past Medical History:   Diagnosis Date    Anemia     Depression     Diabetes mellitus 2021    Encounter for blood transfusion     GERD (gastroesophageal reflux disease)     Hyperlipidemia     Hypertension     MI (myocardial infarction) 2007    Osteoporosis     Thyroid disease     Ulcer of the stomach and intestine     pos. for h. pylori    Weight loss 2022    food comes up      Past Surgical History:   Procedure Laterality Date    APPENDECTOMY      CHOLECYSTECTOMY      COLONOSCOPY N/A 3/18/2016    Procedure: COLONOSCOPY;  Surgeon: Rober Zelaya Jr., MD;  Location: [facility];  Service: Endoscopy;  Laterality: N/A;    ESOPHAGOGASTRODUODENOSCOPY  04/15/2016    with dil    ESOPHAGOGASTRODUODENOSCOPY N/A 5/20/2022    Procedure: EGD (ESOPHAGOGASTRODUODENOSCOPY);  Surgeon: Justine Villegas MD;  Location: [facility];  Service: Endoscopy;  Laterality: N/A;    EYE SURGERY      as a child    TONSILLECTOMY      TUBAL LIGATION           Tobacco History:  reports that she has been smoking cigarettes. She started smoking about 63 years ago. She has a 22.50 pack-year smoking history. She has never used smokeless tobacco.      Review of patient's allergies indicates:  No Known Allergies    Current Outpatient Medications:     albuterol (PROAIR HFA) 90 mcg/actuation inhaler, Inhale 2 puffs into the lungs every 6 (six) hours as needed  for Wheezing or Shortness of Breath. Rescue, Disp: 18 g, Rfl: 5    amoxicillin-clavulanate 875-125mg (AUGMENTIN) 875-125 mg per tablet, Take 1 tablet by mouth 2 (two) times daily., Disp: 14 tablet, Rfl: 0    aspirin-acetaminophen-caffeine 500-325-65 mg PwPk, Take 1 tablet by mouth 2 (two) times a day., Disp: , Rfl:     atorvastatin (LIPITOR) 10 MG tablet, Take 1 tablet (10 mg total) by mouth once daily., Disp: 90 tablet, Rfl: 1    blood sugar diagnostic Strp, 1 each by In Vitro route once daily., Disp: 100 each, Rfl: 3    blood-glucose meter kit, Use as instructed, Disp: 1 each, Rfl: 0    calcium carbonate (OS-JOSELYN) 600 mg calcium (1,500 mg) Tab, Take 600 mg by mouth once daily., Disp: , Rfl:     ergocalciferol (ERGOCALCIFEROL) 50,000 unit Cap, Take 1 capsule (50,000 Units total) by mouth every 7 days., Disp: 12 capsule, Rfl: 1    EScitalopram oxalate (LEXAPRO) 20 MG tablet, Take 1 tablet (20 mg total) by mouth once daily., Disp: 90 tablet, Rfl: 1    fluticasone-umeclidin-vilanter (TRELEGY ELLIPTA) 100-62.5-25 mcg DsDv, Inhale 1 puff into the lungs once daily., Disp: 60 each, Rfl: 5    icosapent ethyL (VASCEPA) 1 gram Cap, Take 2 capsules (2 g total) by mouth 2 (two) times a day., Disp: 360 capsule, Rfl: 1    lancets Misc, 1 each by Misc.(Non-Drug; Combo Route) route once daily., Disp: 100 each, Rfl: 3    levothyroxine (SYNTHROID) 50 MCG tablet, Take 1 tablet (50 mcg total) by mouth before breakfast., Disp: 90 tablet, Rfl: 1    lisinopriL (PRINIVIL,ZESTRIL) 20 MG tablet, Take 1 tablet (20 mg total) by mouth once daily., Disp: 90 tablet, Rfl: 1    metFORMIN (GLUCOPHAGE) 1000 MG tablet, Take 1 tablet (1,000 mg total) by mouth daily with breakfast., Disp: 90 tablet, Rfl: 1    pantoprazole (PROTONIX) 40 MG tablet, Take 1 tablet (40 mg total) by mouth 2 (two) times daily., Disp: 180 tablet, Rfl: 1    predniSONE (DELTASONE) 20 MG tablet, Take 2 tablets (40 mg total) by mouth once daily. for 5 days, Disp: 10 tablet, Rfl:  "0    propranoloL (INDERAL LA) 80 MG 24 hr capsule, Take 1 capsule (80 mg total) by mouth once daily., Disp: 90 capsule, Rfl: 1    sucralfate (CARAFATE) 1 gram tablet, Take 1 tablet (1 g total) by mouth 2 (two) times daily. Take on empty stomach, do not eat or take medication for at least 1 hour, Disp: 60 tablet, Rfl: 2    varenicline (CHANTIX STARTING MONTH BOX) 0.5 mg (11)- 1 mg (42) tablet, Take one 0.5mg tab by mouth once daily X3 days,then increase to one 0.5mg tab twice daily X4 days,then increase to one 1mg tab twice daily, Disp: 1 each, Rfl: 0    varenicline (CHANTIX) 1 mg Tab, Take 1 tablet (1 mg total) by mouth 2 (two) times daily. Begin after completing starter brad, Disp: 60 tablet, Rfl: 3    Review of Systems   Constitutional:  Negative for appetite change, chills, fever and unexpected weight change.   HENT:  Positive for postnasal drip. Negative for sore throat and trouble swallowing.    Respiratory:  Positive for cough, shortness of breath (worsening the past few weeks) and wheezing.    Cardiovascular:  Negative for chest pain, palpitations and leg swelling.   Gastrointestinal:  Negative for abdominal pain, blood in stool, constipation, diarrhea, nausea and vomiting.   Genitourinary:  Negative for dysuria, frequency and hematuria.   Skin:  Negative for rash.   Neurological:  Negative for dizziness, syncope, speech difficulty, numbness and headaches.        Objective:      Vitals:    10/10/22 1512   BP: 132/78   Pulse: 61   SpO2: 98%   Weight: 49.8 kg (109 lb 12.8 oz)   Height: 5' 1" (1.549 m)     Physical Exam  Vitals and nursing note reviewed.   Constitutional:       General: She is not in acute distress.     Appearance: Normal appearance. She is well-developed.      Comments: Thin frail white female, appears older than stated age   HENT:      Head: Normocephalic and atraumatic.      Right Ear: Tympanic membrane and ear canal normal.      Left Ear: Tympanic membrane and ear canal normal.      " Mouth/Throat:      Mouth: Mucous membranes are moist.   Neck:      Vascular: No carotid bruit.   Cardiovascular:      Rate and Rhythm: Normal rate and regular rhythm.      Heart sounds: Murmur heard.   Systolic murmur is present with a grade of 3/6.     No friction rub. No gallop.   Pulmonary:      Effort: Pulmonary effort is normal. No respiratory distress.      Breath sounds: No wheezing or rales.      Comments: Diminished throughout however almost absent left base posteriorly  Abdominal:      General: There is no distension.      Palpations: Abdomen is soft.      Tenderness: There is no abdominal tenderness. There is no guarding or rebound.   Musculoskeletal:      Cervical back: Neck supple.      Right lower leg: No edema.      Left lower leg: No edema.   Lymphadenopathy:      Cervical: No cervical adenopathy.   Skin:     General: Skin is warm and dry.      Findings: No rash.   Neurological:      General: No focal deficit present.      Mental Status: She is alert and oriented to person, place, and time.      Gait: Gait normal.         Assessment:       1. COPD exacerbation           Plan:       COPD exacerbation  Comments:  no distress/hypoxia- will treat with abx/steroids and send for CXR given diminished left base- also recommend switch from anoro to trelegy. schedule LDCT also. Needs PFTs, will order at next visit  Orders:  -     X-Ray Chest PA And Lateral; Future; Expected date: 10/10/2022  -     amoxicillin-clavulanate 875-125mg (AUGMENTIN) 875-125 mg per tablet; Take 1 tablet by mouth 2 (two) times daily.  Dispense: 14 tablet; Refill: 0  -     predniSONE (DELTASONE) 20 MG tablet; Take 2 tablets (40 mg total) by mouth once daily. for 5 days  Dispense: 10 tablet; Refill: 0  -     fluticasone-umeclidin-vilanter (TRELEGY ELLIPTA) 100-62.5-25 mcg DsDv; Inhale 1 puff into the lungs once daily.  Dispense: 60 each; Refill: 5    Follow up if symptoms worsen or fail to improve, for as scheduled. In December         10/10/2022 Liv Parrish NP

## 2022-10-11 ENCOUNTER — TELEPHONE (OUTPATIENT)
Dept: HEMATOLOGY/ONCOLOGY | Facility: CLINIC | Age: 67
End: 2022-10-11

## 2022-10-11 LAB
ALBUMIN SERPL-MCNC: 2.9 G/DL (ref 3.6–5.1)
ALBUMIN/GLOB SERPL: 0.9 (CALC) (ref 1–2.5)
ALP SERPL-CCNC: 59 U/L (ref 37–153)
ALT SERPL-CCNC: 7 U/L (ref 6–29)
AST SERPL-CCNC: 9 U/L (ref 10–35)
BILIRUB SERPL-MCNC: 0.3 MG/DL (ref 0.2–1.2)
BUN SERPL-MCNC: 13 MG/DL (ref 7–25)
BUN/CREAT SERPL: ABNORMAL (CALC) (ref 6–22)
CALCIUM SERPL-MCNC: 8.4 MG/DL (ref 8.6–10.4)
CHLORIDE SERPL-SCNC: 101 MMOL/L (ref 98–110)
CHOLEST SERPL-MCNC: 82 MG/DL
CHOLEST/HDLC SERPL: 2.8 (CALC)
CO2 SERPL-SCNC: 30 MMOL/L (ref 20–32)
CREAT SERPL-MCNC: 0.6 MG/DL (ref 0.5–1.05)
EGFR: 98 ML/MIN/1.73M2
GLOBULIN SER CALC-MCNC: 3.3 G/DL (CALC) (ref 1.9–3.7)
GLUCOSE SERPL-MCNC: 127 MG/DL (ref 65–99)
HBA1C MFR BLD: 5.6 % OF TOTAL HGB
HDLC SERPL-MCNC: 29 MG/DL
LDLC SERPL CALC-MCNC: 33 MG/DL (CALC)
NONHDLC SERPL-MCNC: 53 MG/DL (CALC)
POTASSIUM SERPL-SCNC: 4.2 MMOL/L (ref 3.5–5.3)
PROT SERPL-MCNC: 6.2 G/DL (ref 6.1–8.1)
SODIUM SERPL-SCNC: 137 MMOL/L (ref 135–146)
TRIGL SERPL-MCNC: 116 MG/DL
TSH SERPL-ACNC: 2.68 MIU/L (ref 0.4–4.5)

## 2022-10-11 NOTE — TELEPHONE ENCOUNTER
I had a vm on my nurse navigator line asking for me to contact pt.  I have LVM asking her to return my call.

## 2022-10-12 ENCOUNTER — HOSPITAL ENCOUNTER (OUTPATIENT)
Dept: RADIOLOGY | Facility: HOSPITAL | Age: 67
Discharge: HOME | DRG: 291 | End: 2022-10-12
Attending: NURSE PRACTITIONER
Payer: MEDICARE

## 2022-10-12 ENCOUNTER — TELEPHONE (OUTPATIENT)
Dept: FAMILY MEDICINE | Facility: CLINIC | Age: 67
End: 2022-10-12

## 2022-10-12 ENCOUNTER — HOSPITAL ENCOUNTER (INPATIENT)
Facility: HOSPITAL | Age: 67
LOS: 3 days | Discharge: HOME | DRG: 291 | End: 2022-10-15
Attending: EMERGENCY MEDICINE | Admitting: INTERNAL MEDICINE
Payer: MEDICARE

## 2022-10-12 ENCOUNTER — INFUSION (OUTPATIENT)
Dept: INFUSION THERAPY | Facility: HOSPITAL | Age: 67
DRG: 291 | End: 2022-10-12
Attending: NURSE PRACTITIONER
Payer: MEDICARE

## 2022-10-12 VITALS
OXYGEN SATURATION: 96 % | TEMPERATURE: 98 F | HEIGHT: 61 IN | WEIGHT: 113 LBS | SYSTOLIC BLOOD PRESSURE: 125 MMHG | RESPIRATION RATE: 18 BRPM | DIASTOLIC BLOOD PRESSURE: 55 MMHG | BODY MASS INDEX: 21.34 KG/M2 | HEART RATE: 78 BPM

## 2022-10-12 DIAGNOSIS — I10 ESSENTIAL HYPERTENSION: ICD-10-CM

## 2022-10-12 DIAGNOSIS — I50.9 ACUTE CONGESTIVE HEART FAILURE, UNSPECIFIED HEART FAILURE TYPE: Primary | ICD-10-CM

## 2022-10-12 DIAGNOSIS — J44.1 COPD EXACERBATION: ICD-10-CM

## 2022-10-12 DIAGNOSIS — R06.02 SHORTNESS OF BREATH: ICD-10-CM

## 2022-10-12 DIAGNOSIS — M81.0 AGE-RELATED OSTEOPOROSIS WITHOUT CURRENT PATHOLOGICAL FRACTURE: Primary | ICD-10-CM

## 2022-10-12 DIAGNOSIS — I10 HYPERTENSION, UNSPECIFIED TYPE: Chronic | ICD-10-CM

## 2022-10-12 DIAGNOSIS — I50.9 CHF (CONGESTIVE HEART FAILURE): ICD-10-CM

## 2022-10-12 DIAGNOSIS — I49.9 ARRHYTHMIA: ICD-10-CM

## 2022-10-12 DIAGNOSIS — J90 PLEURAL EFFUSION: ICD-10-CM

## 2022-10-12 LAB
ALBUMIN SERPL BCP-MCNC: 2.9 G/DL (ref 3.5–5.2)
ALLENS TEST: ABNORMAL
ALP SERPL-CCNC: 52 U/L (ref 55–135)
ALT SERPL W/O P-5'-P-CCNC: 12 U/L (ref 10–44)
ANION GAP SERPL CALC-SCNC: 11 MMOL/L (ref 8–16)
AST SERPL-CCNC: 19 U/L (ref 10–40)
BASOPHILS # BLD AUTO: 0.01 K/UL (ref 0–0.2)
BASOPHILS NFR BLD: 0.1 % (ref 0–1.9)
BILIRUB SERPL-MCNC: 0.5 MG/DL (ref 0.1–1)
BNP SERPL-MCNC: 1134 PG/ML (ref 0–99)
BUN SERPL-MCNC: 15 MG/DL (ref 8–23)
CALCIUM SERPL-MCNC: 9.2 MG/DL (ref 8.7–10.5)
CHLORIDE SERPL-SCNC: 99 MMOL/L (ref 95–110)
CO2 SERPL-SCNC: 26 MMOL/L (ref 23–29)
CREAT SERPL-MCNC: 0.6 MG/DL (ref 0.5–1.4)
DELSYS: ABNORMAL
DIFFERENTIAL METHOD: ABNORMAL
EOSINOPHIL # BLD AUTO: 0 K/UL (ref 0–0.5)
EOSINOPHIL NFR BLD: 0.1 % (ref 0–8)
ERYTHROCYTE [DISTWIDTH] IN BLOOD BY AUTOMATED COUNT: 15.7 % (ref 11.5–14.5)
ERYTHROCYTE [SEDIMENTATION RATE] IN BLOOD BY WESTERGREN METHOD: 16 MM/H
EST. GFR  (NO RACE VARIABLE): >60 ML/MIN/1.73 M^2
FIO2: 21
GLUCOSE SERPL-MCNC: 146 MG/DL (ref 70–110)
HCO3 UR-SCNC: 29.3 MMOL/L (ref 24–28)
HCT VFR BLD AUTO: 35.5 % (ref 37–48.5)
HGB BLD-MCNC: 11 G/DL (ref 12–16)
IMM GRANULOCYTES # BLD AUTO: 0.03 K/UL (ref 0–0.04)
IMM GRANULOCYTES NFR BLD AUTO: 0.3 % (ref 0–0.5)
LYMPHOCYTES # BLD AUTO: 1.3 K/UL (ref 1–4.8)
LYMPHOCYTES NFR BLD: 14.5 % (ref 18–48)
MAGNESIUM SERPL-MCNC: 1.6 MG/DL (ref 1.6–2.6)
MCH RBC QN AUTO: 25.6 PG (ref 27–31)
MCHC RBC AUTO-ENTMCNC: 31 G/DL (ref 32–36)
MCV RBC AUTO: 83 FL (ref 82–98)
MODE: ABNORMAL
MONOCYTES # BLD AUTO: 0.5 K/UL (ref 0.3–1)
MONOCYTES NFR BLD: 5.5 % (ref 4–15)
NEUTROPHILS # BLD AUTO: 7 K/UL (ref 1.8–7.7)
NEUTROPHILS NFR BLD: 79.5 % (ref 38–73)
NRBC BLD-RTO: 0 /100 WBC
PCO2 BLDA: 38.8 MMHG (ref 35–45)
PH SMN: 7.49 [PH] (ref 7.35–7.45)
PHOSPHATE SERPL-MCNC: 3 MG/DL (ref 2.7–4.5)
PLATELET # BLD AUTO: 289 K/UL (ref 150–450)
PMV BLD AUTO: 10.4 FL (ref 9.2–12.9)
PO2 BLDA: 78 MMHG (ref 80–100)
POC BE: 6 MMOL/L
POC SATURATED O2: 96 % (ref 95–100)
POC TCO2: 30 MMOL/L (ref 23–27)
POTASSIUM SERPL-SCNC: 4.4 MMOL/L (ref 3.5–5.1)
PROT SERPL-MCNC: 7.3 G/DL (ref 6–8.4)
RBC # BLD AUTO: 4.3 M/UL (ref 4–5.4)
SAMPLE: ABNORMAL
SARS-COV-2 RDRP RESP QL NAA+PROBE: NEGATIVE
SITE: ABNORMAL
SODIUM SERPL-SCNC: 136 MMOL/L (ref 136–145)
SP02: 97
TROPONIN I SERPL DL<=0.01 NG/ML-MCNC: <0.03 NG/ML
WBC # BLD AUTO: 8.76 K/UL (ref 3.9–12.7)

## 2022-10-12 PROCEDURE — 36415 COLL VENOUS BLD VENIPUNCTURE: CPT | Performed by: EMERGENCY MEDICINE

## 2022-10-12 PROCEDURE — 96374 THER/PROPH/DIAG INJ IV PUSH: CPT

## 2022-10-12 PROCEDURE — 85025 COMPLETE CBC W/AUTO DIFF WBC: CPT | Performed by: EMERGENCY MEDICINE

## 2022-10-12 PROCEDURE — 93010 EKG 12-LEAD: ICD-10-PCS | Mod: ,,, | Performed by: SPECIALIST

## 2022-10-12 PROCEDURE — 25500020 PHARM REV CODE 255: Performed by: INTERNAL MEDICINE

## 2022-10-12 PROCEDURE — 21000000 HC CCU ICU ROOM CHARGE

## 2022-10-12 PROCEDURE — 84100 ASSAY OF PHOSPHORUS: CPT | Performed by: EMERGENCY MEDICINE

## 2022-10-12 PROCEDURE — U0002 COVID-19 LAB TEST NON-CDC: HCPCS | Performed by: STUDENT IN AN ORGANIZED HEALTH CARE EDUCATION/TRAINING PROGRAM

## 2022-10-12 PROCEDURE — 96372 THER/PROPH/DIAG INJ SC/IM: CPT | Mod: 59

## 2022-10-12 PROCEDURE — 93010 ELECTROCARDIOGRAM REPORT: CPT | Mod: ,,, | Performed by: SPECIALIST

## 2022-10-12 PROCEDURE — 36600 WITHDRAWAL OF ARTERIAL BLOOD: CPT

## 2022-10-12 PROCEDURE — 25000003 PHARM REV CODE 250: Performed by: INTERNAL MEDICINE

## 2022-10-12 PROCEDURE — 93005 ELECTROCARDIOGRAM TRACING: CPT | Performed by: SPECIALIST

## 2022-10-12 PROCEDURE — 80053 COMPREHEN METABOLIC PANEL: CPT | Performed by: EMERGENCY MEDICINE

## 2022-10-12 PROCEDURE — 71046 X-RAY EXAM CHEST 2 VIEWS: CPT | Mod: TC,PO

## 2022-10-12 PROCEDURE — 84484 ASSAY OF TROPONIN QUANT: CPT | Performed by: EMERGENCY MEDICINE

## 2022-10-12 PROCEDURE — 63600175 PHARM REV CODE 636 W HCPCS: Mod: JG | Performed by: NURSE PRACTITIONER

## 2022-10-12 PROCEDURE — 63600175 PHARM REV CODE 636 W HCPCS: Performed by: INTERNAL MEDICINE

## 2022-10-12 PROCEDURE — 63600175 PHARM REV CODE 636 W HCPCS: Performed by: STUDENT IN AN ORGANIZED HEALTH CARE EDUCATION/TRAINING PROGRAM

## 2022-10-12 PROCEDURE — 82803 BLOOD GASES ANY COMBINATION: CPT

## 2022-10-12 PROCEDURE — 83735 ASSAY OF MAGNESIUM: CPT | Performed by: EMERGENCY MEDICINE

## 2022-10-12 PROCEDURE — 83880 ASSAY OF NATRIURETIC PEPTIDE: CPT | Performed by: EMERGENCY MEDICINE

## 2022-10-12 PROCEDURE — 99900035 HC TECH TIME PER 15 MIN (STAT)

## 2022-10-12 PROCEDURE — 99285 EMERGENCY DEPT VISIT HI MDM: CPT | Mod: 25

## 2022-10-12 PROCEDURE — 21400001 HC TELEMETRY ROOM

## 2022-10-12 RX ORDER — ESCITALOPRAM OXALATE 10 MG/1
20 TABLET ORAL DAILY
Status: DISCONTINUED | OUTPATIENT
Start: 2022-10-13 | End: 2022-10-15 | Stop reason: HOSPADM

## 2022-10-12 RX ORDER — SODIUM CHLORIDE 0.9 % (FLUSH) 0.9 %
10 SYRINGE (ML) INJECTION
Status: DISCONTINUED | OUTPATIENT
Start: 2022-10-12 | End: 2022-10-15 | Stop reason: HOSPADM

## 2022-10-12 RX ORDER — IPRATROPIUM BROMIDE AND ALBUTEROL SULFATE 2.5; .5 MG/3ML; MG/3ML
3 SOLUTION RESPIRATORY (INHALATION) EVERY 6 HOURS
Status: DISCONTINUED | OUTPATIENT
Start: 2022-10-12 | End: 2022-10-13

## 2022-10-12 RX ORDER — FUROSEMIDE 10 MG/ML
20 INJECTION INTRAMUSCULAR; INTRAVENOUS
Status: DISCONTINUED | OUTPATIENT
Start: 2022-10-12 | End: 2022-10-13

## 2022-10-12 RX ORDER — PROPRANOLOL HYDROCHLORIDE 10 MG/1
40 TABLET ORAL 2 TIMES DAILY
Status: DISCONTINUED | OUTPATIENT
Start: 2022-10-13 | End: 2022-10-15 | Stop reason: HOSPADM

## 2022-10-12 RX ORDER — SUCRALFATE 1 G/1
1 TABLET ORAL 2 TIMES DAILY
Status: DISCONTINUED | OUTPATIENT
Start: 2022-10-12 | End: 2022-10-15 | Stop reason: HOSPADM

## 2022-10-12 RX ORDER — FUROSEMIDE 10 MG/ML
20 INJECTION INTRAMUSCULAR; INTRAVENOUS
Status: COMPLETED | OUTPATIENT
Start: 2022-10-12 | End: 2022-10-12

## 2022-10-12 RX ORDER — LEVOTHYROXINE SODIUM 25 UG/1
50 TABLET ORAL
Status: DISCONTINUED | OUTPATIENT
Start: 2022-10-13 | End: 2022-10-15 | Stop reason: HOSPADM

## 2022-10-12 RX ORDER — PANTOPRAZOLE SODIUM 40 MG/1
40 TABLET, DELAYED RELEASE ORAL 2 TIMES DAILY
Status: DISCONTINUED | OUTPATIENT
Start: 2022-10-12 | End: 2022-10-15 | Stop reason: HOSPADM

## 2022-10-12 RX ORDER — ATORVASTATIN CALCIUM 10 MG/1
10 TABLET, FILM COATED ORAL NIGHTLY
Status: DISCONTINUED | OUTPATIENT
Start: 2022-10-12 | End: 2022-10-15 | Stop reason: HOSPADM

## 2022-10-12 RX ORDER — HEPARIN SODIUM 5000 [USP'U]/ML
5000 INJECTION, SOLUTION INTRAVENOUS; SUBCUTANEOUS EVERY 8 HOURS
Status: DISCONTINUED | OUTPATIENT
Start: 2022-10-12 | End: 2022-10-15 | Stop reason: HOSPADM

## 2022-10-12 RX ADMIN — CEFTRIAXONE 1 G: 1 INJECTION, SOLUTION INTRAVENOUS at 10:10

## 2022-10-12 RX ADMIN — HEPARIN SODIUM 5000 UNITS: 5000 INJECTION, SOLUTION INTRAVENOUS; SUBCUTANEOUS at 10:10

## 2022-10-12 RX ADMIN — PANTOPRAZOLE SODIUM 40 MG: 40 TABLET, DELAYED RELEASE ORAL at 10:10

## 2022-10-12 RX ADMIN — IOHEXOL 75 ML: 350 INJECTION, SOLUTION INTRAVENOUS at 08:10

## 2022-10-12 RX ADMIN — FUROSEMIDE 20 MG: 10 INJECTION, SOLUTION INTRAMUSCULAR; INTRAVENOUS at 07:10

## 2022-10-12 RX ADMIN — DENOSUMAB 60 MG: 60 INJECTION SUBCUTANEOUS at 08:10

## 2022-10-12 RX ADMIN — SUCRALFATE 1 G: 1 TABLET ORAL at 10:10

## 2022-10-12 RX ADMIN — AZITHROMYCIN 500 MG: 500 INJECTION, POWDER, LYOPHILIZED, FOR SOLUTION INTRAVENOUS at 11:10

## 2022-10-12 NOTE — TELEPHONE ENCOUNTER
----- Message from Liv Parrish NP sent at 10/12/2022 11:16 AM CDT -----  Please call patient let her know chest x-ray show she has developed a large left-sided pleural effusion which is a collection of fluid in the sac around the lungs.  Given the fluid collection is large and she is short of breath I recommend she go to the emergency room as she likely needs thoracentesis to have the fluid drained off

## 2022-10-12 NOTE — TELEPHONE ENCOUNTER
----- Message from Liv Parrish NP sent at 10/11/2022  8:19 PM CDT -----  Please call patient and let her know her recent labs overall look good.  Her cholesterol levels are much improved and diabetes test A1c is in normal range at 5.6%.  Kidney, liver and thyroid function in normal range.  Her protein and calcium level is slightly low likely due to weight loss and GI issues she was having earlier in the year so it's important to make sure she's getting adequate nutrition and protein in her diet.  Continue current medication

## 2022-10-12 NOTE — FIRST PROVIDER EVALUATION
"Medical screening examination initiated.  I have conducted a focused provider triage encounter, findings are as follows:    Brief history of present illness: SOB    Vitals:    10/12/22 1653   BP: (!) 139/99   BP Location: Left arm   Patient Position: Sitting   Pulse: 106   Resp: 20   Temp: 97.5 °F (36.4 °C)   TempSrc: Oral   SpO2: 97%   Weight: 49.9 kg (110 lb)   Height: 5' 1" (1.549 m)       Pertinent physical exam:  67-year-old female presents emergency department reports that she was seen by her primary care provider for intermittent shortness of breath and cough she is a former smoker she states she quit 6 weeks ago she had outpatient x-ray and has a very large left pleural effusion    Brief workup plan: labs    Preliminary workup initiated; this workup will be continued and followed by the physician or advanced practice provider that is assigned to the patient when roomed.  "

## 2022-10-12 NOTE — TELEPHONE ENCOUNTER
Spoke with pt in regards to recent chest xray results. Verbalized per Liv that pt's chest x-ray show she has developed a large left-sided pleural effusion which is a collection of fluid in the sac around the lungs.  Given the fluid collection is large and she is short of breath. Liv recommends she go to the emergency room as she likely needs thoracentesis to have the fluid drained off. Pt acknowledge understanding.

## 2022-10-12 NOTE — TELEPHONE ENCOUNTER
----- Message from Prema Rodriguez sent at 10/12/2022  4:44 PM CDT -----    3:54  Shoulder she go to the [facility] or tomorrow. Pt's #  [phone] GH

## 2022-10-13 ENCOUNTER — CLINICAL SUPPORT (OUTPATIENT)
Dept: CARDIOLOGY | Facility: HOSPITAL | Age: 67
DRG: 291 | End: 2022-10-13
Attending: INTERNAL MEDICINE
Payer: MEDICARE

## 2022-10-13 VITALS — WEIGHT: 107 LBS | HEIGHT: 61 IN | BODY MASS INDEX: 20.2 KG/M2

## 2022-10-13 PROBLEM — I35.0 SEVERE AORTIC STENOSIS: Status: ACTIVE | Noted: 2022-10-13

## 2022-10-13 PROBLEM — I50.31 ACUTE DIASTOLIC CONGESTIVE HEART FAILURE: Status: ACTIVE | Noted: 2022-10-13

## 2022-10-13 LAB
ALBUMIN SERPL BCP-MCNC: 2.8 G/DL (ref 3.5–5.2)
ALP SERPL-CCNC: 49 U/L (ref 55–135)
ALT SERPL W/O P-5'-P-CCNC: 10 U/L (ref 10–44)
ANION GAP SERPL CALC-SCNC: 7 MMOL/L (ref 8–16)
AORTIC ROOT ANNULUS: 2.72 CM
APTT PPP: 31.1 SEC (ref 23.3–35.1)
AST SERPL-CCNC: 13 U/L (ref 10–40)
AV INDEX (PROSTH): 0.29
AV MEAN GRADIENT: 21 MMHG
AV VALVE AREA: 0.92 CM2
BILIRUB SERPL-MCNC: 0.6 MG/DL (ref 0.1–1)
BSA FOR ECHO PROCEDURE: 1.45 M2
BUN SERPL-MCNC: 15 MG/DL (ref 8–23)
CALCIUM SERPL-MCNC: 8.9 MG/DL (ref 8.7–10.5)
CHLORIDE SERPL-SCNC: 95 MMOL/L (ref 95–110)
CO2 SERPL-SCNC: 31 MMOL/L (ref 23–29)
CREAT SERPL-MCNC: 0.6 MG/DL (ref 0.5–1.4)
CV ECHO LV RWT: 0.44 CM
DOP CALC AO VTI: 57.68 CM
DOP CALC LVOT AREA: 3.2 CM2
DOP CALC LVOT DIAMETER: 2.02 CM
DOP CALC LVOT PEAK VEL: 69.23 M/S
DOP CALC LVOT STROKE VOLUME: 53.24 CM3
DOP CALCLVOT PEAK VEL VTI: 16.62 CM
E WAVE DECELERATION TIME: 140.91 MSEC
E/A RATIO: 1.64
E/E' RATIO: 25.54 M/S
ECHO LV POSTERIOR WALL: 0.93 CM (ref 0.6–1.1)
EJECTION FRACTION: 60 %
EST. GFR  (NO RACE VARIABLE): >60 ML/MIN/1.73 M^2
FRACTIONAL SHORTENING: 16 % (ref 28–44)
GLUCOSE SERPL-MCNC: 100 MG/DL (ref 70–110)
GLUCOSE SERPL-MCNC: 98 MG/DL (ref 70–110)
INR PPP: 1.2
INTERVENTRICULAR SEPTUM: 0.92 CM (ref 0.6–1.1)
LDH SERPL L TO P-CCNC: 137 U/L (ref 110–260)
LEFT ATRIUM SIZE: 3.43 CM
LEFT ATRIUM VOLUME INDEX MOD: 37.9 ML/M2
LEFT ATRIUM VOLUME MOD: 54.94 CM3
LEFT INTERNAL DIMENSION IN SYSTOLE: 3.59 CM (ref 2.1–4)
LEFT VENTRICLE DIASTOLIC VOLUME INDEX: 53.11 ML/M2
LEFT VENTRICLE DIASTOLIC VOLUME: 77.01 ML
LEFT VENTRICLE MASS INDEX: 87 G/M2
LEFT VENTRICLE SYSTOLIC VOLUME INDEX: 31.9 ML/M2
LEFT VENTRICLE SYSTOLIC VOLUME: 46.2 ML
LEFT VENTRICULAR INTERNAL DIMENSION IN DIASTOLE: 4.25 CM (ref 3.5–6)
LEFT VENTRICULAR MASS: 125.58 G
LV LATERAL E/E' RATIO: 20.75 M/S
LV SEPTAL E/E' RATIO: 33.2 M/S
MAGNESIUM SERPL-MCNC: 1.5 MG/DL (ref 1.6–2.6)
MV PEAK A VEL: 1.01 M/S
MV PEAK E VEL: 1.66 M/S
PISA MRMAX VEL: 559.78 M/S
PISA TR MAX VEL: 3.37 M/S
POTASSIUM SERPL-SCNC: 4 MMOL/L (ref 3.5–5.1)
PROT SERPL-MCNC: 6.9 G/DL (ref 6–8.4)
PROTHROMBIN TIME: 14.5 SEC (ref 11.4–13.7)
RA PRESSURE: 8 MMHG
RIGHT VENTRICULAR END-DIASTOLIC DIMENSION: 2.44 CM
SODIUM SERPL-SCNC: 133 MMOL/L (ref 136–145)
TDI LATERAL: 0.08 M/S
TDI SEPTAL: 0.05 M/S
TDI: 0.07 M/S
TR MAX PG: 45 MMHG
TRICUSPID ANNULAR PLANE SYSTOLIC EXCURSION: 2.18 CM
TV REST PULMONARY ARTERY PRESSURE: 53 MMHG

## 2022-10-13 PROCEDURE — 99900035 HC TECH TIME PER 15 MIN (STAT)

## 2022-10-13 PROCEDURE — 36415 COLL VENOUS BLD VENIPUNCTURE: CPT | Performed by: INTERNAL MEDICINE

## 2022-10-13 PROCEDURE — 63600175 PHARM REV CODE 636 W HCPCS: Performed by: INTERNAL MEDICINE

## 2022-10-13 PROCEDURE — 85730 THROMBOPLASTIN TIME PARTIAL: CPT | Performed by: INTERNAL MEDICINE

## 2022-10-13 PROCEDURE — 93005 ELECTROCARDIOGRAM TRACING: CPT | Performed by: SPECIALIST

## 2022-10-13 PROCEDURE — 93306 TTE W/DOPPLER COMPLETE: CPT

## 2022-10-13 PROCEDURE — 93010 ELECTROCARDIOGRAM REPORT: CPT | Mod: ,,, | Performed by: SPECIALIST

## 2022-10-13 PROCEDURE — 94799 UNLISTED PULMONARY SVC/PX: CPT

## 2022-10-13 PROCEDURE — 21000000 HC CCU ICU ROOM CHARGE

## 2022-10-13 PROCEDURE — 93306 TTE W/DOPPLER COMPLETE: CPT | Mod: 26,,, | Performed by: INTERNAL MEDICINE

## 2022-10-13 PROCEDURE — 85610 PROTHROMBIN TIME: CPT | Performed by: INTERNAL MEDICINE

## 2022-10-13 PROCEDURE — 21400001 HC TELEMETRY ROOM

## 2022-10-13 PROCEDURE — 99223 1ST HOSP IP/OBS HIGH 75: CPT | Mod: ,,, | Performed by: INTERNAL MEDICINE

## 2022-10-13 PROCEDURE — 99223 PR INITIAL HOSPITAL CARE,LEVL III: ICD-10-PCS | Mod: ,,, | Performed by: INTERNAL MEDICINE

## 2022-10-13 PROCEDURE — 93306 ECHO (CUPID ONLY): ICD-10-PCS | Mod: 26,,, | Performed by: INTERNAL MEDICINE

## 2022-10-13 PROCEDURE — 63600175 PHARM REV CODE 636 W HCPCS: Performed by: STUDENT IN AN ORGANIZED HEALTH CARE EDUCATION/TRAINING PROGRAM

## 2022-10-13 PROCEDURE — 25000003 PHARM REV CODE 250: Performed by: INTERNAL MEDICINE

## 2022-10-13 PROCEDURE — 94761 N-INVAS EAR/PLS OXIMETRY MLT: CPT

## 2022-10-13 PROCEDURE — 83615 LACTATE (LD) (LDH) ENZYME: CPT | Performed by: INTERNAL MEDICINE

## 2022-10-13 PROCEDURE — 93010 EKG 12-LEAD: ICD-10-PCS | Mod: ,,, | Performed by: SPECIALIST

## 2022-10-13 PROCEDURE — 80053 COMPREHEN METABOLIC PANEL: CPT | Performed by: INTERNAL MEDICINE

## 2022-10-13 PROCEDURE — 83735 ASSAY OF MAGNESIUM: CPT | Performed by: INTERNAL MEDICINE

## 2022-10-13 PROCEDURE — 99900031 HC PATIENT EDUCATION (STAT)

## 2022-10-13 RX ORDER — CALCIUM GLUCONATE 20 MG/ML
3 INJECTION, SOLUTION INTRAVENOUS
Status: DISCONTINUED | OUTPATIENT
Start: 2022-10-13 | End: 2022-10-15 | Stop reason: HOSPADM

## 2022-10-13 RX ORDER — FUROSEMIDE 10 MG/ML
20 INJECTION INTRAMUSCULAR; INTRAVENOUS
Status: DISCONTINUED | OUTPATIENT
Start: 2022-10-13 | End: 2022-10-13

## 2022-10-13 RX ORDER — CALCIUM GLUCONATE 20 MG/ML
1 INJECTION, SOLUTION INTRAVENOUS
Status: DISCONTINUED | OUTPATIENT
Start: 2022-10-13 | End: 2022-10-15 | Stop reason: HOSPADM

## 2022-10-13 RX ORDER — CALCIUM GLUCONATE 20 MG/ML
2 INJECTION, SOLUTION INTRAVENOUS
Status: DISCONTINUED | OUTPATIENT
Start: 2022-10-13 | End: 2022-10-15 | Stop reason: HOSPADM

## 2022-10-13 RX ORDER — ONDANSETRON 2 MG/ML
4 INJECTION INTRAMUSCULAR; INTRAVENOUS EVERY 6 HOURS PRN
Status: DISCONTINUED | OUTPATIENT
Start: 2022-10-13 | End: 2022-10-15 | Stop reason: HOSPADM

## 2022-10-13 RX ORDER — MAGNESIUM SULFATE HEPTAHYDRATE 40 MG/ML
4 INJECTION, SOLUTION INTRAVENOUS
Status: DISCONTINUED | OUTPATIENT
Start: 2022-10-13 | End: 2022-10-15 | Stop reason: HOSPADM

## 2022-10-13 RX ORDER — IPRATROPIUM BROMIDE AND ALBUTEROL SULFATE 2.5; .5 MG/3ML; MG/3ML
3 SOLUTION RESPIRATORY (INHALATION) EVERY 6 HOURS PRN
Status: DISCONTINUED | OUTPATIENT
Start: 2022-10-13 | End: 2022-10-15 | Stop reason: HOSPADM

## 2022-10-13 RX ORDER — MAGNESIUM SULFATE HEPTAHYDRATE 40 MG/ML
2 INJECTION, SOLUTION INTRAVENOUS
Status: DISCONTINUED | OUTPATIENT
Start: 2022-10-13 | End: 2022-10-15 | Stop reason: HOSPADM

## 2022-10-13 RX ORDER — VARENICLINE TARTRATE 0.5 MG/1
1 TABLET, FILM COATED ORAL 2 TIMES DAILY
Status: DISCONTINUED | OUTPATIENT
Start: 2022-10-14 | End: 2022-10-15 | Stop reason: HOSPADM

## 2022-10-13 RX ORDER — PREDNISONE 20 MG/1
40 TABLET ORAL DAILY
Status: DISCONTINUED | OUTPATIENT
Start: 2022-10-13 | End: 2022-10-13

## 2022-10-13 RX ORDER — FUROSEMIDE 10 MG/ML
20 INJECTION INTRAMUSCULAR; INTRAVENOUS DAILY
Status: DISCONTINUED | OUTPATIENT
Start: 2022-10-14 | End: 2022-10-15 | Stop reason: HOSPADM

## 2022-10-13 RX ADMIN — HEPARIN SODIUM 5000 UNITS: 5000 INJECTION, SOLUTION INTRAVENOUS; SUBCUTANEOUS at 04:10

## 2022-10-13 RX ADMIN — HEPARIN SODIUM 5000 UNITS: 5000 INJECTION, SOLUTION INTRAVENOUS; SUBCUTANEOUS at 10:10

## 2022-10-13 RX ADMIN — PROPRANOLOL HYDROCHLORIDE 40 MG: 10 TABLET ORAL at 10:10

## 2022-10-13 RX ADMIN — ESCITALOPRAM OXALATE 20 MG: 10 TABLET ORAL at 09:10

## 2022-10-13 RX ADMIN — PREDNISONE 40 MG: 20 TABLET ORAL at 12:10

## 2022-10-13 RX ADMIN — PANTOPRAZOLE SODIUM 40 MG: 40 TABLET, DELAYED RELEASE ORAL at 09:10

## 2022-10-13 RX ADMIN — ATORVASTATIN CALCIUM 10 MG: 10 TABLET, FILM COATED ORAL at 03:10

## 2022-10-13 RX ADMIN — PANTOPRAZOLE SODIUM 40 MG: 40 TABLET, DELAYED RELEASE ORAL at 06:10

## 2022-10-13 RX ADMIN — ATORVASTATIN CALCIUM 10 MG: 10 TABLET, FILM COATED ORAL at 10:10

## 2022-10-13 RX ADMIN — FUROSEMIDE 20 MG: 10 INJECTION, SOLUTION INTRAMUSCULAR; INTRAVENOUS at 09:10

## 2022-10-13 RX ADMIN — SUCRALFATE 1 G: 1 TABLET ORAL at 10:10

## 2022-10-13 RX ADMIN — ONDANSETRON 4 MG: 2 INJECTION, SOLUTION INTRAMUSCULAR; INTRAVENOUS at 03:10

## 2022-10-13 RX ADMIN — MAGNESIUM SULFATE 2 G: 2 INJECTION INTRAVENOUS at 12:10

## 2022-10-13 RX ADMIN — LEVOTHYROXINE SODIUM 50 MCG: 0.03 TABLET ORAL at 09:10

## 2022-10-13 NOTE — PROGRESS NOTES
[facility] Medicine  Progress Note    Patient Name: Liv Decker  MRN: [id number]  Patient Class: IP- Inpatient   Admission Date: 10/12/2022  Length of Stay: 1 days  Attending Physician: Tremayne Adan MD  Primary Care Provider: Liv Parrish NP        Subjective:     Principal Problem:Severe aortic stenosis        HPI:  No notes on file    Overview/Hospital Course:  No notes on file    Interval History:  No acute events since admission.  She is feeling much better.  Echocardiogram with severe aortic stenosis.  Will follow Cardiology recommendations.    Review of Systems   All other systems reviewed and are negative.  Objective:     Vital Signs (Most Recent):  Temp: 97 °F (36.1 °C) (10/13/22 1200)  Pulse: 68 (10/13/22 1400)  Resp: (!) 21 (10/13/22 1400)  BP: 132/62 (10/13/22 1100)  SpO2: (!) 92 % (10/13/22 1400)   Vital Signs (24h Range):  Temp:  [97 °F (36.1 °C)-98.6 °F (37 °C)] 97 °F (36.1 °C)  Pulse:  [] 68  Resp:  [21-38] 21  SpO2:  [92 %-97 %] 92 %  BP: (113-156)/(43-89) 132/62     Weight: 48.8 kg (107 lb 9.4 oz)  Body mass index is 20.33 kg/m².    Intake/Output Summary (Last 24 hours) at 10/13/2022 1717  Last data filed at 10/13/2022 1627  Gross per 24 hour   Intake 50 ml   Output 2000 ml   Net -1950 ml      Physical Exam  Constitutional:       Appearance: Normal appearance. She is normal weight.   HENT:      Head: Normocephalic and atraumatic.      Nose: Nose normal.      Mouth/Throat:      Mouth: Mucous membranes are moist.   Eyes:      Conjunctiva/sclera: Conjunctivae normal.      Pupils: Pupils are equal, round, and reactive to light.   Cardiovascular:      Rate and Rhythm: Normal rate and regular rhythm.      Pulses: Normal pulses.      Heart sounds: Murmur heard.     No friction rub. No gallop.   Pulmonary:      Effort: Pulmonary effort is normal.      Breath sounds: Normal breath sounds. No wheezing or rales.   Abdominal:      General: Abdomen is flat. Bowel sounds  are normal. There is no distension.      Palpations: Abdomen is soft.      Tenderness: There is no abdominal tenderness. There is no guarding.   Musculoskeletal:         General: No swelling. Normal range of motion.      Cervical back: Normal range of motion and neck supple.   Skin:     General: Skin is warm and dry.   Neurological:      General: No focal deficit present.      Mental Status: She is alert.   Psychiatric:         Mood and Affect: Mood normal.         Thought Content: Thought content normal.         Judgment: Judgment normal.       Significant Labs: All pertinent labs within the past 24 hours have been reviewed.    Significant Imaging: I have reviewed all pertinent imaging results/findings within the past 24 hours.      Assessment/Plan:      * Severe aortic stenosis  Severe aortic stenosis on echocardiogram.  Likely contributing to her CHF symptoms along with mitral regurgitation  Cardiology following  Gentle diuresis  Further planning per Cardiology      Acute diastolic congestive heart failure  - gentle diuresis  - feeling much better  - cardiology following      Acquired hypothyroidism  Synthroid      Dyslipidemia    Statin    HTN (hypertension)  Continue home medications      Tobacco abuse  On Chantix to quit smoking      Type 2 diabetes mellitus  Patient's FSGs are controlled on current medication regimen.  Last A1c reviewed-   Lab Results   Component Value Date    HGBA1C 5.6 10/10/2022     Most recent fingerstick glucose reviewed- No results for input(s): POCTGLUCOSE in the last 24 hours.  Current correctional scale  Medium  Maintain anti-hyperglycemic dose as follows-   Antihyperglycemics (From admission, onward)    None        Hold Oral hypoglycemics while patient is in the hospital.      VTE Risk Mitigation (From admission, onward)         Ordered     heparin (porcine) injection 5,000 Units  Every 8 hours         10/12/22 2132     IP VTE HIGH RISK PATIENT  Once         10/12/22 2014     Place  sequential compression device  Until discontinued         10/12/22 2014                Discharge Planning   BOOM: 10/15/2022     Code Status: Full Code   Is the patient medically ready for discharge?:     Reason for patient still in hospital (select all that apply): Treatment  Discharge Plan A: Home with family                  Tremayne Adan MD  Department of Hospital Medicine   [facility]

## 2022-10-13 NOTE — PLAN OF CARE
Problem: Adult Inpatient Plan of Care  Goal: Plan of Care Review  Outcome: Ongoing, Progressing  Goal: Absence of Hospital-Acquired Illness or Injury  Outcome: Ongoing, Progressing  Goal: Readiness for Transition of Care  Outcome: Ongoing, Progressing   Instructed pt to use call light.  Pt verbalized understanding

## 2022-10-13 NOTE — CARE UPDATE
10/13/22 1400   Patient Assessment/Suction   Level of Consciousness (AVPU) alert   Respiratory Effort Normal;Unlabored   Expansion/Accessory Muscles/Retractions no use of accessory muscles   All Lung Fields Breath Sounds clear   Rhythm/Pattern, Respiratory unlabored   PRE-TX-O2   O2 Device (Oxygen Therapy) room air   SpO2 (!) 92 %   Pulse Oximetry Type Continuous   $ Pulse Oximetry - Multiple Charge Pulse Oximetry - Multiple   Pulse 68   Resp (!) 21   Aerosol Therapy   $ Aerosol Therapy Charges PRN treatment not required   Incentive Spirometer   $ Incentive Spirometer Charges proper technique demonstrated;done with encouragement   Incentive Spirometer Predicted Level (mL) 1200   Administration (IS) instruction provided, initial   Number of Repetitions (IS) 10   Level Incentive Spirometer (mL) 1000   Patient Tolerance (IS) good;no adverse signs/symptoms present   Education   $ Education Other (see comment);15 min  (is instruct)   Respiratory Evaluation   $ Care Plan Tech Time 15 min   $ Eval/Re-eval Charges Evaluation

## 2022-10-13 NOTE — CONSULTS
"[facility]  Adult Nutrition   Consult Note (Nutrition Education)     SUMMARY     Recommendations/Intervention:   1) RD to educate pt on diet for CHF verbally and gave written information as well as my contact information if needed.   2) RD has added Ensure High Protein to assist in meeting nutritional needs.    Goals:   1) Pt to understand diet for CHF (Low Sodium) and be able to control her CHF by lowering her BNP.   2) Pt not to aspirate on food while in hospital and be evaluated for swallowing ability.    Nutrition Goal Status: progressing towards goal    Communication of RD Recs: other (comment) (pt)    Dietitian Rounds Brief  Consult for Diet Education: Educated pt on diet for CHF verbally and gave written information as well as my contact information if needed. She verbalized understanding with intent to comply.      Diet order:   Current Diet Order: Cardiac with 1800 ml FR     Evaluation of Received Nutrient/Fluid Intake  Energy Calories Required: not meeting needs  Protein Required: not meeting needs  Fluid Required: meeting needs     % Intake of Estimated Energy Needs: 0%  % Meal Intake: Other: 0      Intake/Output Summary (Last 24 hours) at 10/13/2022 1157  Last data filed at 10/13/2022 0400  Gross per 24 hour   Intake 50 ml   Output 300 ml   Net -250 ml        Anthropometrics  Temp: 97.7 °F (36.5 °C)  Height Method: Stated  Height: 5' 1" (154.9 cm)  Height (inches): 61 in  Weight Method: Standard Scale  Weight: 48.8 kg (107 lb 9.4 oz)  Weight (lb): 107.59 lb  Ideal Body Weight (IBW), Female: 105 lb  % Ideal Body Weight, Female (lb): 104.76 %  BMI (Calculated): 20.3  BMI Grade: 18.5-24.9 - normal       Estimated/Assessed Needs  Weight Used For Calorie Calculations: 48.5 kg (106 lb 14.8 oz)  Energy Calorie Requirements (kcal): 7940-2028 kcals/day (30-35 kcals/kg ABW)  Energy Need Method: Kcal/kg  Protein Requirements: 73-97 g/day (1.5-2 g/kg ABW)  Weight Used For Protein Calculations: 48.5 kg " (106 lb 14.8 oz)     Estimated Fluid Requirement Method: RDA Method  RDA Method (mL): 1455       Reason for Assessment  Reason For Assessment: consult  Relevant Medical History: Hyperlipidemia, GERD (gastroesophageal reflux disease), Encounter for blood transfusion, Anemia, Ulcer of the stomach and intestine, pos. for h. pylori  MI (myocardial infarction), Diabetes mellitus, Weight loss, food comes up, Hypertension, Thyroid disease, Osteoporosis, Depression  Interdisciplinary Rounds: did not attend    Nutrition/Diet History  Spiritual, Cultural Beliefs, Worship Practices, Values that Affect Care: no  Food Allergies: NKFA  Factors Affecting Nutritional Intake: other (see comments), difficulty/impaired swallowing (no diet order)    Nutrition Risk Screen  Nutrition Risk Screen: dysphagia or difficulty swallowing     MST Score: 0  Have you recently lost weight without trying?: No  Weight loss score: 0  Have you been eating poorly because of a decreased appetite?: No  Appetite score: 0       Weight History:  Wt Readings from Last 5 Encounters:   10/13/22 48.8 kg (107 lb 9.4 oz)   10/13/22 48.5 kg (107 lb)   10/12/22 51.3 kg (113 lb)   10/10/22 49.8 kg (109 lb 12.8 oz)   08/23/22 49.9 kg (110 lb)        Lab/Procedures/Meds: Pertinent Labs/Meds Reviewed    Medications:Pertinent Medications Reviewed  Scheduled Meds:   atorvastatin  10 mg Oral QHS    azithromycin  500 mg Intravenous Q24H    cefTRIAXone (ROCEPHIN) IVPB  1 g Intravenous Q24H    EScitalopram oxalate  20 mg Oral Daily    furosemide (LASIX) injection  20 mg Intravenous Q12H    heparin (porcine)  5,000 Units Subcutaneous Q8H    levothyroxine  50 mcg Oral Before breakfast    pantoprazole  40 mg Oral BID    predniSONE  40 mg Oral Daily    propranoloL  40 mg Oral BID    sucralfate  1 g Oral BID     Continuous Infusions:  PRN Meds:.albuterol-ipratropium, ondansetron, sodium chloride 0.9%, sodium chloride 0.9%    Labs: Pertinent Labs Reviewed  Clinical  Chemistry:  Recent Labs   Lab 10/12/22  1754 10/13/22  0627    133*   K 4.4 4.0   CL 99 95   CO2 26 31*   * 98   BUN 15 15   CREATININE 0.6 0.6   CALCIUM 9.2 8.9   PROT 7.3 6.9   ALBUMIN 2.9* 2.8*   BILITOT 0.5 0.6   ALKPHOS 52* 49*   AST 19 13   ALT 12 10   ANIONGAP 11 7*   MG 1.6 1.5*   PHOS 3.0  --      CBC:   Recent Labs   Lab 10/12/22  1754   WBC 8.76   RBC 4.30   HGB 11.0*   HCT 35.5*      MCV 83   MCH 25.6*   MCHC 31.0*     Lipid Panel:  Recent Labs   Lab 10/10/22  0729   CHOL 82   HDL 29*   LDLCALC 33   TRIG 116   CHOLHDL 2.8     Cardiac Profile:  Recent Labs   Lab 10/12/22  1754   BNP 1,134*   TROPONINI <0.030     Diabetes:  Recent Labs   Lab 10/10/22  0729   HGBA1C 5.6       Monitor and Evaluation  Food and Nutrient Intake: food and beverage intake, energy intake  Food and Nutrient Adminstration: diet order  Knowledge/Beliefs/Attitudes: food and nutrition knowledge/skill, beliefs and attitudes  Physical Activity and Function: nutrition-related ADLs and IADLs, factors affecting access to physical activity  Anthropometric Measurements: weight, weight change, body mass index  Biochemical Data, Medical Tests and Procedures: lipid profile, inflammatory profile, glucose/endocrine profile, gastrointestinal profile, electrolyte and renal panel  Nutrition-Focused Physical Findings: overall appearance     Nutrition Risk  Level of Risk/Frequency of Follow-up: high     Nutrition Follow-Up  RD Follow-up?: Yes      Marina Berg RD 10/13/2022 11:57 AM

## 2022-10-13 NOTE — SUBJECTIVE & OBJECTIVE
Interval History:  No acute events since admission.  She is feeling much better.  Echocardiogram with severe aortic stenosis.  Will follow Cardiology recommendations.    Review of Systems   All other systems reviewed and are negative.  Objective:     Vital Signs (Most Recent):  Temp: 97 °F (36.1 °C) (10/13/22 1200)  Pulse: 68 (10/13/22 1400)  Resp: (!) 21 (10/13/22 1400)  BP: 132/62 (10/13/22 1100)  SpO2: (!) 92 % (10/13/22 1400)   Vital Signs (24h Range):  Temp:  [97 °F (36.1 °C)-98.6 °F (37 °C)] 97 °F (36.1 °C)  Pulse:  [] 68  Resp:  [21-38] 21  SpO2:  [92 %-97 %] 92 %  BP: (113-156)/(43-89) 132/62     Weight: 48.8 kg (107 lb 9.4 oz)  Body mass index is 20.33 kg/m².    Intake/Output Summary (Last 24 hours) at 10/13/2022 1717  Last data filed at 10/13/2022 1627  Gross per 24 hour   Intake 50 ml   Output 2000 ml   Net -1950 ml      Physical Exam  Constitutional:       Appearance: Normal appearance. She is normal weight.   HENT:      Head: Normocephalic and atraumatic.      Nose: Nose normal.      Mouth/Throat:      Mouth: Mucous membranes are moist.   Eyes:      Conjunctiva/sclera: Conjunctivae normal.      Pupils: Pupils are equal, round, and reactive to light.   Cardiovascular:      Rate and Rhythm: Normal rate and regular rhythm.      Pulses: Normal pulses.      Heart sounds: Murmur heard.     No friction rub. No gallop.   Pulmonary:      Effort: Pulmonary effort is normal.      Breath sounds: Normal breath sounds. No wheezing or rales.   Abdominal:      General: Abdomen is flat. Bowel sounds are normal. There is no distension.      Palpations: Abdomen is soft.      Tenderness: There is no abdominal tenderness. There is no guarding.   Musculoskeletal:         General: No swelling. Normal range of motion.      Cervical back: Normal range of motion and neck supple.   Skin:     General: Skin is warm and dry.   Neurological:      General: No focal deficit present.      Mental Status: She is alert.    Psychiatric:         Mood and Affect: Mood normal.         Thought Content: Thought content normal.         Judgment: Judgment normal.       Significant Labs: All pertinent labs within the past 24 hours have been reviewed.    Significant Imaging: I have reviewed all pertinent imaging results/findings within the past 24 hours.

## 2022-10-13 NOTE — ASSESSMENT & PLAN NOTE
Severe aortic stenosis on echocardiogram.  Likely contributing to her CHF symptoms along with mitral regurgitation  Cardiology following  Gentle diuresis  Further planning per Cardiology

## 2022-10-13 NOTE — CONSULTS
Pulmonary/Critical Care Consult      PATIENT NAME: Liv Decker  MRN: [id number]  TODAY'S DATE: 10/13/2022  10:02 AM  ADMIT DATE: 10/12/2022  AGE: 67 y.o. : 1955    CONSULT REQUESTED BY: Tremayne Adan MD    REASON FOR CONSULT:   Pleural effusion    HISTORY OF PRESENT ILLNESS   Liv Decker is a 67 y.o. female with a PMH of esophageal stenosis s/p dialtion x2, DM2, COPD, PUD, and HTN on whom we have been consulted for COPD exacerbation, pleural effusion.    The patient states she has had worsening SOB for the past 6 weeks, which coincided with when she stopped smoking. Reports orthopnea. Her PCP ordered a CXR and noticed a left pleural effusion for which he sent the patient to the ED. In the ED, the patient was found to have a BNP of 1100, and CT chest showed bilateral pleural effusions L>R, bibasilar interlobular septal thickening, and groundglass opacity in the LLL. She was started on empiric Abx for pneumonia.    Re: COPD, the patient has never undergone PFTs, but she is being prescribed Trilogy inhaler and albuterol, which she does not think has helped much with her SOB.    SMOKING HISTORY  1/2 PPD x 58 years.  Quit 6 weeks ago.    REVIEW OF SYSTEMS  GENERAL: Feeling well. No fevers, chills, or night sweats.  EYES: Vision is good.  ENT: No sinusitis or pharyngitis.   HEART: No chest pain or palpitations.  LUNGS: No cough, sputum, or wheezing. Progressive LEZAMA x 6 wks.  GI: No abdominal pain, nausea, vomiting, or diarrhea. Abdomen might be slightly enlarged from baseline.  : No dysuria, urgency, or frequency.  SKIN: No lesions or rashes.  MUSCULOSKELETAL: No joint pain or myalgias.  NEURO: No headaches or neuropathy.  LYMPH: No edema or adenopathy.  PSYCH: No anxiety or depression.  ENDO: No unexpected weight change.    ALLERGIES  Review of patient's allergies indicates:  No Known Allergies    INPATIENT SCHEDULED MEDICATIONS   atorvastatin  10 mg Oral QHS    azithromycin  500 mg Intravenous Q24H     cefTRIAXone (ROCEPHIN) IVPB  1 g Intravenous Q24H    EScitalopram oxalate  20 mg Oral Daily    furosemide (LASIX) injection  20 mg Intravenous Q12H    heparin (porcine)  5,000 Units Subcutaneous Q8H    levothyroxine  50 mcg Oral Before breakfast    pantoprazole  40 mg Oral BID    propranoloL  40 mg Oral BID    sucralfate  1 g Oral BID         MEDICAL AND SURGICAL HISTORY  Past Medical History:   Diagnosis Date    Anemia     Depression     Diabetes mellitus 2021    Encounter for blood transfusion     GERD (gastroesophageal reflux disease)     Hyperlipidemia     Hypertension     MI (myocardial infarction) 2007    Osteoporosis     Thyroid disease     Ulcer of the stomach and intestine     pos. for h. pylori    Weight loss 2022    food comes up      Past Surgical History:   Procedure Laterality Date    APPENDECTOMY      CHOLECYSTECTOMY      COLONOSCOPY N/A 3/18/2016    Procedure: COLONOSCOPY;  Surgeon: Rober Zelaya Jr., MD;  Location: [facility];  Service: Endoscopy;  Laterality: N/A;    ESOPHAGOGASTRODUODENOSCOPY  04/15/2016    with dil    ESOPHAGOGASTRODUODENOSCOPY N/A 5/20/2022    Procedure: EGD (ESOPHAGOGASTRODUODENOSCOPY);  Surgeon: Justine Villegas MD;  Location: [facility];  Service: Endoscopy;  Laterality: N/A;    EYE SURGERY      as a child    TONSILLECTOMY      TUBAL LIGATION         ALCOHOL, TOBACCO AND DRUG USE  Social History     Tobacco Use   Smoking Status Every Day    Packs/day: 0.50    Years: 45.00    Pack years: 22.50    Types: Cigarettes    Start date: 1/1/1959   Smokeless Tobacco Never   Tobacco Comments    current 1/4ppd, prior use was 1ppd for 45 years     Social History     Substance and Sexual Activity   Alcohol Use No     Social History     Substance and Sexual Activity   Drug Use No       FAMILY HISTORY  Family History   Problem Relation Age of Onset    Heart disease Mother     Diabetes Father        VITAL SIGNS (MOST RECENT)  Temp: 98.6 °F (37 °C) (10/13/22 0250)  Pulse: 69 (10/13/22  0769)  Resp: (!) 26 (10/13/22 0349)  BP: 132/64 (10/13/22 0900)  SpO2: (!) 93 % (10/13/22 0349)    INTAKE AND OUTPUT (LAST 24 HOURS):  Intake/Output Summary (Last 24 hours) at 10/13/2022 1002  Last data filed at 10/13/2022 0400  Gross per 24 hour   Intake 50 ml   Output 300 ml   Net -250 ml       WEIGHT  Wt Readings from Last 1 Encounters:   10/13/22 48.8 kg (107 lb 9.4 oz)       PHYSICAL EXAM  GENERAL: Thin older woman in NAD.  HEENT: Extraocular movements intact. Pharynx moist.  NECK: Supple. No JVD or hepatojugular reflux.  HEART: Regular rate and normal rhythm. 2/6 systolic murmur best heard at RUSB not radiating to carotids.  LUNGS: Crackles soft at bases b/l.   ABDOMEN: Soft, non-tender, no masses palpated. Mildly distended relative to thin body habitus.  EXTREMITIES: Normal muscle tone and joint movement, no cyanosis or clubbing.   LYMPHATICS: No adenopathy palpated, no edema.  SKIN: Dry, intact, no lesions.   NEURO: No gross deficit.  PSYCH: Appropriate affect    ACUTE PHASE REACTANT (LAST 24 HOURS)  Recent Labs   Lab 10/13/22  0627          CBC LAST (LAST 24 HOURS)  Recent Labs   Lab 10/12/22  1754   WBC 8.76   RBC 4.30   HGB 11.0*   HCT 35.5*   MCV 83   MCH 25.6*   MCHC 31.0*   RDW 15.7*      MPV 10.4   GRAN 79.5*  7.0   LYMPH 14.5*  1.3   MONO 5.5  0.5   BASO 0.01   NRBC 0       CHEMISTRY LAST (LAST 24 HOURS)  Recent Labs   Lab 10/12/22  1754 10/12/22  2159 10/13/22  0627     --  133*   K 4.4  --  4.0   CL 99  --  95   CO2 26  --  31*   ANIONGAP 11  --  7*   BUN 15  --  15   CREATININE 0.6  --  0.6   *  --  98   CALCIUM 9.2  --  8.9   PH  --  7.486*  --    MG 1.6  --   --    ALBUMIN 2.9*  --  2.8*   PROT 7.3  --  6.9   ALKPHOS 52*  --  49*   ALT 12  --  10   AST 19  --  13   BILITOT 0.5  --  0.6       COAGULATION LAST (LAST 24 HOURS)  Recent Labs   Lab 10/13/22  0627   LABPT 14.5*   INR 1.2   APTT 31.1       CARDIAC PROFILE (LAST 24 HOURS)  Recent Labs   Lab 10/12/22  8612  10/13/22  0627   BNP 1,134*  --    LDH  --  137   TROPONINI <0.030  --        LAST 7 DAYS MICROBIOLOGY   Microbiology Results (last 7 days)       Procedure Component Value Units Date/Time    Culture, Respiratory with Gram Stain [[id number]]     Order Status: No result Specimen: Respiratory     Gram stain [[id number]]     Order Status: Canceled Specimen: Body Fluid from Pleural Fluid     AFB culture (includes stain) [[id number]]     Order Status: Canceled Specimen: Body Fluid from Pleural Fluid     Fungus culture [[id number]]     Order Status: Canceled Specimen: Body Fluid from Pleural Fluid     Aerobic culture [[id number]]     Order Status: No result Specimen: Pleural Fluid     Culture, Anaerobic [[id number]]     Order Status: No result Specimen: Pleural Fluid     Gram stain [[id number]]     Order Status: No result Specimen: Pleural Fluid     AFB Culture & Smear [[id number]]     Order Status: No result Specimen: Pleural Fluid     Fungus culture [[id number]]     Order Status: No result Specimen: Pleural Fluid             MOST RECENT IMAGING  CTA Chest Non-Coronary (PE Studies)  CT CHEST ANGIOGRAPHY WITH IV CONTRAST    INDICATION: 67 years Female; Pulmonary embolism (PE) suspected, high prob    TECHNIQUE:  CT angiogram of the chest was performed with IV contrast.  3-D/MIP reconstructions were performed.  This exam was performed according to our departmental dose-optimization program, which includes automated exposure control, adjustment of the mA and/or kV according to patient size and/or use of iterative reconstruction technique.    Comparison: 8/26/2020.    FINDINGS:  Thyroid: Unremarkable.  Heart/Mediastinum: The heart is within normal limits in size. Scattered coronary artery calcifications.  Vasculature: Mild atherosclerosis of the aorta without aneurysm or dissection. No evidence of pulmonary emboli reflux of contrast into the IVC and hepatic veins could indicate right-sided heart failure/tricuspid  regurgitation.  Lungs/Pleura: Moderate left pleural effusion. Small right pleural effusion. Interlobular septal thickening in the bilateral lower lobes concerning for pulmonary edema. Consolidation with surrounding groundglass in the left lower lobe is concerning for pneumonia. Opacification of the distal airways of the left lower lobe could be related to mucous plugging/aspiration. Cluster of nodules in the lateral aspect of the right lower lobe with the largest measuring 7 mm are new from prior study. No pneumothorax.  Lymph nodes: No pathologically enlarged lymph nodes.  Bones: Unremarkable.  Soft tissues: Unremarkable.  Incidental findings: None.    IMPRESSION:    1.  No evidence of pulmonary emboli  2.  Pulmonary edema. Moderate left pleural effusion. Small right pleural effusion.  3.  Consolidation with surrounding groundglass in the left lower lobe is concerning for pneumonia.  4.  Opacification of the distal airways of the left lower lobe could be related to mucous plugging/aspiration.  5.  Cluster of nodules in the lateral aspect of the right lower lobe with the largest measuring 7 mm are new from prior study.  Recommend a non-contrast Chest CT at 3-6 months. If patient is high risk for malignancy, recommend an additional non-contrast Chest CT at 18-24 months; if patient is low risk for malignancy a non-contrast Chest CT at 18-24 months is optional.  6.  Reflux of contrast into the IVC and hepatic veins could indicate right-sided heart failure/tricuspid regurgitation.    Electronically signed by:  Rebeka Dash  10/12/2022 10:33 PM CDT Workstation: [id number]  X-Ray Chest PA And Lateral  Examination: Chest two views.    CLINICAL HISTORY: Ongoing cough and shortness of breath. History of smoking.    TECHNIQUE: Erect PA lateral views the chest comparison: Five cystitis/2022.    FINDINGS: Interval development of a left-sided pleural effusion small in size with small meniscus sign at the apex tapering towards  lateral origin. Left basilar atelectasis is affiliated with this finding given the limited expansion of the left lung. Right lung is clear. The heart is normal in size. Pulmonary vascularity is unremarkable. Minimal arch atherosclerosis. Thoracic aorta is tortuous. Postoperative changes of previous coronary artery bypass surgery.    IMPRESSION:  1. Left-sided pleural effusion and a small in size with apical meniscus sign.  2. No evidence of acute cardiopulmonary findings.    Electronically signed by:  Kvng Leal MD  10/12/2022 10:16 AM CDT Workstation: [id number]      CURRENT VISIT EKG  Results for orders placed or performed during the hospital encounter of 10/12/22   EKG 12-lead    Narrative    Test Reason : R06.02,    Vent. Rate : 073 BPM     Atrial Rate : 096 BPM     P-R Int : 120 ms          QRS Dur : 078 ms      QT Int : 382 ms       P-R-T Axes : 070 081 080 degrees     QTc Int : 420 ms    Sinus rhythm with marked sinus arrythmia  Otherwise normal ECG  When compared with ECG of 19-MAY-2022 08:19,  No significant change was found    Referred By: [email or web address]   SELF           Confirmed By:        ECHOCARDIOGRAM 10/13/22  The left ventricle is normal in size with normal systolic function.  The estimated ejection fraction is 60%.  Grade II left ventricular diastolic dysfunction.  Normal right ventricular size with normal right ventricular systolic function.  Mild left atrial enlargement.  Mild right atrial enlargement.  Moderate-to-severe mitral regurgitation.  Moderate to severe tricuspid regurgitation.  There is moderate-to-severe aortic valve stenosis.  Aortic valve area is 0.92 cm2; peak velocity is m/s; mean gradient is 21 mmHg.  Mild-to-moderate aortic regurgitation.  Intermediate central venous pressure (8 mmHg).  There is pulmonary hypertension.  The estimated PA systolic pressure is 53 mmHg.    RESPIRATORY SUPPORT   Room air       LAST ARTERIAL BLOOD GAS  ABG  Recent Labs   Lab 10/12/22  8325   PH 7.486*    PO2 78*   PCO2 38.8   HCO3 29.3*   BE 6       IMPRESSION AND PLAN  Liv Decker is a 67 y.o. female with a PMH of DM2, COPD, PUD, and HTN on whom we have been consulted for COPD exacerbation, pleural effusion on left.    #Bilateral pleural effusions L>R  Likely due to CHF.  #HFpEF, new diagnosis, decompensated  #Likely pulmonary hypertension, likely group 2  Pneumonia unlikely, as the patient has had no infectious symptoms, fever, or leukocytosis. GGO on chest CT likely due to pulmonary edema.  - no need for thoracentesis cynthia  - antibiotics discontinued   - low threshold to restart if febrile   - (Note that 1 dose of prednisone was given today before I saw the patient, and this may induce leukocytosis, but I would not restart Abx on this basis.)  - diurese  - agree with cardiology consult and GDMT for CHF    #Possible COPD   - DuoNebs  - outpatient PFTs and pulmonary follow up    #Multiple solid pulmonary nodules in RLL up to 7 mnm  - repeat CT chest in 3 months    Pulmonary & Critical Care Medicine will sign off at this time.   Please call with any further questions or concerns.    Taiwo Reilly MD  Formerly [facility], [facility]  Department of Pulmonology  Date of Service: 10/13/2022  10:02 AM

## 2022-10-13 NOTE — H&P
[facility] - Emergency Dept    History & Physical      Patient Name: Liv Decker  MRN: [id number]  Admission Date: 10/12/2022  Attending Physician: Rafiq Garcia MD   Primary Care Provider: Liv Parrish NP         Patient information was obtained from patient and ER records.     Subjective:     Principal Problem:<principal problem not specified>    Chief Complaint:   Chief Complaint   Patient presents with    Abnormal Chest X-ray     COUGH X 5 WEEKS, SENT PER PCP FOR EVAL OF FLUID AROUND LUNGS        HPI: 67-year-old female past medical history significant for diabetes, hypertension, hyperlipidemia, PUD, COPD presents to the emergency room today for evaluation of 5 weeks progressively worsening shortness of breath.  Shortness breath is worse with activity, worse with laying down.  Shortness of breath has been occurring for the last 5 weeks.  Patient does have significant orthopnea.  No chest pain.  She presented to her primary care provider who ordered chest x-ray.  Chest x-ray shows large left-sided pleural effusion.  Of note, patient did quit smoking just before the onset of her symptoms.  She was a pack per day smoker for many years.     Ten point review systems otherwise negative.        Past Medical History:   Diagnosis Date    Anemia     Depression     Diabetes mellitus 2021    Encounter for blood transfusion     GERD (gastroesophageal reflux disease)     Hyperlipidemia     Hypertension     MI (myocardial infarction) 2007    Osteoporosis     Thyroid disease     Ulcer of the stomach and intestine     pos. for h. pylori    Weight loss 2022    food comes up        Past Surgical History:   Procedure Laterality Date    APPENDECTOMY      CHOLECYSTECTOMY      COLONOSCOPY N/A 3/18/2016    Procedure: COLONOSCOPY;  Surgeon: Rober Zelaya Jr., MD;  Location: [facility];  Service: Endoscopy;  Laterality: N/A;    ESOPHAGOGASTRODUODENOSCOPY  04/15/2016    with dil    ESOPHAGOGASTRODUODENOSCOPY  N/A 5/20/2022    Procedure: EGD (ESOPHAGOGASTRODUODENOSCOPY);  Surgeon: Justine Villegas MD;  Location: [facility];  Service: Endoscopy;  Laterality: N/A;    EYE SURGERY      as a child    TONSILLECTOMY      TUBAL LIGATION         Review of patient's allergies indicates:  No Known Allergies    Current Facility-Administered Medications on File Prior to Encounter   Medication    [COMPLETED] denosumab (PROLIA) injection 60 mg     Current Outpatient Medications on File Prior to Encounter   Medication Sig    albuterol (PROAIR HFA) 90 mcg/actuation inhaler Inhale 2 puffs into the lungs every 6 (six) hours as needed for Wheezing or Shortness of Breath. Rescue    amoxicillin-clavulanate 875-125mg (AUGMENTIN) 875-125 mg per tablet Take 1 tablet by mouth 2 (two) times daily.    aspirin-acetaminophen-caffeine 500-325-65 mg PwPk Take 1 tablet by mouth 2 (two) times a day.    atorvastatin (LIPITOR) 10 MG tablet Take 1 tablet (10 mg total) by mouth once daily.    blood sugar diagnostic Strp 1 each by In Vitro route once daily.    blood-glucose meter kit Use as instructed    calcium carbonate (OS-JOSELYN) 600 mg calcium (1,500 mg) Tab Take 600 mg by mouth once daily.    ergocalciferol (ERGOCALCIFEROL) 50,000 unit Cap Take 1 capsule (50,000 Units total) by mouth every 7 days.    EScitalopram oxalate (LEXAPRO) 20 MG tablet Take 1 tablet (20 mg total) by mouth once daily.    fluticasone-umeclidin-vilanter (TRELEGY ELLIPTA) 100-62.5-25 mcg DsDv Inhale 1 puff into the lungs once daily.    icosapent ethyL (VASCEPA) 1 gram Cap Take 2 capsules (2 g total) by mouth 2 (two) times a day.    lancets Misc 1 each by Misc.(Non-Drug; Combo Route) route once daily.    levothyroxine (SYNTHROID) 50 MCG tablet Take 1 tablet (50 mcg total) by mouth before breakfast.    lisinopriL (PRINIVIL,ZESTRIL) 20 MG tablet Take 1 tablet (20 mg total) by mouth once daily.    metFORMIN (GLUCOPHAGE) 1000 MG tablet Take 1 tablet (1,000 mg total) by mouth daily with  breakfast.    pantoprazole (PROTONIX) 40 MG tablet Take 1 tablet (40 mg total) by mouth 2 (two) times daily.    predniSONE (DELTASONE) 20 MG tablet Take 2 tablets (40 mg total) by mouth once daily. for 5 days    propranoloL (INDERAL LA) 80 MG 24 hr capsule Take 1 capsule (80 mg total) by mouth once daily.    sucralfate (CARAFATE) 1 gram tablet Take 1 tablet (1 g total) by mouth 2 (two) times daily. Take on empty stomach, do not eat or take medication for at least 1 hour    varenicline (CHANTIX STARTING MONTH BOX) 0.5 mg (11)- 1 mg (42) tablet Take one 0.5mg tab by mouth once daily X3 days,then increase to one 0.5mg tab twice daily X4 days,then increase to one 1mg tab twice daily    varenicline (CHANTIX) 1 mg Tab Take 1 tablet (1 mg total) by mouth 2 (two) times daily. Begin after completing starter brad     Family History       Problem Relation (Age of Onset)    Diabetes Father    Heart disease Mother          Tobacco Use    Smoking status: Every Day     Packs/day: 0.50     Years: 45.00     Pack years: 22.50     Types: Cigarettes     Start date: 1/1/1959    Smokeless tobacco: Never    Tobacco comments:     current 1/4ppd, prior use was 1ppd for 45 years   Substance and Sexual Activity    Alcohol use: No    Drug use: No    Sexual activity: Never     Review of Systems  Objective:     Vital Signs (Most Recent):  Temp: 97.5 °F (36.4 °C) (10/12/22 1653)  Pulse: 106 (10/12/22 1653)  Resp: 20 (10/12/22 1653)  BP: (!) 139/99 (10/12/22 1653)  SpO2: 97 % (10/12/22 1653)   Vital Signs (24h Range):  Temp:  [97.5 °F (36.4 °C)-97.9 °F (36.6 °C)] 97.5 °F (36.4 °C)  Pulse:  [] 106  Resp:  [18-20] 20  SpO2:  [96 %-97 %] 97 %  BP: (125-139)/(55-99) 139/99     Weight: 49.9 kg (110 lb)  Body mass index is 20.78 kg/m².    Physical Exam      Nursing note and vitals reviewed.  Constitutional: She appears well-developed and well-nourished.   HENT:   Head: Normocephalic and atraumatic.   Eyes: Conjunctivae are normal. Pupils are equal,  round, and reactive to light.   Neck: Neck supple.   Normal range of motion.  Cardiovascular:  Normal rate, regular rhythm and normal heart sounds.           1+ bilateral lower extremity edema   Pulmonary/Chest:   Decreased breath sounds on the left lower quadrant.  Patient has significant respiratory distress when laying flat/reclined.  This is much improved with sitting upright.  Lung sounds otherwise normal.   Abdominal: Abdomen is soft. She exhibits no distension. There is no abdominal tenderness.   Musculoskeletal:         General: Normal range of motion.      Cervical back: Normal range of motion and neck supple.      Neurological: She is alert and oriented to person, place, and time. GCS score is 15. GCS eye subscore is 4. GCS verbal subscore is 5. GCS motor subscore is 6.   Skin: Skin is warm and dry. Capillary refill takes less than 2 seconds.   Psychiatric: She has a normal mood and affect. Her behavior is normal. Judgment and thought content normal.     Significant Labs: All pertinent labs within the past 24 hours have been reviewed.    Significant Imaging: I have reviewed all pertinent imaging results/findings within the past 24 hours.    Assessment/Plan:     There are no hospital problems to display for this patient.    VTE Risk Mitigation (From admission, onward)           Ordered     heparin (porcine) injection 5,000 Units  Every 8 hours         10/12/22 2132     IP VTE HIGH RISK PATIENT  Once         10/12/22 2014     Place sequential compression device  Until discontinued         10/12/22 2014                  Shortness of breath secondary to large left-sided pleural effusion    Subacute onset of pleural effusion left-sided  Order IR thoracentesis:Will send pleural fluid for cytology, culture, AFB, fungal culture,  cell count with differential, protein, LDH, pH, glucose, albumin.  Chronic clock breathing treatments, his incentive spirometer, empiric Rocephin, azithromycin  Check PE study,  echo  Gentle IV diuresis IV 20 mg q.12 hours, assess blood pressure response, may need albumin to help with diuresis.  Pulmonology, Cardiology consult  HSQ for VTE ppx  Rest of plan per hospital course    Chronic/prior  Hypertension, COPD, HLD, type 2 diabetes, vitamin-D deficiency, peptic ulcer disease, fatty liver, tobacco abuse, recently quit  -restart PTA meds as ordered  -SSI    Rafiq Garcia MD  Department of Hospital Medicine   [facility]

## 2022-10-13 NOTE — ED PROVIDER NOTES
Encounter Date: 10/12/2022       History     Chief Complaint   Patient presents with    Abnormal Chest X-ray     COUGH X 5 WEEKS, SENT PER PCP FOR EVAL OF FLUID AROUND LUNGS     67-year-old female past medical history significant for diabetes, hypertension, hyperlipidemia, stomach ulcers, COPD presents to the emergency room today for evaluation of 5 weeks progressively worsening shortness of breath.  Shortness breath is worse with activity, worse with laying down.  Patient does have significant orthopnea.  No chest pain.  She presented to her primary care provider who ordered chest x-ray.  Chest x-ray shows large left-sided pleural effusion.  Of note, patient did quit smoking just before the onset of her symptoms.  She was a pack per day smoker for many years.    Review of patient's allergies indicates:  No Known Allergies  Past Medical History:   Diagnosis Date    Anemia     Depression     Diabetes mellitus 2021    Encounter for blood transfusion     GERD (gastroesophageal reflux disease)     Hyperlipidemia     Hypertension     MI (myocardial infarction) 2007    Osteoporosis     Thyroid disease     Ulcer of the stomach and intestine     pos. for h. pylori    Weight loss 2022    food comes up      Past Surgical History:   Procedure Laterality Date    APPENDECTOMY      CHOLECYSTECTOMY      COLONOSCOPY N/A 3/18/2016    Procedure: COLONOSCOPY;  Surgeon: Rober Zelaya Jr., MD;  Location: [facility];  Service: Endoscopy;  Laterality: N/A;    ESOPHAGOGASTRODUODENOSCOPY  04/15/2016    with dil    ESOPHAGOGASTRODUODENOSCOPY N/A 5/20/2022    Procedure: EGD (ESOPHAGOGASTRODUODENOSCOPY);  Surgeon: Justine Villegas MD;  Location: [facility];  Service: Endoscopy;  Laterality: N/A;    EYE SURGERY      as a child    TONSILLECTOMY      TUBAL LIGATION       Family History   Problem Relation Age of Onset    Heart disease Mother     Diabetes Father      Social History     Tobacco Use    Smoking status: Every Day     Packs/day: 0.50      Years: 45.00     Pack years: 22.50     Types: Cigarettes     Start date: 1/1/1959    Smokeless tobacco: Never    Tobacco comments:     current 1/4ppd, prior use was 1ppd for 45 years   Substance Use Topics    Alcohol use: No    Drug use: No     Review of Systems   Constitutional:  Negative for chills, fatigue and fever.   HENT:  Negative for congestion, hearing loss, sore throat and trouble swallowing.    Eyes:  Negative for visual disturbance.   Respiratory:  Positive for cough and shortness of breath. Negative for chest tightness.    Cardiovascular:  Negative for chest pain.   Gastrointestinal:  Negative for abdominal pain and nausea.   Endocrine: Negative for polyuria.   Genitourinary:  Negative for difficulty urinating.   Musculoskeletal:  Negative for arthralgias and myalgias.   Skin:  Negative for rash.   Neurological:  Negative for dizziness and headaches.   Psychiatric/Behavioral:  The patient is not nervous/anxious.      Physical Exam     Initial Vitals [10/12/22 1653]   BP Pulse Resp Temp SpO2   (!) 139/99 106 20 97.5 °F (36.4 °C) 97 %      MAP       --         Physical Exam    Nursing note and vitals reviewed.  Constitutional: She appears well-developed and well-nourished.   HENT:   Head: Normocephalic and atraumatic.   Eyes: Conjunctivae are normal. Pupils are equal, round, and reactive to light.   Neck: Neck supple.   Normal range of motion.  Cardiovascular:  Normal rate, regular rhythm and normal heart sounds.           1+ bilateral lower extremity edema   Pulmonary/Chest:   Decreased breath sounds on the left lower quadrant.  Patient has significant respiratory distress when laying flat/reclined.  This is much improved with sitting upright.  Lung sounds otherwise normal.   Abdominal: Abdomen is soft. She exhibits no distension. There is no abdominal tenderness.   Musculoskeletal:         General: Normal range of motion.      Cervical back: Normal range of motion and neck supple.     Neurological:  She is alert and oriented to person, place, and time. GCS score is 15. GCS eye subscore is 4. GCS verbal subscore is 5. GCS motor subscore is 6.   Skin: Skin is warm and dry. Capillary refill takes less than 2 seconds.   Psychiatric: She has a normal mood and affect. Her behavior is normal. Judgment and thought content normal.       ED Course   Procedures  Labs Reviewed   CBC W/ AUTO DIFFERENTIAL - Abnormal; Notable for the following components:       Result Value    Hemoglobin 11.0 (*)     Hematocrit 35.5 (*)     MCH 25.6 (*)     MCHC 31.0 (*)     RDW 15.7 (*)     Gran % 79.5 (*)     Lymph % 14.5 (*)     All other components within normal limits   COMPREHENSIVE METABOLIC PANEL - Abnormal; Notable for the following components:    Glucose 146 (*)     Albumin 2.9 (*)     Alkaline Phosphatase 52 (*)     All other components within normal limits   B-TYPE NATRIURETIC PEPTIDE - Abnormal; Notable for the following components:    BNP 1,134 (*)     All other components within normal limits   TROPONIN I   MAGNESIUM   SARS-COV-2 RNA AMPLIFICATION, QUAL          Imaging Results    None          Medications   furosemide injection 20 mg (has no administration in time range)     Medical Decision Making:   Initial Assessment:   Nontoxic and well-appearing.  Patient does have significant respiratory distress when reclined but much improved with sitting up  ED Management:  67-year-old female history of COPD and recently tobacco cessation, status post CABG presents emergency room for progressively worsening shortness of breath with outpatient chest x-ray that shows left-sided pleural effusion.  Patient recently quit smoking.  She does have significant shortness of breath with recline but improved with sitting up.  Labs show an elevated BNP to 1134.  Mild anemia.  No evidence of infection.  EKG normal sinus rhythm with sinus arrhythmia.  No acute ischemic changes.  Chest x-ray shows evidence of left-sided pleural effusion.  Patient  takes Goody Powder regularly.  Plan to admit to hospital medicine for evaluation and management of acute CHF.    Disposition:  Stable, admitted.    I discuss this patient case with the cosigning physician, who agrees with diagnosis and plan of care. This note was written using the assistance of a dictation program and may contain grammatical errors.                         Clinical Impression:   Final diagnoses:  [R06.02] Shortness of breath  [I50.9] Acute congestive heart failure, unspecified heart failure type (Primary)  [J90] Pleural effusion        ED Disposition Condition    Admit Stable                Keegan Hannah PA-C  10/12/22 1932       Keegan Hannah PA-C  10/12/22 1933

## 2022-10-13 NOTE — PLAN OF CARE
Problem: Oral Intake Inadequate  Goal: Improved Oral Intake  Outcome: Ongoing, Progressing  Intervention: Promote and Optimize Oral Intake  Flowsheets (Taken 10/13/2022 4780)  Oral Nutrition Promotion: calorie-dense liquids provided

## 2022-10-13 NOTE — PLAN OF CARE
[facility]  Initial Discharge Assessment       Primary Care Provider: Liv Parrish NP    Admission Diagnosis: Acute congestive heart failure, unspecified heart failure type [I50.9]    Admission Date: 10/12/2022    Discharge assessment completed with pt at bedside. Information verified as correct on facesheet. Denies HPOA, NOK patient's two daughters and one son. Reports independence in ADLs. Denies HH/HD/coumadin. DME at home is glucometer.  Discharge plan is home. Family to provide transport on discharge.       Discharge Barriers Identified: None    Payor: [employer] / Plan: [email or web address] HEALTH / Product Type: Medicare Advantage /     Extended Emergency Contact Information  Primary Emergency Contact: Angelo Vigil   [facility]  Home Phone: [phone]  Relation: Daughter  Secondary Emergency Contact: Laura Cisneros   [facility]  Home Phone: [phone]  Relation: Daughter    Discharge Plan A: Home with family  Discharge Plan B: Home with family, Home Health      [id number] - [facility] [address] AT [address] & [facility]  [address]  [facility] [address]  Phone: [phone] Fax: [phone]      Initial Assessment (most recent)       Adult Discharge Assessment - 10/13/22 1150          Discharge Assessment    Assessment Type Discharge Planning Assessment     Confirmed/corrected address, phone number and insurance Yes     Confirmed Demographics Correct on Facesheet     Source of Information patient     Reason For Admission CHF     Lives With child(marlon), adult;grandchild(marlon)     Facility Arrived From: home     Do you expect to return to your current living situation? Yes     Do you have help at home or someone to help you manage your care at home? Yes     Who are your caregiver(s) and their phone number(s)? family lives with patient     Prior to hospitilization cognitive status:  Alert/Oriented     Current cognitive status: Alert/Oriented     Walking or Climbing Stairs Difficulty none     Dressing/Bathing Difficulty none     Equipment Currently Used at Home glucometer     Readmission within 30 days? No     Patient currently being followed by outpatient case management? No     Do you currently have service(s) that help you manage your care at home? No     Do you take prescription medications? Yes     Do you have prescription coverage? Yes     Coverage PHN     Do you have any problems affording any of your prescribed medications? No     Is the patient taking medications as prescribed? yes     Who is going to help you get home at discharge? family     How do you get to doctors appointments? family or friend will provide;car, drives self     Are you on dialysis? No     Do you take coumadin? No     Discharge Plan A Home with family     Discharge Plan B Home with family;Home Health     DME Needed Upon Discharge  none     Discharge Plan discussed with: Patient     Discharge Barriers Identified None        Physical Activity    On average, how many days per week do you engage in moderate to strenuous exercise (like a brisk walk)? Patient refused     On average, how many minutes do you engage in exercise at this level? Patient refused        Financial Resource Strain    How hard is it for you to pay for the very basics like food, housing, medical care, and heating? Patient refused        Housing Stability    In the last 12 months, was there a time when you were not able to pay the mortgage or rent on time? Patient refused     In the last 12 months, was there a time when you did not have a steady place to sleep or slept in a shelter (including now)? Patient refused        Transportation Needs    In the past 12 months, has lack of transportation kept you from medical appointments or from getting medications? Patient refused     In the past 12 months, has lack of transportation kept you from meetings,  work, or from getting things needed for daily living? Patient refused        Food Insecurity    Within the past 12 months, you worried that your food would run out before you got the money to buy more. Patient refused     Within the past 12 months, the food you bought just didn't last and you didn't have money to get more. Patient refused        Stress    Do you feel stress - tense, restless, nervous, or anxious, or unable to sleep at night because your mind is troubled all the time - these days? Patient refused        Social Connections    In a typical week, how many times do you talk on the phone with family, friends, or neighbors? Patient refused     How often do you get together with friends or relatives? Patient refused     How often do you attend Orthodox or Orthodoxy services? Patient refused     Do you belong to any clubs or organizations such as Orthodox groups, unions, fraternal or athletic groups, or school groups? Patient refused     How often do you attend meetings of the clubs or organizations you belong to? Patient refused     Are you , , , , never , or living with a partner? Patient refused        Alcohol Use    Q1: How often do you have a drink containing alcohol? Patient refused     Q2: How many drinks containing alcohol do you have on a typical day when you are drinking? Patient refused     Q3: How often do you have six or more drinks on one occasion? Patient refused

## 2022-10-13 NOTE — CONSULTS
[facility]  Department of Cardiology  Consult Note      PATIENT NAME: Liv Decker  MRN: [id number]  TODAY'S DATE: 10/13/2022  ADMIT DATE: 10/12/2022                          CONSULT REQUESTED BY: Tremayne Adan MD    SUBJECTIVE     PRINCIPAL PROBLEM: <principal problem not specified>      REASON FOR CONSULT:  Hospitalist HPI: 67-year-old female past medical history significant for diabetes, hypertension, hyperlipidemia, PUD, COPD presents to the emergency room today for evaluation of 5 weeks progressively worsening shortness of breath.  Shortness breath is worse with activity, worse with laying down.  Shortness of breath has been occurring for the last 5 patient   Patient does have significant orthopnea.  No chest pain.  She presented to her primary care provider who ordered chest x-ray.  Chest x-ray shows large left-sided pleural effusion.  Of note, patient did quit smoking just before the onset of her symptoms.  She was a pack per day smoker for many years.       HPI:  Patient is 67 year white female who reported to the ER due to complaints of progressive shortness of breath, orthopnea, dyspnea on exertion for the past 5 weeks.  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  She went to her primary care provider who obtained a chest x-ray which showed a large left pleural effusion.  Patient has recently stopped smoking.  BNP was found to be 1134 in the ER.  Echocardiogram has been ordered and done, results pending read by cardiologist.      Reports history of myocardial infarction approximately 15 years ago.  She states she had an angiogram at that time which showed no blockages.    Other past medical history includes:    Hypertension, hyperlipidemia, COPD, thyroid disease        Review of patient's allergies indicates:  No Known Allergies    Past Medical History:   Diagnosis Date    Anemia     Depression     Diabetes mellitus 2021    Encounter for blood transfusion     GERD  (gastroesophageal reflux disease)     Hyperlipidemia     Hypertension     MI (myocardial infarction) 2007    Osteoporosis     Thyroid disease     Ulcer of the stomach and intestine     pos. for h. pylori    Weight loss 2022    food comes up      Past Surgical History:   Procedure Laterality Date    APPENDECTOMY      CHOLECYSTECTOMY      COLONOSCOPY N/A 3/18/2016    Procedure: COLONOSCOPY;  Surgeon: Rober Zelaya Jr., MD;  Location: [facility] ENDO;  Service: Endoscopy;  Laterality: N/A;    ESOPHAGOGASTRODUODENOSCOPY  04/15/2016    with dil    ESOPHAGOGASTRODUODENOSCOPY N/A 5/20/2022    Procedure: EGD (ESOPHAGOGASTRODUODENOSCOPY);  Surgeon: Justine Villegas MD;  Location: [facility] ENDO;  Service: Endoscopy;  Laterality: N/A;    EYE SURGERY      as a child    TONSILLECTOMY      TUBAL LIGATION       Social History     Tobacco Use    Smoking status: Every Day     Packs/day: 0.50     Years: 45.00     Pack years: 22.50     Types: Cigarettes     Start date: 1/1/1959    Smokeless tobacco: Never    Tobacco comments:     current 1/4ppd, prior use was 1ppd for 45 years   Substance Use Topics    Alcohol use: No    Drug use: No        REVIEW OF SYSTEMS  CONSTITUTIONAL: Negative for chills, fatigue and fever.   EYES: No double vision, No blurred vision  NEURO: No headaches, No dizziness  RESPIRATORY:  Positive for shortness of breath CARDIOVASCULAR:  Positive for orthopnea, dyspnea on exertion, Negative for chest pain,palpitations and leg swelling.   GI: Negative for abdominal pain, No melena, diarrhea, nausea and vomiting.   : Negative for dysuria and frequency, Negative for hematuria  MUSCULOSKELETAL: Negative for neck pain, Negative for muscle weakness, Negative for back pain     OBJECTIVE     VITAL SIGNS (Most Recent)  Temp: 97 °F (36.1 °C) (10/13/22 1200)  Pulse: 66 (10/13/22 1100)  Resp: (!) 38 (10/13/22 1100)  BP: 132/62 (10/13/22 1100)  SpO2: 97 % (10/13/22 1100)    VENTILATION STATUS  Resp: (!) 38 (10/13/22 1100)  SpO2: 97 %  (10/13/22 1100)       I & O (Last 24H):  Intake/Output Summary (Last 24 hours) at 10/13/2022 1621  Last data filed at 10/13/2022 1235  Gross per 24 hour   Intake 50 ml   Output 1600 ml   Net -1550 ml       WEIGHTS  Wt Readings from Last 3 Encounters:   10/13/22 0250 48.8 kg (107 lb 9.4 oz)   10/12/22 1653 49.9 kg (110 lb)   10/13/22 0918 48.5 kg (107 lb)   10/12/22 0807 51.3 kg (113 lb)       PHYSICAL EXAM  GENERAL: well built, well nourished, well-developed female resting in bed in no apparent distress alert and oriented.   HEENT: Normocephalic. Pupils normal and conjunctivae normal.  Mucous membranes normal, no cyanosis or icterus, trachea central,no pallor or icterus is noted..   NECK: No JVD. No bruit..    CARDIAC: Regular rate and rhythm.  Systolic murmur audible.  Normal sinus rhythm with PACs 2 telemetry  CHEST ANATOMY: normal.   LUNGS:  Breathing is nonlabored sitting up in bed.  Crackles audible bilateral bases.     ABDOMEN:  Abdomen  Soft with no masses or organomegaly.  No abdomen pulsations or bruits.  Normal bowel sounds.  URINARY: No lentz catheter   EXTREMITIES: No cyanosis, clubbing or edema noted at this time., no calf tenderness bilaterally.   PERIPHERAL VASCULAR SYSTEM: Good palpable distal pulses.  MUSCLE STRENGTH & TONE: No noteable weakness, atrophy or abnormal movement.       Telemetry-normal sinus rhythm with PACs      HOME MEDICATIONS:  No current facility-administered medications on file prior to encounter.     Current Outpatient Medications on File Prior to Encounter   Medication Sig Dispense Refill    atorvastatin (LIPITOR) 10 MG tablet Take 1 tablet (10 mg total) by mouth once daily. 90 tablet 1    blood sugar diagnostic Strp 1 each by In Vitro route once daily. 100 each 3    calcium carbonate (OS-JOSELYN) 600 mg calcium (1,500 mg) Tab Take 600 mg by mouth once daily.      EScitalopram oxalate (LEXAPRO) 20 MG tablet Take 1 tablet (20 mg total) by mouth once daily. 90 tablet 1     fluticasone-umeclidin-vilanter (TRELEGY ELLIPTA) 100-62.5-25 mcg DsDv Inhale 1 puff into the lungs once daily. 60 each 5    icosapent ethyL (VASCEPA) 1 gram Cap Take 2 capsules (2 g total) by mouth 2 (two) times a day. 360 capsule 1    levothyroxine (SYNTHROID) 50 MCG tablet Take 1 tablet (50 mcg total) by mouth before breakfast. 90 tablet 1    metFORMIN (GLUCOPHAGE) 1000 MG tablet Take 1 tablet (1,000 mg total) by mouth daily with breakfast. 90 tablet 1    pantoprazole (PROTONIX) 40 MG tablet Take 1 tablet (40 mg total) by mouth 2 (two) times daily. 180 tablet 1    propranoloL (INDERAL LA) 80 MG 24 hr capsule Take 1 capsule (80 mg total) by mouth once daily. 90 capsule 1    albuterol (PROAIR HFA) 90 mcg/actuation inhaler Inhale 2 puffs into the lungs every 6 (six) hours as needed for Wheezing or Shortness of Breath. Rescue 18 g 5    amoxicillin-clavulanate 875-125mg (AUGMENTIN) 875-125 mg per tablet Take 1 tablet by mouth 2 (two) times daily. 14 tablet 0    aspirin-acetaminophen-caffeine 500-325-65 mg PwPk Take 1 tablet by mouth 2 (two) times a day.      blood-glucose meter kit Use as instructed 1 each 0    ergocalciferol (ERGOCALCIFEROL) 50,000 unit Cap Take 1 capsule (50,000 Units total) by mouth every 7 days. (Patient taking differently: Take 50,000 Units by mouth every Saturday.) 12 capsule 1    lancets Misc 1 each by Misc.(Non-Drug; Combo Route) route once daily. 100 each 3    lisinopriL (PRINIVIL,ZESTRIL) 20 MG tablet Take 1 tablet (20 mg total) by mouth once daily. 90 tablet 1    predniSONE (DELTASONE) 20 MG tablet Take 2 tablets (40 mg total) by mouth once daily. for 5 days 10 tablet 0    sucralfate (CARAFATE) 1 gram tablet Take 1 tablet (1 g total) by mouth 2 (two) times daily. Take on empty stomach, do not eat or take medication for at least 1 hour 60 tablet 2    varenicline (CHANTIX STARTING MONTH BOX) 0.5 mg (11)- 1 mg (42) tablet Take one 0.5mg tab by mouth once daily X3 days,then increase to one 0.5mg  tab twice daily X4 days,then increase to one 1mg tab twice daily 1 each 0    varenicline (CHANTIX) 1 mg Tab Take 1 tablet (1 mg total) by mouth 2 (two) times daily. Begin after completing starter brad 60 tablet 3       SCHEDULED MEDS:   atorvastatin  10 mg Oral QHS    EScitalopram oxalate  20 mg Oral Daily    [START ON 10/14/2022] furosemide (LASIX) injection  20 mg Intravenous Daily    heparin (porcine)  5,000 Units Subcutaneous Q8H    levothyroxine  50 mcg Oral Before breakfast    pantoprazole  40 mg Oral BID    propranoloL  40 mg Oral BID    sucralfate  1 g Oral BID       CONTINUOUS INFUSIONS:    PRN MEDS:albuterol-ipratropium, calcium gluconate IVPB, calcium gluconate IVPB, calcium gluconate IVPB, magnesium sulfate IVPB, magnesium sulfate IVPB, ondansetron, sodium chloride 0.9%, sodium chloride 0.9%, sodium phosphate IVPB, sodium phosphate IVPB, sodium phosphate IVPB    LABS AND DIAGNOSTICS     CBC LAST 3 DAYS  Recent Labs   Lab 10/12/22  1754   WBC 8.76   RBC 4.30   HGB 11.0*   HCT 35.5*   MCV 83   MCH 25.6*   MCHC 31.0*   RDW 15.7*      MPV 10.4   GRAN 79.5*  7.0   LYMPH 14.5*  1.3   MONO 5.5  0.5   BASO 0.01   NRBC 0       COAGULATION LAST 3 DAYS  Recent Labs   Lab 10/13/22  0627   LABPT 14.5*   INR 1.2   APTT 31.1       CHEMISTRY LAST 3 DAYS  Recent Labs   Lab 10/10/22  0729 10/12/22  1754 10/12/22  2159 10/13/22  0627    136  --  133*   K 4.2 4.4  --  4.0    99  --  95   CO2 30 26  --  31*   ANIONGAP  --  11  --  7*   BUN 13 15  --  15   CREATININE 0.60 0.6  --  0.6   * 146*  --  98   CALCIUM 8.4* 9.2  --  8.9   PH  --   --  7.486*  --    MG  --  1.6  --  1.5*   ALBUMIN 2.9* 2.9*  --  2.8*   PROT 6.2 7.3  --  6.9   ALKPHOS  --  52*  --  49*   ALT 7 12  --  10   AST 9* 19  --  13   BILITOT 0.3 0.5  --  0.6       CARDIAC PROFILE LAST 3 DAYS  Recent Labs   Lab 10/12/22  1754 10/13/22  0627   BNP 1,134*  --    LDH  --  137   TROPONINI <0.030  --        ENDOCRINE LAST 3 DAYS  No  results for input(s): TSH, PROCAL in the last 168 hours.    LAST ARTERIAL BLOOD GAS  ABG  Recent Labs   Lab 10/12/22  2159   PH 7.486*   PO2 78*   PCO2 38.8   HCO3 29.3*   BE 6       LAST 7 DAYS MICROBIOLOGY   Microbiology Results (last 7 days)       Procedure Component Value Units Date/Time    Culture, Respiratory with Gram Stain [[id number]]     Order Status: No result Specimen: Respiratory     Gram stain [[id number]]     Order Status: Canceled Specimen: Body Fluid from Pleural Fluid     AFB culture (includes stain) [[id number]]     Order Status: Canceled Specimen: Body Fluid from Pleural Fluid     Fungus culture [[id number]]     Order Status: Canceled Specimen: Body Fluid from Pleural Fluid     Aerobic culture [[id number]]     Order Status: Canceled Specimen: Pleural Fluid     Culture, Anaerobic [[id number]]     Order Status: Canceled Specimen: Pleural Fluid     Gram stain [[id number]]     Order Status: Canceled Specimen: Pleural Fluid     AFB Culture & Smear [[id number]]     Order Status: Canceled Specimen: Pleural Fluid     Fungus culture [[id number]]     Order Status: Canceled Specimen: Pleural Fluid             MOST RECENT IMAGING  Echo  · The left ventricle is normal in size with normal systolic function.  · The estimated ejection fraction is 60%.  · Grade II left ventricular diastolic dysfunction.  · Normal right ventricular size with normal right ventricular systolic   function.  · Mild left atrial enlargement.  · Mild right atrial enlargement.  · Moderate-to-severe mitral regurgitation.  · Moderate to severe tricuspid regurgitation.  · There is moderate-to-severe aortic valve stenosis.  · Aortic valve area is 0.92 cm2; peak velocity is m/s; mean gradient is 21   mmHg.  · Mild-to-moderate aortic regurgitation.  · Intermediate central venous pressure (8 mmHg).  · There is pulmonary hypertension.  · The estimated PA systolic pressure is 53 mmHg.         ECHOCARDIOGRAM RESULTS (last 5)  Results for orders placed  during the hospital encounter of 10/12/22    Echo    Interpretation Summary  · The left ventricle is normal in size with normal systolic function.  · The estimated ejection fraction is 60%.  · Grade II left ventricular diastolic dysfunction.  · Normal right ventricular size with normal right ventricular systolic function.  · Mild left atrial enlargement.  · Mild right atrial enlargement.  · Moderate-to-severe mitral regurgitation.  · Moderate to severe tricuspid regurgitation.  · There is moderate-to-severe aortic valve stenosis.  · Aortic valve area is 0.92 cm2; peak velocity is m/s; mean gradient is 21 mmHg.  · Mild-to-moderate aortic regurgitation.  · Intermediate central venous pressure (8 mmHg).  · There is pulmonary hypertension.  · The estimated PA systolic pressure is 53 mmHg.      CURRENT/PREVIOUS VISIT EKG  Results for orders placed or performed during the hospital encounter of 10/12/22   EKG 12-lead    Collection Time: 10/13/22 10:07 AM    Narrative    Test Reason : I49.9,    Vent. Rate : 079 BPM     Atrial Rate : 079 BPM     P-R Int : 152 ms          QRS Dur : 084 ms      QT Int : 390 ms       P-R-T Axes : 070 076 075 degrees     QTc Int : 447 ms    Sinus rhythm with Premature atrial complexes with Aberrant conduction  Minimal voltage criteria for LVH, may be normal variant ( Gustavo product )  Borderline Abnormal ECG  When compared with ECG of 12-OCT-2022 19:24,  Aberrant conduction is now Present    Referred By: CORTES   SELF           Confirmed By:            ASSESSMENT/PLAN:     There are no active hospital problems to display for this patient.      ASSESSMENT & PLAN:   Congestive heart failure  Pulmonary hypertension   Bilateral pleural effusions  Pulmonary nodules RLL  Hypertension  COPD  Type 2 diabetes  Hyperlipidemia- on statin therapy  Hypothyroidism-on levothyroxine  Hypomagnesemia-replete per protocol        RECOMMENDATIONS:  1. Agree with gentle diuresis with Lasix 20 mg IV q.12 hours.   Monitor electrolytes in the morning. Continue to check and replace potassium and magnesium. Goal for potassium is 4.0, and goal for magnesium is 2.0.   2. Echocardiogram today  3. Blood pressure appears to be well controlled at this time.   4. Strict I/Os, daily weight.       Gill Maravilla NP  [facility]  Department of Cardiology  Date of Service: 10/13/2022

## 2022-10-13 NOTE — ASSESSMENT & PLAN NOTE
Patient's FSGs are controlled on current medication regimen.  Last A1c reviewed-   Lab Results   Component Value Date    HGBA1C 5.6 10/10/2022     Most recent fingerstick glucose reviewed- No results for input(s): POCTGLUCOSE in the last 24 hours.  Current correctional scale  Medium  Maintain anti-hyperglycemic dose as follows-   Antihyperglycemics (From admission, onward)    None        Hold Oral hypoglycemics while patient is in the hospital.

## 2022-10-14 LAB
ALBUMIN SERPL BCP-MCNC: 2.7 G/DL (ref 3.5–5.2)
ALP SERPL-CCNC: 46 U/L (ref 55–135)
ALT SERPL W/O P-5'-P-CCNC: 13 U/L (ref 10–44)
ANION GAP SERPL CALC-SCNC: 6 MMOL/L (ref 8–16)
AST SERPL-CCNC: 10 U/L (ref 10–40)
BILIRUB SERPL-MCNC: 0.7 MG/DL (ref 0.1–1)
BUN SERPL-MCNC: 18 MG/DL (ref 8–23)
CALCIUM SERPL-MCNC: 8.5 MG/DL (ref 8.7–10.5)
CHLORIDE SERPL-SCNC: 94 MMOL/L (ref 95–110)
CO2 SERPL-SCNC: 35 MMOL/L (ref 23–29)
CREAT SERPL-MCNC: 0.7 MG/DL (ref 0.5–1.4)
EST. GFR  (NO RACE VARIABLE): >60 ML/MIN/1.73 M^2
GLUCOSE SERPL-MCNC: 122 MG/DL (ref 70–110)
GLUCOSE SERPL-MCNC: 127 MG/DL (ref 70–110)
GLUCOSE SERPL-MCNC: 127 MG/DL (ref 70–110)
GLUCOSE SERPL-MCNC: 130 MG/DL (ref 70–110)
MAGNESIUM SERPL-MCNC: 1.9 MG/DL (ref 1.6–2.6)
POTASSIUM SERPL-SCNC: 4 MMOL/L (ref 3.5–5.1)
PROT SERPL-MCNC: 6.7 G/DL (ref 6–8.4)
SODIUM SERPL-SCNC: 135 MMOL/L (ref 136–145)

## 2022-10-14 PROCEDURE — 21400001 HC TELEMETRY ROOM

## 2022-10-14 PROCEDURE — 63600175 PHARM REV CODE 636 W HCPCS: Performed by: INTERNAL MEDICINE

## 2022-10-14 PROCEDURE — 99232 SBSQ HOSP IP/OBS MODERATE 35: CPT | Mod: ,,, | Performed by: INTERNAL MEDICINE

## 2022-10-14 PROCEDURE — 99900035 HC TECH TIME PER 15 MIN (STAT)

## 2022-10-14 PROCEDURE — 99232 PR SUBSEQUENT HOSPITAL CARE,LEVL II: ICD-10-PCS | Mod: ,,, | Performed by: INTERNAL MEDICINE

## 2022-10-14 PROCEDURE — 99900031 HC PATIENT EDUCATION (STAT)

## 2022-10-14 PROCEDURE — 94761 N-INVAS EAR/PLS OXIMETRY MLT: CPT

## 2022-10-14 PROCEDURE — 80053 COMPREHEN METABOLIC PANEL: CPT | Performed by: INTERNAL MEDICINE

## 2022-10-14 PROCEDURE — 83735 ASSAY OF MAGNESIUM: CPT | Performed by: INTERNAL MEDICINE

## 2022-10-14 PROCEDURE — 25000003 PHARM REV CODE 250: Performed by: STUDENT IN AN ORGANIZED HEALTH CARE EDUCATION/TRAINING PROGRAM

## 2022-10-14 PROCEDURE — 25000003 PHARM REV CODE 250: Performed by: INTERNAL MEDICINE

## 2022-10-14 PROCEDURE — 36415 COLL VENOUS BLD VENIPUNCTURE: CPT | Performed by: INTERNAL MEDICINE

## 2022-10-14 RX ADMIN — HEPARIN SODIUM 5000 UNITS: 5000 INJECTION, SOLUTION INTRAVENOUS; SUBCUTANEOUS at 09:10

## 2022-10-14 RX ADMIN — VARENICLINE 1 MG: 0.5 TABLET, FILM COATED ORAL at 08:10

## 2022-10-14 RX ADMIN — HEPARIN SODIUM 5000 UNITS: 5000 INJECTION, SOLUTION INTRAVENOUS; SUBCUTANEOUS at 05:10

## 2022-10-14 RX ADMIN — HEPARIN SODIUM 5000 UNITS: 5000 INJECTION, SOLUTION INTRAVENOUS; SUBCUTANEOUS at 02:10

## 2022-10-14 RX ADMIN — SUCRALFATE 1 G: 1 TABLET ORAL at 09:10

## 2022-10-14 RX ADMIN — PANTOPRAZOLE SODIUM 40 MG: 40 TABLET, DELAYED RELEASE ORAL at 05:10

## 2022-10-14 RX ADMIN — FUROSEMIDE 20 MG: 10 INJECTION, SOLUTION INTRAMUSCULAR; INTRAVENOUS at 08:10

## 2022-10-14 RX ADMIN — LEVOTHYROXINE SODIUM 50 MCG: 0.03 TABLET ORAL at 05:10

## 2022-10-14 RX ADMIN — PROPRANOLOL HYDROCHLORIDE 40 MG: 10 TABLET ORAL at 09:10

## 2022-10-14 RX ADMIN — PROPRANOLOL HYDROCHLORIDE 40 MG: 10 TABLET ORAL at 08:10

## 2022-10-14 RX ADMIN — SUCRALFATE 1 G: 1 TABLET ORAL at 08:10

## 2022-10-14 RX ADMIN — ESCITALOPRAM OXALATE 20 MG: 10 TABLET ORAL at 08:10

## 2022-10-14 RX ADMIN — ATORVASTATIN CALCIUM 10 MG: 10 TABLET, FILM COATED ORAL at 09:10

## 2022-10-14 RX ADMIN — VARENICLINE 1 MG: 0.5 TABLET, FILM COATED ORAL at 09:10

## 2022-10-14 NOTE — PROGRESS NOTES
[facility]  Department of Cardiology  Progress Note    PATIENT NAME: Liv Deckre  MRN: [id number]  TODAY'S DATE: 10/14/2022  ADMIT DATE: 10/12/2022    SUBJECTIVE     PRINCIPLE PROBLEM: Severe aortic stenosis    INTERVAL HISTORY:    10/14/2022  Sitting up in bed, states she feels better.  Denies shortness of breath, chest pain.  Patient has diuresed over 2 L in the past 24 hours.      REASON FOR CONSULT:  Hospitalist HPI: 67-year-old female past medical history significant for diabetes, hypertension, hyperlipidemia, PUD, COPD presents to the emergency room today for evaluation of 5 weeks progressively worsening shortness of breath.  Shortness breath is worse with activity, worse with laying down.  Shortness of breath has been occurring for the last 5 patient   Patient does have significant orthopnea.  No chest pain.  She presented to her primary care provider who ordered chest x-ray.  Chest x-ray shows large left-sided pleural effusion.  Of note, patient did quit smoking just before the onset of her symptoms.  She was a pack per day smoker for many years.         HPI:  Patient is 67 year white female who reported to the ER due to complaints of progressive shortness of breath, orthopnea, dyspnea on exertion for the past 5 weeks.  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  She went to her primary care provider who obtained a chest x-ray which showed a large left pleural effusion.  Patient has recently stopped smoking.  BNP was found to be 1134 in the ER.  Echocardiogram has been ordered and done, results pending read by cardiologist.       Reports history of myocardial infarction approximately 15 years ago.  She states she had an angiogram at that time which showed no blockages.     Other past medical history includes:    Hypertension, hyperlipidemia, COPD, thyroid disease       Review of patient's allergies indicates:  No Known Allergies    REVIEW OF SYSTEMS  CARDIOVASCULAR: No recent  chest pain, palpitations, arm, neck, or jaw pain  RESPIRATORY: No recent fever, cough chills, SOB or congestion  : No blood in the urine  GI: No Nausea, vomiting, constipation, diarrhea, blood, or reflux.  MUSCULOSKELETAL: No myalgias  NEURO: No lightheadedness or dizziness       OBJECTIVE     VITAL SIGNS (Most Recent)  Temp: 97.7 °F (36.5 °C) (10/14/22 0710)  Pulse: 66 (10/14/22 0839)  Resp: (!) 23 (10/14/22 0710)  BP: 135/63 (10/14/22 0839)  SpO2: (!) 93 % (10/14/22 0710)    VENTILATION STATUS  Resp: (!) 23 (10/14/22 0710)  SpO2: (!) 93 % (10/14/22 0710)       I & O (Last 24H):  Intake/Output Summary (Last 24 hours) at 10/14/2022 0906  Last data filed at 10/13/2022 1938  Gross per 24 hour   Intake 650 ml   Output 1700 ml   Net -1050 ml       WEIGHTS  Wt Readings from Last 3 Encounters:   10/14/22 0344 49.9 kg (110 lb 0.2 oz)   10/13/22 0250 48.8 kg (107 lb 9.4 oz)   10/12/22 1653 49.9 kg (110 lb)   10/13/22 0918 48.5 kg (107 lb)   10/12/22 0807 51.3 kg (113 lb)       PHYSICAL EXAM  CONSTITUTIONAL: Well built, well nourished female resting in bed in no apparent distress  LUNGS: CTA  CHEST WALL: no tenderness  HEART: regular rate and rhythm, audible systolic murmur,   ABDOMEN: soft, non-tender; bowel sounds normal; no masses,  no organomegaly  EXTREMITIES: Extremities normal, no edema  NEURO: AAO X 3    SCHEDULED MEDS:   atorvastatin  10 mg Oral QHS    EScitalopram oxalate  20 mg Oral Daily    furosemide (LASIX) injection  20 mg Intravenous Daily    heparin (porcine)  5,000 Units Subcutaneous Q8H    levothyroxine  50 mcg Oral Before breakfast    pantoprazole  40 mg Oral BID    propranoloL  40 mg Oral BID    sucralfate  1 g Oral BID    varenicline  1 mg Oral BID       CONTINUOUS INFUSIONS:    PRN MEDS:albuterol-ipratropium, calcium gluconate IVPB, calcium gluconate IVPB, calcium gluconate IVPB, magnesium sulfate IVPB, magnesium sulfate IVPB, ondansetron, sodium chloride 0.9%, sodium chloride 0.9%, sodium phosphate  IVPB, sodium phosphate IVPB, sodium phosphate IVPB    LABS AND DIAGNOSTICS     CBC LAST 3 DAYS  Recent Labs   Lab 10/12/22  1754   WBC 8.76   RBC 4.30   HGB 11.0*   HCT 35.5*   MCV 83   MCH 25.6*   MCHC 31.0*   RDW 15.7*      MPV 10.4   GRAN 79.5*  7.0   LYMPH 14.5*  1.3   MONO 5.5  0.5   BASO 0.01   NRBC 0       COAGULATION LAST 3 DAYS  Recent Labs   Lab 10/13/22  0627   LABPT 14.5*   INR 1.2   APTT 31.1       CHEMISTRY LAST 3 DAYS  Recent Labs   Lab 10/12/22  1754 10/12/22  2159 10/13/22  0627 10/14/22  0550     --  133* 135*   K 4.4  --  4.0 4.0   CL 99  --  95 94*   CO2 26  --  31* 35*   ANIONGAP 11  --  7* 6*   BUN 15  --  15 18   CREATININE 0.6  --  0.6 0.7   *  --  98 127*   CALCIUM 9.2  --  8.9 8.5*   PH  --  7.486*  --   --    MG 1.6  --  1.5* 1.9   ALBUMIN 2.9*  --  2.8* 2.7*   PROT 7.3  --  6.9 6.7   ALKPHOS 52*  --  49* 46*   ALT 12  --  10 13   AST 19  --  13 10   BILITOT 0.5  --  0.6 0.7       CARDIAC PROFILE LAST 3 DAYS  Recent Labs   Lab 10/12/22  1754 10/13/22  0627   BNP 1,134*  --    LDH  --  137   TROPONINI <0.030  --        ENDOCRINE LAST 3 DAYS  No results for input(s): TSH, PROCAL in the last 168 hours.    LAST ARTERIAL BLOOD GAS  ABG  Recent Labs   Lab 10/12/22  2159   PH 7.486*   PO2 78*   PCO2 38.8   HCO3 29.3*   BE 6       LAST 7 DAYS MICROBIOLOGY   Microbiology Results (last 7 days)       Procedure Component Value Units Date/Time    Culture, Respiratory with Gram Stain [[id number]]     Order Status: No result Specimen: Respiratory     Gram stain [[id number]]     Order Status: Canceled Specimen: Body Fluid from Pleural Fluid     AFB culture (includes stain) [[id number]]     Order Status: Canceled Specimen: Body Fluid from Pleural Fluid     Fungus culture [[id number]]     Order Status: Canceled Specimen: Body Fluid from Pleural Fluid     Aerobic culture [[id number]]     Order Status: Canceled Specimen: Pleural Fluid     Culture, Anaerobic [[id number]]     Order Status:  Canceled Specimen: Pleural Fluid     Gram stain [[id number]]     Order Status: Canceled Specimen: Pleural Fluid     AFB Culture & Smear [[id number]]     Order Status: Canceled Specimen: Pleural Fluid     Fungus culture [[id number]]     Order Status: Canceled Specimen: Pleural Fluid             MOST RECENT IMAGING  Echo  · The left ventricle is normal in size with normal systolic function.  · The estimated ejection fraction is 60%.  · Grade II left ventricular diastolic dysfunction.  · Normal right ventricular size with normal right ventricular systolic   function.  · Mild left atrial enlargement.  · Mild right atrial enlargement.  · Moderate-to-severe mitral regurgitation.  · Moderate to severe tricuspid regurgitation.  · There is moderate-to-severe aortic valve stenosis.  · Aortic valve area is 0.92 cm2; peak velocity is m/s; mean gradient is 21   mmHg.  · Mild-to-moderate aortic regurgitation.  · Intermediate central venous pressure (8 mmHg).  · There is pulmonary hypertension.  · The estimated PA systolic pressure is 53 mmHg.         [facility]  Results for orders placed during the hospital encounter of 10/12/22    Echo    Interpretation Summary  · The left ventricle is normal in size with normal systolic function.  · The estimated ejection fraction is 60%.  · Grade II left ventricular diastolic dysfunction.  · Normal right ventricular size with normal right ventricular systolic function.  · Mild left atrial enlargement.  · Mild right atrial enlargement.  · Moderate-to-severe mitral regurgitation.  · Moderate to severe tricuspid regurgitation.  · There is moderate-to-severe aortic valve stenosis.  · Aortic valve area is 0.92 cm2; 2.59 peak velocity is m/s; mean gradient is 21 mmHg.  · Mild-to-moderate aortic regurgitation.  · Intermediate central venous pressure (8 mmHg).  · There is pulmonary hypertension.  · The estimated PA systolic pressure is 53 mmHg.      CURRENT/PREVIOUS VISIT EKG  Results for orders placed or  performed during the hospital encounter of 10/12/22   EKG 12-lead    Collection Time: 10/13/22 10:07 AM    Narrative    Test Reason : I49.9,    Vent. Rate : 079 BPM     Atrial Rate : 079 BPM     P-R Int : 152 ms          QRS Dur : 084 ms      QT Int : 390 ms       P-R-T Axes : 070 076 075 degrees     QTc Int : 447 ms    Sinus rhythm with Premature atrial complexes with Aberrant conduction  Minimal voltage criteria for LVH, may be normal variant ( [place] product )  Borderline Abnormal ECG  When compared with ECG of 12-OCT-2022 19:24,  Aberrant conduction is now Present    Referred By: [email or web address]   SELF           Confirmed By:        ASSESSMENT/PLAN:     Active Hospital Problems    Diagnosis    *Severe aortic stenosis    Acute diastolic congestive heart failure    Dyslipidemia    Acquired hypothyroidism    HTN (hypertension)    Tobacco abuse    Type 2 diabetes mellitus       ASSESSMENT & PLAN:   Congestive heart failure  Aortic stenosis peak velocity of 2.59 m/sec will monitor for now.  Usually valve replacement is at 4.0 m/sec  Pulmonary hypertension   Bilateral pleural effusions  Pulmonary nodules RLL  Hypertension  COPD  Type 2 diabetes  Hyperlipidemia- on statin therapy  Hypothyroidism-on levothyroxine  Hypomagnesemia-replete per protocol      RECOMMENDATIONS:  1. Obtain chest x-ray in the morning to follow up on pleural effusion   2. Patient has aortic insufficiency/severe aortic stenosis.  3. Continue to check and replace potassium and magnesium. Goal for potassium is 4.0, and goal for magnesium is 2.0.    4. Strict I&Os, daily weight.  5. Blood pressure, heart rate have been well controlled in the past 24 hours.      Gill Maravilla NP  [facility]  Department of Cardiology  Date of Service: 10/14/2022

## 2022-10-14 NOTE — PLAN OF CARE
Problem: Adult Inpatient Plan of Care  Goal: Plan of Care Review  Outcome: Ongoing, Progressing  Goal: Patient-Specific Goal (Individualized)  Outcome: Ongoing, Progressing  Goal: Absence of Hospital-Acquired Illness or Injury  Outcome: Ongoing, Progressing  Goal: Optimal Comfort and Wellbeing  Outcome: Ongoing, Progressing  Goal: Readiness for Transition of Care  Outcome: Ongoing, Progressing     Problem: Diabetes Comorbidity  Goal: Blood Glucose Level Within Targeted Range  Outcome: Ongoing, Progressing     Problem: Oral Intake Inadequate  Goal: Improved Oral Intake  Outcome: Ongoing, Progressing

## 2022-10-14 NOTE — PLAN OF CARE
10/14/22 1129   Patient Assessment/Suction   Level of Consciousness (AVPU) alert   Respiratory Effort Normal;Unlabored   All Lung Fields Breath Sounds clear   PRE-TX-O2   O2 Device (Oxygen Therapy) room air   SpO2 95 %   Pulse Oximetry Type Continuous   $ Pulse Oximetry - Multiple Charge Pulse Oximetry - Multiple   Pulse 75   Resp 20   Aerosol Therapy   $ Aerosol Therapy Charges PRN treatment not required   Respiratory Treatment Status (SVN) PRN treatment not required   Education   $ Education Bronchodilator;15 min   Respiratory Evaluation   $ Care Plan Tech Time 15 min

## 2022-10-15 VITALS
OXYGEN SATURATION: 95 % | RESPIRATION RATE: 17 BRPM | HEART RATE: 60 BPM | TEMPERATURE: 98 F | WEIGHT: 105 LBS | DIASTOLIC BLOOD PRESSURE: 65 MMHG | HEIGHT: 61 IN | SYSTOLIC BLOOD PRESSURE: 124 MMHG | BODY MASS INDEX: 19.83 KG/M2

## 2022-10-15 LAB
ALBUMIN SERPL BCP-MCNC: 2.7 G/DL (ref 3.5–5.2)
ALP SERPL-CCNC: 47 U/L (ref 55–135)
ALT SERPL W/O P-5'-P-CCNC: 11 U/L (ref 10–44)
ANION GAP SERPL CALC-SCNC: 10 MMOL/L (ref 8–16)
AST SERPL-CCNC: 14 U/L (ref 10–40)
BILIRUB SERPL-MCNC: 0.5 MG/DL (ref 0.1–1)
BUN SERPL-MCNC: 22 MG/DL (ref 8–23)
CALCIUM SERPL-MCNC: 8.5 MG/DL (ref 8.7–10.5)
CHLORIDE SERPL-SCNC: 94 MMOL/L (ref 95–110)
CO2 SERPL-SCNC: 30 MMOL/L (ref 23–29)
CREAT SERPL-MCNC: 0.6 MG/DL (ref 0.5–1.4)
EST. GFR  (NO RACE VARIABLE): >60 ML/MIN/1.73 M^2
GLUCOSE SERPL-MCNC: 110 MG/DL (ref 70–110)
GLUCOSE SERPL-MCNC: 125 MG/DL (ref 70–110)
POTASSIUM SERPL-SCNC: 3.6 MMOL/L (ref 3.5–5.1)
PROT SERPL-MCNC: 6.4 G/DL (ref 6–8.4)
SODIUM SERPL-SCNC: 134 MMOL/L (ref 136–145)

## 2022-10-15 PROCEDURE — 99233 PR SUBSEQUENT HOSPITAL CARE,LEVL III: ICD-10-PCS | Mod: ,,, | Performed by: INTERNAL MEDICINE

## 2022-10-15 PROCEDURE — 99900031 HC PATIENT EDUCATION (STAT)

## 2022-10-15 PROCEDURE — 63600175 PHARM REV CODE 636 W HCPCS: Performed by: INTERNAL MEDICINE

## 2022-10-15 PROCEDURE — 25000003 PHARM REV CODE 250: Performed by: INTERNAL MEDICINE

## 2022-10-15 PROCEDURE — 36415 COLL VENOUS BLD VENIPUNCTURE: CPT | Performed by: INTERNAL MEDICINE

## 2022-10-15 PROCEDURE — 80053 COMPREHEN METABOLIC PANEL: CPT | Performed by: INTERNAL MEDICINE

## 2022-10-15 PROCEDURE — 94761 N-INVAS EAR/PLS OXIMETRY MLT: CPT

## 2022-10-15 PROCEDURE — 99233 SBSQ HOSP IP/OBS HIGH 50: CPT | Mod: ,,, | Performed by: INTERNAL MEDICINE

## 2022-10-15 PROCEDURE — 99900035 HC TECH TIME PER 15 MIN (STAT)

## 2022-10-15 PROCEDURE — 25000003 PHARM REV CODE 250: Performed by: STUDENT IN AN ORGANIZED HEALTH CARE EDUCATION/TRAINING PROGRAM

## 2022-10-15 RX ORDER — FUROSEMIDE 20 MG/1
20 TABLET ORAL DAILY
Qty: 30 TABLET | Refills: 1 | Status: SHIPPED | OUTPATIENT
Start: 2022-10-15 | End: 2022-10-24

## 2022-10-15 RX ORDER — LISINOPRIL 20 MG/1
10 TABLET ORAL DAILY
Qty: 90 TABLET | Refills: 1 | Status: SHIPPED | OUTPATIENT
Start: 2022-10-15 | End: 2023-03-14 | Stop reason: SDUPTHER

## 2022-10-15 RX ADMIN — ESCITALOPRAM OXALATE 20 MG: 10 TABLET ORAL at 09:10

## 2022-10-15 RX ADMIN — PANTOPRAZOLE SODIUM 40 MG: 40 TABLET, DELAYED RELEASE ORAL at 06:10

## 2022-10-15 RX ADMIN — HEPARIN SODIUM 5000 UNITS: 5000 INJECTION, SOLUTION INTRAVENOUS; SUBCUTANEOUS at 06:10

## 2022-10-15 RX ADMIN — LEVOTHYROXINE SODIUM 50 MCG: 0.03 TABLET ORAL at 06:10

## 2022-10-15 RX ADMIN — FUROSEMIDE 20 MG: 10 INJECTION, SOLUTION INTRAMUSCULAR; INTRAVENOUS at 09:10

## 2022-10-15 RX ADMIN — HEPARIN SODIUM 5000 UNITS: 5000 INJECTION, SOLUTION INTRAVENOUS; SUBCUTANEOUS at 01:10

## 2022-10-15 RX ADMIN — ONDANSETRON 4 MG: 2 INJECTION, SOLUTION INTRAMUSCULAR; INTRAVENOUS at 03:10

## 2022-10-15 RX ADMIN — POTASSIUM BICARBONATE 25 MEQ: 977.5 TABLET, EFFERVESCENT ORAL at 12:10

## 2022-10-15 RX ADMIN — SUCRALFATE 1 G: 1 TABLET ORAL at 09:10

## 2022-10-15 RX ADMIN — VARENICLINE 1 MG: 0.5 TABLET, FILM COATED ORAL at 09:10

## 2022-10-15 RX ADMIN — PROPRANOLOL HYDROCHLORIDE 40 MG: 10 TABLET ORAL at 09:10

## 2022-10-15 RX ADMIN — ONDANSETRON 4 MG: 2 INJECTION, SOLUTION INTRAMUSCULAR; INTRAVENOUS at 08:10

## 2022-10-15 NOTE — PLAN OF CARE
10/15/22 0947   Patient Assessment/Suction   Level of Consciousness (AVPU) alert   Respiratory Effort Normal;Unlabored   All Lung Fields Breath Sounds clear   Rhythm/Pattern, Respiratory unlabored;pattern regular;depth regular   PRE-TX-O2   O2 Device (Oxygen Therapy) room air   SpO2 96 %   Pulse Oximetry Type Continuous   $ Pulse Oximetry - Multiple Charge Pulse Oximetry - Multiple   Pulse 71   Resp (!) 22   Aerosol Therapy   $ Aerosol Therapy Charges PRN treatment not required   Respiratory Treatment Status (SVN) PRN treatment not required   Education   $ Education Bronchodilator;15 min   Respiratory Evaluation   $ Care Plan Tech Time 15 min

## 2022-10-15 NOTE — PROGRESS NOTES
[facility]  Progress Note    Patient Name: Liv Decker  MRN: [id number]  Patient Class: IP- Inpatient   Admission Date: 10/12/2022  Length of Stay: 2 days  Attending Physician: Guillermo Gardner MD  Primary Care Provider: Liv Parrish NP  DOS: 10/14/2022      Subjective:     Principal Problem:Severe aortic stenosis        HPI:  No notes on file    Overview/Hospital Course:  No notes on file        10/14: Patient seen and examined.  Patient reports shortness of breath is improving with treatment.  She is mobilizing.  No fever or chest pain.  No nausea or vomiting.    Vitals reviewed  GEN: nontoxic, nondiaphoretic, comfortable  ENT: Moist mucous membranes  PULM: comfortable work of breathing, CTA B  CV: RRR, 2+ radial pulses  GI: abd soft NTND +BS  Neuro: Nonfocal motor exam, fluent speech, A&O, follows commands    LABS:  reviewed       * Severe aortic stenosis  Severe aortic stenosis on echocardiogram.  Likely contributing to her CHF symptoms along with mitral regurgitation  Cardiology following  Gentle diuresis  Further planning per Cardiology  CONTINUE IV LASIX DIURESIS. MONTIOR UOP AND FLUID STATUS.  TITRATE MEDICAL REGIMEN   MOBILIZE AS ABLE      Acute diastolic congestive heart failure  - gentle diuresis  - feeling much better  - cardiology following      Acquired hypothyroidism  Synthroid      Dyslipidemia    Statin    HTN (hypertension)  Continue home medications      Tobacco abuse  On Chantix to quit smoking      Type 2 diabetes mellitus  Patient's FSGs are controlled on current medication regimen.  Last A1c reviewed-   Lab Results   Component Value Date    HGBA1C 5.6 10/10/2022     Most recent fingerstick glucose reviewed- No results for input(s): POCTGLUCOSE in the last 24 hours.  Current correctional scale  Medium  Maintain anti-hyperglycemic dose as follows-   Antihyperglycemics (From admission, onward)      None          Hold Oral hypoglycemics while patient is  in the hospital.      VTE Risk Mitigation (From admission, onward)           Ordered     heparin (porcine) injection 5,000 Units  Every 8 hours         10/12/22 2132     IP VTE HIGH RISK PATIENT  Once         10/12/22 2014     Place sequential compression device  Until discontinued         10/12/22 2014                    Discharge Planning   BOOM: 10/15/2022     Code Status: Full Code   Is the patient medically ready for discharge?:     Reason for patient still in hospital (select all that apply): Treatment  Discharge Plan A: Home with family                  Guillermo Gardner MD  Department of Hospital Medicine   [facility]

## 2022-10-15 NOTE — PROGRESS NOTES
[facility]  Department of Cardiology  Progress Note    PATIENT NAME: Liv Decker  MRN: [id number]  TODAY'S DATE: 10/15/2022  ADMIT DATE: 10/12/2022    SUBJECTIVE     PRINCIPLE PROBLEM: Severe aortic stenosis    INTERVAL HISTORY:    10/15/2022  She is feeling much better ambulating denies any shortness of breath.  10/14/2022  Sitting up in bed, states she feels better.  Denies shortness of breath, chest pain.  Patient has diuresed over 2 L in the past 24 hours.      REASON FOR CONSULT:  Hospitalist HPI: 67-year-old female past medical history significant for diabetes, hypertension, hyperlipidemia, PUD, COPD presents to the emergency room today for evaluation of 5 weeks progressively worsening shortness of breath.  Shortness breath is worse with activity, worse with laying down.  Shortness of breath has been occurring for the last 5 patient   Patient does have significant orthopnea.  No chest pain.  She presented to her primary care provider who ordered chest x-ray.  Chest x-ray shows large left-sided pleural effusion.  Of note, patient did quit smoking just before the onset of her symptoms.  She was a pack per day smoker for many years.         HPI:  Patient is 67 year white female who reported to the ER due to complaints of progressive shortness of breath, orthopnea, dyspnea on exertion for the past 5 weeks.  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  She went to her primary care provider who obtained a chest x-ray which showed a large left pleural effusion.  Patient has recently stopped smoking.  BNP was found to be 1134 in the ER.  Echocardiogram has been ordered and done, results pending read by cardiologist.       Reports history of myocardial infarction approximately 15 years ago.  She states she had an angiogram at that time which showed no blockages.     Other past medical history includes:    Hypertension, hyperlipidemia, COPD, thyroid disease       Review of patient's  allergies indicates:  No Known Allergies    REVIEW OF SYSTEMS  CARDIOVASCULAR: No recent chest pain, palpitations, arm, neck, or jaw pain  RESPIRATORY: No recent fever, cough chills, SOB or congestion  : No blood in the urine  GI: No Nausea, vomiting, constipation, diarrhea, blood, or reflux.  MUSCULOSKELETAL: No myalgias  NEURO: No lightheadedness or dizziness       OBJECTIVE     VITAL SIGNS (Most Recent)  Temp: 98.9 °F (37.2 °C) (10/15/22 1334)  Pulse: 65 (10/15/22 1334)  Resp: (!) 24 (10/15/22 1334)  BP: (!) 115/59 (10/15/22 1334)  SpO2: 96 % (10/15/22 1334)    VENTILATION STATUS  Resp: (!) 24 (10/15/22 1334)  SpO2: 96 % (10/15/22 1334)       I & O (Last 24H):  Intake/Output Summary (Last 24 hours) at 10/15/2022 1721  Last data filed at 10/15/2022 0429  Gross per 24 hour   Intake 240 ml   Output 950 ml   Net -710 ml         WEIGHTS  Wt Readings from Last 3 Encounters:   10/15/22 0345 47.6 kg (105 lb)   10/14/22 0344 49.9 kg (110 lb 0.2 oz)   10/13/22 0250 48.8 kg (107 lb 9.4 oz)   10/12/22 1653 49.9 kg (110 lb)   10/13/22 0918 48.5 kg (107 lb)   10/12/22 0807 51.3 kg (113 lb)       PHYSICAL EXAM  CONSTITUTIONAL: Well built, well nourished female resting in bed in no apparent distress  LUNGS: CTA  CHEST WALL: no tenderness  HEART: regular rate and rhythm, audible systolic murmur,   ABDOMEN: soft, non-tender; bowel sounds normal; no masses,  no organomegaly  EXTREMITIES: Extremities normal, no edema  NEURO: AAO X 3    SCHEDULED MEDS:   atorvastatin  10 mg Oral QHS    EScitalopram oxalate  20 mg Oral Daily    furosemide (LASIX) injection  20 mg Intravenous Daily    heparin (porcine)  5,000 Units Subcutaneous Q8H    levothyroxine  50 mcg Oral Before breakfast    pantoprazole  40 mg Oral BID    propranoloL  40 mg Oral BID    sucralfate  1 g Oral BID    varenicline  1 mg Oral BID       CONTINUOUS INFUSIONS:    PRN MEDS:albuterol-ipratropium, calcium gluconate IVPB, calcium gluconate IVPB, calcium gluconate IVPB,  magnesium sulfate IVPB, magnesium sulfate IVPB, ondansetron, sodium chloride 0.9%, sodium chloride 0.9%, sodium phosphate IVPB, sodium phosphate IVPB, sodium phosphate IVPB    LABS AND DIAGNOSTICS     CBC LAST 3 DAYS  Recent Labs   Lab 10/12/22  1754   WBC 8.76   RBC 4.30   HGB 11.0*   HCT 35.5*   MCV 83   MCH 25.6*   MCHC 31.0*   RDW 15.7*      MPV 10.4   GRAN 79.5*  7.0   LYMPH 14.5*  1.3   MONO 5.5  0.5   BASO 0.01   NRBC 0         COAGULATION LAST 3 DAYS  Recent Labs   Lab 10/13/22  0627   LABPT 14.5*   INR 1.2   APTT 31.1         CHEMISTRY LAST 3 DAYS  Recent Labs   Lab 10/12/22  1754 10/12/22  2159 10/13/22  0627 10/14/22  0550 10/15/22  0347     --  133* 135* 134*   K 4.4  --  4.0 4.0 3.6   CL 99  --  95 94* 94*   CO2 26  --  31* 35* 30*   ANIONGAP 11  --  7* 6* 10   BUN 15  --  15 18 22   CREATININE 0.6  --  0.6 0.7 0.6   *  --  98 127* 110   CALCIUM 9.2  --  8.9 8.5* 8.5*   PH  --  7.486*  --   --   --    MG 1.6  --  1.5* 1.9  --    ALBUMIN 2.9*  --  2.8* 2.7* 2.7*   PROT 7.3  --  6.9 6.7 6.4   ALKPHOS 52*  --  49* 46* 47*   ALT 12  --  10 13 11   AST 19  --  13 10 14   BILITOT 0.5  --  0.6 0.7 0.5         CARDIAC PROFILE LAST 3 DAYS  Recent Labs   Lab 10/12/22  1754 10/13/22  0627   BNP 1,134*  --    LDH  --  137   TROPONINI <0.030  --          ENDOCRINE LAST 3 DAYS  No results for input(s): TSH, PROCAL in the last 168 hours.    LAST ARTERIAL BLOOD GAS  ABG  Recent Labs   Lab 10/12/22  2159   PH 7.486*   PO2 78*   PCO2 38.8   HCO3 29.3*   BE 6         LAST 7 DAYS MICROBIOLOGY   Microbiology Results (last 7 days)       Procedure Component Value Units Date/Time    Culture, Respiratory with Gram Stain [[id number]]     Order Status: No result Specimen: Respiratory     Gram stain [[id number]]     Order Status: Canceled Specimen: Body Fluid from Pleural Fluid     AFB culture (includes stain) [[id number]]     Order Status: Canceled Specimen: Body Fluid from Pleural Fluid     Fungus culture  [[id number]]     Order Status: Canceled Specimen: Body Fluid from Pleural Fluid     Aerobic culture [[id number]]     Order Status: Canceled Specimen: Pleural Fluid     Culture, Anaerobic [[id number]]     Order Status: Canceled Specimen: Pleural Fluid     Gram stain [[id number]]     Order Status: Canceled Specimen: Pleural Fluid     AFB Culture & Smear [[id number]]     Order Status: Canceled Specimen: Pleural Fluid     Fungus culture [[id number]]     Order Status: Canceled Specimen: Pleural Fluid             MOST RECENT IMAGING  X-Ray Chest PA And Lateral  Reason: Pleural effusion SOB    FINDINGS:  PA and lateral chest compared with 10/12/2022 show normal cardiomediastinal silhouette.    Small left and tiny right pleural effusions and left basilar airspace opacities show no significant change. Minor biapical pleural parenchymal scarring unchanged. Pulmonary vasculature is normal. Bones are normal.    IMPRESSION:  Unchanged bilateral pleural effusions and left basilar airspace opacities.    Electronically signed by:  Reji Silva MD  10/15/2022 12:26 PM CDT Workstation: [id number]      [employer]  Results for orders placed during the hospital encounter of 10/12/22    Echo    Interpretation Summary  · The left ventricle is normal in size with normal systolic function.  · The estimated ejection fraction is 60%.  · Grade II left ventricular diastolic dysfunction.  · Normal right ventricular size with normal right ventricular systolic function.  · Mild left atrial enlargement.  · Mild right atrial enlargement.  · Moderate-to-severe mitral regurgitation.  · Moderate to severe tricuspid regurgitation.  · There is moderate-to-severe aortic valve stenosis.  · Aortic valve area is 0.92 cm2; 2.59 peak velocity is m/s; mean gradient is 21 mmHg.  · Mild-to-moderate aortic regurgitation.  · Intermediate central venous pressure (8 mmHg).  · There is pulmonary hypertension.  · The estimated PA systolic pressure is 53  mmHg.      CURRENT/PREVIOUS VISIT EKG  Results for orders placed or performed during the hospital encounter of 10/12/22   EKG 12-lead    Collection Time: 10/13/22 10:07 AM    Narrative    Test Reason : I49.9,    Vent. Rate : 079 BPM     Atrial Rate : 079 BPM     P-R Int : 152 ms          QRS Dur : 084 ms      QT Int : 390 ms       P-R-T Axes : 070 076 075 degrees     QTc Int : 447 ms    Sinus rhythm with Premature atrial complexes with Aberrant conduction  Minimal voltage criteria for LVH, may be normal variant ( [place] product )  Borderline Abnormal ECG  When compared with ECG of 12-OCT-2022 19:24,  Aberrant conduction is now Present    Referred By: [email or web address]   SELF           Confirmed By:        ASSESSMENT/PLAN:     Active Hospital Problems    Diagnosis    *Severe aortic stenosis    Acute diastolic congestive heart failure    Dyslipidemia    Acquired hypothyroidism    HTN (hypertension)    Tobacco abuse    Type 2 diabetes mellitus       ASSESSMENT & PLAN:   Congestive heart failure  Aortic stenosis peak velocity of 2.59 m/sec will monitor for now.  Usually valve replacement is at 4.0 m/sec  Pulmonary hypertension   Bilateral pleural effusions  Pulmonary nodules RLL  Hypertension  COPD  Type 2 diabetes  Hyperlipidemia- on statin therapy  Hypothyroidism-on levothyroxine  Hypomagnesemia-replete per protocol      RECOMMENDATIONS:  1. Obtain a chest x-ray to check on the left-sided pleural effusion.  If better the patient probably can be discharged and follow-up as an outpatient.  If the effusion persist contact pulmonary    Jw Del Cid MD  [facility]  Department of Cardiology  Date of Service: 10/15/2022

## 2022-10-16 NOTE — NURSING
DISCHARGE INSTRUCTIONS GIVEN AND EXPLAINED TO PATIENT. PATIENT EXPRESSED UNDERSTANDING TO DISCHARGE INSTRUCTIONS. DENIES ANY QUESTIONS AT THIS TIME REGARDING THERE DISCHARGE.

## 2022-10-16 NOTE — DISCHARGE SUMMARY
[facility]  Discharge Summary      Patient Name: Liv Decker  MRN: [id number]  Patient Class: IP- Inpatient  Admission Date: 10/12/2022  Hospital Length of Stay: 3 days  Discharge Date and Time:  10/15/2022 7:16 PM  Attending Physician: Guillermo Gardner MD   Discharging Provider: Gulilermo Gardner MD  Primary Care Provider: Liv Parrish NP      HPI:   67-year-old female past medical history significant for diabetes, hypertension, hyperlipidemia, PUD, COPD presents to the emergency room today for evaluation of 5 weeks progressively worsening shortness of breath. Shortness breath is worse with activity, worse with laying down.  Shortness of breath has been occurring for the last 5 weeks.  Patient does have significant orthopnea. No chest pain.  She presented to her primary care provider who ordered chest x-ray.  Chest x-ray shows large left-sided pleural effusion.  Of note, patient did quit smoking just before the onset of her symptoms. She was a pack per day smoker for many years.   Ten point review systems otherwise negative.    Hospital Course:   10/13: No acute events since admission.  She is feeling much better.  Echocardiogram with severe aortic stenosis.  Will follow Cardiology recommendations.    10/14: Patient seen and examined.  Patient reports shortness of breath is improving with treatment.  She is mobilizing.  No fever or chest pain.  No nausea or vomiting.    10/15:  Patient seen and examined.  Patient reports shortness of breath continues to improve, near baseline.  Mobilizing without difficulty.  Tolerating diet.  No fever or chest pain.  Plan of care discussed with Cardiology today who recommends initiating home Lasix and decreasing home lisinopril.  Patient will follow up as outpatient.  Patient is in understanding and agreement.     ACUTE CHF DUE TO AS AND DIASTOLIC   Severe aortic stenosis  Acute diastolic congestive heart failure  Severe aortic stenosis  on echocardiogram.  Likely contributing to her CHF symptoms along with mitral regurgitation  Cardiology following  Gentle diuresis  Further planning per Cardiology  CONTINUE IV LASIX DIURESIS. MONTIOR UOP AND FLUID STATUS.  TITRATE MEDICAL REGIMEN   MOBILIZE AS ABLE  PO LASIX AT DC, CARDS FU        Acquired hypothyroidism  Synthroid        Dyslipidemia     Statin     HTN (hypertension)  Continue home medications        Tobacco abuse  On Chantix to quit smoking        Type 2 diabetes mellitus  Patient's FSGs are controlled on current medication regimen.  Most recent fingerstick glucose reviewed- No results for input(s): POCTGLUCOSE in the last 24 hours.  Current correctional scale  Medium  Maintain anti-hyperglycemic dose as follows-   Hold Oral hypoglycemics while patient is in the hospital.      Discharge exam:  GEN: nontoxic, nondiaphoretic, comfortable  PULM: comfortable work of breathing, CTA B  CV: RRR, 2+ radial pulses  Neuro: Nonfocal motor exam, fluent speech, A&O, follows commands    Goals of Care Treatment Preferences:  Code Status: Full Code      Consults:   Consults (From admission, onward)          Status Ordering Provider     Inpatient consult to Cardiology  Once        Provider:  Matthew Basilio MD    Completed RAFIQ TURNER     Inpatient consult to Pulmonology  Once        Provider:  Taiwo Reilly MD    Completed RAFIQ TURNER     Inpatient consult to Registered Dietitian/Nutritionist  Once        Provider:  (Not yet assigned)    Completed RAFIQ TURNER     Inpatient consult to Hospitalist  Once        Provider:  Rafiq Turner MD    Acknowledged FLORI IGLESIAS            No new Assessment & Plan notes have been filed under this hospital service since the last note was generated.  Service: Hospital Medicine    Final Active Diagnoses:    Diagnosis Date Noted POA    PRINCIPAL PROBLEM:  Severe aortic stenosis [I35.0] 10/13/2022 Yes    Acute diastolic congestive heart  failure [I50.31] 10/13/2022 Yes    Dyslipidemia [E78.5] 02/14/2020 Yes    Acquired hypothyroidism [E03.9] 02/14/2020 Yes    HTN (hypertension) [I10] 08/10/2019 Yes     Chronic    Tobacco abuse [Z72.0] 08/10/2019 Yes     Chronic    Type 2 diabetes mellitus [E11.9] 08/10/2019 Yes      Problems Resolved During this Admission:       Discharged Condition: stable    Disposition: Home or Self Care    Follow Up:   Follow-up Information       Liv Parrish NP Follow up in 1 week(s).    Specialty: Family Medicine  Contact information:  [address]  [address]  [place] [address]  [phone]               Matthew Basilio MD Follow up in 2 week(s).    Specialties: Interventional Cardiology, Cardiology  Contact information:  [address]  [address]  [place] [address]  [phone]                           Patient Instructions:      Diet Cardiac   Order Comments: Fluid restrict 1.5L/day     Notify your health care provider if you experience any of the following:  temperature >100.4     Notify your health care provider if you experience any of the following:  persistent nausea and vomiting or diarrhea     Notify your health care provider if you experience any of the following:  severe uncontrolled pain     Notify your health care provider if you experience any of the following:  difficulty breathing or increased cough     Notify your health care provider if you experience any of the following:  persistent dizziness, light-headedness, or visual disturbances     Activity as tolerated       Pending Diagnostic Studies:       None           Medications:  Reconciled Home Medications:      Medication List        START taking these medications      furosemide 20 MG tablet  Commonly known as: LASIX  Take 1 tablet (20 mg total) by mouth once daily.            CHANGE how you take these medications      ergocalciferol 50,000 unit Cap  Commonly known as: ERGOCALCIFEROL  Take 1 capsule (50,000 Units total) by mouth every 7 days.  What  changed: when to take this     lisinopriL 20 MG tablet  Commonly known as: PRINIVIL,ZESTRIL  Take 0.5 tablets (10 mg total) by mouth once daily.  What changed: how much to take            CONTINUE taking these medications      albuterol 90 mcg/actuation inhaler  Commonly known as: PROAIR HFA  Inhale 2 puffs into the lungs every 6 (six) hours as needed for Wheezing or Shortness of Breath. Rescue     atorvastatin 10 MG tablet  Commonly known as: LIPITOR  Take 1 tablet (10 mg total) by mouth once daily.     blood sugar diagnostic Strp  1 each by In Vitro route once daily.     calcium carbonate 600 mg calcium (1,500 mg) Tab  Commonly known as: OS-JOSELYN  Take 600 mg by mouth once daily.     EScitalopram oxalate 20 MG tablet  Commonly known as: LEXAPRO  Take 1 tablet (20 mg total) by mouth once daily.     fluticasone-umeclidin-vilanter 100-62.5-25 mcg Dsdv  Commonly known as: TRELEGY ELLIPTA  Inhale 1 puff into the lungs once daily.     icosapent ethyL 1 gram Cap  Commonly known as: VASCEPA  Take 2 capsules (2 g total) by mouth 2 (two) times a day.     levothyroxine 50 MCG tablet  Commonly known as: SYNTHROID  Take 1 tablet (50 mcg total) by mouth before breakfast.     metFORMIN 1000 MG tablet  Commonly known as: GLUCOPHAGE  Take 1 tablet (1,000 mg total) by mouth daily with breakfast.     pantoprazole 40 MG tablet  Commonly known as: PROTONIX  Take 1 tablet (40 mg total) by mouth 2 (two) times daily.     propranoloL 80 MG 24 hr capsule  Commonly known as: INDERAL LA  Take 1 capsule (80 mg total) by mouth once daily.     sucralfate 1 gram tablet  Commonly known as: CARAFATE  Take 1 tablet (1 g total) by mouth 2 (two) times daily. Take on empty stomach, do not eat or take medication for at least 1 hour            STOP taking these medications      amoxicillin-clavulanate 875-125mg 875-125 mg per tablet  Commonly known as: AUGMENTIN     aspirin-acetaminophen-caffeine 500-325-65 mg Pwpk     blood-glucose meter kit     lancets  Misc     predniSONE 20 MG tablet  Commonly known as: DELTASONE     varenicline 0.5 mg (11)- 1 mg (42) tablet  Commonly known as: CHANTIX STARTING MONTH BOX     varenicline 1 mg Tab  Commonly known as: CHANTIX              Indwelling Lines/Drains at time of discharge:   Lines/Drains/Airways       None                   Time spent on the discharge of patient: 32 minutes         Guillermo Gardner MD  Department of Hospital Medicine  [facility]

## 2022-10-16 NOTE — PLAN OF CARE
Problem: Adult Inpatient Plan of Care  Goal: Plan of Care Review  Outcome: Met  Goal: Patient-Specific Goal (Individualized)  Outcome: Met  Goal: Absence of Hospital-Acquired Illness or Injury  Outcome: Met  Goal: Optimal Comfort and Wellbeing  Outcome: Met  Goal: Readiness for Transition of Care  Outcome: Met     Problem: Diabetes Comorbidity  Goal: Blood Glucose Level Within Targeted Range  Outcome: Met     Problem: Oral Intake Inadequate  Goal: Improved Oral Intake  Outcome: Met     Problem: Bariatric Environmental Safety  Goal: Safety Maintained with Care  Outcome: Met     Problem: Oral Intake Inadequate  Goal: Improved Oral Intake  Outcome: Met     Problem: Diabetes Comorbidity  Goal: Blood Glucose Level Within Targeted Range  Outcome: Met     Problem: Fall Injury Risk  Goal: Absence of Fall and Fall-Related Injury  Outcome: Met

## 2022-10-17 ENCOUNTER — PATIENT OUTREACH (OUTPATIENT)
Dept: FAMILY MEDICINE | Facility: CLINIC | Age: 67
End: 2022-10-17

## 2022-10-17 ENCOUNTER — TELEPHONE (OUTPATIENT)
Dept: FAMILY MEDICINE | Facility: CLINIC | Age: 67
End: 2022-10-17

## 2022-10-17 NOTE — PLAN OF CARE
10/17/2022 0813 - patient discharged after CM hours. No discharge needs noted. Patient cleared for discharge from case management. Patient discharged home       10/17/22 0813   Final Note   Assessment Type Final Discharge Note   Anticipated Discharge Disposition Home

## 2022-10-17 NOTE — PROGRESS NOTES
Discharge Information     Discharge Date:   10/15/22    Primary Discharge Diagnosis:   Severe aortic stenosis      Discharge Summary:  Reviewed      Medication & Order Review     Were medication changes made or new medications added?   Yes    If so, has the patient filled the prescriptions?  Yes     Was Home Health ordered? No    If so, has Home Health contacted patient and/or initiated services?  No    Name of Home Health Agency? N/A    Durable Medical Equipment ordered?  No     If so, has the DME provider contacted patient and delivered equipment?  N/A    Follow Up               Any problems since discharge? No    How is the patient feeling since returning home?  Pt reports feeling good.     Have you set up recommended follow up appointments?  Yes    Schedule Hospital Follow-up appointment within 7-14 days (preferably 7).      Notes:  pt states she is doing good. Pt scheduled HFU appt.             Yovana Escamilla

## 2022-10-17 NOTE — TELEPHONE ENCOUNTER
Call patient - needs post-hospital phone call within 2 business days and hospital follow up visit scheduled within 7-14 days.

## 2022-10-18 ENCOUNTER — TELEPHONE (OUTPATIENT)
Dept: FAMILY MEDICINE | Facility: CLINIC | Age: 67
End: 2022-10-18

## 2022-10-18 NOTE — TELEPHONE ENCOUNTER
----- Message from Kristel Daniel sent at 10/18/2022  2:45 PM CDT -----  - pt will not be able to make her appt tomorrow. She is off Thursday and would like to know if she can come in then   [phone]

## 2022-10-24 ENCOUNTER — OFFICE VISIT (OUTPATIENT)
Dept: FAMILY MEDICINE | Facility: CLINIC | Age: 67
End: 2022-10-24
Payer: MEDICARE

## 2022-10-24 VITALS
SYSTOLIC BLOOD PRESSURE: 98 MMHG | OXYGEN SATURATION: 97 % | WEIGHT: 102 LBS | DIASTOLIC BLOOD PRESSURE: 58 MMHG | HEIGHT: 61 IN | BODY MASS INDEX: 19.26 KG/M2 | HEART RATE: 73 BPM

## 2022-10-24 DIAGNOSIS — I50.31 ACUTE HEART FAILURE WITH PRESERVED EJECTION FRACTION: Primary | ICD-10-CM

## 2022-10-24 DIAGNOSIS — I27.20 PULMONARY HYPERTENSION: ICD-10-CM

## 2022-10-24 DIAGNOSIS — I35.0 AORTIC VALVE STENOSIS, ETIOLOGY OF CARDIAC VALVE DISEASE UNSPECIFIED: ICD-10-CM

## 2022-10-24 DIAGNOSIS — J44.9 CHRONIC OBSTRUCTIVE PULMONARY DISEASE, UNSPECIFIED COPD TYPE: ICD-10-CM

## 2022-10-24 PROCEDURE — 4010F ACE/ARB THERAPY RXD/TAKEN: CPT | Mod: CPTII,S$GLB,, | Performed by: NURSE PRACTITIONER

## 2022-10-24 PROCEDURE — 3066F NEPHROPATHY DOC TX: CPT | Mod: CPTII,S$GLB,, | Performed by: NURSE PRACTITIONER

## 2022-10-24 PROCEDURE — 99495 TRANSJ CARE MGMT MOD F2F 14D: CPT | Mod: S$GLB,,, | Performed by: NURSE PRACTITIONER

## 2022-10-24 PROCEDURE — 1101F PR PT FALLS ASSESS DOC 0-1 FALLS W/OUT INJ PAST YR: ICD-10-PCS | Mod: CPTII,S$GLB,, | Performed by: NURSE PRACTITIONER

## 2022-10-24 PROCEDURE — 3066F PR DOCUMENTATION OF TREATMENT FOR NEPHROPATHY: ICD-10-PCS | Mod: CPTII,S$GLB,, | Performed by: NURSE PRACTITIONER

## 2022-10-24 PROCEDURE — 99495 TCM SERVICES (MODERATE COMPLEXITY): ICD-10-PCS | Mod: S$GLB,,, | Performed by: NURSE PRACTITIONER

## 2022-10-24 PROCEDURE — 1159F MED LIST DOCD IN RCRD: CPT | Mod: CPTII,S$GLB,, | Performed by: NURSE PRACTITIONER

## 2022-10-24 PROCEDURE — 3074F PR MOST RECENT SYSTOLIC BLOOD PRESSURE < 130 MM HG: ICD-10-PCS | Mod: CPTII,S$GLB,, | Performed by: NURSE PRACTITIONER

## 2022-10-24 PROCEDURE — 3044F PR MOST RECENT HEMOGLOBIN A1C LEVEL <7.0%: ICD-10-PCS | Mod: CPTII,S$GLB,, | Performed by: NURSE PRACTITIONER

## 2022-10-24 PROCEDURE — 3061F PR NEG MICROALBUMINURIA RESULT DOCUMENTED/REVIEW: ICD-10-PCS | Mod: CPTII,S$GLB,, | Performed by: NURSE PRACTITIONER

## 2022-10-24 PROCEDURE — 1160F PR REVIEW ALL MEDS BY PRESCRIBER/CLIN PHARMACIST DOCUMENTED: ICD-10-PCS | Mod: CPTII,S$GLB,, | Performed by: NURSE PRACTITIONER

## 2022-10-24 PROCEDURE — 3288F FALL RISK ASSESSMENT DOCD: CPT | Mod: CPTII,S$GLB,, | Performed by: NURSE PRACTITIONER

## 2022-10-24 PROCEDURE — 1160F RVW MEDS BY RX/DR IN RCRD: CPT | Mod: CPTII,S$GLB,, | Performed by: NURSE PRACTITIONER

## 2022-10-24 PROCEDURE — 1101F PT FALLS ASSESS-DOCD LE1/YR: CPT | Mod: CPTII,S$GLB,, | Performed by: NURSE PRACTITIONER

## 2022-10-24 PROCEDURE — 3288F PR FALLS RISK ASSESSMENT DOCUMENTED: ICD-10-PCS | Mod: CPTII,S$GLB,, | Performed by: NURSE PRACTITIONER

## 2022-10-24 PROCEDURE — 4010F PR ACE/ARB THEARPY RXD/TAKEN: ICD-10-PCS | Mod: CPTII,S$GLB,, | Performed by: NURSE PRACTITIONER

## 2022-10-24 PROCEDURE — 3061F NEG MICROALBUMINURIA REV: CPT | Mod: CPTII,S$GLB,, | Performed by: NURSE PRACTITIONER

## 2022-10-24 PROCEDURE — 3074F SYST BP LT 130 MM HG: CPT | Mod: CPTII,S$GLB,, | Performed by: NURSE PRACTITIONER

## 2022-10-24 PROCEDURE — 3078F DIAST BP <80 MM HG: CPT | Mod: CPTII,S$GLB,, | Performed by: NURSE PRACTITIONER

## 2022-10-24 PROCEDURE — 3078F PR MOST RECENT DIASTOLIC BLOOD PRESSURE < 80 MM HG: ICD-10-PCS | Mod: CPTII,S$GLB,, | Performed by: NURSE PRACTITIONER

## 2022-10-24 PROCEDURE — 1159F PR MEDICATION LIST DOCUMENTED IN MEDICAL RECORD: ICD-10-PCS | Mod: CPTII,S$GLB,, | Performed by: NURSE PRACTITIONER

## 2022-10-24 PROCEDURE — 3044F HG A1C LEVEL LT 7.0%: CPT | Mod: CPTII,S$GLB,, | Performed by: NURSE PRACTITIONER

## 2022-10-24 RX ORDER — FUROSEMIDE 20 MG/1
20 TABLET ORAL EVERY OTHER DAY
Qty: 30 TABLET | Refills: 1 | Status: SHIPPED | OUTPATIENT
Start: 2022-10-24 | End: 2022-11-11 | Stop reason: SDUPTHER

## 2022-10-24 NOTE — PROGRESS NOTES
Patient ID: Liv Decker is a 67 y.o. female.    Chief Complaint: Follow-up (Hospital f/u//no med bottles//tc)        Admit Date: 10/12/22   Discharge Date: 10/15/22  Discharge Facility: Hospital    Medication Reconciliation:  Medications changed/added/deleted. Lasix added, lisinopril dose reduced  New Prescriptions filled after discharge: yes  Discharge summary reviewed:  no- DC summary not available  Pending test results at discharge reviewed:   no  Follow up appointments scheduled: no   with Cardiology   Follow up labs/tests ordered:   no  Home Health ordered on discharge:   no  Home Health company name:   DME ordered at discharge:   no    History of hospital stay:   Pt referred to ER after outpt CXR revealed large left pleural effusion. Admit BMP 1134. Admitted and started on iv diuretics. seen by Dr. Del Cid, cards- echo showed mod-severe MR/AS and pulm HTN.   Also seen by dr. Reilly, pulmonary who did not think thoracentesis was needed, recommended diuresis and outpt PFTs and pulm f/u, repeat CT chest in 3mos  CXR on day of discharge showed small left and tiny right pleural effusion with left basilar airspace opacity.    CT scan of chest:  No evidence of pulmonary emboli  2.  Pulmonary edema. Moderate left pleural effusion. Small right pleural effusion.  3.  Consolidation with surrounding groundglass in the left lower lobe is concerning for pneumonia.  4.  Opacification of the distal airways of the left lower lobe could be related to mucous plugging/aspiration.  5.  Cluster of nodules in the lateral aspect of the right lower lobe with the largest measuring 7 mm are new from prior study.  Recommend a non-contrast Chest CT at 3-6 months. If patient is high risk for malignancy, recommend an additional non-contrast Chest CT at 18-24 months; if patient is low risk for malignancy a non-contrast Chest CT at 18-24 months is optional.  6.  Reflux of contrast into the IVC and hepatic veins could indicate right-sided  heart failure/tricuspid regurgitation.      Echo   Interpretation Summary  · The left ventricle is normal in size with normal systolic function.  · The estimated ejection fraction is 60%.  · Grade II left ventricular diastolic dysfunction.  · Normal right ventricular size with normal right ventricular systolic function.  · Mild left atrial enlargement.  · Mild right atrial enlargement.  · Moderate-to-severe mitral regurgitation.  · Moderate to severe tricuspid regurgitation.  · There is moderate-to-severe aortic valve stenosis.  · Aortic valve area is 0.92 cm2; 2.59 peak velocity is m/s; mean gradient is 21 mmHg.  · Mild-to-moderate aortic regurgitation.  · Intermediate central venous pressure (8 mmHg).  · There is pulmonary hypertension.  · The estimated PA systolic pressure is 53 mmHg.    How patient is feeling since discharge from the hospital?  Pt reports overall feeling much better since discharge.SOB and cough have improved. Reports sleepign comfortably on 2 pillows, no leg edema. Denies CP, dizziness or palpitations.  Has been weighing herself daily and wt stable at 102lbs.   Hasn't called to schedule appts with cards or pulmonary  Still not smoking! Reports hospital DC indicated to stop chantix however pt would like to continue as it's helped her quit    Patient follow up phone call documented on separate encounter.    Admission on 10/12/2022, Discharged on 10/15/2022   Component Date Value Ref Range Status    WBC 10/12/2022 8.76  3.90 - 12.70 K/uL Final    RBC 10/12/2022 4.30  4.00 - 5.40 M/uL Final    Hemoglobin 10/12/2022 11.0 (L)  12.0 - 16.0 g/dL Final    Hematocrit 10/12/2022 35.5 (L)  37.0 - 48.5 % Final    MCV 10/12/2022 83  82 - 98 fL Final    MCH 10/12/2022 25.6 (L)  27.0 - 31.0 pg Final    MCHC 10/12/2022 31.0 (L)  32.0 - 36.0 g/dL Final    RDW 10/12/2022 15.7 (H)  11.5 - 14.5 % Final    Platelets 10/12/2022 289  150 - 450 K/uL Final    MPV 10/12/2022 10.4  9.2 - 12.9 fL Final    Immature  Granulocytes 10/12/2022 0.3  0.0 - 0.5 % Final    Gran # (ANC) 10/12/2022 7.0  1.8 - 7.7 K/uL Final    Immature Grans (Abs) 10/12/2022 0.03  0.00 - 0.04 K/uL Final    Lymph # 10/12/2022 1.3  1.0 - 4.8 K/uL Final    Mono # 10/12/2022 0.5  0.3 - 1.0 K/uL Final    Eos # 10/12/2022 0.0  0.0 - 0.5 K/uL Final    Baso # 10/12/2022 0.01  0.00 - 0.20 K/uL Final    nRBC 10/12/2022 0  0 /100 WBC Final    Gran % 10/12/2022 79.5 (H)  38.0 - 73.0 % Final    Lymph % 10/12/2022 14.5 (L)  18.0 - 48.0 % Final    Mono % 10/12/2022 5.5  4.0 - 15.0 % Final    Eosinophil % 10/12/2022 0.1  0.0 - 8.0 % Final    Basophil % 10/12/2022 0.1  0.0 - 1.9 % Final    Differential Method 10/12/2022 Automated   Final    Sodium 10/12/2022 136  136 - 145 mmol/L Final    Potassium 10/12/2022 4.4  3.5 - 5.1 mmol/L Final    Chloride 10/12/2022 99  95 - 110 mmol/L Final    CO2 10/12/2022 26  23 - 29 mmol/L Final    Glucose 10/12/2022 146 (H)  70 - 110 mg/dL Final    BUN 10/12/2022 15  8 - 23 mg/dL Final    Creatinine 10/12/2022 0.6  0.5 - 1.4 mg/dL Final    Calcium 10/12/2022 9.2  8.7 - 10.5 mg/dL Final    Total Protein 10/12/2022 7.3  6.0 - 8.4 g/dL Final    Albumin 10/12/2022 2.9 (L)  3.5 - 5.2 g/dL Final    Total Bilirubin 10/12/2022 0.5  0.1 - 1.0 mg/dL Final    Alkaline Phosphatase 10/12/2022 52 (L)  55 - 135 U/L Final    AST 10/12/2022 19  10 - 40 U/L Final    ALT 10/12/2022 12  10 - 44 U/L Final    Anion Gap 10/12/2022 11  8 - 16 mmol/L Final    eGFR 10/12/2022 >60.0  >60 mL/min/1.73 m^2 Final    Troponin I 10/12/2022 <0.030  <=0.040 ng/mL Final    BNP 10/12/2022 1,134 (H)  0 - 99 pg/mL Final    Magnesium 10/12/2022 1.6  1.6 - 2.6 mg/dL Final    SARS-CoV-2 RNA, Amplification, Qual 10/12/2022 Negative  Negative Final    BSA 10/13/2022 1.45  m2 Final    TDI SEPTAL 10/13/2022 0.05  m/s Final    LV LATERAL E/E' RATIO 10/13/2022 20.75  m/s Final    LV SEPTAL E/E' RATIO 10/13/2022 33.20  m/s Final    TDI LATERAL 10/13/2022 0.08  m/s Final    LVIDd  10/13/2022 4.25  3.5 - 6.0 cm Final    IVS 10/13/2022 0.92  0.6 - 1.1 cm Final    Posterior Wall 10/13/2022 0.93  0.6 - 1.1 cm Final    Ao root annulus 10/13/2022 2.72  cm Final    LVIDs 10/13/2022 3.59  2.1 - 4.0 cm Final    FS 10/13/2022 16  28 - 44 % Final    LV mass 10/13/2022 125.58  g Final    LA size 10/13/2022 3.43  cm Final    RVDD 10/13/2022 2.44  cm Final    TAPSE 10/13/2022 2.18  cm Final    Left Ventricle Relative Wall Thick* 10/13/2022 0.44  cm Final    AV mean gradient 10/13/2022 21  mmHg Final    AV valve area 10/13/2022 0.92  cm2 Final    AV index (prosthetic) 10/13/2022 0.29   Final    E/A ratio 10/13/2022 1.64   Final    Mean e' 10/13/2022 0.07  m/s Final    E wave deceleration time 10/13/2022 140.91  msec Final    LVOT diameter 10/13/2022 2.02  cm Final    LVOT area 10/13/2022 3.2  cm2 Final    LVOT peak ed 10/13/2022 69.23  m/s Final    LVOT peak VTI 10/13/2022 16.62  cm Final    Ao VTI 10/13/2022 57.68  cm Final    Mr max ed 10/13/2022 559.78  m/s Final    LVOT stroke volume 10/13/2022 53.24  cm3 Final    E/E' ratio 10/13/2022 25.54  m/s Final    MV Peak E Ed 10/13/2022 1.66  m/s Final    TR Max Ed 10/13/2022 3.37  m/s Final    MV Peak A Ed 10/13/2022 1.01  m/s Final    LV Systolic Volume 10/13/2022 46.20  mL Final    LV Systolic Volume Index 10/13/2022 31.9  mL/m2 Final    LV Diastolic Volume 10/13/2022 77.01  mL Final    LV Diastolic Volume Index 10/13/2022 53.11  mL/m2 Final    LV Mass Index 10/13/2022 87  g/m2 Final    Triscuspid Valve Regurgitation Pea* 10/13/2022 45  mmHg Final    LA Volume Index (Mod) 10/13/2022 37.9  mL/m2 Final    LA volume (mod) 10/13/2022 54.94  cm3 Final    Right Atrial Pressure (from IVC) 10/13/2022 8  mmHg Final    EF 10/13/2022 60  % Final    TV rest pulmonary artery pressure 10/13/2022 53  mmHg Final    Phosphorus 10/12/2022 3.0  2.7 - 4.5 mg/dL Final    POC PH 10/12/2022 7.486 (H)  7.35 - 7.45 Final    POC PCO2 10/12/2022 38.8  35 - 45 mmHg Final    POC  PO2 10/12/2022 78 (L)  80 - 100 mmHg Final    POC HCO3 10/12/2022 29.3 (H)  24 - 28 mmol/L Final    POC BE 10/12/2022 6  -2 to 2 mmol/L Final    POC SATURATED O2 10/12/2022 96  95 - 100 % Final    POC TCO2 10/12/2022 30 (H)  23 - 27 mmol/L Final    Rate 10/12/2022 16   Final    Sample 10/12/2022 ARTERIAL   Final    Site 10/12/2022 RR   Final    Allens Test 10/12/2022 Pass   Final    DelSys 10/12/2022 Room Air   Final    Mode 10/12/2022 SPONT   Final    FiO2 10/12/2022 21   Final    Sp02 10/12/2022 97   Final    LD 10/13/2022 137  110 - 260 U/L Final    PT 10/13/2022 14.5 (H)  11.4 - 13.7 sec Final    INR 10/13/2022 1.2   Final    aPTT 10/13/2022 31.1  23.3 - 35.1 sec Final    Sodium 10/13/2022 133 (L)  136 - 145 mmol/L Final    Potassium 10/13/2022 4.0  3.5 - 5.1 mmol/L Final    Chloride 10/13/2022 95  95 - 110 mmol/L Final    CO2 10/13/2022 31 (H)  23 - 29 mmol/L Final    Glucose 10/13/2022 98  70 - 110 mg/dL Final    BUN 10/13/2022 15  8 - 23 mg/dL Final    Creatinine 10/13/2022 0.6  0.5 - 1.4 mg/dL Final    Calcium 10/13/2022 8.9  8.7 - 10.5 mg/dL Final    Total Protein 10/13/2022 6.9  6.0 - 8.4 g/dL Final    Albumin 10/13/2022 2.8 (L)  3.5 - 5.2 g/dL Final    Total Bilirubin 10/13/2022 0.6  0.1 - 1.0 mg/dL Final    Alkaline Phosphatase 10/13/2022 49 (L)  55 - 135 U/L Final    AST 10/13/2022 13  10 - 40 U/L Final    ALT 10/13/2022 10  10 - 44 U/L Final    Anion Gap 10/13/2022 7 (L)  8 - 16 mmol/L Final    eGFR 10/13/2022 >60.0  >60 mL/min/1.73 m^2 Final    Magnesium 10/13/2022 1.5 (L)  1.6 - 2.6 mg/dL Final    POC Glucose 10/13/2022 100  70 - 110 Final    Sodium 10/14/2022 135 (L)  136 - 145 mmol/L Final    Potassium 10/14/2022 4.0  3.5 - 5.1 mmol/L Final    Chloride 10/14/2022 94 (L)  95 - 110 mmol/L Final    CO2 10/14/2022 35 (H)  23 - 29 mmol/L Final    Glucose 10/14/2022 127 (H)  70 - 110 mg/dL Final    BUN 10/14/2022 18  8 - 23 mg/dL Final    Creatinine 10/14/2022 0.7  0.5 - 1.4 mg/dL Final    Calcium  10/14/2022 8.5 (L)  8.7 - 10.5 mg/dL Final    Total Protein 10/14/2022 6.7  6.0 - 8.4 g/dL Final    Albumin 10/14/2022 2.7 (L)  3.5 - 5.2 g/dL Final    Total Bilirubin 10/14/2022 0.7  0.1 - 1.0 mg/dL Final    Alkaline Phosphatase 10/14/2022 46 (L)  55 - 135 U/L Final    AST 10/14/2022 10  10 - 40 U/L Final    ALT 10/14/2022 13  10 - 44 U/L Final    Anion Gap 10/14/2022 6 (L)  8 - 16 mmol/L Final    eGFR 10/14/2022 >60.0  >60 mL/min/1.73 m^2 Final    Magnesium 10/14/2022 1.9  1.6 - 2.6 mg/dL Final    POC Glucose 10/14/2022 122 (H)  70 - 110 Final    POC Glucose 10/14/2022 127 (H)  70 - 110 Final    POC Glucose 10/14/2022 130 (H)  70 - 110 Final    Sodium 10/15/2022 134 (L)  136 - 145 mmol/L Final    Potassium 10/15/2022 3.6  3.5 - 5.1 mmol/L Final    Chloride 10/15/2022 94 (L)  95 - 110 mmol/L Final    CO2 10/15/2022 30 (H)  23 - 29 mmol/L Final    Glucose 10/15/2022 110  70 - 110 mg/dL Final    BUN 10/15/2022 22  8 - 23 mg/dL Final    Creatinine 10/15/2022 0.6  0.5 - 1.4 mg/dL Final    Calcium 10/15/2022 8.5 (L)  8.7 - 10.5 mg/dL Final    Total Protein 10/15/2022 6.4  6.0 - 8.4 g/dL Final    Albumin 10/15/2022 2.7 (L)  3.5 - 5.2 g/dL Final    Total Bilirubin 10/15/2022 0.5  0.1 - 1.0 mg/dL Final    Alkaline Phosphatase 10/15/2022 47 (L)  55 - 135 U/L Final    AST 10/15/2022 14  10 - 40 U/L Final    ALT 10/15/2022 11  10 - 44 U/L Final    Anion Gap 10/15/2022 10  8 - 16 mmol/L Final    eGFR 10/15/2022 >60.0  >60 mL/min/1.73 m^2 Final    POC Glucose 10/15/2022 125 (H)  70 - 110 Final   Office Visit on 08/23/2022   Component Date Value Ref Range Status    Cholesterol 10/10/2022 82  <200 mg/dL Final    HDL 10/10/2022 29 (L)  > OR = 50 mg/dL Final    Triglycerides 10/10/2022 116  <150 mg/dL Final    LDL Cholesterol 10/10/2022 33  mg/dL (calc) Final    HDL/Cholesterol Ratio 10/10/2022 2.8  <5.0 (calc) Final    Non HDL Chol. (LDL+VLDL) 10/10/2022 53  <130 mg/dL (calc) Final    Glucose 10/10/2022 127 (H)  65 - 99 mg/dL  Final    BUN 10/10/2022 13  7 - 25 mg/dL Final    Creatinine 10/10/2022 0.60  0.50 - 1.05 mg/dL Final    eGFR 10/10/2022 98  > OR = 60 mL/min/1.73m2 Final    BUN/Creatinine Ratio 10/10/2022 NOT APPLICABLE  6 - 22 (calc) Final    Sodium 10/10/2022 137  135 - 146 mmol/L Final    Potassium 10/10/2022 4.2  3.5 - 5.3 mmol/L Final    Chloride 10/10/2022 101  98 - 110 mmol/L Final    CO2 10/10/2022 30  20 - 32 mmol/L Final    Calcium 10/10/2022 8.4 (L)  8.6 - 10.4 mg/dL Final    Total Protein 10/10/2022 6.2  6.1 - 8.1 g/dL Final    Albumin 10/10/2022 2.9 (L)  3.6 - 5.1 g/dL Final    Globulin, Total 10/10/2022 3.3  1.9 - 3.7 g/dL (calc) Final    Albumin/Globulin Ratio 10/10/2022 0.9 (L)  1.0 - 2.5 (calc) Final    Total Bilirubin 10/10/2022 0.3  0.2 - 1.2 mg/dL Final    Alkaline Phosphatase 10/10/2022 59  37 - 153 U/L Final    AST 10/10/2022 9 (L)  10 - 35 U/L Final    ALT 10/10/2022 7  6 - 29 U/L Final    TSH w/reflex to FT4 10/10/2022 2.68  0.40 - 4.50 mIU/L Final    Hemoglobin A1C 10/10/2022 5.6  <5.7 % of total Hgb Final   Admission on 05/20/2022, Discharged on 05/20/2022   Component Date Value Ref Range Status    POC Glucose 05/20/2022 87  70 - 110 Final   Lab Visit on 05/19/2022   Component Date Value Ref Range Status    WBC 05/19/2022 8.88  3.90 - 12.70 K/uL Final    RBC 05/19/2022 4.60  4.00 - 5.40 M/uL Final    Hemoglobin 05/19/2022 13.7  12.0 - 16.0 g/dL Final    Hematocrit 05/19/2022 41.2  37.0 - 48.5 % Final    MCV 05/19/2022 90  82 - 98 fL Final    MCH 05/19/2022 29.8  27.0 - 31.0 pg Final    MCHC 05/19/2022 33.3  32.0 - 36.0 g/dL Final    RDW 05/19/2022 14.2  11.5 - 14.5 % Final    Platelets 05/19/2022 125 (L)  150 - 450 K/uL Final    MPV 05/19/2022 11.0  9.2 - 12.9 fL Final    Immature Granulocytes 05/19/2022 0.3  0.0 - 0.5 % Final    Gran # (ANC) 05/19/2022 5.7  1.8 - 7.7 K/uL Final    Immature Grans (Abs) 05/19/2022 0.03  0.00 - 0.04 K/uL Final    Lymph # 05/19/2022 2.2  1.0 - 4.8 K/uL Final    Mono #  05/19/2022 0.7  0.3 - 1.0 K/uL Final    Eos # 05/19/2022 0.2  0.0 - 0.5 K/uL Final    Baso # 05/19/2022 0.05  0.00 - 0.20 K/uL Final    nRBC 05/19/2022 0  0 /100 WBC Final    Gran % 05/19/2022 64.4  38.0 - 73.0 % Final    Lymph % 05/19/2022 24.8  18.0 - 48.0 % Final    Mono % 05/19/2022 7.9  4.0 - 15.0 % Final    Eosinophil % 05/19/2022 2.0  0.0 - 8.0 % Final    Basophil % 05/19/2022 0.6  0.0 - 1.9 % Final    Differential Method 05/19/2022 Automated   Final    Sodium 05/19/2022 135 (L)  136 - 145 mmol/L Final    Potassium 05/19/2022 4.1  3.5 - 5.1 mmol/L Final    Chloride 05/19/2022 100  95 - 110 mmol/L Final    CO2 05/19/2022 27  23 - 29 mmol/L Final    Glucose 05/19/2022 104  70 - 110 mg/dL Final    BUN 05/19/2022 14  8 - 23 mg/dL Final    Creatinine 05/19/2022 0.5  0.5 - 1.4 mg/dL Final    Calcium 05/19/2022 9.2  8.7 - 10.5 mg/dL Final    Anion Gap 05/19/2022 8  8 - 16 mmol/L Final    eGFR if African American 05/19/2022 >60.0  >60 mL/min/1.73 m^2 Final    eGFR if non African American 05/19/2022 >60.0  >60 mL/min/1.73 m^2 Final       Past Medical History:   Diagnosis Date    Anemia     Depression     Diabetes mellitus 2021    Encounter for blood transfusion     GERD (gastroesophageal reflux disease)     Hyperlipidemia     Hypertension     MI (myocardial infarction) 2007    Osteoporosis     Thyroid disease     Ulcer of the stomach and intestine     pos. for h. pylori    Weight loss 2022    food comes up      Past Surgical History:   Procedure Laterality Date    APPENDECTOMY      CHOLECYSTECTOMY      COLONOSCOPY N/A 3/18/2016    Procedure: COLONOSCOPY;  Surgeon: Rober Zelaya Jr., MD;  Location: [facility];  Service: Endoscopy;  Laterality: N/A;    ESOPHAGOGASTRODUODENOSCOPY  04/15/2016    with dil    ESOPHAGOGASTRODUODENOSCOPY N/A 5/20/2022    Procedure: EGD (ESOPHAGOGASTRODUODENOSCOPY);  Surgeon: Justine Villegas MD;  Location: [facility];  Service: Endoscopy;  Laterality: N/A;    EYE SURGERY      as a child     TONSILLECTOMY      TUBAL LIGATION       Family History   Problem Relation Age of Onset    Heart disease Mother     Diabetes Father        Marital Status:   Alcohol History:  reports no history of alcohol use.  Tobacco History:  reports that she quit smoking about 7 weeks ago. Her smoking use included cigarettes. She started smoking about 63 years ago. She has a 22.50 pack-year smoking history. She has never used smokeless tobacco.  Drug History:  reports no history of drug use.    Review of patient's allergies indicates:  No Known Allergies    Current Outpatient Medications:     albuterol (PROAIR HFA) 90 mcg/actuation inhaler, Inhale 2 puffs into the lungs every 6 (six) hours as needed for Wheezing or Shortness of Breath. Rescue, Disp: 18 g, Rfl: 5    atorvastatin (LIPITOR) 10 MG tablet, Take 1 tablet (10 mg total) by mouth once daily., Disp: 90 tablet, Rfl: 1    blood sugar diagnostic Strp, 1 each by In Vitro route once daily., Disp: 100 each, Rfl: 3    calcium carbonate (OS-JOSELYN) 600 mg calcium (1,500 mg) Tab, Take 600 mg by mouth once daily., Disp: , Rfl:     ergocalciferol (ERGOCALCIFEROL) 50,000 unit Cap, Take 1 capsule (50,000 Units total) by mouth every 7 days. (Patient taking differently: Take 50,000 Units by mouth every Saturday.), Disp: 12 capsule, Rfl: 1    EScitalopram oxalate (LEXAPRO) 20 MG tablet, Take 1 tablet (20 mg total) by mouth once daily., Disp: 90 tablet, Rfl: 1    fluticasone-umeclidin-vilanter (TRELEGY ELLIPTA) 100-62.5-25 mcg DsDv, Inhale 1 puff into the lungs once daily., Disp: 60 each, Rfl: 5    furosemide (LASIX) 20 MG tablet, Take 1 tablet (20 mg total) by mouth every other day., Disp: 30 tablet, Rfl: 1    icosapent ethyL (VASCEPA) 1 gram Cap, Take 2 capsules (2 g total) by mouth 2 (two) times a day., Disp: 360 capsule, Rfl: 1    levothyroxine (SYNTHROID) 50 MCG tablet, Take 1 tablet (50 mcg total) by mouth before breakfast., Disp: 90 tablet, Rfl: 1    lisinopriL  "(PRINIVIL,ZESTRIL) 20 MG tablet, Take 0.5 tablets (10 mg total) by mouth once daily., Disp: 90 tablet, Rfl: 1    metFORMIN (GLUCOPHAGE) 1000 MG tablet, Take 1 tablet (1,000 mg total) by mouth daily with breakfast., Disp: 90 tablet, Rfl: 1    pantoprazole (PROTONIX) 40 MG tablet, Take 1 tablet (40 mg total) by mouth 2 (two) times daily., Disp: 180 tablet, Rfl: 1    propranoloL (INDERAL LA) 80 MG 24 hr capsule, Take 1 capsule (80 mg total) by mouth once daily., Disp: 90 capsule, Rfl: 1    sucralfate (CARAFATE) 1 gram tablet, Take 1 tablet (1 g total) by mouth 2 (two) times daily. Take on empty stomach, do not eat or take medication for at least 1 hour, Disp: 60 tablet, Rfl: 2    Review of Systems   Constitutional:  Negative for appetite change, chills, fever and unexpected weight change (wt down 7lbs since last visit here).   HENT:  Negative for postnasal drip, sore throat and trouble swallowing.    Respiratory:  Positive for shortness of breath (much improved). Negative for cough and wheezing.    Cardiovascular:  Negative for chest pain, palpitations and leg swelling.   Gastrointestinal:  Negative for abdominal pain, blood in stool, constipation, diarrhea, nausea and vomiting.   Genitourinary:  Negative for dysuria, frequency and hematuria.   Skin:  Negative for rash.   Neurological:  Negative for dizziness, syncope, speech difficulty, numbness and headaches.      Objective:      Vitals:    10/24/22 1346   BP: (!) 98/58   Pulse: 73   SpO2: 97%   Weight: 46.3 kg (102 lb)   Height: 5' 1" (1.549 m)     Physical Exam  Vitals and nursing note reviewed.   Constitutional:       General: She is not in acute distress.     Appearance: Normal appearance. She is well-developed.      Comments: Thin frail white female, appears older than stated age   HENT:      Head: Normocephalic and atraumatic.      Mouth/Throat:      Mouth: Mucous membranes are moist.   Neck:      Vascular: No carotid bruit.   Cardiovascular:      Rate and " Rhythm: Normal rate and regular rhythm.      Heart sounds: Murmur heard.   Systolic murmur is present with a grade of 3/6.     No friction rub. No gallop.   Pulmonary:      Effort: Pulmonary effort is normal. No respiratory distress.      Breath sounds: No wheezing or rales.      Comments: Much better aeration since last visit  Abdominal:      General: There is no distension.      Palpations: Abdomen is soft.      Tenderness: There is no abdominal tenderness.   Musculoskeletal:      Cervical back: Neck supple.      Right lower leg: No edema.      Left lower leg: No edema.   Lymphadenopathy:      Cervical: No cervical adenopathy.   Skin:     General: Skin is warm and dry.      Findings: No rash.   Neurological:      General: No focal deficit present.      Mental Status: She is alert and oriented to person, place, and time.      Gait: Gait normal.       Assessment:       1. Acute heart failure with preserved ejection fraction    2. Chronic obstructive pulmonary disease, unspecified COPD type    3. Pulmonary hypertension    4. Aortic valve stenosis, etiology of cardiac valve disease unspecified         Plan:       Acute heart failure with preserved ejection fraction  Comments:  clinically much improved-reduce lasix to QOD, vimal labs 2wks, reviewed echo with pt and encouraged her to call and schedule appt with cards  Orders:  -     Ambulatory referral/consult to Cardiology; Future; Expected date: 10/31/2022  -     BNP; Future; Expected date: 10/24/2022  -     Basic Metabolic Panel; Future; Expected date: 10/24/2022  -     furosemide (LASIX) 20 MG tablet; Take 1 tablet (20 mg total) by mouth every other day.  Dispense: 30 tablet; Refill: 1    Chronic obstructive pulmonary disease, unspecified COPD type  Comments:  stable at present though again advised of need to see pulm- she has repeatedly declined PFTs and pulm referral in the past  Orders:  -     Ambulatory referral/consult to Pulmonology; Future; Expected date:  10/31/2022    Pulmonary hypertension    Aortic valve stenosis, etiology of cardiac valve disease unspecified    Follow up if symptoms worsen or fail to improve, for as scheduled.

## 2022-10-24 NOTE — PATIENT INSTRUCTIONS
Matthew Basilio MD- cardiology- call to schedule appointment  [address]  [address]  [place] [address]  Phone: [phone]    Golden Garcia MD- pulmonology  [address] [facility]  [address]  [place] [address]  Phone: [phone]    Reduce furosemide to every other day.  Have labs drawn in 2 weeks to check kidney function and potassium level

## 2022-10-25 ENCOUNTER — TELEPHONE (OUTPATIENT)
Dept: FAMILY MEDICINE | Facility: CLINIC | Age: 67
End: 2022-10-25
Payer: MEDICARE

## 2022-10-25 NOTE — TELEPHONE ENCOUNTER
"Pulm referral: "call cannot be completed at this time" x2; portal message with scheduling information.  "

## 2022-10-26 ENCOUNTER — TELEPHONE (OUTPATIENT)
Dept: FAMILY MEDICINE | Facility: CLINIC | Age: 67
End: 2022-10-26
Payer: MEDICARE

## 2022-10-27 ENCOUNTER — TELEPHONE (OUTPATIENT)
Dept: FAMILY MEDICINE | Facility: CLINIC | Age: 67
End: 2022-10-27
Payer: MEDICARE

## 2022-10-27 ENCOUNTER — PATIENT MESSAGE (OUTPATIENT)
Dept: FAMILY MEDICINE | Facility: CLINIC | Age: 67
End: 2022-10-27
Payer: MEDICARE

## 2022-10-27 ENCOUNTER — TELEPHONE (OUTPATIENT)
Dept: PULMONOLOGY | Facility: CLINIC | Age: 67
End: 2022-10-27
Payer: MEDICARE

## 2022-10-27 NOTE — TELEPHONE ENCOUNTER
Scheduled patient for November 28th. Patient added to wait list.   ----- Message from Letty Oakley sent at 10/27/2022  2:42 PM CDT -----  Regarding: returning call  Type:  Patient Returning Call    Who Called: BEE GARCIA [[id number]]    Who Left Message for Patient: Sparkle    Does the patient know what this is regarding? yes    Best Call Back Number: [phone]     Additional Information: Pt stated she work nights and sleep throughout the day but is up around 2:30 or 3 pm and that is the best time to reach her.

## 2022-10-27 NOTE — TELEPHONE ENCOUNTER
"Pulm referral: attempted call again will not connect; tried one IC, but "call can't be connected at this time" voicemail left on other IC with scheduling desk information to pass along to patient. Another portal message for patient to contact for scheduling.  "

## 2022-11-04 ENCOUNTER — TELEPHONE (OUTPATIENT)
Dept: CARDIOLOGY | Facility: CLINIC | Age: 67
End: 2022-11-04
Payer: MEDICARE

## 2022-11-08 ENCOUNTER — TELEPHONE (OUTPATIENT)
Dept: FAMILY MEDICINE | Facility: CLINIC | Age: 67
End: 2022-11-08

## 2022-11-08 NOTE — TELEPHONE ENCOUNTER
----- Message from RT Jose L sent at 11/7/2022  7:46 AM CST -----    ----- Message -----  From: Liv Parrish NP  Sent: 11/7/2022  12:00 AM CST  To: Liv Parrish Staff    Please call pt and remind her to have updated labs

## 2022-11-22 ENCOUNTER — TELEPHONE (OUTPATIENT)
Dept: FAMILY MEDICINE | Facility: CLINIC | Age: 67
End: 2022-11-22

## 2022-11-22 NOTE — TELEPHONE ENCOUNTER
Left mess that fasting lab is due soon and to call with any questions. Encouraged water.  Updated remind me.

## 2022-11-29 ENCOUNTER — TELEPHONE (OUTPATIENT)
Dept: FAMILY MEDICINE | Facility: CLINIC | Age: 67
End: 2022-11-29

## 2022-12-02 ENCOUNTER — TELEPHONE (OUTPATIENT)
Dept: FAMILY MEDICINE | Facility: CLINIC | Age: 67
End: 2022-12-02
Payer: MEDICARE

## 2022-12-06 ENCOUNTER — TELEPHONE (OUTPATIENT)
Dept: FAMILY MEDICINE | Facility: CLINIC | Age: 67
End: 2022-12-06

## 2022-12-12 ENCOUNTER — OFFICE VISIT (OUTPATIENT)
Dept: FAMILY MEDICINE | Facility: CLINIC | Age: 67
End: 2022-12-12
Payer: MEDICARE

## 2022-12-12 VITALS
DIASTOLIC BLOOD PRESSURE: 62 MMHG | SYSTOLIC BLOOD PRESSURE: 122 MMHG | HEART RATE: 64 BPM | HEIGHT: 61 IN | WEIGHT: 107 LBS | BODY MASS INDEX: 20.2 KG/M2

## 2022-12-12 DIAGNOSIS — I50.31 ACUTE HEART FAILURE WITH PRESERVED EJECTION FRACTION: ICD-10-CM

## 2022-12-12 DIAGNOSIS — J44.9 CHRONIC OBSTRUCTIVE PULMONARY DISEASE, UNSPECIFIED COPD TYPE: Primary | ICD-10-CM

## 2022-12-12 DIAGNOSIS — E78.5 DYSLIPIDEMIA: ICD-10-CM

## 2022-12-12 DIAGNOSIS — I27.20 PULMONARY HYPERTENSION: ICD-10-CM

## 2022-12-12 DIAGNOSIS — E78.5 DM TYPE 2 WITH DIABETIC DYSLIPIDEMIA: ICD-10-CM

## 2022-12-12 DIAGNOSIS — E11.69 DM TYPE 2 WITH DIABETIC DYSLIPIDEMIA: ICD-10-CM

## 2022-12-12 DIAGNOSIS — E03.9 ACQUIRED HYPOTHYROIDISM: ICD-10-CM

## 2022-12-12 PROCEDURE — 3044F HG A1C LEVEL LT 7.0%: CPT | Mod: CPTII,S$GLB,, | Performed by: NURSE PRACTITIONER

## 2022-12-12 PROCEDURE — 3066F PR DOCUMENTATION OF TREATMENT FOR NEPHROPATHY: ICD-10-PCS | Mod: CPTII,S$GLB,, | Performed by: NURSE PRACTITIONER

## 2022-12-12 PROCEDURE — 99214 OFFICE O/P EST MOD 30 MIN: CPT | Mod: S$GLB,,, | Performed by: NURSE PRACTITIONER

## 2022-12-12 PROCEDURE — 1159F MED LIST DOCD IN RCRD: CPT | Mod: CPTII,S$GLB,, | Performed by: NURSE PRACTITIONER

## 2022-12-12 PROCEDURE — 3008F PR BODY MASS INDEX (BMI) DOCUMENTED: ICD-10-PCS | Mod: CPTII,S$GLB,, | Performed by: NURSE PRACTITIONER

## 2022-12-12 PROCEDURE — 3008F BODY MASS INDEX DOCD: CPT | Mod: CPTII,S$GLB,, | Performed by: NURSE PRACTITIONER

## 2022-12-12 PROCEDURE — 1160F RVW MEDS BY RX/DR IN RCRD: CPT | Mod: CPTII,S$GLB,, | Performed by: NURSE PRACTITIONER

## 2022-12-12 PROCEDURE — 3061F NEG MICROALBUMINURIA REV: CPT | Mod: CPTII,S$GLB,, | Performed by: NURSE PRACTITIONER

## 2022-12-12 PROCEDURE — 3078F PR MOST RECENT DIASTOLIC BLOOD PRESSURE < 80 MM HG: ICD-10-PCS | Mod: CPTII,S$GLB,, | Performed by: NURSE PRACTITIONER

## 2022-12-12 PROCEDURE — 3074F SYST BP LT 130 MM HG: CPT | Mod: CPTII,S$GLB,, | Performed by: NURSE PRACTITIONER

## 2022-12-12 PROCEDURE — 3078F DIAST BP <80 MM HG: CPT | Mod: CPTII,S$GLB,, | Performed by: NURSE PRACTITIONER

## 2022-12-12 PROCEDURE — 3066F NEPHROPATHY DOC TX: CPT | Mod: CPTII,S$GLB,, | Performed by: NURSE PRACTITIONER

## 2022-12-12 PROCEDURE — 4010F ACE/ARB THERAPY RXD/TAKEN: CPT | Mod: CPTII,S$GLB,, | Performed by: NURSE PRACTITIONER

## 2022-12-12 PROCEDURE — 3074F PR MOST RECENT SYSTOLIC BLOOD PRESSURE < 130 MM HG: ICD-10-PCS | Mod: CPTII,S$GLB,, | Performed by: NURSE PRACTITIONER

## 2022-12-12 PROCEDURE — 3044F PR MOST RECENT HEMOGLOBIN A1C LEVEL <7.0%: ICD-10-PCS | Mod: CPTII,S$GLB,, | Performed by: NURSE PRACTITIONER

## 2022-12-12 PROCEDURE — 3061F PR NEG MICROALBUMINURIA RESULT DOCUMENTED/REVIEW: ICD-10-PCS | Mod: CPTII,S$GLB,, | Performed by: NURSE PRACTITIONER

## 2022-12-12 PROCEDURE — 99214 PR OFFICE/OUTPT VISIT, EST, LEVL IV, 30-39 MIN: ICD-10-PCS | Mod: S$GLB,,, | Performed by: NURSE PRACTITIONER

## 2022-12-12 PROCEDURE — 4010F PR ACE/ARB THEARPY RXD/TAKEN: ICD-10-PCS | Mod: CPTII,S$GLB,, | Performed by: NURSE PRACTITIONER

## 2022-12-12 PROCEDURE — 1159F PR MEDICATION LIST DOCUMENTED IN MEDICAL RECORD: ICD-10-PCS | Mod: CPTII,S$GLB,, | Performed by: NURSE PRACTITIONER

## 2022-12-12 PROCEDURE — 1160F PR REVIEW ALL MEDS BY PRESCRIBER/CLIN PHARMACIST DOCUMENTED: ICD-10-PCS | Mod: CPTII,S$GLB,, | Performed by: NURSE PRACTITIONER

## 2022-12-12 NOTE — PROGRESS NOTES
SUBJECTIVE:    Patient ID: Liv Decker is a 67 y.o. female.    Chief Complaint: Follow-up (4mth, no bottles, Eye exam pt will sched// SW)    Pt here for regular f/u- COPD, DM, recent new diagnosis of CHF, pleural effusion    Pt reports overall since last HFU visit she's feeling good. Denies any recent SOB. Still not smoking!!    Since last visit missed her appt with Dr. Garcia- reports has to call and reschedule. Has appt with Dr. Smart in January. Taking Lasix QOD. No leg/abdominal swelling.  Reports weighing herself daily      Admission on 10/12/2022, Discharged on 10/15/2022   Component Date Value Ref Range Status    WBC 10/12/2022 8.76  3.90 - 12.70 K/uL Final    RBC 10/12/2022 4.30  4.00 - 5.40 M/uL Final    Hemoglobin 10/12/2022 11.0 (L)  12.0 - 16.0 g/dL Final    Hematocrit 10/12/2022 35.5 (L)  37.0 - 48.5 % Final    MCV 10/12/2022 83  82 - 98 fL Final    MCH 10/12/2022 25.6 (L)  27.0 - 31.0 pg Final    MCHC 10/12/2022 31.0 (L)  32.0 - 36.0 g/dL Final    RDW 10/12/2022 15.7 (H)  11.5 - 14.5 % Final    Platelets 10/12/2022 289  150 - 450 K/uL Final    MPV 10/12/2022 10.4  9.2 - 12.9 fL Final    Immature Granulocytes 10/12/2022 0.3  0.0 - 0.5 % Final    Gran # (ANC) 10/12/2022 7.0  1.8 - 7.7 K/uL Final    Immature Grans (Abs) 10/12/2022 0.03  0.00 - 0.04 K/uL Final    Lymph # 10/12/2022 1.3  1.0 - 4.8 K/uL Final    Mono # 10/12/2022 0.5  0.3 - 1.0 K/uL Final    Eos # 10/12/2022 0.0  0.0 - 0.5 K/uL Final    Baso # 10/12/2022 0.01  0.00 - 0.20 K/uL Final    nRBC 10/12/2022 0  0 /100 WBC Final    Gran % 10/12/2022 79.5 (H)  38.0 - 73.0 % Final    Lymph % 10/12/2022 14.5 (L)  18.0 - 48.0 % Final    Mono % 10/12/2022 5.5  4.0 - 15.0 % Final    Eosinophil % 10/12/2022 0.1  0.0 - 8.0 % Final    Basophil % 10/12/2022 0.1  0.0 - 1.9 % Final    Differential Method 10/12/2022 Automated   Final    Sodium 10/12/2022 136  136 - 145 mmol/L Final    Potassium 10/12/2022 4.4  3.5 - 5.1 mmol/L Final    Chloride 10/12/2022 99   95 - 110 mmol/L Final    CO2 10/12/2022 26  23 - 29 mmol/L Final    Glucose 10/12/2022 146 (H)  70 - 110 mg/dL Final    BUN 10/12/2022 15  8 - 23 mg/dL Final    Creatinine 10/12/2022 0.6  0.5 - 1.4 mg/dL Final    Calcium 10/12/2022 9.2  8.7 - 10.5 mg/dL Final    Total Protein 10/12/2022 7.3  6.0 - 8.4 g/dL Final    Albumin 10/12/2022 2.9 (L)  3.5 - 5.2 g/dL Final    Total Bilirubin 10/12/2022 0.5  0.1 - 1.0 mg/dL Final    Alkaline Phosphatase 10/12/2022 52 (L)  55 - 135 U/L Final    AST 10/12/2022 19  10 - 40 U/L Final    ALT 10/12/2022 12  10 - 44 U/L Final    Anion Gap 10/12/2022 11  8 - 16 mmol/L Final    eGFR 10/12/2022 >60.0  >60 mL/min/1.73 m^2 Final    Troponin I 10/12/2022 <0.030  <=0.040 ng/mL Final    BNP 10/12/2022 1,134 (H)  0 - 99 pg/mL Final    Magnesium 10/12/2022 1.6  1.6 - 2.6 mg/dL Final    SARS-CoV-2 RNA, Amplification, Qual 10/12/2022 Negative  Negative Final    BSA 10/13/2022 1.45  m2 Final    TDI SEPTAL 10/13/2022 0.05  m/s Final    LV LATERAL E/E' RATIO 10/13/2022 20.75  m/s Final    LV SEPTAL E/E' RATIO 10/13/2022 33.20  m/s Final    TDI LATERAL 10/13/2022 0.08  m/s Final    LVIDd 10/13/2022 4.25  3.5 - 6.0 cm Final    IVS 10/13/2022 0.92  0.6 - 1.1 cm Final    Posterior Wall 10/13/2022 0.93  0.6 - 1.1 cm Final    Ao root annulus 10/13/2022 2.72  cm Final    LVIDs 10/13/2022 3.59  2.1 - 4.0 cm Final    FS 10/13/2022 16  28 - 44 % Final    LV mass 10/13/2022 125.58  g Final    LA size 10/13/2022 3.43  cm Final    RVDD 10/13/2022 2.44  cm Final    TAPSE 10/13/2022 2.18  cm Final    Left Ventricle Relative Wall Thick* 10/13/2022 0.44  cm Final    AV mean gradient 10/13/2022 21  mmHg Final    AV valve area 10/13/2022 0.92  cm2 Final    AV index (prosthetic) 10/13/2022 0.29   Final    E/A ratio 10/13/2022 1.64   Final    Mean e' 10/13/2022 0.07  m/s Final    E wave deceleration time 10/13/2022 140.91  msec Final    LVOT diameter 10/13/2022 2.02  cm Final    LVOT area 10/13/2022 3.2  cm2 Final     LVOT peak ed 10/13/2022 69.23  m/s Final    LVOT peak VTI 10/13/2022 16.62  cm Final    Ao VTI 10/13/2022 57.68  cm Final    Mr max ed 10/13/2022 559.78  m/s Final    LVOT stroke volume 10/13/2022 53.24  cm3 Final    E/E' ratio 10/13/2022 25.54  m/s Final    MV Peak E Ed 10/13/2022 1.66  m/s Final    TR Max Ed 10/13/2022 3.37  m/s Final    MV Peak A Ed 10/13/2022 1.01  m/s Final    LV Systolic Volume 10/13/2022 46.20  mL Final    LV Systolic Volume Index 10/13/2022 31.9  mL/m2 Final    LV Diastolic Volume 10/13/2022 77.01  mL Final    LV Diastolic Volume Index 10/13/2022 53.11  mL/m2 Final    LV Mass Index 10/13/2022 87  g/m2 Final    Triscuspid Valve Regurgitation Pea* 10/13/2022 45  mmHg Final    LA Volume Index (Mod) 10/13/2022 37.9  mL/m2 Final    LA volume (mod) 10/13/2022 54.94  cm3 Final    Right Atrial Pressure (from IVC) 10/13/2022 8  mmHg Final    EF 10/13/2022 60  % Final    TV rest pulmonary artery pressure 10/13/2022 53  mmHg Final    Phosphorus 10/12/2022 3.0  2.7 - 4.5 mg/dL Final    POC PH 10/12/2022 7.486 (H)  7.35 - 7.45 Final    POC PCO2 10/12/2022 38.8  35 - 45 mmHg Final    POC PO2 10/12/2022 78 (L)  80 - 100 mmHg Final    POC HCO3 10/12/2022 29.3 (H)  24 - 28 mmol/L Final    POC BE 10/12/2022 6  -2 to 2 mmol/L Final    POC SATURATED O2 10/12/2022 96  95 - 100 % Final    POC TCO2 10/12/2022 30 (H)  23 - 27 mmol/L Final    Rate 10/12/2022 16   Final    Sample 10/12/2022 ARTERIAL   Final    Site 10/12/2022 RR   Final    Allens Test 10/12/2022 Pass   Final    DelSys 10/12/2022 Room Air   Final    Mode 10/12/2022 SPONT   Final    FiO2 10/12/2022 21   Final    Sp02 10/12/2022 97   Final    LD 10/13/2022 137  110 - 260 U/L Final    PT 10/13/2022 14.5 (H)  11.4 - 13.7 sec Final    INR 10/13/2022 1.2   Final    aPTT 10/13/2022 31.1  23.3 - 35.1 sec Final    Sodium 10/13/2022 133 (L)  136 - 145 mmol/L Final    Potassium 10/13/2022 4.0  3.5 - 5.1 mmol/L Final    Chloride 10/13/2022 95  95 - 110  mmol/L Final    CO2 10/13/2022 31 (H)  23 - 29 mmol/L Final    Glucose 10/13/2022 98  70 - 110 mg/dL Final    BUN 10/13/2022 15  8 - 23 mg/dL Final    Creatinine 10/13/2022 0.6  0.5 - 1.4 mg/dL Final    Calcium 10/13/2022 8.9  8.7 - 10.5 mg/dL Final    Total Protein 10/13/2022 6.9  6.0 - 8.4 g/dL Final    Albumin 10/13/2022 2.8 (L)  3.5 - 5.2 g/dL Final    Total Bilirubin 10/13/2022 0.6  0.1 - 1.0 mg/dL Final    Alkaline Phosphatase 10/13/2022 49 (L)  55 - 135 U/L Final    AST 10/13/2022 13  10 - 40 U/L Final    ALT 10/13/2022 10  10 - 44 U/L Final    Anion Gap 10/13/2022 7 (L)  8 - 16 mmol/L Final    eGFR 10/13/2022 >60.0  >60 mL/min/1.73 m^2 Final    Magnesium 10/13/2022 1.5 (L)  1.6 - 2.6 mg/dL Final    POC Glucose 10/13/2022 100  70 - 110 Final    Sodium 10/14/2022 135 (L)  136 - 145 mmol/L Final    Potassium 10/14/2022 4.0  3.5 - 5.1 mmol/L Final    Chloride 10/14/2022 94 (L)  95 - 110 mmol/L Final    CO2 10/14/2022 35 (H)  23 - 29 mmol/L Final    Glucose 10/14/2022 127 (H)  70 - 110 mg/dL Final    BUN 10/14/2022 18  8 - 23 mg/dL Final    Creatinine 10/14/2022 0.7  0.5 - 1.4 mg/dL Final    Calcium 10/14/2022 8.5 (L)  8.7 - 10.5 mg/dL Final    Total Protein 10/14/2022 6.7  6.0 - 8.4 g/dL Final    Albumin 10/14/2022 2.7 (L)  3.5 - 5.2 g/dL Final    Total Bilirubin 10/14/2022 0.7  0.1 - 1.0 mg/dL Final    Alkaline Phosphatase 10/14/2022 46 (L)  55 - 135 U/L Final    AST 10/14/2022 10  10 - 40 U/L Final    ALT 10/14/2022 13  10 - 44 U/L Final    Anion Gap 10/14/2022 6 (L)  8 - 16 mmol/L Final    eGFR 10/14/2022 >60.0  >60 mL/min/1.73 m^2 Final    Magnesium 10/14/2022 1.9  1.6 - 2.6 mg/dL Final    POC Glucose 10/14/2022 122 (H)  70 - 110 Final    POC Glucose 10/14/2022 127 (H)  70 - 110 Final    POC Glucose 10/14/2022 130 (H)  70 - 110 Final    Sodium 10/15/2022 134 (L)  136 - 145 mmol/L Final    Potassium 10/15/2022 3.6  3.5 - 5.1 mmol/L Final    Chloride 10/15/2022 94 (L)  95 - 110 mmol/L Final    CO2 10/15/2022  30 (H)  23 - 29 mmol/L Final    Glucose 10/15/2022 110  70 - 110 mg/dL Final    BUN 10/15/2022 22  8 - 23 mg/dL Final    Creatinine 10/15/2022 0.6  0.5 - 1.4 mg/dL Final    Calcium 10/15/2022 8.5 (L)  8.7 - 10.5 mg/dL Final    Total Protein 10/15/2022 6.4  6.0 - 8.4 g/dL Final    Albumin 10/15/2022 2.7 (L)  3.5 - 5.2 g/dL Final    Total Bilirubin 10/15/2022 0.5  0.1 - 1.0 mg/dL Final    Alkaline Phosphatase 10/15/2022 47 (L)  55 - 135 U/L Final    AST 10/15/2022 14  10 - 40 U/L Final    ALT 10/15/2022 11  10 - 44 U/L Final    Anion Gap 10/15/2022 10  8 - 16 mmol/L Final    eGFR 10/15/2022 >60.0  >60 mL/min/1.73 m^2 Final    POC Glucose 10/15/2022 125 (H)  70 - 110 Final   Office Visit on 08/23/2022   Component Date Value Ref Range Status    Cholesterol 10/10/2022 82  <200 mg/dL Final    HDL 10/10/2022 29 (L)  > OR = 50 mg/dL Final    Triglycerides 10/10/2022 116  <150 mg/dL Final    LDL Cholesterol 10/10/2022 33  mg/dL (calc) Final    HDL/Cholesterol Ratio 10/10/2022 2.8  <5.0 (calc) Final    Non HDL Chol. (LDL+VLDL) 10/10/2022 53  <130 mg/dL (calc) Final    Glucose 10/10/2022 127 (H)  65 - 99 mg/dL Final    BUN 10/10/2022 13  7 - 25 mg/dL Final    Creatinine 10/10/2022 0.60  0.50 - 1.05 mg/dL Final    eGFR 10/10/2022 98  > OR = 60 mL/min/1.73m2 Final    BUN/Creatinine Ratio 10/10/2022 NOT APPLICABLE  6 - 22 (calc) Final    Sodium 10/10/2022 137  135 - 146 mmol/L Final    Potassium 10/10/2022 4.2  3.5 - 5.3 mmol/L Final    Chloride 10/10/2022 101  98 - 110 mmol/L Final    CO2 10/10/2022 30  20 - 32 mmol/L Final    Calcium 10/10/2022 8.4 (L)  8.6 - 10.4 mg/dL Final    Total Protein 10/10/2022 6.2  6.1 - 8.1 g/dL Final    Albumin 10/10/2022 2.9 (L)  3.6 - 5.1 g/dL Final    Globulin, Total 10/10/2022 3.3  1.9 - 3.7 g/dL (calc) Final    Albumin/Globulin Ratio 10/10/2022 0.9 (L)  1.0 - 2.5 (calc) Final    Total Bilirubin 10/10/2022 0.3  0.2 - 1.2 mg/dL Final    Alkaline Phosphatase 10/10/2022 59  37 - 153 U/L Final     AST 10/10/2022 9 (L)  10 - 35 U/L Final    ALT 10/10/2022 7  6 - 29 U/L Final    TSH w/reflex to FT4 10/10/2022 2.68  0.40 - 4.50 mIU/L Final    Hemoglobin A1C 10/10/2022 5.6  <5.7 % of total Hgb Final       Past Medical History:   Diagnosis Date    Anemia     Depression     Diabetes mellitus 2021    Encounter for blood transfusion     GERD (gastroesophageal reflux disease)     Hyperlipidemia     Hypertension     MI (myocardial infarction) 2007    Osteoporosis     Thyroid disease     Ulcer of the stomach and intestine     pos. for h. pylori    Weight loss 2022    food comes up      Past Surgical History:   Procedure Laterality Date    APPENDECTOMY      CHOLECYSTECTOMY      COLONOSCOPY N/A 3/18/2016    Procedure: COLONOSCOPY;  Surgeon: Rober Zelaya Jr., MD;  Location: [facility];  Service: Endoscopy;  Laterality: N/A;    ESOPHAGOGASTRODUODENOSCOPY  04/15/2016    with dil    ESOPHAGOGASTRODUODENOSCOPY N/A 5/20/2022    Procedure: EGD (ESOPHAGOGASTRODUODENOSCOPY);  Surgeon: Justine Villegsa MD;  Location: [facility];  Service: Endoscopy;  Laterality: N/A;    EYE SURGERY      as a child    TONSILLECTOMY      TUBAL LIGATION       Family History   Problem Relation Age of Onset    Heart disease Mother     Diabetes Father        The CVD Risk score (D'Agostino, et al., 2008) failed to calculate for the following reasons:    The patient has a prior MI, stroke, CHF, or peripheral vascular disease diagnosis     Marital Status:   Alcohol History:  reports no history of alcohol use.  Tobacco History:  reports that she quit smoking about 3 months ago. Her smoking use included cigarettes. She started smoking about 63 years ago. She has a 22.50 pack-year smoking history. She has never used smokeless tobacco.  Drug History:  reports no history of drug use.    Health Maintenance Topics with due status: Not Due       Topic Last Completion Date    TETANUS VACCINE 03/21/2019    Pneumococcal Vaccines (Age 65+) 03/31/2021     Colorectal Cancer Screening 05/05/2021    Diabetes Urine Screening 03/28/2022    Mammogram 03/30/2022    DEXA Scan 03/30/2022    Foot Exam 05/12/2022    Lipid Panel 10/10/2022    Hemoglobin A1c 10/10/2022    LDCT Lung Screen 10/12/2022    Low Dose Statin 10/12/2022     Immunization History   Administered Date(s) Administered    COVID-19, MRNA, LN-S, PF (MODERNA FULL 0.5 ML DOSE) 01/15/2021, 02/18/2021    COVID-19, mRNA, LNP-S, bivalent booster, PF (PFIZER OMICRON) 10/19/2022    Influenza 09/09/2009, 10/08/2010, 10/20/2011, 10/02/2012, 10/18/2013, 10/03/2014, 10/15/2015, 10/20/2016    Influenza - Quadrivalent 10/03/2014    Influenza - Quadrivalent - High Dose - PF (65 years and older) 01/25/2022, 10/10/2022    Influenza - Quadrivalent - PF *Preferred* (6 months and older) 10/20/2016, 10/15/2018, 10/16/2019    Influenza A (H1N1) 2009 Monovalent - IM 10/23/2009    Influenza Split 09/09/2009, 10/08/2010, 10/20/2011, 10/02/2012, 10/18/2013, 10/15/2015, 10/15/2018, 10/07/2019    Pneumococcal Conjugate - 13 Valent 03/31/2021    Pneumococcal Polysaccharide - 23 Valent 03/21/2019    Tdap 03/21/2019, 03/21/2019    Zoster 03/21/2019       Review of patient's allergies indicates:  No Known Allergies    Current Outpatient Medications:     albuterol (PROAIR HFA) 90 mcg/actuation inhaler, Inhale 2 puffs into the lungs every 6 (six) hours as needed for Wheezing or Shortness of Breath. Rescue, Disp: 18 g, Rfl: 5    atorvastatin (LIPITOR) 10 MG tablet, Take 1 tablet (10 mg total) by mouth once daily., Disp: 90 tablet, Rfl: 1    blood sugar diagnostic Strp, 1 each by In Vitro route once daily., Disp: 100 each, Rfl: 3    calcium carbonate (OS-JOSELYN) 600 mg calcium (1,500 mg) Tab, Take 600 mg by mouth once daily., Disp: , Rfl:     ergocalciferol (ERGOCALCIFEROL) 50,000 unit Cap, Take 1 capsule (50,000 Units total) by mouth every 7 days. (Patient taking differently: Take 50,000 Units by mouth every Saturday.), Disp: 12 capsule, Rfl: 1     EScitalopram oxalate (LEXAPRO) 20 MG tablet, Take 1 tablet (20 mg total) by mouth once daily., Disp: 90 tablet, Rfl: 1    fluticasone-umeclidin-vilanter (TRELEGY ELLIPTA) 100-62.5-25 mcg DsDv, Inhale 1 puff into the lungs once daily., Disp: 60 each, Rfl: 5    furosemide (LASIX) 20 MG tablet, Take 1 tablet (20 mg total) by mouth every other day., Disp: 30 tablet, Rfl: 1    icosapent ethyL (VASCEPA) 1 gram Cap, Take 2 capsules (2 g total) by mouth 2 (two) times a day., Disp: 360 capsule, Rfl: 1    levothyroxine (SYNTHROID) 50 MCG tablet, Take 1 tablet (50 mcg total) by mouth before breakfast., Disp: 90 tablet, Rfl: 1    lisinopriL (PRINIVIL,ZESTRIL) 20 MG tablet, Take 0.5 tablets (10 mg total) by mouth once daily., Disp: 90 tablet, Rfl: 1    metFORMIN (GLUCOPHAGE) 1000 MG tablet, Take 1 tablet (1,000 mg total) by mouth daily with breakfast., Disp: 90 tablet, Rfl: 1    pantoprazole (PROTONIX) 40 MG tablet, Take 1 tablet (40 mg total) by mouth 2 (two) times daily., Disp: 180 tablet, Rfl: 1    propranoloL (INDERAL LA) 80 MG 24 hr capsule, Take 1 capsule (80 mg total) by mouth once daily., Disp: 90 capsule, Rfl: 1    sucralfate (CARAFATE) 1 gram tablet, Take 1 tablet (1 g total) by mouth 2 (two) times daily. Take on empty stomach, do not eat or take medication for at least 1 hour, Disp: 60 tablet, Rfl: 2    Review of Systems   Constitutional:  Negative for appetite change, chills, fever and unexpected weight change (wt up 5lbs since Oct visit).   HENT:  Negative for postnasal drip, sore throat and trouble swallowing.    Respiratory:  Negative for cough, shortness of breath (resolved) and wheezing.    Cardiovascular:  Negative for chest pain, palpitations and leg swelling.   Gastrointestinal:  Negative for abdominal pain, blood in stool, constipation, diarrhea, nausea and vomiting.   Genitourinary:  Negative for dysuria, frequency and hematuria.   Skin:  Negative for rash.   Neurological:  Negative for dizziness, syncope,  "speech difficulty, numbness and headaches.        Objective:      Vitals:    12/12/22 0749   BP: 122/62   Pulse: 64   Weight: 48.5 kg (107 lb)   Height: 5' 1" (1.549 m)     Physical Exam  Vitals and nursing note reviewed.   Constitutional:       General: She is not in acute distress.     Appearance: Normal appearance. She is well-developed.      Comments: Thin frail white female, appears older than stated age   HENT:      Head: Normocephalic and atraumatic.      Right Ear: Tympanic membrane and ear canal normal.      Left Ear: Tympanic membrane and ear canal normal.      Mouth/Throat:      Mouth: Mucous membranes are moist.   Neck:      Vascular: No carotid bruit.   Cardiovascular:      Rate and Rhythm: Normal rate and regular rhythm.      Heart sounds: Murmur heard.   Systolic murmur is present with a grade of 3/6.     No friction rub. No gallop.   Pulmonary:      Effort: Pulmonary effort is normal. No respiratory distress.      Breath sounds: Normal breath sounds. No wheezing or rales.   Abdominal:      General: There is no distension.      Palpations: Abdomen is soft.      Tenderness: There is no abdominal tenderness.   Musculoskeletal:      Cervical back: Neck supple.      Right lower leg: No edema.      Left lower leg: No edema.   Lymphadenopathy:      Cervical: No cervical adenopathy.   Skin:     General: Skin is warm and dry.      Findings: No rash.   Neurological:      General: No focal deficit present.      Mental Status: She is alert and oriented to person, place, and time.      Gait: Gait normal.         Assessment:       1. Chronic obstructive pulmonary disease, unspecified COPD type    2. Acute heart failure with preserved ejection fraction    3. DM type 2 with diabetic dyslipidemia    4. Dyslipidemia    5. Pulmonary hypertension    6. Acquired hypothyroidism           Plan:       Chronic obstructive pulmonary disease, unspecified COPD type  Comments:  Stable on Trelegy, encouraged her to call and " schedule appointment with Dr. Garcia    Acute heart failure with preserved ejection fraction  Comments:  Improved, left base now fully aerated, continue with Lasix q.o.d. and rechecking labs today    DM type 2 with diabetic dyslipidemia  Comments:  Well controlled with last A1c 5.6%    Dyslipidemia  Comments:  Well controlled on last labs    Pulmonary hypertension  Comments:  Moderate to severe MR/TR and pulmonary hypertension on echo, follow-up with cards    Acquired hypothyroidism  Comments:  Stable      Follow up in about 4 months (around 4/12/2023) for Diabetes, HTN, COPD, labs to be drawn today.          Counseled on age and gender appropriate medical preventative services, including cancer screenings, immunizations, overall nutritional health, need for a consistent exercise regimen and an overall push towards maintaining a vigorous and active lifestyle.      12/12/2022 Liv Parrish NP

## 2022-12-13 LAB
BNP SERPL-MCNC: 75 PG/ML
BUN SERPL-MCNC: 17 MG/DL (ref 7–25)
BUN/CREAT SERPL: NORMAL (CALC) (ref 6–22)
CALCIUM SERPL-MCNC: 8.9 MG/DL (ref 8.6–10.4)
CHLORIDE SERPL-SCNC: 104 MMOL/L (ref 98–110)
CO2 SERPL-SCNC: 29 MMOL/L (ref 20–32)
CREAT SERPL-MCNC: 0.66 MG/DL (ref 0.5–1.05)
EGFR: 96 ML/MIN/1.73M2
GLUCOSE SERPL-MCNC: 80 MG/DL (ref 65–99)
POTASSIUM SERPL-SCNC: 4.8 MMOL/L (ref 3.5–5.3)
SODIUM SERPL-SCNC: 138 MMOL/L (ref 135–146)

## 2022-12-14 ENCOUNTER — TELEPHONE (OUTPATIENT)
Dept: FAMILY MEDICINE | Facility: CLINIC | Age: 67
End: 2022-12-14

## 2022-12-14 NOTE — TELEPHONE ENCOUNTER
----- Message from Liv Parrish NP sent at 12/13/2022  9:07 PM CST -----  Please call pt and let her know labs shows kidney function and potassium within normal range and BNP, marker of CHF is now in normal range. Continue current medication

## 2022-12-15 ENCOUNTER — TELEPHONE (OUTPATIENT)
Dept: FAMILY MEDICINE | Facility: CLINIC | Age: 67
End: 2022-12-15

## 2022-12-15 NOTE — TELEPHONE ENCOUNTER
----- Message from Kristel Daniel sent at 12/15/2022  3:33 PM CST -----  - pt is calling [facility]   [phone]

## 2022-12-15 NOTE — TELEPHONE ENCOUNTER
Spoke with pt in regards to recent lab results. Verbalized per Liv that labs shows kidney function and potassium within normal range and BNP, marker of CHF is now in normal range. Continue current medication. Pt acknowledge understanding.

## 2022-12-24 NOTE — TELEPHONE ENCOUNTER
----- Message from RT Jose L sent at 11/7/2022  7:46 AM CST -----    ----- Message -----  From: Liv Parrish NP  Sent: 11/7/2022  12:00 AM CST  To: Liv Parrish Staff    Please call pt and remind her to have updated labs       Regular rate and rhythm, Heart sounds S1 S2 present

## 2023-01-17 ENCOUNTER — TELEPHONE (OUTPATIENT)
Dept: FAMILY MEDICINE | Facility: CLINIC | Age: 68
End: 2023-01-17

## 2023-01-17 DIAGNOSIS — R91.8 PULMONARY NODULES: Primary | ICD-10-CM

## 2023-01-17 DIAGNOSIS — F17.210 CIGARETTE SMOKER: Primary | ICD-10-CM

## 2023-01-17 DIAGNOSIS — E55.9 VITAMIN D DEFICIENCY: ICD-10-CM

## 2023-01-17 RX ORDER — VARENICLINE TARTRATE 1 MG/1
1 TABLET, FILM COATED ORAL 2 TIMES DAILY
Qty: 60 TABLET | Refills: 3 | Status: SHIPPED | OUTPATIENT
Start: 2023-01-17 | End: 2023-03-14 | Stop reason: SDUPTHER

## 2023-01-17 RX ORDER — ERGOCALCIFEROL 1.25 MG/1
50000 CAPSULE ORAL
Qty: 12 CAPSULE | Refills: 3 | Status: SHIPPED | OUTPATIENT
Start: 2023-01-17 | End: 2023-03-14 | Stop reason: SDUPTHER

## 2023-01-17 NOTE — TELEPHONE ENCOUNTER
----- Message from Abdoulaye Corrales MA sent at 1/17/2023  2:41 PM CST -----  Regarding: refill  Pt needs vitamin D2 and generic chantix sent to [facility] on . [phone]

## 2023-01-17 NOTE — TELEPHONE ENCOUNTER
Left message that CT of the chest is due and [facility] will be calling to schedule. To Liv to order. Updated remind me.

## 2023-01-17 NOTE — TELEPHONE ENCOUNTER
----- Message from Krystalshena Medina RT sent at 1/17/2023 10:55 AM CST -----    ----- Message -----  From: Liv Parrish NP  Sent: 1/16/2023  12:00 AM CST  To: Liv Parrish Staff    Pt needs follow-up CT scan of chest in 3 months

## 2023-01-24 ENCOUNTER — TELEPHONE (OUTPATIENT)
Dept: FAMILY MEDICINE | Facility: CLINIC | Age: 68
End: 2023-01-24

## 2023-01-24 NOTE — TELEPHONE ENCOUNTER
Left message that CT is due and order is at imaging center. Asked her to call to schedule if they have not called her. Gave number on voicemail. Updated remind me.

## 2023-02-06 ENCOUNTER — TELEPHONE (OUTPATIENT)
Dept: FAMILY MEDICINE | Facility: CLINIC | Age: 68
End: 2023-02-06

## 2023-02-06 NOTE — TELEPHONE ENCOUNTER
----- Message from Cristin Mayen sent at 2/6/2023 10:44 AM CST -----  Regarding: PATIENT ORDERS    TEST ORDERED:  CT Chest Without Contrast      STATUS:    Please, be advised that several attempts  to schedule this test with patient were                       made to no avail.  We are removing this order from our workqueue and will no                          longer attempt to contact patient to schedule this test.                        The test order will remain in the patient's chart.                       Thank you!                        Cristin

## 2023-02-08 ENCOUNTER — TELEPHONE (OUTPATIENT)
Dept: FAMILY MEDICINE | Facility: CLINIC | Age: 68
End: 2023-02-08

## 2023-02-08 NOTE — TELEPHONE ENCOUNTER
----- Message from Krystalshena Medina RT sent at 1/17/2023 10:55 AM CST -----    ----- Message -----  From: Liv Parrish NP  Sent: 1/16/2023  12:00 AM CST  To: iLv Parrish Staff    Pt needs follow-up CT scan of chest in 3 months

## 2023-02-08 NOTE — TELEPHONE ENCOUNTER
Left mess that 3 month CT is due, gave her SMI number, asked she call to schedule. Also sent portal message. This makes several attempts from us and imaging center. Can I lluvia reminder complete?

## 2023-03-14 DIAGNOSIS — K31.1 PYLORIC STENOSIS IN ADULT: ICD-10-CM

## 2023-03-14 DIAGNOSIS — F17.210 CIGARETTE SMOKER: ICD-10-CM

## 2023-03-14 DIAGNOSIS — I10 ESSENTIAL HYPERTENSION: ICD-10-CM

## 2023-03-14 DIAGNOSIS — E55.9 VITAMIN D DEFICIENCY: ICD-10-CM

## 2023-03-14 DIAGNOSIS — I50.31 ACUTE HEART FAILURE WITH PRESERVED EJECTION FRACTION: ICD-10-CM

## 2023-03-14 RX ORDER — LISINOPRIL 20 MG/1
10 TABLET ORAL DAILY
Qty: 45 TABLET | Refills: 3 | Status: SHIPPED | OUTPATIENT
Start: 2023-03-14 | End: 2023-06-28 | Stop reason: SDUPTHER

## 2023-03-14 RX ORDER — ERGOCALCIFEROL 1.25 MG/1
50000 CAPSULE ORAL
Qty: 12 CAPSULE | Refills: 3 | Status: SHIPPED | OUTPATIENT
Start: 2023-03-14 | End: 2024-02-14 | Stop reason: SDUPTHER

## 2023-03-14 RX ORDER — FUROSEMIDE 20 MG/1
20 TABLET ORAL EVERY OTHER DAY
Qty: 45 TABLET | Refills: 1 | Status: SHIPPED | OUTPATIENT
Start: 2023-03-14 | End: 2023-06-28 | Stop reason: SDUPTHER

## 2023-03-14 RX ORDER — PANTOPRAZOLE SODIUM 40 MG/1
40 TABLET, DELAYED RELEASE ORAL 2 TIMES DAILY
Qty: 180 TABLET | Refills: 3 | Status: SHIPPED | OUTPATIENT
Start: 2023-03-14 | End: 2024-02-14 | Stop reason: SDUPTHER

## 2023-03-14 RX ORDER — VARENICLINE TARTRATE 1 MG/1
1 TABLET, FILM COATED ORAL 2 TIMES DAILY
Qty: 60 TABLET | Refills: 3 | Status: SHIPPED | OUTPATIENT
Start: 2023-03-14 | End: 2023-08-15 | Stop reason: SDUPTHER

## 2023-03-17 LAB
LEFT EYE DM RETINOPATHY: NEGATIVE
RIGHT EYE DM RETINOPATHY: NEGATIVE

## 2023-03-29 ENCOUNTER — TELEPHONE (OUTPATIENT)
Dept: FAMILY MEDICINE | Facility: CLINIC | Age: 68
End: 2023-03-29

## 2023-03-29 DIAGNOSIS — E78.5 DM TYPE 2 WITH DIABETIC DYSLIPIDEMIA: ICD-10-CM

## 2023-03-29 DIAGNOSIS — E11.69 DM TYPE 2 WITH DIABETIC DYSLIPIDEMIA: ICD-10-CM

## 2023-03-29 DIAGNOSIS — M81.0 AGE-RELATED OSTEOPOROSIS WITHOUT CURRENT PATHOLOGICAL FRACTURE: ICD-10-CM

## 2023-03-29 DIAGNOSIS — Z79.899 ENCOUNTER FOR LONG-TERM (CURRENT) USE OF OTHER MEDICATIONS: Primary | ICD-10-CM

## 2023-03-29 NOTE — TELEPHONE ENCOUNTER
----- Message from Rebekah Rosales LPN sent at 3/29/2023 11:17 AM CDT -----    ----- Message -----  From: Taylor Mejias  Sent: 3/29/2023  11:17 AM CDT  To: Liv Parrish Staff    Please review patient's Appointment Desk for an upcoming PROLIA INJECTION appointment.       The following are needed for this appointment.   Reminding to please contact patient, if needed:      ORDER.  Current / active in the Epic Therapy Plan, signed within a year.  LABS. Serum Calcium within 6 weeks of treatment.    DEXA SCAN within the last 2 years   DENTAL PRECAUTION CONFIRMED WITH PATIENT. No recent issues (infections, etc). No invasive procedures recently done or planned (root canal, extraction, implants, etc.)  A patient will need to bring a Dental Clearance signed by patient's dental provider if there are recent dental issues/procedures within the last 3 months.   FOLLOW-UP APPOINTMENT within the last 12 months with the diagnosis mentioned in the visit notes.         Any item unavailable by the day prior to the scheduled appointment will cause the appointment to be CANCELLED.        To reschedule appointments, please contact [facility] INFUSION SCHEDULING POOL or call [phone] or [phone].       Thank you,  [facility], Infusion Department

## 2023-03-29 NOTE — TELEPHONE ENCOUNTER
Dexa done 3/30/22  Needs Ca+  Therapy plan sent for updated sig.     LMOR for pt letting her know she needs labs. Portal message sent to pt.

## 2023-04-04 ENCOUNTER — HOSPITAL ENCOUNTER (OUTPATIENT)
Dept: RADIOLOGY | Facility: HOSPITAL | Age: 68
Discharge: HOME | End: 2023-04-04
Attending: NURSE PRACTITIONER
Payer: MEDICARE

## 2023-04-04 ENCOUNTER — TELEPHONE (OUTPATIENT)
Dept: FAMILY MEDICINE | Facility: CLINIC | Age: 68
End: 2023-04-04

## 2023-04-04 DIAGNOSIS — R91.8 PULMONARY NODULES: ICD-10-CM

## 2023-04-04 PROCEDURE — 71250 CT THORAX DX C-: CPT | Mod: TC,PO

## 2023-04-04 NOTE — TELEPHONE ENCOUNTER
Spoke with pt in regards to recent CT scan results. Verbalized per Liv that pt's CT scan shows stable scarring to the tops of her lungs. The previously seen nodular opacities in the right lung base have resolved and no new nodules or masses are seen. No enlarged lymph nodes in the chest. There is calcification noted on the heart arteries. Pt acknowledge understanding.

## 2023-04-04 NOTE — TELEPHONE ENCOUNTER
----- Message from Liv Parrish NP sent at 4/4/2023  4:49 PM CDT -----  Please call patient and let her know CT scan shows stable scarring to the tops of her lungs.  The previously seen nodular opacities in the right lung base have resolved and no new nodules or masses are seen.  No enlarged lymph nodes in the chest.  There is calcification noted on the heart arteries.

## 2023-04-11 ENCOUNTER — PATIENT MESSAGE (OUTPATIENT)
Dept: ADMINISTRATIVE | Facility: HOSPITAL | Age: 68
End: 2023-04-11
Payer: MEDICARE

## 2023-04-14 ENCOUNTER — TELEPHONE (OUTPATIENT)
Dept: FAMILY MEDICINE | Facility: CLINIC | Age: 68
End: 2023-04-14

## 2023-04-14 DIAGNOSIS — Z12.31 OTHER SCREENING MAMMOGRAM: ICD-10-CM

## 2023-04-14 NOTE — TELEPHONE ENCOUNTER
Left message on voice mail for the pt to call back im regards to rescheduled recent no show appt. Verbalized per policy, because of recent no show appt she is at a risk from being discharged from the clinic.

## 2023-04-17 ENCOUNTER — PATIENT MESSAGE (OUTPATIENT)
Dept: ADMINISTRATIVE | Facility: HOSPITAL | Age: 68
End: 2023-04-17
Payer: MEDICARE

## 2023-04-18 DIAGNOSIS — E78.5 DYSLIPIDEMIA: ICD-10-CM

## 2023-04-18 DIAGNOSIS — E78.5 DM TYPE 2 WITH DIABETIC DYSLIPIDEMIA: ICD-10-CM

## 2023-04-18 DIAGNOSIS — F32.A MILD DEPRESSION: ICD-10-CM

## 2023-04-18 DIAGNOSIS — E11.69 DM TYPE 2 WITH DIABETIC DYSLIPIDEMIA: ICD-10-CM

## 2023-04-18 RX ORDER — ESCITALOPRAM OXALATE 20 MG/1
20 TABLET ORAL DAILY
Qty: 90 TABLET | Refills: 3 | Status: SHIPPED | OUTPATIENT
Start: 2023-04-18 | End: 2024-02-14 | Stop reason: SDUPTHER

## 2023-04-18 RX ORDER — METFORMIN HYDROCHLORIDE 1000 MG/1
1000 TABLET ORAL
Qty: 90 TABLET | Refills: 3 | Status: SHIPPED | OUTPATIENT
Start: 2023-04-18 | End: 2023-08-08

## 2023-04-18 RX ORDER — PROPRANOLOL HYDROCHLORIDE 80 MG/1
80 CAPSULE, EXTENDED RELEASE ORAL DAILY
Qty: 90 CAPSULE | Refills: 3 | Status: SHIPPED | OUTPATIENT
Start: 2023-04-18 | End: 2024-02-14 | Stop reason: SDUPTHER

## 2023-04-18 RX ORDER — ATORVASTATIN CALCIUM 10 MG/1
10 TABLET, FILM COATED ORAL DAILY
Qty: 90 TABLET | Refills: 1 | Status: SHIPPED | OUTPATIENT
Start: 2023-04-18 | End: 2023-06-28 | Stop reason: SDUPTHER

## 2023-04-18 NOTE — TELEPHONE ENCOUNTER
----- Message from Adore Rockwell MA sent at 4/18/2023  2:38 PM CDT -----  Regarding: refill  Needs refill on atorvastatin 10 mg, escitalopram 20 mg, metformin 1000 mg, propanolol 80 mg,   Lazaro becker and   [phone]

## 2023-04-27 LAB
ALBUMIN SERPL-MCNC: 3.5 G/DL (ref 3.6–5.1)
ALBUMIN/GLOB SERPL: 1.6 (CALC) (ref 1–2.5)
ALP SERPL-CCNC: 39 U/L (ref 37–153)
ALT SERPL-CCNC: 11 U/L (ref 6–29)
AST SERPL-CCNC: 14 U/L (ref 10–35)
BILIRUB SERPL-MCNC: 0.3 MG/DL (ref 0.2–1.2)
BUN SERPL-MCNC: 11 MG/DL (ref 7–25)
BUN/CREAT SERPL: ABNORMAL (CALC) (ref 6–22)
CALCIUM SERPL-MCNC: 9.4 MG/DL (ref 8.6–10.4)
CHLORIDE SERPL-SCNC: 103 MMOL/L (ref 98–110)
CHOLEST SERPL-MCNC: 155 MG/DL
CHOLEST/HDLC SERPL: 3.2 (CALC)
CO2 SERPL-SCNC: 32 MMOL/L (ref 20–32)
CREAT SERPL-MCNC: 0.71 MG/DL (ref 0.5–1.05)
EGFR: 93 ML/MIN/1.73M2
GLOBULIN SER CALC-MCNC: 2.2 G/DL (CALC) (ref 1.9–3.7)
GLUCOSE SERPL-MCNC: 95 MG/DL (ref 65–99)
HBA1C MFR BLD: 5.4 % OF TOTAL HGB
HDLC SERPL-MCNC: 48 MG/DL
LDLC SERPL CALC-MCNC: 80 MG/DL (CALC)
NONHDLC SERPL-MCNC: 107 MG/DL (CALC)
POTASSIUM SERPL-SCNC: 4.2 MMOL/L (ref 3.5–5.3)
PROT SERPL-MCNC: 5.7 G/DL (ref 6.1–8.1)
SODIUM SERPL-SCNC: 141 MMOL/L (ref 135–146)
TRIGL SERPL-MCNC: 177 MG/DL

## 2023-05-03 ENCOUNTER — TELEPHONE (OUTPATIENT)
Dept: FAMILY MEDICINE | Facility: CLINIC | Age: 68
End: 2023-05-03

## 2023-05-03 NOTE — TELEPHONE ENCOUNTER
Unread message alert came up that patient had not seen lab results. Left mess to call me back and it was not urgent.

## 2023-05-03 NOTE — TELEPHONE ENCOUNTER
Overall your labs look okay.  Blood sugar in normal range at 95 and your A1c is well controlled at 5.4%.  Kidney and liver function in normal range.  Triglyceride level has risen slightly so would recommend low-fat diet, cut out fried foods and fast food.  Bad cholesterol LDL is well controlled.  Continue current medication.   Written by Liv Parrish NP on 4/27/2023  9:08 PM CDT

## 2023-05-03 NOTE — TELEPHONE ENCOUNTER
Spoke to patient with results verbatim per Liv. Verbalized understanding on all, including dietary restrictions to help with cholesterol.

## 2023-05-04 ENCOUNTER — INFUSION (OUTPATIENT)
Dept: INFUSION THERAPY | Facility: HOSPITAL | Age: 68
End: 2023-05-04
Attending: NURSE PRACTITIONER
Payer: MEDICARE

## 2023-05-04 VITALS
TEMPERATURE: 98 F | BODY MASS INDEX: 21.75 KG/M2 | WEIGHT: 118.19 LBS | OXYGEN SATURATION: 96 % | HEIGHT: 62 IN | SYSTOLIC BLOOD PRESSURE: 106 MMHG | RESPIRATION RATE: 18 BRPM | DIASTOLIC BLOOD PRESSURE: 63 MMHG | HEART RATE: 70 BPM

## 2023-05-04 DIAGNOSIS — M81.0 AGE-RELATED OSTEOPOROSIS WITHOUT CURRENT PATHOLOGICAL FRACTURE: Primary | ICD-10-CM

## 2023-05-04 PROCEDURE — 63600175 PHARM REV CODE 636 W HCPCS: Mod: JZ,JG | Performed by: NURSE PRACTITIONER

## 2023-05-04 PROCEDURE — 96372 THER/PROPH/DIAG INJ SC/IM: CPT

## 2023-05-04 RX ADMIN — DENOSUMAB 60 MG: 60 INJECTION SUBCUTANEOUS at 01:05

## 2023-05-04 NOTE — PLAN OF CARE
Problem: Fall Injury Risk  Goal: Absence of Fall and Fall-Related Injury  Outcome: Ongoing, Progressing  Intervention: Identify and Manage Contributors  Flowsheets (Taken 5/4/2023 1316)  Self-Care Promotion: independence encouraged  Medication Review/Management: medications reviewed  Intervention: Promote Injury-Free Environment  Flowsheets (Taken 5/4/2023 1316)  Safety Promotion/Fall Prevention: medications reviewed

## 2023-05-30 DIAGNOSIS — I50.31 ACUTE HEART FAILURE WITH PRESERVED EJECTION FRACTION: ICD-10-CM

## 2023-05-30 DIAGNOSIS — I10 ESSENTIAL HYPERTENSION: ICD-10-CM

## 2023-05-30 DIAGNOSIS — E03.9 ACQUIRED HYPOTHYROIDISM: ICD-10-CM

## 2023-05-30 DIAGNOSIS — E78.5 DYSLIPIDEMIA: ICD-10-CM

## 2023-05-30 RX ORDER — ATORVASTATIN CALCIUM 10 MG/1
10 TABLET, FILM COATED ORAL DAILY
Qty: 90 TABLET | Refills: 1 | Status: CANCELLED | OUTPATIENT
Start: 2023-05-30

## 2023-05-30 RX ORDER — LISINOPRIL 20 MG/1
10 TABLET ORAL DAILY
Qty: 45 TABLET | Refills: 3 | Status: CANCELLED | OUTPATIENT
Start: 2023-05-30

## 2023-05-30 RX ORDER — LEVOTHYROXINE SODIUM 50 UG/1
50 TABLET ORAL
Qty: 90 TABLET | Refills: 1 | Status: SHIPPED | OUTPATIENT
Start: 2023-05-30 | End: 2023-08-15 | Stop reason: SDUPTHER

## 2023-05-30 RX ORDER — FUROSEMIDE 20 MG/1
20 TABLET ORAL EVERY OTHER DAY
Qty: 45 TABLET | Refills: 1 | Status: CANCELLED | OUTPATIENT
Start: 2023-05-30 | End: 2024-05-29

## 2023-05-30 NOTE — TELEPHONE ENCOUNTER
----- Message from Vandana Chacon sent at 5/30/2023  9:40 AM CDT -----   05/30/23 @ 9:30 AM :patient called to get a refill on her medication (I was unable to make out the name of the medicine, I could only make out 0.05 mg and no pharmacy information was left)please give her a call to get the name of the medication at [phone]

## 2023-05-30 NOTE — TELEPHONE ENCOUNTER
Spoke to pt and she needs levothyroxine sent to [facility] on    Last office visit 12/12  Next office visit 7/18

## 2023-06-20 DIAGNOSIS — J44.9 CHRONIC OBSTRUCTIVE PULMONARY DISEASE, UNSPECIFIED COPD TYPE: ICD-10-CM

## 2023-06-20 RX ORDER — ALBUTEROL SULFATE 90 UG/1
2 AEROSOL, METERED RESPIRATORY (INHALATION) EVERY 6 HOURS PRN
Qty: 18 G | Refills: 5 | Status: SHIPPED | OUTPATIENT
Start: 2023-06-20 | End: 2023-06-30 | Stop reason: SDUPTHER

## 2023-06-20 NOTE — TELEPHONE ENCOUNTER
Pt is needing a refill on her albuterol inhaler. Last office visit 12/12/2022. Next office visit 07/18/2023.

## 2023-06-20 NOTE — TELEPHONE ENCOUNTER
----- Message from Vandana Chacon sent at 6/20/2023  2:13 PM CDT -----  Patient called and stated that she need a refill of her albuterol inhaler called into [facility] on  and Miguel if any questions please give her a call at [phone]

## 2023-06-27 ENCOUNTER — HOSPITAL ENCOUNTER (OUTPATIENT)
Dept: RADIOLOGY | Facility: HOSPITAL | Age: 68
Discharge: HOME | End: 2023-06-27
Attending: NURSE PRACTITIONER
Payer: MEDICARE

## 2023-06-27 ENCOUNTER — TELEPHONE (OUTPATIENT)
Dept: FAMILY MEDICINE | Facility: CLINIC | Age: 68
End: 2023-06-27

## 2023-06-27 DIAGNOSIS — R06.02 SOB (SHORTNESS OF BREATH): Primary | ICD-10-CM

## 2023-06-27 DIAGNOSIS — R06.02 SOB (SHORTNESS OF BREATH): ICD-10-CM

## 2023-06-27 DIAGNOSIS — J44.1 COPD EXACERBATION: ICD-10-CM

## 2023-06-27 PROCEDURE — 71046 X-RAY EXAM CHEST 2 VIEWS: CPT | Mod: TC,PO

## 2023-06-27 NOTE — TELEPHONE ENCOUNTER
Spoke to pt and she stated she needs her trelegy, and she stated she has been SOB for the last few and she feels it is worsening. PT stated she can barely walk around with out getting SOB. Let pt know if she feels that her SOB is worsening then we would recommend an urgent care or ER because we do not want her having these symptoms. Pt verbalized understanding. Pt is still wants trelegy sent in. Trelegy is not on pts list     [facility] on

## 2023-06-27 NOTE — TELEPHONE ENCOUNTER
----- Message from Prema Rodriguez sent at 6/27/2023  1:42 PM CDT -----  VM  1:29  Refill Trilogy. She is having some SOB.  pt's # [phone] GH

## 2023-06-28 ENCOUNTER — TELEPHONE (OUTPATIENT)
Dept: FAMILY MEDICINE | Facility: CLINIC | Age: 68
End: 2023-06-28

## 2023-06-28 ENCOUNTER — OFFICE VISIT (OUTPATIENT)
Dept: FAMILY MEDICINE | Facility: CLINIC | Age: 68
End: 2023-06-28
Payer: MEDICARE

## 2023-06-28 VITALS
BODY MASS INDEX: 22.82 KG/M2 | HEART RATE: 51 BPM | WEIGHT: 124 LBS | DIASTOLIC BLOOD PRESSURE: 80 MMHG | SYSTOLIC BLOOD PRESSURE: 136 MMHG | HEIGHT: 62 IN | OXYGEN SATURATION: 98 %

## 2023-06-28 DIAGNOSIS — J44.1 COPD EXACERBATION: Primary | ICD-10-CM

## 2023-06-28 DIAGNOSIS — I10 ESSENTIAL HYPERTENSION: ICD-10-CM

## 2023-06-28 DIAGNOSIS — J44.9 CHRONIC OBSTRUCTIVE PULMONARY DISEASE, UNSPECIFIED COPD TYPE: ICD-10-CM

## 2023-06-28 DIAGNOSIS — E78.5 DYSLIPIDEMIA: ICD-10-CM

## 2023-06-28 DIAGNOSIS — E78.5 DM TYPE 2 WITH DIABETIC DYSLIPIDEMIA: ICD-10-CM

## 2023-06-28 DIAGNOSIS — I50.31 ACUTE HEART FAILURE WITH PRESERVED EJECTION FRACTION: ICD-10-CM

## 2023-06-28 DIAGNOSIS — E11.69 DM TYPE 2 WITH DIABETIC DYSLIPIDEMIA: ICD-10-CM

## 2023-06-28 LAB
EKG 12-LEAD: NORMAL
PR INTERVAL: 164
PRT AXES: NORMAL
QRS DURATION: 80
QT/QTC: NORMAL
VENTRICULAR RATE: 69

## 2023-06-28 PROCEDURE — 3079F PR MOST RECENT DIASTOLIC BLOOD PRESSURE 80-89 MM HG: ICD-10-PCS | Mod: CPTII,S$GLB,, | Performed by: NURSE PRACTITIONER

## 2023-06-28 PROCEDURE — 99214 PR OFFICE/OUTPT VISIT, EST, LEVL IV, 30-39 MIN: ICD-10-PCS | Mod: 25,S$GLB,, | Performed by: NURSE PRACTITIONER

## 2023-06-28 PROCEDURE — 3044F PR MOST RECENT HEMOGLOBIN A1C LEVEL <7.0%: ICD-10-PCS | Mod: CPTII,S$GLB,, | Performed by: NURSE PRACTITIONER

## 2023-06-28 PROCEDURE — 1160F RVW MEDS BY RX/DR IN RCRD: CPT | Mod: CPTII,S$GLB,, | Performed by: NURSE PRACTITIONER

## 2023-06-28 PROCEDURE — 99214 OFFICE O/P EST MOD 30 MIN: CPT | Mod: 25,S$GLB,, | Performed by: NURSE PRACTITIONER

## 2023-06-28 PROCEDURE — 93000 POCT EKG 12-LEAD: ICD-10-PCS | Mod: S$GLB,,, | Performed by: NURSE PRACTITIONER

## 2023-06-28 PROCEDURE — 93000 ELECTROCARDIOGRAM COMPLETE: CPT | Mod: S$GLB,,, | Performed by: NURSE PRACTITIONER

## 2023-06-28 PROCEDURE — 1159F MED LIST DOCD IN RCRD: CPT | Mod: CPTII,S$GLB,, | Performed by: NURSE PRACTITIONER

## 2023-06-28 PROCEDURE — 3008F PR BODY MASS INDEX (BMI) DOCUMENTED: ICD-10-PCS | Mod: CPTII,S$GLB,, | Performed by: NURSE PRACTITIONER

## 2023-06-28 PROCEDURE — 3044F HG A1C LEVEL LT 7.0%: CPT | Mod: CPTII,S$GLB,, | Performed by: NURSE PRACTITIONER

## 2023-06-28 PROCEDURE — 4010F PR ACE/ARB THEARPY RXD/TAKEN: ICD-10-PCS | Mod: CPTII,S$GLB,, | Performed by: NURSE PRACTITIONER

## 2023-06-28 PROCEDURE — 4010F ACE/ARB THERAPY RXD/TAKEN: CPT | Mod: CPTII,S$GLB,, | Performed by: NURSE PRACTITIONER

## 2023-06-28 PROCEDURE — 3288F PR FALLS RISK ASSESSMENT DOCUMENTED: ICD-10-PCS | Mod: CPTII,S$GLB,, | Performed by: NURSE PRACTITIONER

## 2023-06-28 PROCEDURE — 1101F PR PT FALLS ASSESS DOC 0-1 FALLS W/OUT INJ PAST YR: ICD-10-PCS | Mod: CPTII,S$GLB,, | Performed by: NURSE PRACTITIONER

## 2023-06-28 PROCEDURE — 3008F BODY MASS INDEX DOCD: CPT | Mod: CPTII,S$GLB,, | Performed by: NURSE PRACTITIONER

## 2023-06-28 PROCEDURE — 3075F SYST BP GE 130 - 139MM HG: CPT | Mod: CPTII,S$GLB,, | Performed by: NURSE PRACTITIONER

## 2023-06-28 PROCEDURE — 1159F PR MEDICATION LIST DOCUMENTED IN MEDICAL RECORD: ICD-10-PCS | Mod: CPTII,S$GLB,, | Performed by: NURSE PRACTITIONER

## 2023-06-28 PROCEDURE — 3079F DIAST BP 80-89 MM HG: CPT | Mod: CPTII,S$GLB,, | Performed by: NURSE PRACTITIONER

## 2023-06-28 PROCEDURE — 3075F PR MOST RECENT SYSTOLIC BLOOD PRESS GE 130-139MM HG: ICD-10-PCS | Mod: CPTII,S$GLB,, | Performed by: NURSE PRACTITIONER

## 2023-06-28 PROCEDURE — 3288F FALL RISK ASSESSMENT DOCD: CPT | Mod: CPTII,S$GLB,, | Performed by: NURSE PRACTITIONER

## 2023-06-28 PROCEDURE — 1101F PT FALLS ASSESS-DOCD LE1/YR: CPT | Mod: CPTII,S$GLB,, | Performed by: NURSE PRACTITIONER

## 2023-06-28 PROCEDURE — 1160F PR REVIEW ALL MEDS BY PRESCRIBER/CLIN PHARMACIST DOCUMENTED: ICD-10-PCS | Mod: CPTII,S$GLB,, | Performed by: NURSE PRACTITIONER

## 2023-06-28 RX ORDER — ATORVASTATIN CALCIUM 10 MG/1
10 TABLET, FILM COATED ORAL DAILY
Qty: 90 TABLET | Refills: 3 | Status: SHIPPED | OUTPATIENT
Start: 2023-06-28 | End: 2024-02-14 | Stop reason: SDUPTHER

## 2023-06-28 RX ORDER — FUROSEMIDE 20 MG/1
20 TABLET ORAL EVERY OTHER DAY
Qty: 45 TABLET | Refills: 1 | Status: SHIPPED | OUTPATIENT
Start: 2023-06-28 | End: 2023-08-15 | Stop reason: SDUPTHER

## 2023-06-28 RX ORDER — LISINOPRIL 10 MG/1
10 TABLET ORAL DAILY
Qty: 90 TABLET | Refills: 3 | Status: SHIPPED | OUTPATIENT
Start: 2023-06-28

## 2023-06-28 RX ORDER — PREDNISONE 20 MG/1
40 TABLET ORAL DAILY
Qty: 10 TABLET | Refills: 0 | Status: SHIPPED | OUTPATIENT
Start: 2023-06-28 | End: 2023-07-03

## 2023-06-28 RX ORDER — ALBUTEROL SULFATE 0.83 MG/ML
2.5 SOLUTION RESPIRATORY (INHALATION) EVERY 6 HOURS PRN
Qty: 120 ML | Refills: 3 | Status: SHIPPED | OUTPATIENT
Start: 2023-06-28 | End: 2024-06-27

## 2023-06-28 RX ORDER — ICOSAPENT ETHYL 1000 MG/1
2 CAPSULE ORAL 2 TIMES DAILY
Qty: 360 CAPSULE | Refills: 3 | Status: SHIPPED | OUTPATIENT
Start: 2023-06-28

## 2023-06-28 NOTE — PROGRESS NOTES
SUBJECTIVE:    Patient ID: Liv Decker is a 68 y.o. female.    Chief Complaint: Shortness of Breath (Bottles brought//Pt is c/o shortness of breath and non productive cough x 2-3 days. Denies fever or chills.//Eye exam done in 04/2023 Dr. Pearce//Foot exam ordered today//Mammo ordered in 04/2023, but not done//OLMAN )    Pt here for sick visit-     reports worsening SOB with dry cough the past few days, + intermittent wheezing.  Pt reports dyspnea with little exertion- hasn't had albuterol inhaler to use. Reports using trelegy daily. Denies any fever/chills. No CP, tightness or palpitations. Denies orthopnea or SOB at rest     Pt last seen in Dec- history of COPD and hospital admit in October for pleural effusion/CHF- admits hasn't followed up with , pulm or , cards as recommended    Still not smoking since October last year- admits to eating more and weight is up about 17lbs. no abd or leg swelling                Office Visit on 06/28/2023   Component Date Value Ref Range Status    EKG 12-Lead 06/28/2023 SR with PACs   Final    Ventricular Rate 06/28/2023 69   Final    HI Interval 06/28/2023 164   Final    QRS Duration 06/28/2023 80   Final    QT/QTc 06/28/2023 420/450   Final    PRT Axes 06/28/2023 33  73  55   Final   Telephone on 03/29/2023   Component Date Value Ref Range Status    Glucose 04/26/2023 95  65 - 99 mg/dL Final    BUN 04/26/2023 11  7 - 25 mg/dL Final    Creatinine 04/26/2023 0.71  0.50 - 1.05 mg/dL Final    eGFR 04/26/2023 93  > OR = 60 mL/min/1.73m2 Final    BUN/Creatinine Ratio 04/26/2023 NOT APPLICABLE  6 - 22 (calc) Final    Sodium 04/26/2023 141  135 - 146 mmol/L Final    Potassium 04/26/2023 4.2  3.5 - 5.3 mmol/L Final    Chloride 04/26/2023 103  98 - 110 mmol/L Final    CO2 04/26/2023 32  20 - 32 mmol/L Final    Calcium 04/26/2023 9.4  8.6 - 10.4 mg/dL Final    Total Protein 04/26/2023 5.7 (L)  6.1 - 8.1 g/dL Final    Albumin 04/26/2023 3.5 (L)  3.6 - 5.1 g/dL Final     Globulin, Total 04/26/2023 2.2  1.9 - 3.7 g/dL (calc) Final    Albumin/Globulin Ratio 04/26/2023 1.6  1.0 - 2.5 (calc) Final    Total Bilirubin 04/26/2023 0.3  0.2 - 1.2 mg/dL Final    Alkaline Phosphatase 04/26/2023 39  37 - 153 U/L Final    AST 04/26/2023 14  10 - 35 U/L Final    ALT 04/26/2023 11  6 - 29 U/L Final    Hemoglobin A1C 04/26/2023 5.4  <5.7 % of total Hgb Final    Cholesterol 04/26/2023 155  <200 mg/dL Final    HDL 04/26/2023 48 (L)  > OR = 50 mg/dL Final    Triglycerides 04/26/2023 177 (H)  <150 mg/dL Final    LDL Cholesterol 04/26/2023 80  mg/dL (calc) Final    HDL/Cholesterol Ratio 04/26/2023 3.2  <5.0 (calc) Final    Non HDL Chol. (LDL+VLDL) 04/26/2023 107  <130 mg/dL (calc) Final       Past Medical History:   Diagnosis Date    Anemia     Depression     Diabetes mellitus 2021    Encounter for blood transfusion     GERD (gastroesophageal reflux disease)     Hyperlipidemia     Hypertension     MI (myocardial infarction) 2007    Osteoporosis     Thyroid disease     Ulcer of the stomach and intestine     pos. for h. pylori    Weight loss 2022    food comes up      Past Surgical History:   Procedure Laterality Date    APPENDECTOMY      CHOLECYSTECTOMY      COLONOSCOPY N/A 3/18/2016    Procedure: COLONOSCOPY;  Surgeon: Rober Zelaya Jr., MD;  Location: [facility];  Service: Endoscopy;  Laterality: N/A;    ESOPHAGOGASTRODUODENOSCOPY  04/15/2016    with dil    ESOPHAGOGASTRODUODENOSCOPY N/A 5/20/2022    Procedure: EGD (ESOPHAGOGASTRODUODENOSCOPY);  Surgeon: Justine Villegas MD;  Location: [facility];  Service: Endoscopy;  Laterality: N/A;    EYE SURGERY      as a child    TONSILLECTOMY      TUBAL LIGATION       Family History   Problem Relation Age of Onset    Heart disease Mother     Diabetes Father        The CVD Risk score (MERYL'Agoino et al., 2008) failed to calculate for the following reasons:    The patient has a prior MI, stroke, CHF, or peripheral vascular disease diagnosis     Marital Status:    Alcohol History:  reports no history of alcohol use.  Tobacco History:  reports that she quit smoking about 9 months ago. Her smoking use included cigarettes. She started smoking about 64 years ago. She has a 22.50 pack-year smoking history. She has been exposed to tobacco smoke. She has never used smokeless tobacco.  Drug History:  reports no history of drug use.    Health Maintenance Topics with due status: Not Due       Topic Last Completion Date    TETANUS VACCINE 03/21/2019    Pneumococcal Vaccines (Age 65+) 03/31/2021    Colorectal Cancer Screening 05/05/2021    DEXA Scan 03/30/2022    LDCT Lung Screen 04/04/2023    Lipid Panel 04/26/2023    Hemoglobin A1c 04/26/2023    Low Dose Statin 06/28/2023     Immunization History   Administered Date(s) Administered    COVID-19, MRNA, LN-S, PF (MODERNA FULL 0.5 ML DOSE) 01/15/2021, 02/18/2021    COVID-19, mRNA, LNP-S, bivalent booster, PF (PFIZER OMICRON) 10/19/2022    Influenza 09/09/2009, 10/08/2010, 10/20/2011, 10/02/2012, 10/18/2013, 10/03/2014, 10/15/2015, 10/20/2016    Influenza - Quadrivalent 10/03/2014    Influenza - Quadrivalent - High Dose - PF (65 years and older) 01/25/2022, 10/10/2022    Influenza - Quadrivalent - PF *Preferred* (6 months and older) 10/20/2016, 10/15/2018, 10/16/2019    Influenza A (H1N1) 2009 Monovalent - IM 10/23/2009    Influenza Split 09/09/2009, 10/08/2010, 10/20/2011, 10/02/2012, 10/18/2013, 10/15/2015, 10/15/2018, 10/07/2019    Pneumococcal Conjugate - 13 Valent 03/31/2021    Pneumococcal Polysaccharide - 23 Valent 03/21/2019    Tdap 03/21/2019, 03/21/2019    Zoster 03/21/2019       Review of patient's allergies indicates:  No Known Allergies    Current Outpatient Medications:     albuterol (PROAIR HFA) 90 mcg/actuation inhaler, Inhale 2 puffs into the lungs every 6 (six) hours as needed for Wheezing or Shortness of Breath. Rescue, Disp: 18 g, Rfl: 5    blood sugar diagnostic Strp, 1 each by In Vitro route once daily.,  Disp: 100 each, Rfl: 3    calcium carbonate (OS-JOSELYN) 600 mg calcium (1,500 mg) Tab, Take 600 mg by mouth once daily., Disp: , Rfl:     ergocalciferol (ERGOCALCIFEROL) 50,000 unit Cap, Take 1 capsule (50,000 Units total) by mouth every 7 days., Disp: 12 capsule, Rfl: 3    EScitalopram oxalate (LEXAPRO) 20 MG tablet, Take 1 tablet (20 mg total) by mouth once daily., Disp: 90 tablet, Rfl: 3    fluticasone-umeclidin-vilanter (TRELEGY ELLIPTA) 100-62.5-25 mcg DsDv, Inhale 1 puff into the lungs once daily., Disp: 60 each, Rfl: 11    levothyroxine (SYNTHROID) 50 MCG tablet, Take 1 tablet (50 mcg total) by mouth before breakfast., Disp: 90 tablet, Rfl: 1    metFORMIN (GLUCOPHAGE) 1000 MG tablet, Take 1 tablet (1,000 mg total) by mouth daily with breakfast., Disp: 90 tablet, Rfl: 3    pantoprazole (PROTONIX) 40 MG tablet, Take 1 tablet (40 mg total) by mouth 2 (two) times daily., Disp: 180 tablet, Rfl: 3    propranoloL (INDERAL LA) 80 MG 24 hr capsule, Take 1 capsule (80 mg total) by mouth once daily., Disp: 90 capsule, Rfl: 3    sucralfate (CARAFATE) 1 gram tablet, Take 1 tablet (1 g total) by mouth 2 (two) times daily. Take on empty stomach, do not eat or take medication for at least 1 hour, Disp: 60 tablet, Rfl: 2    varenicline (CHANTIX) 1 mg Tab, Take 1 tablet (1 mg total) by mouth 2 (two) times daily., Disp: 60 tablet, Rfl: 3    albuterol (PROVENTIL) 2.5 mg /3 mL (0.083 %) nebulizer solution, Take 3 mLs (2.5 mg total) by nebulization every 6 (six) hours as needed for Wheezing or Shortness of Breath. Rescue, Disp: 120 mL, Rfl: 3    atorvastatin (LIPITOR) 10 MG tablet, Take 1 tablet (10 mg total) by mouth once daily., Disp: 90 tablet, Rfl: 3    furosemide (LASIX) 20 MG tablet, Take 1 tablet (20 mg total) by mouth every other day., Disp: 45 tablet, Rfl: 1    icosapent ethyL (VASCEPA) 1 gram Cap, Take 2 capsules (2 g total) by mouth 2 (two) times a day., Disp: 360 capsule, Rfl: 3    lisinopriL 10 MG tablet, Take 1 tablet  "(10 mg total) by mouth once daily., Disp: 90 tablet, Rfl: 3    predniSONE (DELTASONE) 20 MG tablet, Take 2 tablets (40 mg total) by mouth once daily. for 5 days, Disp: 10 tablet, Rfl: 0    Review of Systems   Constitutional:  Negative for appetite change, chills, fever and unexpected weight change (wt up 17lbs since October after quitting smoking).   HENT:  Negative for postnasal drip, sore throat and trouble swallowing.    Respiratory:  Positive for cough and shortness of breath (see HPI). Negative for wheezing.    Cardiovascular:  Negative for chest pain, palpitations and leg swelling.   Gastrointestinal:  Negative for abdominal pain, blood in stool, constipation, diarrhea, nausea and vomiting.   Genitourinary:  Negative for dysuria, frequency and hematuria.   Skin:  Negative for rash.   Neurological:  Negative for dizziness, syncope, speech difficulty, numbness and headaches.        Objective:      Vitals:    06/28/23 1113   BP: 136/80   Pulse: (!) 51   SpO2: 98%   Weight: 56.2 kg (124 lb)   Height: 5' 1.5" (1.562 m)     Physical Exam  Vitals and nursing note reviewed.   Constitutional:       General: She is not in acute distress.     Appearance: Normal appearance. She is well-developed. She is not toxic-appearing.      Comments: Thin frail white female, appears older than stated age   HENT:      Head: Normocephalic and atraumatic.      Right Ear: Tympanic membrane and ear canal normal.      Left Ear: Tympanic membrane and ear canal normal.      Mouth/Throat:      Mouth: Mucous membranes are dry.   Neck:      Vascular: No carotid bruit.   Cardiovascular:      Rate and Rhythm: Normal rate. Rhythm irregular.      Heart sounds: Murmur heard.   Systolic murmur is present with a grade of 3/6.     No friction rub. No gallop.   Pulmonary:      Effort: Pulmonary effort is normal. No respiratory distress.      Breath sounds: Wheezing (fine scattered wheeze) present. No rales.      Comments: Able to speak in complete " sentences  Abdominal:      General: There is no distension.      Palpations: Abdomen is soft.      Tenderness: There is no abdominal tenderness.   Musculoskeletal:      Cervical back: Neck supple.      Right lower leg: No edema.      Left lower leg: No edema.   Lymphadenopathy:      Cervical: No cervical adenopathy.   Skin:     General: Skin is warm and dry.      Findings: No rash.   Neurological:      General: No focal deficit present.      Mental Status: She is alert and oriented to person, place, and time.      Gait: Gait normal.         Assessment:       1. COPD exacerbation    2. Acute heart failure with preserved ejection fraction    3. Essential hypertension    4. Dyslipidemia           Plan:       1. COPD exacerbation  -reviewed recent x-ray with patient, small bilateral pleural effusions though no overt CHF.  No hypoxia, will treat with steroids and stressed importance of filling her albuterol inhaler.  Will send orders for albuterol nebs as well.  Cautioned to call for any worsening in symptoms or no improvement.  Recommend she schedule follow-up with pulmonology  -     POCT EKG 12-LEAD (NOT FOR [facility] USE)  -     NEBULIZER FOR HOME USE  -     albuterol (PROVENTIL) 2.5 mg /3 mL (0.083 %) nebulizer solution; Take 3 mLs (2.5 mg total) by nebulization every 6 (six) hours as needed for Wheezing or Shortness of Breath. Rescue  Dispense: 120 mL; Refill: 3  -     predniSONE (DELTASONE) 20 MG tablet; Take 2 tablets (40 mg total) by mouth once daily. for 5 days  Dispense: 10 tablet; Refill: 0    2. Acute heart failure with preserved ejection fraction  -appears compensated from fluid standpoint.  EKG shows sinus rhythm with PACs, no acute ST/T-wave changes. Did discuss importance of following up with Cardiology  -     furosemide (LASIX) 20 MG tablet; Take 1 tablet (20 mg total) by mouth every other day.  Dispense: 45 tablet; Refill: 1    3. Essential hypertension  -BP well controlled  -     lisinopriL 10 MG tablet;  Take 1 tablet (10 mg total) by mouth once daily.  Dispense: 90 tablet; Refill: 3    4. Dyslipidemia  -stable on last labs  -     icosapent ethyL (VASCEPA) 1 gram Cap; Take 2 capsules (2 g total) by mouth 2 (two) times a day.  Dispense: 360 capsule; Refill: 3  -     atorvastatin (LIPITOR) 10 MG tablet; Take 1 tablet (10 mg total) by mouth once daily.  Dispense: 90 tablet; Refill: 3    5. DM type 2 with diabetic dyslipidemia   -well controlled with A1c 5.4% in April    Follow up if symptoms worsen or fail to improve, for as scheduled.  In 3 weeks          6/28/2023 Liv Parrish NP

## 2023-06-28 NOTE — TELEPHONE ENCOUNTER
LMOR for pt leting her know Prescriptions are at the pharmacy and to give a call back if she has any questions

## 2023-06-28 NOTE — TELEPHONE ENCOUNTER
----- Message from Kristel Daniel sent at 6/28/2023  1:34 PM CDT -----  - 12:41-pt needs refill on albuterol. The pharmacy said they never received it last week   [phone]

## 2023-06-29 ENCOUNTER — PATIENT OUTREACH (OUTPATIENT)
Dept: ADMINISTRATIVE | Facility: HOSPITAL | Age: 68
End: 2023-06-29
Payer: MEDICARE

## 2023-06-29 NOTE — LETTER
AUTHORIZATION FOR RELEASE OF   CONFIDENTIAL INFORMATION    Dear [facility],    We are seeing Liv Decker, date of birth 1955, in the clinic at [address]. Liv Parrish NP is the patient's PCP. Liv Decker has an outstanding lab/procedure at the time we reviewed her chart. In order to help keep her health information updated, she has authorized us to request the following medical record(s):                                               ( X )  EYE EXAM ( Most Recent )                Please fax records to [facility], Linda T Melerine, NP, [phone]       If you have any questions, please contact     Dawit ABREU  Clinical Care Coordinator    [facility] / [facility]  [phone] (Phone)  [phone] (Fax)        Patient Name: Liv Decker  : 1955  Patient Phone #: [phone]       Show    CONSENT AND ACKNOWLEDGEMENT         FORM       Liv Decker  MRN: [id number]  : 1955  Age: 68 y.o.  Sex: female       MEDICARE-PATIENTS CERTIFICATION, AUTHORIZATION TO RELEASE INFORMATION AND PAYMENT REQUEST:  I certify that the information given by me in applying under the Title XVII of Social Security Act is correct.  I authorize any paige of medical or other information about me to release to the Social Security Administration or its intermediaries or carriers any information needed for this or a related Medicare claim.  I request that payment of authorized benefits be made on my behalf to [facility] and [facility] ([facility]).  I also acknowledge upon admission, that I received the Important Message from Medicare.     AUTHORIZATION TO PAY INSURANCE BENEFITS:  For and in consideration of medical services rendered to the patient named herein, I hereby assign and transfer to [facility], including but not limited to hospital based physicians, attending physicians, consulting physicians,  nurse practitioners and physicians assistants the rights for the payment of medical benefits which I may have under the policy/policies identified by me during registration or any policy which may be determined hereafter to pay benefits otherwise payable to me or to a beneficiary designated in the policy.  By this assignment, I authorize payment directly to [facility], hospital based physicians, attending physicians and consulting physicians of all medical benefits payable under the aforesaid policy/policies, but not to exceed the hospitals and/or clinic regular charges.     GUARANTEE OF ACCOUNT:  I/We certify that the information given is true and correct to the best of my/our knowledge.  I/We understand that bills are payable within thirty (30) days of the date of service.  If it becomes necessary for the account to be referred to an  or collection agency, the undersigned agrees to pay the reasonable s fees or collection expenses. I/We reji permission and consent to [facility], our assignees, and third party collection agents to contact myself/us by any telephone number associated with myself/us, including wireless numbers and to leave answering machine and voicemail messages and include in any such messages, information required by law (including debt collection laws) and/or messages regarding amounts owed; to send text messages or emails using any email addresses I/we provided; to use pre-recorded/artificial voice messages  and/or an automatic dialing device in connection with any communications.   I/We agree to be responsible for the payment of all charges of this medical service and hospital based physicians, attending physicians and consulting physicians services rendered to the above named patient     COMMUNICATION AUTHORIZATION:   I hereby authorize Columba Yuen, to contact me on my cell phone and/or home phone using prerecorded messages, artificial voice messages,  automatic telephone dialing devices or other computer assisted technology, or by electronic mail, text messaging, or by any other form of electronic communication. This includes, but is not limited to, appointment reminders, yearly physical exam reminders, preventive care reminders, patient campaigns and welcome calls. I understand I have the right to opt out of these communications at any time.        Page 1 of 3                 CONSENT AND ACKNOWLEDGEMENT FORM CONTINUED     AUTHORIZATION TO RELEASE INFORMATION:  I hereby authorize [facility] and hospital based physicians to release the information for this occasion of service requested by my insurance company or third party payor for the purpose of obtaining payment for services rendered during this admission and/or to other healthcare providers for the purpose of follow-up care or evaluation of care.  This information may or may not include mental health and/or substance abuse information.     AUTHORIZATION FOR MEDICAL AND/OR SURGICAL TREATMENT:  I hereby authorize [facility] and its employees or agents to provide hospital care incident to this admission, including without limitations, consent to routine diagnostic procedures and medical treatment, which is to include whatever procedures that are deemed necessary by the admitting doctor and such other physicians or assistants as he may designate.     PERSONAL VALUABLES:      It is understood and agreed that the hospital maintains a safe for the safekeeping of money and valuables and the hospital shall not be liable for the loss of damage to any money, jewelry, glasses, documents, dentures, hearing aids or other articles of unusual value, unless placed therein, and shall not be liable for loss or damage to any other personal property, unless deposited with the hospital for safekeeping.  VALUABLES ARE NOT TO BE LEFT IN THE PATIENTS ROOM.     ADVANCE DIRECTIVES:  I understand that I am not required to  have Advance Directives in order to be treated.  I have received written information about my rights to formulate Advance Directives.     NOTICE OF PRIVACY PRACTICES/PATIENT RIGHTS/ADMISSION PACKET:  I acknowledge that I have received copies of the [facility] Notice of Privacy Practices, Patient Rights, and the Admission packet, which contains Smoking Cessation information. I understand that weapons, illegal drugs, or any other items considered  contraband, are not allowed on the [facility] campus, and that I do not have such items in my possession.        CONSENT TO PHOTOGRAPH AND/OR VIDEO TAPE DOCUMENTATION OF CARE:  I understand that photographs, videotapes, digital, or other images may be recorded to document my care.  I acknowledge that [facility] will retain the ownership rights to these photographs, videotapes, digital, or other images, and that I will be allowed access to view or obtain copies of any photographs, videotapes, digital, or other images created as part of the documentation of my care.  I understand that these images will be stored in a secure manner that will protect my privacy and that they will be kept for the time period required by law or by policy at [facility]. Images that identify me will be released and/or used outside the institution only upon written authorization from me or my legal representative ([employer], 2001).                                                                                                                                Page 2 of 3                    CONSENT AND ACKNOWLEDGEMENT FORM CONTINUED     [facility] IMMUNIZATION NETWORK (LINKS) PARTICIPATION:  I acknowledge that I have been informed about [place] [employer], or LINKS.  I understand that it is a means to keep track of my immunization records for myself, doctors offices, hospitals and other health care providers through secure, electronic means.     INSURANCE NETWORK ACKNOWLEDGEMENT:                                                                             I acknowledge that I have received notice, based on the information available at this time, regarding the status of my insurance plan as in or out of network at [facility]. I understand that a full listing of accepted insurance plans can be found at the [facility] website.     NOTICE     HEALTH CARE SERVICES MAY BE PROVIDED TO YOU AT A NETWORK HEALTH CARE FACILITY BY FACILITY-BASED PHYSICIANS WHO ARE NOT IN YOUR HEALTH PLAN.  YOU MAY BE RESPONSIBLE FOR PAYMENT OF ALL OR PART OF THE FEES FOR THOSE OUT-OF-NETWORK SERVICES, IN ADDITION TO APPLICABLE AMOUNTS DUE FOR CO-PAYMENTS, COINSURANCE, DEDUCTIBLES, AND NON-COVERED SERVICES.  SPECIFIC INFORMATION ABOUT IN-NETWORK AND OUT-OF NETWORK FACILITY-BASED PHYSICIANS CAN BE FOUND AT THE WEBSITE ADDRESS OF YOUR HEALTH PLAN OR BY CALLING THE [employer] TELEPHONE NUMBER OF YOUR HEALTH PLAN.        I/WE HAVE READ, UNDERSTAND AND AGREE TO THE ABOVE.        _______________________________   Patient/Legal Guardian Signature     This signature was collected at 06/27/2023     Time (if no electronic signature):____________            _______________________________   Printed Name/Relationship to Patient       Witness  Sign Here  _______________________________   Witness Signature     This signature was collected at 06/27/2023        _______________________________   Printed Name         Page 3 of 3

## 2023-06-29 NOTE — PROGRESS NOTES
Population Health Chart Review & Patient Outreach Details:     Reason for Outreach Encounter:     [x]  Non-Compliant Report   []  Payor Report (Humana, PHN, BCBS, MSSP, MCIP, UHC, etc.)   []  Pre-Visit Chart Review     Updates Requested / Reviewed:     []  Care Everywhere    []     []  External Sources (LabCorp, Quest, DIS, etc.)   [x]  Care Team Updated    Patient Outreach Method:    []  Telephone Outreach Completed   [] Successful   [] Left Voicemail   [] Unable to Contact (wrong number, no voicemail)  []  [employer] Outreach Sent  []  Letter Outreach Mailed  [x]  Fax Sent for External Records  []  External Records Upload    Health Maintenance Topics Addressed and Outreach Outcomes / Actions Taken:        []      Breast Cancer Screening []  Mammo Scheduled      []  External Records Requested     []  Added Reminder to Complete to Upcoming Primary Care Appt Notes     []  Patient Declined     []  Patient Will Call Back to Schedule     []  Patient Will Schedule with External Provider / Order Routed if Applicable             []       Cervical Cancer Screening []  Pap Scheduled      []  External Records Requested     []  Added Reminder to Complete to Upcoming Primary Care Appt Notes     []  Patient Declined     []  Patient Will Call Back to Schedule     []  Patient Will Schedule with External Provider               []          Colorectal Cancer Screening []  Colonoscopy Case Request or Referral Placed     []  External Records Requested     []  Added Reminder to Complete to Upcoming Primary Care Appt Notes     []  Patient Declined     []  Patient Will Call Back to Schedule     []  Patient Will Schedule with External Provider     []  Fit Kit Mailed (add the SmartPhrase under additional notes)     []  Reminded Patient to Complete Home Test             [x]      Diabetic Eye Exam []  Eye Camera Scheduled or Optometry Referral Placed     [x]  External Records Requested     []  Added Reminder to Complete to  Upcoming Primary Care Appt Notes     []  Patient Declined     []  Patient Will Call Back to Schedule     []  Patient Will Schedule with External Provider             []      Blood Pressure Control []  Primary Care Follow Up Visit Scheduled     []  Remote Blood Pressure Reading Captured     []  Added Reminder to Complete to Upcoming Primary Care Appt Notes     []  Patient Declined     []  Patient Will Call Back / Patient Will Send Portal Message with Reading     []  Patient Will Call Back to Schedule Provider Visit             []       HbA1c & Other Labs []  Lab Appt Scheduled for Due Labs     []  Primary Care Follow Up Visit Scheduled      []  Reminded Patient to Complete Home Test     []  Added Reminder to Complete to Upcoming Primary Care Appt Notes     []  Patient Declined     []  Patient Will Call Back to Schedule     []  Patient Will Schedule with External Provider / Order Routed if Applicable           []    Schedule Primary Care Appt []  Primary Care Appt Scheduled     []  Patient Declined     []  Patient Will Call Back to Schedule     []  Pt Established with External Provider & Updated Care Team             []      Medication Adherence []  Primary Care Appointment Scheduled     []  Added Reminder to Upcoming Primary Care Appt Notes     []  Patient Reminded to  Prescription     []  Patient Declined, Provider Notified if Needed     []  Sent Provider Message to Review and/or Add Exclusion to Problem List             []      Osteoporosis Screening []  DXA Appointment Scheduled     []  External Records Requested     []  Added Reminder to Complete to Upcoming Primary Care Appt Notes     []  Patient Declined     []  Patient Will Call Back to Schedule     []  Patient Will Schedule with External Provider / Order Routed if Applicable     Additional Care Coordinator Notes:         Further Action Needed If Patient Returns Outreach:

## 2023-06-29 NOTE — LETTER
AUTHORIZATION FOR RELEASE OF   CONFIDENTIAL INFORMATION    Dear Dr. Vasquez Bruno,    We are seeing Liv Decker, date of birth 1955, in the clinic at [address]. Liv Parrish NP is the patient's PCP. Liv Decker has an outstanding lab/procedure at the time we reviewed her chart. In order to help keep her health information updated, she has authorized us to request the following medical record(s):                                             ( X )  EYE EXAM ( Most Recent )               Please fax records to [facility], Linda T Melerine, NP, [phone]       If you have any questions, please contact     Dawit MAREK  Clinical Care Coordinator    [facility]  [phone] (Phone)  [phone] (Fax)      Patient Name: Liv Decker  : 1955  Patient Phone #: [phone]                  Liv Decker  MRN: [id number]  : 1955  Age: 67 y.o.  Sex: female         Patient/Legal Guardian Signature  This signature was collected at 2022           _______________________________   Printed Name/Relationship to Patient      Consent for Examination and Treatment: I hereby authorize the providers and employees of [facility] ([facility]) to provide medical treatment/services which includes, but is not limited to, performing and administering tests and diagnostic procedures that are deemed necessary, including, but not limited to, imaging examinations, blood tests and other laboratory procedures as may be required by the hospital, clinic, or may be ordered by my physician(s) or persons working under the general and/or special instructions of my physician(s).      I understand and agree that this consent covers all authorized persons, including but not limited to physicians, residents, nurse practitioners, physicians' assistants, specialists, consultants, student nurses, and independently contracted physicians, who are called upon  by the physician in charge, to carry out the diagnostic procedures and medical or surgical treatment.     I hereby authorize [facility] to retain or dispose of any specimens or tissue, should there be such remaining from any test or procedure.     I hereby authorize and give consent for [facility] providers and employees to take photographs, images or videotapes of such diagnostic, surgical or treatment procedures of Patient as may be required by [facility] or as may be ordered by a physician. I further acknowledge and agree that [facility] may use cameras or other devices for patient monitoring.     I am aware that the practice of medicine is not an exact science, and I acknowledge that no guarantees have been made to me as to the outcome of any tests, procedures or treatment.     Authorization for Release of Information: I understand that my insurance company and/or their agents may need information necessary to make determinations about payment/reimbursement. I hereby provide authorization to release to all insurance companies, their successors, assignees, other parties with whom they may have contracted, or others acting on their behalf, that are involved with payment for any hospital and/or clinic charges incurred by the patient, any information that they request and deem necessary for payment/reimbursement, and/or quality review.  I further authorize the release of my health information to physicians or other health care practitioners on staff who are involved in my health care now and in the future, and to other health care providers, entities, or institutions for the purpose of my continued care and treatment, including referrals.     REGISTRATION AUTHORIZATION  Form No. 84787 (Rev. 7/13/2022)       Medicare Patient's Certification and Authorization to Release Information and Payment Request:  I certify that the information given by me in applying for payment under Title XVIII of the Social Security Act is correct. I  authorize any paige of medical or other information about me to release to the [facility], or its intermediaries or carriers, any information needed for this or a related Medicare claim. I request that payment of authorized benefits be made on my behalf.     Assignment of Insurance Benefits:   I hereby authorize any and all insurance companies, health plans, defined   benefit plans, health insurers or any entity that is or may be responsible for payment of my medical expenses to pay all hospital and medical benefits now due, and to become due and payable to me under any hospital benefits, sick benefits, injury benefits or any other benefit for services rendered to me, including Major Medical Benefits, direct to [facility] and all independently contracted physicians. I assign any and all rights that I may have against any and all insurance companies, health plans, defined benefit plans, health insurers or any entity that is or may be responsible for payment of my medical expenses, including, but not limited to any right to appeal a denial of a claim, any right to bring any action, lawsuit, administrative proceeding, or other cause of action on my behalf. I specifically assign my right to pursue litigation against any and all insurance companies, health plans, defined benefit plans, health insurers or any entity that is or may be responsible for payment of my medical expenses based upon a refusal to pay charges.            E. Valuables: It is understood and agreed that [facility] is not liable for the damage to or loss of any money, jewelry,   documents, dentures, eye glasses, hearing aids, prosthetics, or other property of value.     F. Computer Equipment: I understand and agree that should I choose to use computer equipment owned by [facility] or if I choose to access the Internet via [facility]s network, I do so at my own risk. [facility] is not responsible for any damage to my computer equipment or to any  damages of any type that might arise from my loss of equipment or data.     G. Acceptance of Financial Responsibility:  I agree that in consideration of the services and   supplies that have been   or will be furnished to the patient, I am hereby obligated to pay all charges made for or on the account of the patient according to the standard rates (in effect at the time the services and supplies are delivered) established by [facility], including its Patient Financial Assistance Policy to the extent it is applicable. I understand that I am responsible for all charges, or portions thereof, not covered by insurance or other sources. Patient refunds will be distributed only after balances at all [facility] facilities are paid.     H. Communication Authorization:  I hereby authorize [facility] and its representatives, along with any billing service   or  who may work on their behalf, to contact me on   my cell phone and/or home phone using pre- recorded messages, artificial voice messages, automatic telephone dialing devices or other computer assisted technology, or by electronic      mail, text messaging, or by any other form of electronic communication. This includes, but is not limited to, appointment reminders, yearly physical exam reminders, preventive care reminders, patient campaigns, welcome calls, and calls about account balances on my account or any account on which I am listed as a guarantor. I understand I have the right to opt out of these communications at any time.      Relationship  Between  Facility and  Provider:      I understand that some, but not all, providers furnishing services to the patient are not employees or agents of [facility]. The patient is under the care and supervision of his/her attending physician, and it is the responsibility of the facility and its nursing staff to carry out the instructions of such physicians. It is the responsibility of the patient's physician/designee to  obtain the patient's informed consent, when required, for medical or surgical treatment, special diagnostic or therapeutic procedures, or hospital services rendered for the patient under the special instructions of the physician/designee.     REGISTRATION AUTHORIZATION  Form No. 47717 (Rev. 7/13/2022)      Notice of Privacy Practices: I acknowledge I have received a copy of [facility]'s Notice of Privacy Practices.     Facility  Directory: I have discussed with the organization my desire to be either included or excluded  in the facility directory in the event of my being an inpatient at an [facility] facility. I understand that if my choice is to opt-out of being identified in the facility directory that the facility will not provide any information about me such as my condition (e.g. fair, stable, etc.) or my location in the facility (e.g., room number, department).     TERM: This authorization is valid for this and subsequent care/treatment I receive at [facility] and will remain valid unless/until revoked in writing by me.     [facility]: As used in this document, [facility] means all [facility] owned and managed facilities, including, but not limited to, all health centers, surgery centers, clinics, urgent care centers, and hospitals.         [facility] complies with applicable Federal civil rights laws and does not discriminate on the basis of race, color, national origin, age, disability, or sex.  ATENCIÓN: si habla español, tiene a madrigal disposición servicios gratuitos de asistencia lingüística. Daniela casas [phone].  KYLIE Ý: N?u b?n nói Ti?ng Vi?t, có các d?ch v? h? tr? ngôn ng? mi?n phí dành cho b?n. G?i s? [phone].        REGISTRATION AUTHORIZATION

## 2023-06-29 NOTE — PROGRESS NOTES
Population Health Chart Review & Patient Outreach Details:     Reason for Outreach Encounter:     [x]  Non-Compliant Report   []  Payor Report ([employer], PHN, BCBS, MSSP, MCIP, UHC, etc.)   []  Pre-Visit Chart Review     Updates Requested / Reviewed:     []  Care Everywhere    []     []  External Sources ([facility], [facility], DIS, etc.)   [x]  Care Team Updated    Patient Outreach Method:    []  Telephone Outreach Completed   [] Successful   [] Left Voicemail   [] Unable to Contact (wrong number, no voicemail)  []  [employer] Outreach Sent  []  Letter Outreach Mailed  [x]  Fax Sent for External Records  []  External Records Upload    Health Maintenance Topics Addressed and Outreach Outcomes / Actions Taken:        []      Breast Cancer Screening []  Mammo Scheduled      []  External Records Requested     []  Added Reminder to Complete to Upcoming Primary Care Appt Notes     []  Patient Declined     []  Patient Will Call Back to Schedule     []  Patient Will Schedule with External Provider / Order Routed if Applicable             []       Cervical Cancer Screening []  Pap Scheduled      []  External Records Requested     []  Added Reminder to Complete to Upcoming Primary Care Appt Notes     []  Patient Declined     []  Patient Will Call Back to Schedule     []  Patient Will Schedule with External Provider               []          Colorectal Cancer Screening []  Colonoscopy Case Request or Referral Placed     []  External Records Requested     []  Added Reminder to Complete to Upcoming Primary Care Appt Notes     []  Patient Declined     []  Patient Will Call Back to Schedule     []  Patient Will Schedule with External Provider     []  Fit Kit Mailed (add the SmartPhrase under additional notes)     []  Reminded Patient to Complete Home Test             [x]      Diabetic Eye Exam []  Eye Camera Scheduled or Optometry Referral Placed     [x]  External Records Requested     []  Added Reminder to Complete to  Upcoming Primary Care Appt Notes     []  Patient Declined     []  Patient Will Call Back to Schedule     []  Patient Will Schedule with External Provider             []      Blood Pressure Control []  Primary Care Follow Up Visit Scheduled     []  Remote Blood Pressure Reading Captured     []  Added Reminder to Complete to Upcoming Primary Care Appt Notes     []  Patient Declined     []  Patient Will Call Back / Patient Will Send Portal Message with Reading     []  Patient Will Call Back to Schedule Provider Visit             []       HbA1c & Other Labs []  Lab Appt Scheduled for Due Labs     []  Primary Care Follow Up Visit Scheduled      []  Reminded Patient to Complete Home Test     []  Added Reminder to Complete to Upcoming Primary Care Appt Notes     []  Patient Declined     []  Patient Will Call Back to Schedule     []  Patient Will Schedule with External Provider / Order Routed if Applicable           []    Schedule Primary Care Appt []  Primary Care Appt Scheduled     []  Patient Declined     []  Patient Will Call Back to Schedule     []  Pt Established with External Provider & Updated Care Team             []      Medication Adherence []  Primary Care Appointment Scheduled     []  Added Reminder to Upcoming Primary Care Appt Notes     []  Patient Reminded to  Prescription     []  Patient Declined, Provider Notified if Needed     []  Sent Provider Message to Review and/or Add Exclusion to Problem List             []      Osteoporosis Screening []  DXA Appointment Scheduled     []  External Records Requested     []  Added Reminder to Complete to Upcoming Primary Care Appt Notes     []  Patient Declined     []  Patient Will Call Back to Schedule     []  Patient Will Schedule with External Provider / Order Routed if Applicable     Additional Care Coordinator Notes:         Further Action Needed If Patient Returns Outreach:

## 2023-06-30 DIAGNOSIS — J44.9 CHRONIC OBSTRUCTIVE PULMONARY DISEASE, UNSPECIFIED COPD TYPE: ICD-10-CM

## 2023-06-30 RX ORDER — ALBUTEROL SULFATE 90 UG/1
2 AEROSOL, METERED RESPIRATORY (INHALATION) EVERY 6 HOURS PRN
Qty: 18 G | Refills: 5 | Status: SHIPPED | OUTPATIENT
Start: 2023-06-30

## 2023-06-30 RX ORDER — ALBUTEROL SULFATE 90 UG/1
2 AEROSOL, METERED RESPIRATORY (INHALATION) EVERY 6 HOURS PRN
Qty: 18 G | Refills: 5 | Status: CANCELLED | OUTPATIENT
Start: 2023-06-30

## 2023-06-30 NOTE — TELEPHONE ENCOUNTER
Pt is needing a refill on her albuterol inhaler. Last office visit 06/28/2023. Next office visit 07/18/2023.    New prescription for pt albuterol was sent into pt's pharmacy on 06/20/2023.

## 2023-06-30 NOTE — TELEPHONE ENCOUNTER
----- Message from Kristel Daniel sent at 6/30/2023 11:49 AM CDT -----  -11:42- pt called her pharmacy about her albuterol inhaler and they said it has not been called in.  [phone]

## 2023-07-18 ENCOUNTER — TELEPHONE (OUTPATIENT)
Dept: FAMILY MEDICINE | Facility: CLINIC | Age: 68
End: 2023-07-18

## 2023-07-18 NOTE — TELEPHONE ENCOUNTER
Spoke with pt in regards to recent no show appt. Pt states that she has been having trouble sleeping because of her breathing. Pt states that she fell asleep around 4 this morning and woke up after her appt time. Appointment made for 08/08/2023. Pt acknowledged understanding. I also stated that she is a risk for being discharged from our clinic because of the no show appt. Pt acknowledged understanding.

## 2023-08-08 ENCOUNTER — PATIENT OUTREACH (OUTPATIENT)
Dept: ADMINISTRATIVE | Facility: HOSPITAL | Age: 68
End: 2023-08-08
Payer: MEDICARE

## 2023-08-08 ENCOUNTER — OFFICE VISIT (OUTPATIENT)
Dept: FAMILY MEDICINE | Facility: CLINIC | Age: 68
End: 2023-08-08
Payer: MEDICARE

## 2023-08-08 VITALS
BODY MASS INDEX: 22.05 KG/M2 | WEIGHT: 119.81 LBS | OXYGEN SATURATION: 98 % | HEART RATE: 70 BPM | DIASTOLIC BLOOD PRESSURE: 60 MMHG | HEIGHT: 62 IN | SYSTOLIC BLOOD PRESSURE: 122 MMHG

## 2023-08-08 DIAGNOSIS — E11.69 DM TYPE 2 WITH DIABETIC DYSLIPIDEMIA: Primary | ICD-10-CM

## 2023-08-08 DIAGNOSIS — I10 ESSENTIAL HYPERTENSION: ICD-10-CM

## 2023-08-08 DIAGNOSIS — J44.9 CHRONIC OBSTRUCTIVE PULMONARY DISEASE, UNSPECIFIED COPD TYPE: ICD-10-CM

## 2023-08-08 DIAGNOSIS — I50.31 ACUTE DIASTOLIC HEART FAILURE DUE TO VALVULAR DISEASE: ICD-10-CM

## 2023-08-08 DIAGNOSIS — I38 ACUTE DIASTOLIC HEART FAILURE DUE TO VALVULAR DISEASE: ICD-10-CM

## 2023-08-08 DIAGNOSIS — E78.5 DYSLIPIDEMIA: ICD-10-CM

## 2023-08-08 DIAGNOSIS — R51.9 SINUS HEADACHE: ICD-10-CM

## 2023-08-08 DIAGNOSIS — M81.0 AGE-RELATED OSTEOPOROSIS WITHOUT CURRENT PATHOLOGICAL FRACTURE: ICD-10-CM

## 2023-08-08 DIAGNOSIS — E78.5 DM TYPE 2 WITH DIABETIC DYSLIPIDEMIA: Primary | ICD-10-CM

## 2023-08-08 LAB — HBA1C MFR BLD: 5.4 %

## 2023-08-08 PROCEDURE — 4010F ACE/ARB THERAPY RXD/TAKEN: CPT | Mod: CPTII,S$GLB,, | Performed by: NURSE PRACTITIONER

## 2023-08-08 PROCEDURE — 99214 OFFICE O/P EST MOD 30 MIN: CPT | Mod: S$GLB,,, | Performed by: NURSE PRACTITIONER

## 2023-08-08 PROCEDURE — 1101F PT FALLS ASSESS-DOCD LE1/YR: CPT | Mod: CPTII,S$GLB,, | Performed by: NURSE PRACTITIONER

## 2023-08-08 PROCEDURE — 3288F PR FALLS RISK ASSESSMENT DOCUMENTED: ICD-10-PCS | Mod: CPTII,S$GLB,, | Performed by: NURSE PRACTITIONER

## 2023-08-08 PROCEDURE — 3008F PR BODY MASS INDEX (BMI) DOCUMENTED: ICD-10-PCS | Mod: CPTII,S$GLB,, | Performed by: NURSE PRACTITIONER

## 2023-08-08 PROCEDURE — 3044F HG A1C LEVEL LT 7.0%: CPT | Mod: CPTII,S$GLB,, | Performed by: NURSE PRACTITIONER

## 2023-08-08 PROCEDURE — 3074F SYST BP LT 130 MM HG: CPT | Mod: CPTII,S$GLB,, | Performed by: NURSE PRACTITIONER

## 2023-08-08 PROCEDURE — 3078F DIAST BP <80 MM HG: CPT | Mod: CPTII,S$GLB,, | Performed by: NURSE PRACTITIONER

## 2023-08-08 PROCEDURE — 83036 POCT HEMOGLOBIN A1C: ICD-10-PCS | Mod: QW,,, | Performed by: NURSE PRACTITIONER

## 2023-08-08 PROCEDURE — 99214 PR OFFICE/OUTPT VISIT, EST, LEVL IV, 30-39 MIN: ICD-10-PCS | Mod: S$GLB,,, | Performed by: NURSE PRACTITIONER

## 2023-08-08 PROCEDURE — 3044F PR MOST RECENT HEMOGLOBIN A1C LEVEL <7.0%: ICD-10-PCS | Mod: CPTII,S$GLB,, | Performed by: NURSE PRACTITIONER

## 2023-08-08 PROCEDURE — 3074F PR MOST RECENT SYSTOLIC BLOOD PRESSURE < 130 MM HG: ICD-10-PCS | Mod: CPTII,S$GLB,, | Performed by: NURSE PRACTITIONER

## 2023-08-08 PROCEDURE — 3288F FALL RISK ASSESSMENT DOCD: CPT | Mod: CPTII,S$GLB,, | Performed by: NURSE PRACTITIONER

## 2023-08-08 PROCEDURE — 4010F PR ACE/ARB THEARPY RXD/TAKEN: ICD-10-PCS | Mod: CPTII,S$GLB,, | Performed by: NURSE PRACTITIONER

## 2023-08-08 PROCEDURE — 1159F MED LIST DOCD IN RCRD: CPT | Mod: CPTII,S$GLB,, | Performed by: NURSE PRACTITIONER

## 2023-08-08 PROCEDURE — 1159F PR MEDICATION LIST DOCUMENTED IN MEDICAL RECORD: ICD-10-PCS | Mod: CPTII,S$GLB,, | Performed by: NURSE PRACTITIONER

## 2023-08-08 PROCEDURE — 1101F PR PT FALLS ASSESS DOC 0-1 FALLS W/OUT INJ PAST YR: ICD-10-PCS | Mod: CPTII,S$GLB,, | Performed by: NURSE PRACTITIONER

## 2023-08-08 PROCEDURE — 83036 HEMOGLOBIN GLYCOSYLATED A1C: CPT | Mod: QW,,, | Performed by: NURSE PRACTITIONER

## 2023-08-08 PROCEDURE — 3078F PR MOST RECENT DIASTOLIC BLOOD PRESSURE < 80 MM HG: ICD-10-PCS | Mod: CPTII,S$GLB,, | Performed by: NURSE PRACTITIONER

## 2023-08-08 PROCEDURE — 1160F RVW MEDS BY RX/DR IN RCRD: CPT | Mod: CPTII,S$GLB,, | Performed by: NURSE PRACTITIONER

## 2023-08-08 PROCEDURE — 3008F BODY MASS INDEX DOCD: CPT | Mod: CPTII,S$GLB,, | Performed by: NURSE PRACTITIONER

## 2023-08-08 PROCEDURE — 1160F PR REVIEW ALL MEDS BY PRESCRIBER/CLIN PHARMACIST DOCUMENTED: ICD-10-PCS | Mod: CPTII,S$GLB,, | Performed by: NURSE PRACTITIONER

## 2023-08-08 RX ORDER — FLUTICASONE PROPIONATE 50 MCG
1 SPRAY, SUSPENSION (ML) NASAL DAILY
Qty: 16 G | Refills: 2 | Status: SHIPPED | OUTPATIENT
Start: 2023-08-08

## 2023-08-08 RX ORDER — METFORMIN HYDROCHLORIDE 500 MG/1
500 TABLET ORAL
Qty: 90 TABLET | Refills: 3 | Status: SHIPPED | OUTPATIENT
Start: 2023-08-08

## 2023-08-08 NOTE — PATIENT INSTRUCTIONS
Recommend you call and schedule follow up appointment with Dr. Garcia, pulmonologist [phone] and Dr. Smart, cardiologist [phone]    Call [facility] to schedule mammogram [phone]    Reduce metformin dose to 500mg daily with meal    Add antihistamine daily for allergies- either claritin, zyrtec or allegra. Start flonase nasal spray one spray each nostril daily

## 2023-08-08 NOTE — LETTER
AUTHORIZATION FOR RELEASE OF   CONFIDENTIAL INFORMATION    Dear Dr.Steven Bruno,    We are seeing Liv Decker, date of birth 1955, in the clinic at [address]. Liv Parrish NP is the patient's PCP. Liv Decker has an outstanding lab/procedure at the time we reviewed her chart. In order to help keep her health information updated, she has authorized us to request the following medical record(s):                                                 ( X )  EYE EXAM ( Most Recent )         Please fax records to [facility], [place], Linda T., NP, [phone]     If you have any questions, please contact     Dawit ARBEU  Clinical Care Coordinator    [facility]  [phone] (Phone)  [phone] (Fax)    Patient Name: Liv Decker  : 1955  Patient Phone #: [phone]           CONSENT AND ACKNOWLEDGEMENT         FORM       Liv Decker  MRN: [id number]  : 1955  Age: 68 y.o.  Sex: female       MEDICARE-PATIENTS CERTIFICATION, AUTHORIZATION TO RELEASE INFORMATION AND PAYMENT REQUEST:  I certify that the information given by me in applying under the Title XVII of Social Security Act is correct.  I authorize any paige of medical or other information about me to release to the Social Security Administration or its intermediaries or carriers any information needed for this or a related Medicare claim.  I request that payment of authorized benefits be made on my behalf to [facility] and [facility] ([facility]).  I also acknowledge upon admission, that I received the Important Message from Medicare.     AUTHORIZATION TO PAY INSURANCE BENEFITS:  For and in consideration of medical services rendered to the patient named herein, I hereby assign and transfer to [facility], including but not limited to hospital based physicians, attending physicians, consulting physicians, nurse practitioners  and physicians assistants the rights for the payment of medical benefits which I may have under the policy/policies identified by me during registration or any policy which may be determined hereafter to pay benefits otherwise payable to me or to a beneficiary designated in the policy.  By this assignment, I authorize payment directly to [facility], hospital based physicians, attending physicians and consulting physicians of all medical benefits payable under the aforesaid policy/policies, but not to exceed the hospitals and/or clinic regular charges.     GUARANTEE OF ACCOUNT:  I/We certify that the information given is true and correct to the best of my/our knowledge.  I/We understand that bills are payable within thirty (30) days of the date of service.  If it becomes necessary for the account to be referred to an  or collection agency, the undersigned agrees to pay the reasonable s fees or collection expenses. I/We reji permission and consent to [facility], our assignees, and third party collection agents to contact myself/us by any telephone number associated with myself/us, including wireless numbers and to leave answering machine and voicemail messages and include in any such messages, information required by law (including debt collection laws) and/or messages regarding amounts owed; to send text messages or emails using any email addresses I/we provided; to use pre-recorded/artificial voice messages  and/or an automatic dialing device in connection with any communications.   I/We agree to be responsible for the payment of all charges of this medical service and hospital based physicians, attending physicians and consulting physicians services rendered to the above named patient     COMMUNICATION AUTHORIZATION:   I hereby authorize Columba Yuen, to contact me on my cell phone and/or home phone using prerecorded messages, artificial voice messages, automatic telephone  dialing devices or other computer assisted technology, or by electronic mail, text messaging, or by any other form of electronic communication. This includes, but is not limited to, appointment reminders, yearly physical exam reminders, preventive care reminders, patient campaigns and welcome calls. I understand I have the right to opt out of these communications at any time.        Page 1 of 3                 CONSENT AND ACKNOWLEDGEMENT FORM CONTINUED     AUTHORIZATION TO RELEASE INFORMATION:  I hereby authorize [facility] and hospital based physicians to release the information for this occasion of service requested by my insurance company or third party payor for the purpose of obtaining payment for services rendered during this admission and/or to other healthcare providers for the purpose of follow-up care or evaluation of care.  This information may or may not include mental health and/or substance abuse information.     AUTHORIZATION FOR MEDICAL AND/OR SURGICAL TREATMENT:  I hereby authorize [facility] and its employees or agents to provide hospital care incident to this admission, including without limitations, consent to routine diagnostic procedures and medical treatment, which is to include whatever procedures that are deemed necessary by the admitting doctor and such other physicians or assistants as he may designate.     PERSONAL VALUABLES:      It is understood and agreed that the hospital maintains a safe for the safekeeping of money and valuables and the hospital shall not be liable for the loss of damage to any money, jewelry, glasses, documents, dentures, hearing aids or other articles of unusual value, unless placed therein, and shall not be liable for loss or damage to any other personal property, unless deposited with the hospital for safekeeping.  VALUABLES ARE NOT TO BE LEFT IN THE PATIENTS ROOM.     ADVANCE DIRECTIVES:  I understand that I am not required to have Advance  Directives in order to be treated.  I have received written information about my rights to formulate Advance Directives.     NOTICE OF PRIVACY PRACTICES/PATIENT RIGHTS/ADMISSION PACKET:  I acknowledge that I have received copies of the [facility] Notice of Privacy Practices, Patient Rights, and the Admission packet, which contains Smoking Cessation information. I understand that weapons, illegal drugs, or any other items considered  contraband, are not allowed on the [facility] campus, and that I do not have such items in my possession.        CONSENT TO PHOTOGRAPH AND/OR VIDEO TAPE DOCUMENTATION OF CARE:  I understand that photographs, videotapes, digital, or other images may be recorded to document my care.  I acknowledge that [facility] will retain the ownership rights to these photographs, videotapes, digital, or other images, and that I will be allowed access to view or obtain copies of any photographs, videotapes, digital, or other images created as part of the documentation of my care.  I understand that these images will be stored in a secure manner that will protect my privacy and that they will be kept for the time period required by law or by policy at [facility]. Images that identify me will be released and/or used outside the institution only upon written authorization from me or my legal representative ([employer], 2001).                                                                                                                                Page 2 of 3                    CONSENT AND ACKNOWLEDGEMENT FORM CONTINUED     [place] [employer] (LINKS) PARTICIPATION:  I acknowledge that I have been informed about [place] [employer], or LINKS.  I understand that it is a means to keep track of my immunization records for myself, doctors offices, hospitals and other health care providers through secure, electronic means.     INSURANCE NETWORK ACKNOWLEDGEMENT:                                                                             I acknowledge that I have received notice, based on the information available at this time, regarding the status of my insurance plan as in or out of network at [facility]. I understand that a full listing of accepted insurance plans can be found at the [facility] website.     NOTICE     HEALTH CARE SERVICES MAY BE PROVIDED TO YOU AT A NETWORK HEALTH CARE FACILITY BY FACILITY-BASED PHYSICIANS WHO ARE NOT IN YOUR HEALTH PLAN.  YOU MAY BE RESPONSIBLE FOR PAYMENT OF ALL OR PART OF THE FEES FOR THOSE OUT-OF-NETWORK SERVICES, IN ADDITION TO APPLICABLE AMOUNTS DUE FOR CO-PAYMENTS, COINSURANCE, DEDUCTIBLES, AND NON-COVERED SERVICES.  SPECIFIC INFORMATION ABOUT IN-NETWORK AND OUT-OF NETWORK FACILITY-BASED PHYSICIANS CAN BE FOUND AT THE WEBSITE ADDRESS OF YOUR HEALTH PLAN OR BY CALLING THE [employer] TELEPHONE NUMBER OF YOUR HEALTH PLAN.        I/WE HAVE READ, UNDERSTAND AND AGREE TO THE ABOVE.        _______________________________   Patient/Legal Guardian Signature     This signature was collected at 06/27/2023     Time (if no electronic signature):____________            _______________________________   Printed Name/Relationship to Patient       Witness  Sign Here  _______________________________   Witness Signature     This signature was collected at 06/27/2023        _______________________________   Printed Name         Page 3 of 3

## 2023-08-08 NOTE — PROGRESS NOTES

## 2023-08-08 NOTE — PROGRESS NOTES
SUBJECTIVE:    Patient ID: Liv Decker is a 68 y.o. female.    Chief Complaint: Follow-up (No bottles//Pt is here for a 3 month checkup//foot exam ordered today//Eye exam done 02/2023, with Dr Bruno//mammo ordered in 04/2023, but not done yet.//OLMAN )    Patient here for regular follow-up.  Seen in June and treated for COPD exacerbation- no showed her follow-up visit    Pt reports overall feeling much better since last visit- still admits to SOB with walking more than 75-100ft. Currrently using trelelgy daily and albuterol INH 2-3 times a day. Reports never received nebulizer ordered at last visit. Denies orthopnea or leg edema.    Admits never has scheduled f/u with Dr. Garcia, pulm or Dr. Smart, cards- had bilat pleural effusions/CHF admit last year. Echo showed mod severe MR/TR and AS, EF 60%, pulm HTN    Quit smoking in October        Office Visit on 08/08/2023   Component Date Value Ref Range Status    Hemoglobin A1C, POC 08/08/2023 5.4  % Final   Office Visit on 06/28/2023   Component Date Value Ref Range Status    EKG 12-Lead 06/28/2023 SR with PACs   Final    Ventricular Rate 06/28/2023 69   Final    OH Interval 06/28/2023 164   Final    QRS Duration 06/28/2023 80   Final    QT/QTc 06/28/2023 420/450   Final    PRT Axes 06/28/2023 33  73  55   Final   Telephone on 03/29/2023   Component Date Value Ref Range Status    Glucose 04/26/2023 95  65 - 99 mg/dL Final    BUN 04/26/2023 11  7 - 25 mg/dL Final    Creatinine 04/26/2023 0.71  0.50 - 1.05 mg/dL Final    eGFR 04/26/2023 93  > OR = 60 mL/min/1.73m2 Final    BUN/Creatinine Ratio 04/26/2023 NOT APPLICABLE  6 - 22 (calc) Final    Sodium 04/26/2023 141  135 - 146 mmol/L Final    Potassium 04/26/2023 4.2  3.5 - 5.3 mmol/L Final    Chloride 04/26/2023 103  98 - 110 mmol/L Final    CO2 04/26/2023 32  20 - 32 mmol/L Final    Calcium 04/26/2023 9.4  8.6 - 10.4 mg/dL Final    Total Protein 04/26/2023 5.7 (L)  6.1 - 8.1 g/dL Final    Albumin 04/26/2023 3.5 (L)  3.6  - 5.1 g/dL Final    Globulin, Total 04/26/2023 2.2  1.9 - 3.7 g/dL (calc) Final    Albumin/Globulin Ratio 04/26/2023 1.6  1.0 - 2.5 (calc) Final    Total Bilirubin 04/26/2023 0.3  0.2 - 1.2 mg/dL Final    Alkaline Phosphatase 04/26/2023 39  37 - 153 U/L Final    AST 04/26/2023 14  10 - 35 U/L Final    ALT 04/26/2023 11  6 - 29 U/L Final    Hemoglobin A1C 04/26/2023 5.4  <5.7 % of total Hgb Final    Cholesterol 04/26/2023 155  <200 mg/dL Final    HDL 04/26/2023 48 (L)  > OR = 50 mg/dL Final    Triglycerides 04/26/2023 177 (H)  <150 mg/dL Final    LDL Cholesterol 04/26/2023 80  mg/dL (calc) Final    HDL/Cholesterol Ratio 04/26/2023 3.2  <5.0 (calc) Final    Non HDL Chol. (LDL+VLDL) 04/26/2023 107  <130 mg/dL (calc) Final       Past Medical History:   Diagnosis Date    Anemia     Depression     Diabetes mellitus 2021    Encounter for blood transfusion     GERD (gastroesophageal reflux disease)     Hyperlipidemia     Hypertension     MI (myocardial infarction) 2007    Osteoporosis     Thyroid disease     Ulcer of the stomach and intestine     pos. for h. pylori    Weight loss 2022    food comes up      Past Surgical History:   Procedure Laterality Date    APPENDECTOMY      CHOLECYSTECTOMY      COLONOSCOPY N/A 3/18/2016    Procedure: COLONOSCOPY;  Surgeon: Rober Zelaya Jr., MD;  Location: [facility];  Service: Endoscopy;  Laterality: N/A;    ESOPHAGOGASTRODUODENOSCOPY  04/15/2016    with dil    ESOPHAGOGASTRODUODENOSCOPY N/A 5/20/2022    Procedure: EGD (ESOPHAGOGASTRODUODENOSCOPY);  Surgeon: Justine Villegas MD;  Location: [facility];  Service: Endoscopy;  Laterality: N/A;    EYE SURGERY      as a child    TONSILLECTOMY      TUBAL LIGATION       Family History   Problem Relation Age of Onset    Heart disease Mother     Diabetes Father        Tests to Keep You Healthy    Mammogram: ORDERED BUT NOT SCHEDULED  Eye Exam: DUE  Colon Cancer Screening: Met on 5/5/2021  Last Blood Pressure <= 139/89 (8/8/2023): Yes  Last HbA1c  < 8 (08/08/2023): Yes      The CVD Risk score (D'Agostino, et al., 2008) failed to calculate for the following reasons:    The patient has a prior MI, stroke, CHF, or peripheral vascular disease diagnosis     Marital Status:   Alcohol History:  reports no history of alcohol use.  Tobacco History:  reports that she quit smoking about 11 months ago. Her smoking use included cigarettes. She started smoking about 64 years ago. She has a 31.8 pack-year smoking history. She has been exposed to tobacco smoke. She has never used smokeless tobacco.  Drug History:  reports no history of drug use.    Health Maintenance Topics with due status: Not Due       Topic Last Completion Date    TETANUS VACCINE 03/21/2019    Pneumococcal Vaccines (Age 65+) 03/31/2021    Colorectal Cancer Screening 05/05/2021    DEXA Scan 03/30/2022    Influenza Vaccine 10/10/2022    LDCT Lung Screen 04/04/2023    Lipid Panel 04/26/2023    Low Dose Statin 06/28/2023    Foot Exam 08/08/2023    Hemoglobin A1c 08/08/2023     Immunization History   Administered Date(s) Administered    COVID-19, MRNA, LN-S, PF (MODERNA FULL 0.5 ML DOSE) 01/15/2021, 02/18/2021    COVID-19, mRNA, LNP-S, bivalent booster, PF (PFIZER OMICRON) 10/19/2022    Influenza 09/09/2009, 10/08/2010, 10/20/2011, 10/02/2012, 10/18/2013, 10/03/2014, 10/15/2015, 10/20/2016    Influenza - Quadrivalent 10/03/2014    Influenza - Quadrivalent - High Dose - PF (65 years and older) 01/25/2022, 10/10/2022    Influenza - Quadrivalent - PF *Preferred* (6 months and older) 10/20/2016, 10/15/2018, 10/16/2019    Influenza A (H1N1) 2009 Monovalent - IM 10/23/2009    Influenza Split 09/09/2009, 10/08/2010, 10/20/2011, 10/02/2012, 10/18/2013, 10/15/2015, 10/15/2018, 10/07/2019    Pneumococcal Conjugate - 13 Valent 03/31/2021    Pneumococcal Polysaccharide - 23 Valent 03/21/2019    Tdap 03/21/2019, 03/21/2019    Zoster 03/21/2019       Review of patient's allergies indicates:  No Known  Allergies    Current Outpatient Medications:     albuterol (PROAIR HFA) 90 mcg/actuation inhaler, Inhale 2 puffs into the lungs every 6 (six) hours as needed for Wheezing or Shortness of Breath. Rescue, Disp: 18 g, Rfl: 5    albuterol (PROVENTIL) 2.5 mg /3 mL (0.083 %) nebulizer solution, Take 3 mLs (2.5 mg total) by nebulization every 6 (six) hours as needed for Wheezing or Shortness of Breath. Rescue, Disp: 120 mL, Rfl: 3    atorvastatin (LIPITOR) 10 MG tablet, Take 1 tablet (10 mg total) by mouth once daily., Disp: 90 tablet, Rfl: 3    blood sugar diagnostic Strp, 1 each by In Vitro route once daily., Disp: 100 each, Rfl: 3    calcium carbonate (OS-JOSELYN) 600 mg calcium (1,500 mg) Tab, Take 600 mg by mouth once daily., Disp: , Rfl:     ergocalciferol (ERGOCALCIFEROL) 50,000 unit Cap, Take 1 capsule (50,000 Units total) by mouth every 7 days., Disp: 12 capsule, Rfl: 3    EScitalopram oxalate (LEXAPRO) 20 MG tablet, Take 1 tablet (20 mg total) by mouth once daily., Disp: 90 tablet, Rfl: 3    fluticasone propionate (FLONASE) 50 mcg/actuation nasal spray, 1 spray (50 mcg total) by Each Nostril route once daily., Disp: 16 g, Rfl: 2    fluticasone-umeclidin-vilanter (TRELEGY ELLIPTA) 100-62.5-25 mcg DsDv, Inhale 1 puff into the lungs once daily., Disp: 60 each, Rfl: 11    furosemide (LASIX) 20 MG tablet, Take 1 tablet (20 mg total) by mouth every other day., Disp: 45 tablet, Rfl: 1    icosapent ethyL (VASCEPA) 1 gram Cap, Take 2 capsules (2 g total) by mouth 2 (two) times a day., Disp: 360 capsule, Rfl: 3    levothyroxine (SYNTHROID) 50 MCG tablet, Take 1 tablet (50 mcg total) by mouth before breakfast., Disp: 90 tablet, Rfl: 1    lisinopriL 10 MG tablet, Take 1 tablet (10 mg total) by mouth once daily., Disp: 90 tablet, Rfl: 3    metFORMIN (GLUCOPHAGE) 500 MG tablet, Take 1 tablet (500 mg total) by mouth daily with breakfast., Disp: 90 tablet, Rfl: 3    pantoprazole (PROTONIX) 40 MG tablet, Take 1 tablet (40 mg total)  "by mouth 2 (two) times daily., Disp: 180 tablet, Rfl: 3    propranoloL (INDERAL LA) 80 MG 24 hr capsule, Take 1 capsule (80 mg total) by mouth once daily., Disp: 90 capsule, Rfl: 3    sucralfate (CARAFATE) 1 gram tablet, Take 1 tablet (1 g total) by mouth 2 (two) times daily. Take on empty stomach, do not eat or take medication for at least 1 hour, Disp: 60 tablet, Rfl: 2    varenicline (CHANTIX) 1 mg Tab, Take 1 tablet (1 mg total) by mouth 2 (two) times daily., Disp: 60 tablet, Rfl: 3    Review of Systems   Constitutional:  Negative for appetite change, chills, fever and unexpected weight change (wt up 17lbs since October after quitting smoking).   HENT:  Positive for sinus pressure. Negative for postnasal drip, sore throat and trouble swallowing.    Eyes:  Negative for visual disturbance.   Respiratory:  Positive for shortness of breath (Improved since last visit, with walking more than  feet). Negative for cough and wheezing.    Cardiovascular:  Negative for chest pain, palpitations and leg swelling.   Gastrointestinal:  Negative for abdominal pain, blood in stool, constipation, diarrhea, nausea and vomiting.   Genitourinary:  Negative for dysuria, frequency and hematuria.   Skin:  Negative for rash.   Neurological:  Positive for numbness (reports some mild burning to toes occasionally) and headaches (Patient complaining of recent headaches, mainly frontal with sinus pressure). Negative for dizziness, syncope and speech difficulty.   Psychiatric/Behavioral:  Negative for dysphoric mood. The patient is not nervous/anxious.           Objective:      Vitals:    08/08/23 1116   BP: 122/60   Pulse: 70   SpO2: 98%   Weight: 54.3 kg (119 lb 12.8 oz)   Height: 5' 1.5" (1.562 m)     Physical Exam  Vitals and nursing note reviewed.   Constitutional:       General: She is not in acute distress.     Appearance: Normal appearance. She is well-developed. She is not toxic-appearing.      Comments: Thin frail white " female, appears older than stated age   HENT:      Head: Normocephalic and atraumatic.      Right Ear: Tympanic membrane and ear canal normal.      Left Ear: Tympanic membrane and ear canal normal.   Neck:      Vascular: No carotid bruit.   Cardiovascular:      Rate and Rhythm: Normal rate and regular rhythm.      Pulses:           Dorsalis pedis pulses are 2+ on the right side and 1+ on the left side.      Heart sounds: Murmur heard.      Systolic murmur is present with a grade of 3/6.      No friction rub. No gallop.   Pulmonary:      Effort: Pulmonary effort is normal. No respiratory distress.      Breath sounds: No wheezing or rales.      Comments: Diminished throughout otherwise clear  Abdominal:      General: There is no distension.      Palpations: Abdomen is soft.      Tenderness: There is no abdominal tenderness.   Musculoskeletal:      Cervical back: Neck supple.      Right lower leg: No edema.      Left lower leg: No edema.      Right foot: No deformity.      Left foot: No deformity.   Feet:      Right foot:      Protective Sensation: 5 sites tested.  5 sites sensed.      Skin integrity: No ulcer or callus.      Toenail Condition: Right toenails are normal.      Left foot:      Protective Sensation: 5 sites tested.  5 sites sensed.      Skin integrity: No ulcer or callus.      Toenail Condition: Left toenails are normal.   Lymphadenopathy:      Cervical: No cervical adenopathy.   Skin:     General: Skin is warm and dry.      Findings: No rash.   Neurological:      General: No focal deficit present.      Mental Status: She is alert and oriented to person, place, and time.      Gait: Gait normal.           Assessment:       1. DM type 2 with diabetic dyslipidemia    2. Chronic obstructive pulmonary disease, unspecified COPD type    3. Essential hypertension    4. Acute diastolic heart failure due to valvular disease    5. Age-related osteoporosis without current pathological fracture    6. Dyslipidemia     7. Sinus headache           Plan:       1. DM type 2 with diabetic dyslipidemia  -A1c well controlled at 5.4%, advised can reduce metformin dose 500 mg daily  -      DIABETES FOOT EXAM  -     Hemoglobin A1C, POCT  -     metFORMIN (GLUCOPHAGE) 500 MG tablet; Take 1 tablet (500 mg total) by mouth daily with breakfast.  Dispense: 90 tablet; Refill: 3    2. Chronic obstructive pulmonary disease, unspecified COPD type   -improved since last visit though continues with LEZAMA complaints.  Patient advised recommend pulmonary follow-up and will check on nebulizer order previously placed    3. Essential hypertension   -BP well controlled    4. Acute diastolic heart failure due to valvular disease   -appears well compensated on Lasix q.o.d., advised I do recommend cardiology follow-up    5. Age-related osteoporosis without current pathological fracture   -stable on Prolia    6. Dyslipidemia   -controlled on last labs    7. Sinus headache  -recommend adding daily antihistamine and Flonase nasal spray, Tylenol p.r.n. for headache  -     fluticasone propionate (FLONASE) 50 mcg/actuation nasal spray; 1 spray (50 mcg total) by Each Nostril route once daily.  Dispense: 16 g; Refill: 2      Follow up in about 3 months (around 11/8/2023).      Patient advised she needs to call and schedule mammogram    Counseled on age and gender appropriate medical preventative services, including cancer screenings, immunizations, overall nutritional health, need for a consistent exercise regimen and an overall push towards maintaining a vigorous and active lifestyle.      8/8/2023 Liv Parrish NP

## 2023-08-15 DIAGNOSIS — I50.31 ACUTE HEART FAILURE WITH PRESERVED EJECTION FRACTION: ICD-10-CM

## 2023-08-15 DIAGNOSIS — F17.210 CIGARETTE SMOKER: ICD-10-CM

## 2023-08-15 DIAGNOSIS — J44.1 COPD EXACERBATION: ICD-10-CM

## 2023-08-15 DIAGNOSIS — E03.9 ACQUIRED HYPOTHYROIDISM: ICD-10-CM

## 2023-08-15 RX ORDER — LEVOTHYROXINE SODIUM 50 UG/1
50 TABLET ORAL
Qty: 90 TABLET | Refills: 1 | Status: SHIPPED | OUTPATIENT
Start: 2023-08-15 | End: 2024-02-14 | Stop reason: SDUPTHER

## 2023-08-15 RX ORDER — VARENICLINE TARTRATE 1 MG/1
1 TABLET, FILM COATED ORAL 2 TIMES DAILY
Qty: 60 TABLET | Refills: 3 | Status: SHIPPED | OUTPATIENT
Start: 2023-08-15 | End: 2023-11-15 | Stop reason: SDUPTHER

## 2023-08-15 RX ORDER — FUROSEMIDE 20 MG/1
20 TABLET ORAL EVERY OTHER DAY
Qty: 45 TABLET | Refills: 1 | Status: SHIPPED | OUTPATIENT
Start: 2023-08-15 | End: 2024-02-14 | Stop reason: SDUPTHER

## 2023-08-15 NOTE — TELEPHONE ENCOUNTER
Pt is needing a refill on her furosemide. Last office visit 08/08/2023. Next office visit 11/21/2023

## 2023-08-15 NOTE — TELEPHONE ENCOUNTER
Pt is needing a refill on her varenicline and trelegy. Last office visit 08/08/2023. Next office visit 11/21/2023.

## 2023-09-20 DIAGNOSIS — Z78.0 ASYMPTOMATIC MENOPAUSAL STATE: ICD-10-CM

## 2023-10-26 ENCOUNTER — TELEPHONE (OUTPATIENT)
Dept: FAMILY MEDICINE | Facility: CLINIC | Age: 68
End: 2023-10-26

## 2023-10-26 DIAGNOSIS — Z79.899 ENCOUNTER FOR LONG-TERM (CURRENT) USE OF OTHER MEDICATIONS: ICD-10-CM

## 2023-10-26 DIAGNOSIS — M81.0 AGE-RELATED OSTEOPOROSIS WITHOUT CURRENT PATHOLOGICAL FRACTURE: Primary | ICD-10-CM

## 2023-10-26 NOTE — TELEPHONE ENCOUNTER
Pt aware she needs randy and ca+. States she will do them soon. To juan for auth. To dr arroyo to sign ca+

## 2023-10-26 NOTE — TELEPHONE ENCOUNTER
----- Message from Rebekah Rosales LPN sent at 10/26/2023  2:55 PM CDT -----    ----- Message -----  From: Taylor Mejias  Sent: 10/26/2023   2:52 PM CDT  To: Liv Parrish Staff    Please review patient's Appointment Desk for an upcoming PROLIA INJECTION appointment.       The following are needed for this appointment.   Reminding to please contact patient, if needed:      ORDER.  Current / active in the Epic Therapy Plan, signed within a year.  LABS. Serum Calcium within 6 weeks of treatment.    DEXA SCAN within the last 2 years   DENTAL PRECAUTION CONFIRMED WITH PATIENT. No recent invasive dental procedures within the last month (tooth extraction, etc). A patient will need to bring a Dental Clearance signed by a dental provider if there was any recent dental procedure within the last month.   FOLLOW-UP APPOINTMENT within the last 12 months with the diagnosis mentioned in the visit notes.         Any item unavailable by the day prior to the scheduled appointment will cause the appointment to be CANCELLED.        To reschedule appointments, please contact [facility] INFUSION SCHEDULING POOL or call [phone] or [phone].       Thank you,  [facility], Infusion Department

## 2023-11-15 ENCOUNTER — TELEPHONE (OUTPATIENT)
Dept: FAMILY MEDICINE | Facility: CLINIC | Age: 68
End: 2023-11-15

## 2023-11-15 ENCOUNTER — OFFICE VISIT (OUTPATIENT)
Dept: FAMILY MEDICINE | Facility: CLINIC | Age: 68
End: 2023-11-15
Payer: MEDICARE

## 2023-11-15 ENCOUNTER — HOSPITAL ENCOUNTER (OUTPATIENT)
Dept: RADIOLOGY | Facility: HOSPITAL | Age: 68
Discharge: HOME | End: 2023-11-15
Attending: NURSE PRACTITIONER
Payer: MEDICARE

## 2023-11-15 VITALS
SYSTOLIC BLOOD PRESSURE: 110 MMHG | HEART RATE: 73 BPM | HEIGHT: 62 IN | OXYGEN SATURATION: 100 % | DIASTOLIC BLOOD PRESSURE: 60 MMHG | WEIGHT: 121 LBS | BODY MASS INDEX: 22.26 KG/M2

## 2023-11-15 DIAGNOSIS — I10 ESSENTIAL HYPERTENSION: ICD-10-CM

## 2023-11-15 DIAGNOSIS — E78.5 DYSLIPIDEMIA: ICD-10-CM

## 2023-11-15 DIAGNOSIS — M81.0 AGE-RELATED OSTEOPOROSIS WITHOUT CURRENT PATHOLOGICAL FRACTURE: ICD-10-CM

## 2023-11-15 DIAGNOSIS — M54.42 ACUTE LEFT-SIDED LOW BACK PAIN WITH LEFT-SIDED SCIATICA: ICD-10-CM

## 2023-11-15 DIAGNOSIS — E11.69 DM TYPE 2 WITH DIABETIC DYSLIPIDEMIA: ICD-10-CM

## 2023-11-15 DIAGNOSIS — E78.5 DM TYPE 2 WITH DIABETIC DYSLIPIDEMIA: ICD-10-CM

## 2023-11-15 DIAGNOSIS — M54.42 ACUTE LEFT-SIDED LOW BACK PAIN WITH LEFT-SIDED SCIATICA: Primary | ICD-10-CM

## 2023-11-15 DIAGNOSIS — I50.31 ACUTE DIASTOLIC HEART FAILURE DUE TO VALVULAR DISEASE: ICD-10-CM

## 2023-11-15 DIAGNOSIS — I38 ACUTE DIASTOLIC HEART FAILURE DUE TO VALVULAR DISEASE: ICD-10-CM

## 2023-11-15 DIAGNOSIS — Z87.891 FORMER SMOKER: ICD-10-CM

## 2023-11-15 DIAGNOSIS — J44.9 CHRONIC OBSTRUCTIVE PULMONARY DISEASE, UNSPECIFIED COPD TYPE: ICD-10-CM

## 2023-11-15 DIAGNOSIS — Z23 NEED FOR VACCINATION: ICD-10-CM

## 2023-11-15 LAB — HBA1C MFR BLD: 5.4 %

## 2023-11-15 PROCEDURE — 3044F PR MOST RECENT HEMOGLOBIN A1C LEVEL <7.0%: ICD-10-PCS | Mod: CPTII,S$GLB,, | Performed by: NURSE PRACTITIONER

## 2023-11-15 PROCEDURE — 1160F RVW MEDS BY RX/DR IN RCRD: CPT | Mod: CPTII,S$GLB,, | Performed by: NURSE PRACTITIONER

## 2023-11-15 PROCEDURE — 3074F PR MOST RECENT SYSTOLIC BLOOD PRESSURE < 130 MM HG: ICD-10-PCS | Mod: CPTII,S$GLB,, | Performed by: NURSE PRACTITIONER

## 2023-11-15 PROCEDURE — 3078F PR MOST RECENT DIASTOLIC BLOOD PRESSURE < 80 MM HG: ICD-10-PCS | Mod: CPTII,S$GLB,, | Performed by: NURSE PRACTITIONER

## 2023-11-15 PROCEDURE — 4010F ACE/ARB THERAPY RXD/TAKEN: CPT | Mod: CPTII,S$GLB,, | Performed by: NURSE PRACTITIONER

## 2023-11-15 PROCEDURE — 3288F FALL RISK ASSESSMENT DOCD: CPT | Mod: CPTII,S$GLB,, | Performed by: NURSE PRACTITIONER

## 2023-11-15 PROCEDURE — 99214 PR OFFICE/OUTPT VISIT, EST, LEVL IV, 30-39 MIN: ICD-10-PCS | Mod: S$GLB,,, | Performed by: NURSE PRACTITIONER

## 2023-11-15 PROCEDURE — 3074F SYST BP LT 130 MM HG: CPT | Mod: CPTII,S$GLB,, | Performed by: NURSE PRACTITIONER

## 2023-11-15 PROCEDURE — 99214 OFFICE O/P EST MOD 30 MIN: CPT | Mod: S$GLB,,, | Performed by: NURSE PRACTITIONER

## 2023-11-15 PROCEDURE — 1159F PR MEDICATION LIST DOCUMENTED IN MEDICAL RECORD: ICD-10-PCS | Mod: CPTII,S$GLB,, | Performed by: NURSE PRACTITIONER

## 2023-11-15 PROCEDURE — G0008 ADMIN INFLUENZA VIRUS VAC: HCPCS | Mod: S$GLB,,, | Performed by: NURSE PRACTITIONER

## 2023-11-15 PROCEDURE — G0008 FLU VACCINE - QUADRIVALENT - ADJUVANTED: ICD-10-PCS | Mod: S$GLB,,, | Performed by: NURSE PRACTITIONER

## 2023-11-15 PROCEDURE — 1160F PR REVIEW ALL MEDS BY PRESCRIBER/CLIN PHARMACIST DOCUMENTED: ICD-10-PCS | Mod: CPTII,S$GLB,, | Performed by: NURSE PRACTITIONER

## 2023-11-15 PROCEDURE — 1101F PT FALLS ASSESS-DOCD LE1/YR: CPT | Mod: CPTII,S$GLB,, | Performed by: NURSE PRACTITIONER

## 2023-11-15 PROCEDURE — 1159F MED LIST DOCD IN RCRD: CPT | Mod: CPTII,S$GLB,, | Performed by: NURSE PRACTITIONER

## 2023-11-15 PROCEDURE — 1101F PR PT FALLS ASSESS DOC 0-1 FALLS W/OUT INJ PAST YR: ICD-10-PCS | Mod: CPTII,S$GLB,, | Performed by: NURSE PRACTITIONER

## 2023-11-15 PROCEDURE — 83036 POCT HEMOGLOBIN A1C: ICD-10-PCS | Mod: QW,,, | Performed by: NURSE PRACTITIONER

## 2023-11-15 PROCEDURE — 3044F HG A1C LEVEL LT 7.0%: CPT | Mod: CPTII,S$GLB,, | Performed by: NURSE PRACTITIONER

## 2023-11-15 PROCEDURE — 3008F BODY MASS INDEX DOCD: CPT | Mod: CPTII,S$GLB,, | Performed by: NURSE PRACTITIONER

## 2023-11-15 PROCEDURE — 3008F PR BODY MASS INDEX (BMI) DOCUMENTED: ICD-10-PCS | Mod: CPTII,S$GLB,, | Performed by: NURSE PRACTITIONER

## 2023-11-15 PROCEDURE — 3288F PR FALLS RISK ASSESSMENT DOCUMENTED: ICD-10-PCS | Mod: CPTII,S$GLB,, | Performed by: NURSE PRACTITIONER

## 2023-11-15 PROCEDURE — 83036 HEMOGLOBIN GLYCOSYLATED A1C: CPT | Mod: QW,,, | Performed by: NURSE PRACTITIONER

## 2023-11-15 PROCEDURE — 72110 X-RAY EXAM L-2 SPINE 4/>VWS: CPT | Mod: TC,PO

## 2023-11-15 PROCEDURE — 4010F PR ACE/ARB THEARPY RXD/TAKEN: ICD-10-PCS | Mod: CPTII,S$GLB,, | Performed by: NURSE PRACTITIONER

## 2023-11-15 PROCEDURE — 90694 FLU VACCINE - QUADRIVALENT - ADJUVANTED: ICD-10-PCS | Mod: S$GLB,,, | Performed by: NURSE PRACTITIONER

## 2023-11-15 PROCEDURE — 3078F DIAST BP <80 MM HG: CPT | Mod: CPTII,S$GLB,, | Performed by: NURSE PRACTITIONER

## 2023-11-15 PROCEDURE — 90694 VACC AIIV4 NO PRSRV 0.5ML IM: CPT | Mod: S$GLB,,, | Performed by: NURSE PRACTITIONER

## 2023-11-15 RX ORDER — METHYLPREDNISOLONE 4 MG/1
TABLET ORAL
Qty: 1 EACH | Refills: 0 | Status: SHIPPED | OUTPATIENT
Start: 2023-11-15 | End: 2024-02-14

## 2023-11-15 RX ORDER — VARENICLINE TARTRATE 1 MG/1
TABLET, FILM COATED ORAL
Qty: 60 TABLET | Refills: 3 | Status: SHIPPED | OUTPATIENT
Start: 2023-11-15

## 2023-11-15 RX ORDER — GABAPENTIN 100 MG/1
CAPSULE ORAL
Qty: 60 CAPSULE | Refills: 5 | Status: SHIPPED | OUTPATIENT
Start: 2023-11-15 | End: 2024-02-14

## 2023-11-15 NOTE — PROGRESS NOTES
SUBJECTIVE:    Patient ID: Liv Decker is a 68 y.o. female.    Chief Complaint: Diabetes (No bottles//Pt is here for  a check up//Discuss left side hip pain that radiates down leg x 2 weeks//Pt would like her flu vaccine//Eye exam done this year with Dr. Goss//Pt will scheduled mammo later//OLMAN )    Patient here for regular follow-up- DM2, COPD, CHF    Pt c/oing of left lower back/buttock radiating down her leg all the way to foot the past few weeks. Left thigh feels numb and foot tingles at times. Denies any falls or trauma. 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 Reports has been using topical creams for pain which helps just a little and taking ibuprofen 600-800mg TID for relief.    Reports breathing has been good, admits smoked one cigarette the other day when she was feeling stressed. Would like to get back on chantix    Admits never scheduled f/u with Dr. Smart, cards for hx fo CHF/valvular heart disease, Echo showed mod severe MR/TR and AS, EF 60%, pulm HTN    Also hasn't scheduled f/u with Dr. Garcia, pulm d/t work schedule              Office Visit on 11/15/2023   Component Date Value Ref Range Status    Hemoglobin A1C, POC 11/15/2023 5.4  % Final   Office Visit on 08/08/2023   Component Date Value Ref Range Status    Hemoglobin A1C, POC 08/08/2023 5.4  % Final   Office Visit on 06/28/2023   Component Date Value Ref Range Status    EKG 12-Lead 06/28/2023 SR with PACs   Final    Ventricular Rate 06/28/2023 69   Final    VT Interval 06/28/2023 164   Final    QRS Duration 06/28/2023 80   Final    QT/QTc 06/28/2023 420/450   Final    PRT Axes 06/28/2023 33  73  55   Final       Past Medical History:   Diagnosis Date    Anemia     Depression     Diabetes mellitus 2021    Encounter for blood transfusion     GERD (gastroesophageal reflux disease)     Hyperlipidemia     Hypertension     MI (myocardial infarction) 2007    Osteoporosis     Thyroid  disease     Ulcer of the stomach and intestine     pos. for h. pylori    Weight loss 2022    food comes up      Past Surgical History:   Procedure Laterality Date    APPENDECTOMY      CHOLECYSTECTOMY      COLONOSCOPY N/A 3/18/2016    Procedure: COLONOSCOPY;  Surgeon: Rober Zelaya Jr., MD;  Location: [facility];  Service: Endoscopy;  Laterality: N/A;    ESOPHAGOGASTRODUODENOSCOPY  04/15/2016    with dil    ESOPHAGOGASTRODUODENOSCOPY N/A 5/20/2022    Procedure: EGD (ESOPHAGOGASTRODUODENOSCOPY);  Surgeon: Justine Villegas MD;  Location: [facility] ENDO;  Service: Endoscopy;  Laterality: N/A;    EYE SURGERY      as a child    TONSILLECTOMY      TUBAL LIGATION       Family History   Problem Relation Age of Onset    Heart disease Mother     Diabetes Father        Tests to Keep You Healthy    Mammogram: ORDERED BUT NOT SCHEDULED  Eye Exam: DUE  Colon Cancer Screening: Met on 5/5/2021  Last Blood Pressure <= 139/89 (11/15/2023): Yes  Last HbA1c < 8 (08/08/2023): Yes      The CVD Risk score (D'Agostino et al., 2008) failed to calculate for the following reasons:    The patient has a prior MI, stroke, CHF, or peripheral vascular disease diagnosis     Marital Status:   Alcohol History:  reports no history of alcohol use.  Tobacco History:  reports that she quit smoking about 14 months ago. Her smoking use included cigarettes. She started smoking about 64 years ago. She has a 31.8 pack-year smoking history. She has been exposed to tobacco smoke. She has never used smokeless tobacco.  Drug History:  reports no history of drug use.    Health Maintenance Topics with due status: Not Due       Topic Last Completion Date    TETANUS VACCINE 03/21/2019    Pneumococcal Vaccines (Age 65+) 03/31/2021    Colorectal Cancer Screening 05/05/2021    DEXA Scan 03/30/2022    LDCT Lung Screen 04/04/2023    Lipid Panel 04/26/2023    Low Dose Statin 06/28/2023    Foot Exam 11/15/2023    Hemoglobin A1c 11/15/2023     Immunization History    Administered Date(s) Administered    COVID-19, MRNA, LN-S, PF (MODERNA FULL 0.5 ML DOSE) 01/15/2021, 02/18/2021    COVID-19, mRNA, LNP-S, bivalent booster, PF (PFIZER OMICRON) 10/19/2022    Influenza 09/09/2009, 10/08/2010, 10/20/2011, 10/02/2012, 10/18/2013, 10/03/2014, 10/15/2015, 10/20/2016    Influenza - Quadrivalent 10/03/2014    Influenza - Quadrivalent - High Dose - PF (65 years and older) 01/25/2022, 10/10/2022    Influenza - Quadrivalent - PF *Preferred* (6 months and older) 10/20/2016, 10/15/2018, 10/16/2019    Influenza A (H1N1) 2009 Monovalent - IM 10/23/2009    Influenza Split 09/09/2009, 10/08/2010, 10/20/2011, 10/02/2012, 10/18/2013, 10/15/2015, 10/15/2018, 10/07/2019    Pneumococcal Conjugate - 13 Valent 03/31/2021    Pneumococcal Polysaccharide - 23 Valent 03/21/2019    Tdap 03/21/2019, 03/21/2019    Zoster 03/21/2019       Review of patient's allergies indicates:  No Known Allergies    Current Outpatient Medications:     albuterol (PROAIR HFA) 90 mcg/actuation inhaler, Inhale 2 puffs into the lungs every 6 (six) hours as needed for Wheezing or Shortness of Breath. Rescue, Disp: 18 g, Rfl: 5    albuterol (PROVENTIL) 2.5 mg /3 mL (0.083 %) nebulizer solution, Take 3 mLs (2.5 mg total) by nebulization every 6 (six) hours as needed for Wheezing or Shortness of Breath. Rescue, Disp: 120 mL, Rfl: 3    atorvastatin (LIPITOR) 10 MG tablet, Take 1 tablet (10 mg total) by mouth once daily., Disp: 90 tablet, Rfl: 3    blood sugar diagnostic Strp, 1 each by In Vitro route once daily., Disp: 100 each, Rfl: 3    calcium carbonate (OS-JOSELYN) 600 mg calcium (1,500 mg) Tab, Take 600 mg by mouth once daily., Disp: , Rfl:     ergocalciferol (ERGOCALCIFEROL) 50,000 unit Cap, Take 1 capsule (50,000 Units total) by mouth every 7 days., Disp: 12 capsule, Rfl: 3    EScitalopram oxalate (LEXAPRO) 20 MG tablet, Take 1 tablet (20 mg total) by mouth once daily., Disp: 90 tablet, Rfl: 3    fluticasone propionate (FLONASE) 50  mcg/actuation nasal spray, 1 spray (50 mcg total) by Each Nostril route once daily., Disp: 16 g, Rfl: 2    fluticasone-umeclidin-vilanter (TRELEGY ELLIPTA) 100-62.5-25 mcg DsDv, Inhale 1 puff into the lungs once daily., Disp: 60 each, Rfl: 11    furosemide (LASIX) 20 MG tablet, Take 1 tablet (20 mg total) by mouth every other day., Disp: 45 tablet, Rfl: 1    gabapentin (NEURONTIN) 100 MG capsule, Start one capsule at bedtime for back/leg pain, may increase to 2-3 capsules at bedtime as needed for pain, Disp: 60 capsule, Rfl: 5    icosapent ethyL (VASCEPA) 1 gram Cap, Take 2 capsules (2 g total) by mouth 2 (two) times a day., Disp: 360 capsule, Rfl: 3    levothyroxine (SYNTHROID) 50 MCG tablet, Take 1 tablet (50 mcg total) by mouth before breakfast., Disp: 90 tablet, Rfl: 1    lisinopriL 10 MG tablet, Take 1 tablet (10 mg total) by mouth once daily., Disp: 90 tablet, Rfl: 3    metFORMIN (GLUCOPHAGE) 500 MG tablet, Take 1 tablet (500 mg total) by mouth daily with breakfast., Disp: 90 tablet, Rfl: 3    methylPREDNISolone (MEDROL DOSEPACK) 4 mg tablet, use as directed- take with food, Disp: 1 each, Rfl: 0    pantoprazole (PROTONIX) 40 MG tablet, Take 1 tablet (40 mg total) by mouth 2 (two) times daily., Disp: 180 tablet, Rfl: 3    propranoloL (INDERAL LA) 80 MG 24 hr capsule, Take 1 capsule (80 mg total) by mouth once daily., Disp: 90 capsule, Rfl: 3    sucralfate (CARAFATE) 1 gram tablet, Take 1 tablet (1 g total) by mouth 2 (two) times daily. Take on empty stomach, do not eat or take medication for at least 1 hour, Disp: 60 tablet, Rfl: 2    varenicline (CHANTIX) 1 mg Tab, Start 1/2 tablet daily for 3 days then 1/2 tablet twice a day for 1 week then 1 tablet twice a day, Disp: 60 tablet, Rfl: 3    Review of Systems   Constitutional:  Negative for appetite change, chills, fever and unexpected weight change.   HENT:  Negative for postnasal drip, sinus pressure, sore throat and trouble swallowing.    Eyes:  Negative for  "visual disturbance.   Respiratory:  Positive for shortness of breath (improved recently, only mild LEZAMA wtih heavy exertion). Negative for cough and wheezing.    Cardiovascular:  Negative for chest pain, palpitations and leg swelling.   Gastrointestinal:  Negative for abdominal pain, blood in stool, constipation, diarrhea, nausea and vomiting.   Genitourinary:  Negative for dysuria, frequency and hematuria.   Musculoskeletal:  Positive for back pain.   Skin:  Negative for rash.   Neurological:  Positive for numbness (left thigh and left foot). Negative for dizziness, syncope, speech difficulty and headaches.   Psychiatric/Behavioral:  Negative for dysphoric mood. The patient is not nervous/anxious.           Objective:      Vitals:    11/15/23 0809   BP: 110/60   Pulse: 73   SpO2: 100%   Weight: 54.9 kg (121 lb)   Height: 5' 1.5" (1.562 m)     Physical Exam  Vitals and nursing note reviewed.   Constitutional:       General: She is not in acute distress.     Appearance: Normal appearance. She is well-developed. She is not toxic-appearing.      Comments: Thin frail white female, appears older than stated age   HENT:      Head: Normocephalic and atraumatic.      Right Ear: Tympanic membrane and ear canal normal.      Left Ear: Tympanic membrane and ear canal normal.   Neck:      Vascular: No carotid bruit.   Cardiovascular:      Rate and Rhythm: Normal rate and regular rhythm.      Heart sounds: Murmur heard.      Systolic murmur is present with a grade of 3/6.      No friction rub. No gallop.   Pulmonary:      Effort: Pulmonary effort is normal. No respiratory distress.      Breath sounds: No wheezing or rales.      Comments: Slightly diminished throughout otherwise clear  Abdominal:      General: There is no distension.      Palpations: Abdomen is soft.      Tenderness: There is no abdominal tenderness.   Musculoskeletal:      Cervical back: Neck supple.      Lumbar back: No edema, signs of trauma or bony tenderness. " Positive left straight leg raise test.      Right lower leg: No edema.      Left lower leg: No edema.   Lymphadenopathy:      Cervical: No cervical adenopathy.   Skin:     General: Skin is warm and dry.      Findings: No rash.   Neurological:      General: No focal deficit present.      Mental Status: She is alert and oriented to person, place, and time.      Gait: Gait normal.           Assessment:       1. Acute left-sided low back pain with left-sided sciatica    2. DM type 2 with diabetic dyslipidemia    3. Chronic obstructive pulmonary disease, unspecified COPD type    4. Essential hypertension    5. Dyslipidemia    6. Acute diastolic heart failure due to valvular disease    7. Need for vaccination    8. Age-related osteoporosis without current pathological fracture    9. Former smoker           Plan:       1. Acute left-sided low back pain with left-sided sciatica  -will send for lumbar x-ray to rule out compression fracture.  Treat with Medrol Dosepak and gabapentin though patient advised if no improvement in pain would recommend either referral to physical therapy or consider MRI/pain management  -     X-Ray Lumbar Spine 5 View; Future; Expected date: 11/15/2023  -     gabapentin (NEURONTIN) 100 MG capsule; Start one capsule at bedtime for back/leg pain, may increase to 2-3 capsules at bedtime as needed for pain  Dispense: 60 capsule; Refill: 5  -     methylPREDNISolone (MEDROL DOSEPACK) 4 mg tablet; use as directed- take with food  Dispense: 1 each; Refill: 0    2. DM type 2 with diabetic dyslipidemia  -well controlled with A1c 5.4%  -     Hemoglobin A1C, POCT  -     CBC Auto Differential; Future; Expected date: 11/15/2023  -     Comprehensive Metabolic Panel; Future; Expected date: 11/15/2023  -     Lipid Panel; Future; Expected date: 11/15/2023  -     Hemoglobin A1C; Future; Expected date: 11/15/2023  -     TSH w/reflex to FT4; Future; Expected date: 11/15/2023  -     Urinalysis, Reflex to Urine Culture  Urine, Clean Catch; Future; Expected date: 11/15/2023  -     Microalbumin/Creatinine Ratio, Urine; Future; Expected date: 11/15/2023    3. Chronic obstructive pulmonary disease, unspecified COPD type   -stable, patient reports overall doing well recently on Trelegy.  I did encourage her to schedule follow-up with pulmonary    4. Essential hypertension   -BP well controlled    5. Dyslipidemia   -stable on last labs    6. Acute diastolic heart failure due to valvular disease   -appears well compensated on Lasix q.o.d., advised I do recommend she schedule follow-up with Cardiology    7. Need for vaccination  -     Influenza - Quadrivalent (Adjuvanted)    8. Age-related osteoporosis without current pathological fracture  -improved on last DEXA, continue Prolia  -     Vitamin D; Future; Expected date: 11/15/2023    9. Former smoker  -     varenicline (CHANTIX) 1 mg Tab; Start 1/2 tablet daily for 3 days then 1/2 tablet twice a day for 1 week then 1 tablet twice a day  Dispense: 60 tablet; Refill: 3      Follow up in about 3 months (around 2/15/2024).          Counseled on age and gender appropriate medical preventative services, including cancer screenings, immunizations, overall nutritional health, need for a consistent exercise regimen and an overall push towards maintaining a vigorous and active lifestyle.      11/15/2023 Liv Parrish NP

## 2023-11-15 NOTE — TELEPHONE ENCOUNTER
----- Message from Liv Parrish NP sent at 11/15/2023  1:27 PM CST -----  Please call patient let her know lumbar x-ray shows some arthritis changes to the hinges of the lower lumbar spine at L4-L5 and L5-S1.  There are no compression fractures and disc spaces are well maintained.  Recommend treatment as we discussed today though if pain continues or worsens she needs to call the office

## 2023-11-15 NOTE — TELEPHONE ENCOUNTER
----- Message from Duane Dolan sent at 11/15/2023 12:30 PM CST -----    Patient was no show for Prolia injection on 11/9 and is now rescheduled for 11/20/23.   Calcium labs are needed, she stated that she had labs drawn today but in Epic it only shows Hemoglobin A1c.     Please review and contact patient for instructions to get labwork done prior to Prolia.     Thank you,  Duane Crespo

## 2023-11-15 NOTE — TELEPHONE ENCOUNTER
Spoke with pt in regards to recent lumbar spine results. Verbalized per Liv that pt's lumbar x-ray shows some arthritis changes to the hinges of the lower lumbar spine at L4-L5 and L5-S1. There are no compression fractures and disc spaces are well maintained. Liv recommend treatment as we discussed today though if pain continues or worsens she needs to call the office. Pt acknowledged understanding.

## 2023-11-16 LAB — CALCIUM SERPL-MCNC: 9.7 MG/DL (ref 8.6–10.4)

## 2023-11-20 ENCOUNTER — PATIENT MESSAGE (OUTPATIENT)
Dept: ADMINISTRATIVE | Facility: HOSPITAL | Age: 68
End: 2023-11-20
Payer: MEDICARE

## 2023-11-20 ENCOUNTER — TELEPHONE (OUTPATIENT)
Dept: INFUSION THERAPY | Facility: HOSPITAL | Age: 68
End: 2023-11-20

## 2023-11-20 ENCOUNTER — PATIENT OUTREACH (OUTPATIENT)
Dept: ADMINISTRATIVE | Facility: HOSPITAL | Age: 68
End: 2023-11-20
Payer: MEDICARE

## 2023-11-20 NOTE — LETTER
AUTHORIZATION FOR RELEASE OF   CONFIDENTIAL INFORMATION    Dear Dr. Bruno,    We are seeing Liv Decker, date of birth 1955, in the clinic at [address]. Liv Parrish NP is the patient's PCP. Liv Decker has an outstanding lab/procedure at the time we reviewed her chart. In order to help keep her health information updated, she has authorized us to request the following medical record(s):                                                ( X )  EYE EXAM ( Most Recent )              Please fax records to [facility], Melerine, Linda T., NP, [phone]     If you have any questions, please contact     Dawit ABREU  Clinical Care Coordinator    [facility] / [facility]  [phone] (Phone)  [phone] (Fax)      Patient Name: Liv Decker  : 1955  Patient Phone #: [phone]          CONSENT AND ACKNOWLEDGEMENT         FORM       Liv Decker  MRN: [id number]  : 1955  Age: 68 y.o.  Sex: female       MEDICARE-PATIENTS CERTIFICATION, AUTHORIZATION TO RELEASE INFORMATION AND PAYMENT REQUEST:  I certify that the information given by me in applying under the Title XVII of Social Security Act is correct.  I authorize any paige of medical or other information about me to release to the Social Security Administration or its intermediaries or carriers any information needed for this or a related Medicare claim.  I request that payment of authorized benefits be made on my behalf to [facility] and [facility] ([facility]).  I also acknowledge upon admission, that I received the Important Message from Medicare.     AUTHORIZATION TO PAY INSURANCE BENEFITS:  For and in consideration of medical services rendered to the patient named herein, I hereby assign and transfer to [facility], including but not limited to hospital based physicians, attending physicians, consulting physicians, nurse practitioners and  physicians assistants the rights for the payment of medical benefits which I may have under the policy/policies identified by me during registration or any policy which may be determined hereafter to pay benefits otherwise payable to me or to a beneficiary designated in the policy.  By this assignment, I authorize payment directly to [facility], hospital based physicians, attending physicians and consulting physicians of all medical benefits payable under the aforesaid policy/policies, but not to exceed the hospitals and/or clinic regular charges.     GUARANTEE OF ACCOUNT:  I/We certify that the information given is true and correct to the best of my/our knowledge.  I/We understand that bills are payable within thirty (30) days of the date of service.  If it becomes necessary for the account to be referred to an  or collection agency, the undersigned agrees to pay the reasonable s fees or collection expenses. I/We reji permission and consent to [facility], our assignees, and third party collection agents to contact myself/us by any telephone number associated with myself/us, including wireless numbers and to leave answering machine and voicemail messages and include in any such messages, information required by law (including debt collection laws) and/or messages regarding amounts owed; to send text messages or emails using any email addresses I/we provided; to use pre-recorded/artificial voice messages  and/or an automatic dialing device in connection with any communications.   I/We agree to be responsible for the payment of all charges of this medical service and hospital based physicians, attending physicians and consulting physicians services rendered to the above named patient     COMMUNICATION AUTHORIZATION:   I hereby authorize Columba Yuen, to contact me on my cell phone and/or home phone using prerecorded messages, artificial voice messages, automatic telephone dialing  devices or other computer assisted technology, or by electronic mail, text messaging, or by any other form of electronic communication. This includes, but is not limited to, appointment reminders, yearly physical exam reminders, preventive care reminders, patient campaigns and welcome calls. I understand I have the right to opt out of these communications at any time.        Page 1 of 3                 CONSENT AND ACKNOWLEDGEMENT FORM CONTINUED     AUTHORIZATION TO RELEASE INFORMATION:  I hereby authorize [facility] and hospital based physicians to release the information for this occasion of service requested by my insurance company or third party payor for the purpose of obtaining payment for services rendered during this admission and/or to other healthcare providers for the purpose of follow-up care or evaluation of care.  This information may or may not include mental health and/or substance abuse information.     AUTHORIZATION FOR MEDICAL AND/OR SURGICAL TREATMENT:  I hereby authorize [facility] and its employees or agents to provide hospital care incident to this admission, including without limitations, consent to routine diagnostic procedures and medical treatment, which is to include whatever procedures that are deemed necessary by the admitting doctor and such other physicians or assistants as he may designate.     PERSONAL VALUABLES:      It is understood and agreed that the hospital maintains a safe for the safekeeping of money and valuables and the hospital shall not be liable for the loss of damage to any money, jewelry, glasses, documents, dentures, hearing aids or other articles of unusual value, unless placed therein, and shall not be liable for loss or damage to any other personal property, unless deposited with the hospital for safekeeping.  VALUABLES ARE NOT TO BE LEFT IN THE PATIENTS ROOM.     ADVANCE DIRECTIVES:  I understand that I am not required to have Advance Directives in  order to be treated.  I have received written information about my rights to formulate Advance Directives.     NOTICE OF PRIVACY PRACTICES/PATIENT RIGHTS/ADMISSION PACKET:  I acknowledge that I have received copies of the [facility] Notice of Privacy Practices, Patient Rights, and the Admission packet, which contains Smoking Cessation information. I understand that weapons, illegal drugs, or any other items considered  contraband, are not allowed on the [facility] campus, and that I do not have such items in my possession.        CONSENT TO PHOTOGRAPH AND/OR VIDEO TAPE DOCUMENTATION OF CARE:  I understand that photographs, videotapes, digital, or other images may be recorded to document my care.  I acknowledge that [facility] will retain the ownership rights to these photographs, videotapes, digital, or other images, and that I will be allowed access to view or obtain copies of any photographs, videotapes, digital, or other images created as part of the documentation of my care.  I understand that these images will be stored in a secure manner that will protect my privacy and that they will be kept for the time period required by law or by policy at [facility]. Images that identify me will be released and/or used outside the institution only upon written authorization from me or my legal representative ([employer], 2001).                                                                                                                                Page 2 of 3                    CONSENT AND ACKNOWLEDGEMENT FORM CONTINUED     [place] [employer] (LINKS) PARTICIPATION:  I acknowledge that I have been informed about [place] [facility], or LINKS.  I understand that it is a means to keep track of my immunization records for myself, doctors offices, hospitals and other health care providers through secure, electronic means.     INSURANCE NETWORK ACKNOWLEDGEMENT:                                                                             I acknowledge that I have received notice, based on the information available at this time, regarding the status of my insurance plan as in or out of network at [facility]. I understand that a full listing of accepted insurance plans can be found at the [facility] website.     NOTICE     HEALTH CARE SERVICES MAY BE PROVIDED TO YOU AT A NETWORK HEALTH CARE FACILITY BY FACILITY-BASED PHYSICIANS WHO ARE NOT IN YOUR HEALTH PLAN.  YOU MAY BE RESPONSIBLE FOR PAYMENT OF ALL OR PART OF THE FEES FOR THOSE OUT-OF-NETWORK SERVICES, IN ADDITION TO APPLICABLE AMOUNTS DUE FOR CO-PAYMENTS, COINSURANCE, DEDUCTIBLES, AND NON-COVERED SERVICES.  SPECIFIC INFORMATION ABOUT IN-NETWORK AND OUT-OF NETWORK FACILITY-BASED PHYSICIANS CAN BE FOUND AT THE WEBSITE ADDRESS OF YOUR HEALTH PLAN OR BY CALLING THE [employer] TELEPHONE NUMBER OF YOUR HEALTH PLAN.        I/WE HAVE READ, UNDERSTAND AND AGREE TO THE ABOVE.        _______________________________   Patient/Legal Guardian Signature     This signature was collected at 06/27/2023     Time (if no electronic signature):____________            _______________________________   Printed Name/Relationship to Patient       Witness  _______________________________   Witness Signature     This signature was collected at 06/27/2023        _______________________________   Printed Name         Page 3 of 3

## 2023-11-20 NOTE — PROGRESS NOTES
Population Health Chart Review & Patient Outreach Details    Outreach Performed: YES Portal    Additional [facility] Health Notes:           Updates Requested / Reviewed:      Updated Care Coordination Note and Immunizations Reconciliation Completed or Queried: [place]         Health Maintenance Topics Overdue:    Health Maintenance Due   Topic Date Due    Eye Exam  Never done    RSV Vaccine (Age 60+) (1 - 1-dose 60+ series) Never done    Shingles Vaccine (2 of 3) 05/16/2019    Diabetes Urine Screening  03/28/2023    Mammogram  03/30/2023    COVID-19 Vaccine (4 - 2023-24 season) 09/01/2023         Health Maintenance Topic(s) Outreach Outcomes & Actions Taken:    Breast Cancer Screening - Outreach Outcomes & Actions Taken  : Reminder letter sent via pt portal.    Eye Exam - Outreach Outcomes & Actions Taken  : External Records Requested & Care Team Updated if Applicable

## 2023-11-27 ENCOUNTER — INFUSION (OUTPATIENT)
Dept: INFUSION THERAPY | Facility: HOSPITAL | Age: 68
End: 2023-11-27
Attending: NURSE PRACTITIONER
Payer: MEDICARE

## 2023-11-27 VITALS
HEART RATE: 55 BPM | RESPIRATION RATE: 16 BRPM | HEIGHT: 62 IN | BODY MASS INDEX: 21.96 KG/M2 | SYSTOLIC BLOOD PRESSURE: 117 MMHG | WEIGHT: 119.31 LBS | DIASTOLIC BLOOD PRESSURE: 55 MMHG | TEMPERATURE: 98 F | OXYGEN SATURATION: 100 %

## 2023-11-27 DIAGNOSIS — M81.0 AGE-RELATED OSTEOPOROSIS WITHOUT CURRENT PATHOLOGICAL FRACTURE: Primary | ICD-10-CM

## 2023-11-27 PROCEDURE — 96372 THER/PROPH/DIAG INJ SC/IM: CPT

## 2023-11-27 PROCEDURE — 63600175 PHARM REV CODE 636 W HCPCS: Mod: JZ,JG | Performed by: NURSE PRACTITIONER

## 2023-11-27 RX ADMIN — DENOSUMAB 60 MG: 60 INJECTION SUBCUTANEOUS at 04:11

## 2023-12-12 DIAGNOSIS — E78.5 DYSLIPIDEMIA: ICD-10-CM

## 2023-12-12 RX ORDER — ATORVASTATIN CALCIUM 10 MG/1
10 TABLET, FILM COATED ORAL DAILY
Qty: 90 TABLET | Refills: 3 | Status: CANCELLED | OUTPATIENT
Start: 2023-12-12

## 2023-12-12 NOTE — TELEPHONE ENCOUNTER
Pt is needing a refill of Lipitor. Last office visit 11/15/23. Next office visit 2/14/2024.     Spoke with pt in regards to recent medication refill request. Verbalized to the pt that Liv sent a new prescription for her Atorvastatin on 06/28/2023, for a 90 day supply with 3 refills. Stated that this should last her a whole year. Recommended that she call her pharmacy for this refill. Pt acknowledged understanding.

## 2024-01-12 ENCOUNTER — PATIENT OUTREACH (OUTPATIENT)
Dept: ADMINISTRATIVE | Facility: HOSPITAL | Age: 69
End: 2024-01-12
Payer: MEDICARE

## 2024-01-12 NOTE — PROGRESS NOTES
Population Health Chart Review & Patient Outreach Details    Outreach Performed: NO    Additional Pop Health Notes:           Updates Requested / Reviewed:      Updated Care Coordination Note, , and Immunizations Reconciliation Completed or Queried: [place]         Health Maintenance Topics Overdue:    Health Maintenance Due   Topic Date Due    Eye Exam  Never done    RSV Vaccine (Age 60+ and Pregnant patients) (1 - 1-dose 60+ series) Never done    Shingles Vaccine (2 of 3) 05/16/2019    Diabetes Urine Screening  03/28/2023    Mammogram  03/30/2023    COVID-19 Vaccine (4 - 2023-24 season) 09/01/2023         Health Maintenance Topic(s) Outreach Outcomes & Actions Taken:    Eye Exam - Outreach Outcomes & Actions Taken  : Dr Bruno's office will fu on request and fax or call back, if appropriate

## 2024-01-30 ENCOUNTER — TELEPHONE (OUTPATIENT)
Dept: FAMILY MEDICINE | Facility: CLINIC | Age: 69
End: 2024-01-30
Payer: MEDICARE

## 2024-02-08 ENCOUNTER — OCCUPATIONAL HEALTH (OUTPATIENT)
Dept: URGENT CARE | Facility: CLINIC | Age: 69
End: 2024-02-08

## 2024-02-08 DIAGNOSIS — Z00.00 ROUTINE GENERAL MEDICAL EXAMINATION AT A HEALTH CARE FACILITY: Primary | ICD-10-CM

## 2024-02-08 PROCEDURE — 86580 TB INTRADERMAL TEST: CPT | Mod: S$GLB,,, | Performed by: EMERGENCY MEDICINE

## 2024-02-14 ENCOUNTER — OFFICE VISIT (OUTPATIENT)
Dept: FAMILY MEDICINE | Facility: CLINIC | Age: 69
End: 2024-02-14
Payer: MEDICARE

## 2024-02-14 VITALS
OXYGEN SATURATION: 97 % | HEIGHT: 62 IN | SYSTOLIC BLOOD PRESSURE: 118 MMHG | WEIGHT: 115.63 LBS | DIASTOLIC BLOOD PRESSURE: 62 MMHG | BODY MASS INDEX: 21.28 KG/M2 | HEART RATE: 85 BPM

## 2024-02-14 DIAGNOSIS — Z12.31 SCREENING MAMMOGRAM, ENCOUNTER FOR: ICD-10-CM

## 2024-02-14 DIAGNOSIS — M54.42 ACUTE LEFT-SIDED LOW BACK PAIN WITH LEFT-SIDED SCIATICA: ICD-10-CM

## 2024-02-14 DIAGNOSIS — E11.69 DM TYPE 2 WITH DIABETIC DYSLIPIDEMIA: Primary | ICD-10-CM

## 2024-02-14 DIAGNOSIS — I10 ESSENTIAL HYPERTENSION: ICD-10-CM

## 2024-02-14 DIAGNOSIS — E55.9 VITAMIN D DEFICIENCY: ICD-10-CM

## 2024-02-14 DIAGNOSIS — E78.5 DM TYPE 2 WITH DIABETIC DYSLIPIDEMIA: Primary | ICD-10-CM

## 2024-02-14 DIAGNOSIS — M70.22 OLECRANON BURSITIS OF LEFT ELBOW: ICD-10-CM

## 2024-02-14 DIAGNOSIS — J44.9 CHRONIC OBSTRUCTIVE PULMONARY DISEASE, UNSPECIFIED COPD TYPE: ICD-10-CM

## 2024-02-14 DIAGNOSIS — E03.9 ACQUIRED HYPOTHYROIDISM: ICD-10-CM

## 2024-02-14 DIAGNOSIS — E78.5 DYSLIPIDEMIA: ICD-10-CM

## 2024-02-14 DIAGNOSIS — I50.32 CHRONIC HEART FAILURE WITH PRESERVED EJECTION FRACTION: ICD-10-CM

## 2024-02-14 DIAGNOSIS — F33.0 MILD EPISODE OF RECURRENT MAJOR DEPRESSIVE DISORDER: ICD-10-CM

## 2024-02-14 DIAGNOSIS — K31.1 PYLORIC STENOSIS IN ADULT: ICD-10-CM

## 2024-02-14 DIAGNOSIS — I35.0 SEVERE AORTIC STENOSIS: ICD-10-CM

## 2024-02-14 PROCEDURE — 99214 OFFICE O/P EST MOD 30 MIN: CPT | Mod: S$GLB,,, | Performed by: NURSE PRACTITIONER

## 2024-02-14 RX ORDER — DOXYCYCLINE 100 MG/1
100 CAPSULE ORAL 2 TIMES DAILY
Qty: 14 CAPSULE | Refills: 0 | Status: SHIPPED | OUTPATIENT
Start: 2024-02-14 | End: 2024-04-15

## 2024-02-14 RX ORDER — ESCITALOPRAM OXALATE 20 MG/1
20 TABLET ORAL DAILY
Qty: 90 TABLET | Refills: 3 | Status: SHIPPED | OUTPATIENT
Start: 2024-02-14 | End: 2025-02-13

## 2024-02-14 RX ORDER — ATORVASTATIN CALCIUM 10 MG/1
10 TABLET, FILM COATED ORAL DAILY
Qty: 90 TABLET | Refills: 3 | Status: SHIPPED | OUTPATIENT
Start: 2024-02-14

## 2024-02-14 RX ORDER — PANTOPRAZOLE SODIUM 40 MG/1
40 TABLET, DELAYED RELEASE ORAL 2 TIMES DAILY
Qty: 180 TABLET | Refills: 3 | Status: SHIPPED | OUTPATIENT
Start: 2024-02-14

## 2024-02-14 RX ORDER — PROPRANOLOL HYDROCHLORIDE 80 MG/1
80 CAPSULE, EXTENDED RELEASE ORAL DAILY
Qty: 90 CAPSULE | Refills: 3 | Status: SHIPPED | OUTPATIENT
Start: 2024-02-14 | End: 2025-02-13

## 2024-02-14 RX ORDER — FUROSEMIDE 20 MG/1
20 TABLET ORAL EVERY OTHER DAY
Qty: 45 TABLET | Refills: 1 | Status: SHIPPED | OUTPATIENT
Start: 2024-02-14 | End: 2025-02-13

## 2024-02-14 RX ORDER — GABAPENTIN 300 MG/1
300 CAPSULE ORAL 2 TIMES DAILY
Qty: 60 CAPSULE | Refills: 5 | Status: SHIPPED | OUTPATIENT
Start: 2024-02-14

## 2024-02-14 RX ORDER — ERGOCALCIFEROL 1.25 MG/1
50000 CAPSULE ORAL
Qty: 12 CAPSULE | Refills: 3 | Status: SHIPPED | OUTPATIENT
Start: 2024-02-14

## 2024-02-14 RX ORDER — LEVOTHYROXINE SODIUM 50 UG/1
50 TABLET ORAL
Qty: 90 TABLET | Refills: 1 | Status: SHIPPED | OUTPATIENT
Start: 2024-02-14 | End: 2025-02-13

## 2024-02-14 NOTE — PATIENT INSTRUCTIONS
Call and schedule follow up appointment with Dr. Smart, cardiology    Barrett Crain MD- physical medicine rehab doctor  [address] [place] [address] [address]  [address], [address]  [facility] [address]  Phone: [phone]

## 2024-02-14 NOTE — PROGRESS NOTES
SUBJECTIVE:    Patient ID: Liv Decker is a 69 y.o. female.    Chief Complaint: Follow-up (No bottles//Pt is here for a 3 month check up and medication refills//Discuss tingling sensation in the left leg x 6 weeks//Discuss non-productive cough x 10 days//eye exam done last year with Dr. Lopez//Pt will scheduled mammo later//OLMAN )    Patient here for regular follow-up- DM2, COPD, CHF    Pt c/oing of pain and tingling to lateral left thigh down into calf- at last visit was started on gabapentin and sent for lumbar xray which showed Facet hypertrophy at L4-5 and L5-S1, disc space well maintained- reports taking 1-2 capsules at night which helps a little. Reports recently quit her job because she thought the sitting all night and lifting was causing the pain. Denies any leg weakness. Reports fell about a month ago, tripped in yard and struck left elbow on ground- developed swelling and redness to elbow and it's drained intermittently yellow/green drainage. Swelling and pain has improved recently    Reports breathing overall has been stable, using albuterol 1-2 times/day and trelegy daily. SOB if she exerts herself too much. has been coughing a little more recently which she blames on the weather change. admits smoking 1-2 cigarettes about every other day- still taking chantix    C/o's of chronic depression- mainly related to family issues. 2 of her kids and her grandkids live with her and she doesn't feel they help her as much as they should. Denies any suicidial thoughts/plans- has been on lexapro 20mg for several years, doesn't want to take anymore medication    Admits never scheduled f/u with Dr. Smart, cards for hx of CHF/valvular heart disease, Echo 10/2022 showed mod severe MR/TR and AS, EF 60%, pulm HTN. Denies orthopnea or leg swelling.    Also hasn't scheduled f/u with Dr. Garcia, pulm d/t work schedule              Office Visit on 11/15/2023   Component Date Value Ref Range Status    Hemoglobin A1C, POC  11/15/2023 5.4  % Final   Telephone on 10/26/2023   Component Date Value Ref Range Status    Calcium 11/15/2023 9.7  8.6 - 10.4 mg/dL Final       Past Medical History:   Diagnosis Date    Anemia     Depression     Diabetes mellitus 2021    Encounter for blood transfusion     GERD (gastroesophageal reflux disease)     Hyperlipidemia     Hypertension     MI (myocardial infarction) 2007    Osteoporosis     Thyroid disease     Ulcer of the stomach and intestine     pos. for h. pylori    Weight loss 2022    food comes up      Past Surgical History:   Procedure Laterality Date    APPENDECTOMY      CHOLECYSTECTOMY      COLONOSCOPY N/A 03/18/2016    Procedure: COLONOSCOPY;  Surgeon: Rober Zelaya Jr., MD;  Location: [facility] ENDO;  Service: Endoscopy;  Laterality: N/A;    ESOPHAGOGASTRODUODENOSCOPY  04/15/2016    with dil    ESOPHAGOGASTRODUODENOSCOPY N/A 05/20/2022    Procedure: EGD (ESOPHAGOGASTRODUODENOSCOPY);  Surgeon: Justine Villegas MD;  Location: [facility] ENDO;  Service: Endoscopy;  Laterality: N/A;    EYE SURGERY      as a child    TONSILLECTOMY      TUBAL LIGATION       Family History   Problem Relation Age of Onset    Heart disease Mother     Diabetes Father        Tests to Keep You Healthy    Mammogram: ORDERED BUT NOT SCHEDULED  Eye Exam: DUE  Colon Cancer Screening: Met on 5/5/2021  Last Blood Pressure <= 139/89 (2/14/2024): Yes  Last HbA1c < 8 (11/15/2023): Yes      The CVD Risk score (Yahirino et al., 2008) failed to calculate for the following reasons:    The patient has a prior MI, stroke, CHF, or peripheral vascular disease diagnosis     Marital Status:   Alcohol History:  reports no history of alcohol use.  Tobacco History:  reports that she has been smoking cigarettes. She started smoking about 65 years ago. She has a 31.8 pack-year smoking history. She has been exposed to tobacco smoke. She has never used smokeless tobacco.  Drug History:  reports no history of drug use.    Health Maintenance  Topics with due status: Not Due       Topic Last Completion Date    TETANUS VACCINE 03/21/2019    Pneumococcal Vaccines (Age 65+) 03/31/2021    Colorectal Cancer Screening 05/05/2021    DEXA Scan 03/30/2022    LDCT Lung Screen 04/04/2023    Lipid Panel 04/26/2023    Foot Exam 11/15/2023    Hemoglobin A1c 11/15/2023     Immunization History   Administered Date(s) Administered    COVID-19, MRNA, LN-S, PF (MODERNA FULL 0.5 ML DOSE) 01/15/2021, 02/18/2021    COVID-19, mRNA, LNP-S, bivalent booster, PF (PFIZER OMICRON) 10/19/2022    Influenza 09/09/2009, 10/08/2010, 10/20/2011, 10/02/2012, 10/18/2013, 10/03/2014, 10/15/2015, 10/20/2016    Influenza (FLUAD) - Quadrivalent - Adjuvanted - PF *Preferred* (65+) 11/15/2023    Influenza - Quadrivalent 10/03/2014    Influenza - Quadrivalent - High Dose - PF (65 years and older) 01/25/2022, 10/10/2022    Influenza - Quadrivalent - PF *Preferred* (6 months and older) 10/20/2016, 10/15/2018, 10/16/2019    Influenza A (H1N1) 2009 Monovalent - IM 10/23/2009    Influenza Split 09/09/2009, 10/08/2010, 10/20/2011, 10/02/2012, 10/18/2013, 10/15/2015, 10/15/2018, 10/07/2019    Pneumococcal Conjugate - 13 Valent 03/31/2021    Pneumococcal Polysaccharide - 23 Valent 03/21/2019    Tdap 03/21/2019, 03/21/2019    Zoster 03/21/2019       Review of patient's allergies indicates:  No Known Allergies    Current Outpatient Medications:     albuterol (PROAIR HFA) 90 mcg/actuation inhaler, Inhale 2 puffs into the lungs every 6 (six) hours as needed for Wheezing or Shortness of Breath. Rescue, Disp: 18 g, Rfl: 5    albuterol (PROVENTIL) 2.5 mg /3 mL (0.083 %) nebulizer solution, Take 3 mLs (2.5 mg total) by nebulization every 6 (six) hours as needed for Wheezing or Shortness of Breath. Rescue, Disp: 120 mL, Rfl: 3    blood sugar diagnostic Strp, 1 each by In Vitro route once daily., Disp: 100 each, Rfl: 3    calcium carbonate (OS-JOSELYN) 600 mg calcium (1,500 mg) Tab, Take 600 mg by mouth once daily.,  Disp: , Rfl:     fluticasone propionate (FLONASE) 50 mcg/actuation nasal spray, 1 spray (50 mcg total) by Each Nostril route once daily., Disp: 16 g, Rfl: 2    fluticasone-umeclidin-vilanter (TRELEGY ELLIPTA) 100-62.5-25 mcg DsDv, Inhale 1 puff into the lungs once daily., Disp: 60 each, Rfl: 11    icosapent ethyL (VASCEPA) 1 gram Cap, Take 2 capsules (2 g total) by mouth 2 (two) times a day., Disp: 360 capsule, Rfl: 3    lisinopriL 10 MG tablet, Take 1 tablet (10 mg total) by mouth once daily., Disp: 90 tablet, Rfl: 3    metFORMIN (GLUCOPHAGE) 500 MG tablet, Take 1 tablet (500 mg total) by mouth daily with breakfast., Disp: 90 tablet, Rfl: 3    sucralfate (CARAFATE) 1 gram tablet, Take 1 tablet (1 g total) by mouth 2 (two) times daily. Take on empty stomach, do not eat or take medication for at least 1 hour, Disp: 60 tablet, Rfl: 2    varenicline (CHANTIX) 1 mg Tab, Start 1/2 tablet daily for 3 days then 1/2 tablet twice a day for 1 week then 1 tablet twice a day, Disp: 60 tablet, Rfl: 3    atorvastatin (LIPITOR) 10 MG tablet, Take 1 tablet (10 mg total) by mouth once daily., Disp: 90 tablet, Rfl: 3    doxycycline (MONODOX) 100 MG capsule, Take 1 capsule (100 mg total) by mouth 2 (two) times daily., Disp: 14 capsule, Rfl: 0    ergocalciferol (ERGOCALCIFEROL) 50,000 unit Cap, Take 1 capsule (50,000 Units total) by mouth every 7 days., Disp: 12 capsule, Rfl: 3    EScitalopram oxalate (LEXAPRO) 20 MG tablet, Take 1 tablet (20 mg total) by mouth once daily., Disp: 90 tablet, Rfl: 3    furosemide (LASIX) 20 MG tablet, Take 1 tablet (20 mg total) by mouth every other day., Disp: 45 tablet, Rfl: 1    gabapentin (NEURONTIN) 300 MG capsule, Take 1 capsule (300 mg total) by mouth 2 (two) times daily., Disp: 60 capsule, Rfl: 5    levothyroxine (SYNTHROID) 50 MCG tablet, Take 1 tablet (50 mcg total) by mouth before breakfast., Disp: 90 tablet, Rfl: 1    pantoprazole (PROTONIX) 40 MG tablet, Take 1 tablet (40 mg total) by mouth  "2 (two) times daily., Disp: 180 tablet, Rfl: 3    propranoloL (INDERAL LA) 80 MG 24 hr capsule, Take 1 capsule (80 mg total) by mouth once daily., Disp: 90 capsule, Rfl: 3    Review of Systems   Constitutional:  Negative for activity change, chills, fever and unexpected weight change.   HENT:  Positive for rhinorrhea (clear). Negative for hearing loss, sore throat and trouble swallowing.    Eyes:  Negative for discharge and visual disturbance.   Respiratory:  Positive for cough (the past week coughing more, clear to white sputum), shortness of breath (with heavy exertion) and wheezing. Negative for chest tightness.    Cardiovascular:  Negative for chest pain, palpitations and leg swelling.   Gastrointestinal:  Negative for blood in stool, constipation, diarrhea and vomiting.   Endocrine: Negative for polydipsia and polyuria.   Genitourinary:  Negative for difficulty urinating, dysuria, hematuria and menstrual problem.   Musculoskeletal:  Positive for arthralgias (left thigh/leg pain) and back pain. Negative for joint swelling and neck pain.   Skin:  Negative for rash.   Neurological:  Negative for dizziness, syncope, speech difficulty, weakness and headaches.   Psychiatric/Behavioral:  Positive for dysphoric mood. Negative for confusion, self-injury and suicidal ideas.           Objective:      Vitals:    02/14/24 0900   BP: 118/62   Pulse: 85   SpO2: 97%   Weight: 52.4 kg (115 lb 9.6 oz)   Height: 5' 1.5" (1.562 m)     Physical Exam  Vitals and nursing note reviewed.   Constitutional:       General: She is not in acute distress.     Appearance: Normal appearance. She is well-developed. She is not toxic-appearing.      Comments: Thin frail white female, appears older than stated age   HENT:      Head: Normocephalic and atraumatic.      Right Ear: Tympanic membrane and ear canal normal.      Left Ear: Tympanic membrane and ear canal normal.   Neck:      Vascular: No carotid bruit.   Cardiovascular:      Rate and " Rhythm: Normal rate and regular rhythm.      Heart sounds: Murmur heard.      Systolic murmur is present with a grade of 3/6.      No friction rub. No gallop.   Pulmonary:      Effort: Pulmonary effort is normal. No respiratory distress.      Breath sounds: No wheezing or rales.      Comments: Slightly diminished throughout otherwise clear  Abdominal:      General: There is no distension.      Palpations: Abdomen is soft.      Tenderness: There is no abdominal tenderness.   Musculoskeletal:      Left elbow: Swelling (olecranon bursitis with mild erythema and tenderness, small scab, no drainage) present. Normal range of motion. No tenderness.      Cervical back: Neck supple.      Lumbar back: No edema, signs of trauma or bony tenderness. Positive left straight leg raise test.      Right lower leg: No edema.      Left lower leg: No edema.   Lymphadenopathy:      Cervical: No cervical adenopathy.   Skin:     General: Skin is warm and dry.      Findings: No rash.   Neurological:      General: No focal deficit present.      Mental Status: She is alert and oriented to person, place, and time.      Gait: Gait normal.           Assessment:       1. DM type 2 with diabetic dyslipidemia    2. Essential hypertension    3. Chronic obstructive pulmonary disease, unspecified COPD type    4. Acute left-sided low back pain with left-sided sciatica    5. Acquired hypothyroidism    6. Chronic heart failure with preserved ejection fraction    7. Dyslipidemia    8. Olecranon bursitis of left elbow    9. Mild episode of recurrent major depressive disorder    10. Vitamin D deficiency    11. Pyloric stenosis in adult    12. Screening mammogram, encounter for    13. Severe aortic stenosis           Plan:       1. DM type 2 with diabetic dyslipidemia   -stable, A1c 5.4% November due for annual labs    2. Essential hypertension   -BP well controlled    3. Chronic obstructive pulmonary disease, unspecified COPD type   -stable unfortunately  has continued to smoke few cigarettes.  Discussed importance of smoking cessation and also encouraged follow-up with Dr. Garcia    4. Acute left-sided low back pain with left-sided sciatica   -will refer to PT and Dr. Crain, increase gabapentin dose  -     Ambulatory referral/consult to Physical Medicine Rehab; Future; Expected date: 02/21/2024  -     Ambulatory referral/consult to Physical/Occupational Therapy; Future; Expected date: 02/21/2024  -     gabapentin (NEURONTIN) 300 MG capsule; Take 1 capsule (300 mg total) by mouth 2 (two) times daily.  Dispense: 60 capsule; Refill: 5    5. Acquired hypothyroidism  -due for follow-up labs  -     levothyroxine (SYNTHROID) 50 MCG tablet; Take 1 tablet (50 mcg total) by mouth before breakfast.  Dispense: 90 tablet; Refill: 1    6. Chronic heart failure with preserved ejection fraction  -stable, appears well compensated on Lasix every other day.  Did discuss importance of follow-up with Cardiology given history of heart failure, AS and pulmonary hypertension  -     furosemide (LASIX) 20 MG tablet; Take 1 tablet (20 mg total) by mouth every other day.  Dispense: 45 tablet; Refill: 1    7. Dyslipidemia  -     atorvastatin (LIPITOR) 10 MG tablet; Take 1 tablet (10 mg total) by mouth once daily.  Dispense: 90 tablet; Refill: 3    8. Olecranon bursitis of left elbow  -will treat with abx, recommend ice and compression and call if no improvement/worsening  -     doxycycline (MONODOX) 100 MG capsule; Take 1 capsule (100 mg total) by mouth 2 (two) times daily.  Dispense: 14 capsule; Refill: 0    9. Mild episode of recurrent major depressive disorder  -admits to ongoing depression mainly related to family situation though declines additional medication or referral  -     EScitalopram oxalate (LEXAPRO) 20 MG tablet; Take 1 tablet (20 mg total) by mouth once daily.  Dispense: 90 tablet; Refill: 3    10. Vitamin D deficiency  -     ergocalciferol (ERGOCALCIFEROL) 50,000 unit Cap; Take  1 capsule (50,000 Units total) by mouth every 7 days.  Dispense: 12 capsule; Refill: 3    11. Pyloric stenosis in adult  -stable, denies any issues  -     pantoprazole (PROTONIX) 40 MG tablet; Take 1 tablet (40 mg total) by mouth 2 (two) times daily.  Dispense: 180 tablet; Refill: 3    12. Screening mammogram, encounter for  -     Mammo Digital Screening Bilat w/ Ben; Future; Expected date: 02/14/2024    13. Severe aortic stenosis   -stable, denies syncope or chest pain    Other orders  -     propranoloL (INDERAL LA) 80 MG 24 hr capsule; Take 1 capsule (80 mg total) by mouth once daily.  Dispense: 90 capsule; Refill: 3      Follow up in about 3 months (around 5/14/2024) for labs to be drawn today.          Counseled on age and gender appropriate medical preventative services, including cancer screenings, immunizations, overall nutritional health, need for a consistent exercise regimen and an overall push towards maintaining a vigorous and active lifestyle.      2/14/2024 Liv Parrish NP

## 2024-02-15 LAB
25(OH)D3 SERPL-MCNC: 101 NG/ML (ref 30–100)
ALBUMIN SERPL-MCNC: 3.7 G/DL (ref 3.6–5.1)
ALBUMIN/CREAT UR: 64 MCG/MG CREAT
ALBUMIN/GLOB SERPL: 1.4 (CALC) (ref 1–2.5)
ALP SERPL-CCNC: 48 U/L (ref 37–153)
ALT SERPL-CCNC: 4 U/L (ref 6–29)
APPEARANCE UR: ABNORMAL
AST SERPL-CCNC: 7 U/L (ref 10–35)
BACTERIA #/AREA URNS HPF: ABNORMAL /HPF
BACTERIA UR CULT: ABNORMAL
BASOPHILS # BLD AUTO: 40 CELLS/UL (ref 0–200)
BASOPHILS NFR BLD AUTO: 0.4 %
BILIRUB SERPL-MCNC: 0.2 MG/DL (ref 0.2–1.2)
BILIRUB UR QL STRIP: NEGATIVE
BUN SERPL-MCNC: 11 MG/DL (ref 7–25)
BUN/CREAT SERPL: ABNORMAL (CALC) (ref 6–22)
CALCIUM SERPL-MCNC: 8.7 MG/DL (ref 8.6–10.4)
CHLORIDE SERPL-SCNC: 107 MMOL/L (ref 98–110)
CHOLEST SERPL-MCNC: 138 MG/DL
CHOLEST/HDLC SERPL: 4.2 (CALC)
CO2 SERPL-SCNC: 24 MMOL/L (ref 20–32)
COLOR UR: ABNORMAL
CREAT SERPL-MCNC: 0.67 MG/DL (ref 0.5–1.05)
CREAT UR-MCNC: 218 MG/DL (ref 20–275)
EGFR: 95 ML/MIN/1.73M2
EOSINOPHIL # BLD AUTO: 129 CELLS/UL (ref 15–500)
EOSINOPHIL NFR BLD AUTO: 1.3 %
ERYTHROCYTE [DISTWIDTH] IN BLOOD BY AUTOMATED COUNT: 14.5 % (ref 11–15)
GLOBULIN SER CALC-MCNC: 2.6 G/DL (CALC) (ref 1.9–3.7)
GLUCOSE SERPL-MCNC: 108 MG/DL (ref 65–99)
GLUCOSE UR QL STRIP: NEGATIVE
HBA1C MFR BLD: 5.7 % OF TOTAL HGB
HCT VFR BLD AUTO: 38.6 % (ref 35–45)
HDLC SERPL-MCNC: 33 MG/DL
HGB BLD-MCNC: 12.4 G/DL (ref 11.7–15.5)
HGB UR QL STRIP: NEGATIVE
HYALINE CASTS #/AREA URNS LPF: ABNORMAL /LPF
KETONES UR QL STRIP: NEGATIVE
LDLC SERPL CALC-MCNC: 71 MG/DL (CALC)
LEUKOCYTE ESTERASE UR QL STRIP: NEGATIVE
LYMPHOCYTES # BLD AUTO: 1356 CELLS/UL (ref 850–3900)
LYMPHOCYTES NFR BLD AUTO: 13.7 %
MCH RBC QN AUTO: 28.4 PG (ref 27–33)
MCHC RBC AUTO-ENTMCNC: 32.1 G/DL (ref 32–36)
MCV RBC AUTO: 88.5 FL (ref 80–100)
MICROALBUMIN UR-MCNC: 14 MG/DL
MONOCYTES # BLD AUTO: 891 CELLS/UL (ref 200–950)
MONOCYTES NFR BLD AUTO: 9 %
NEUTROPHILS # BLD AUTO: 7484 CELLS/UL (ref 1500–7800)
NEUTROPHILS NFR BLD AUTO: 75.6 %
NITRITE UR QL STRIP: NEGATIVE
NONHDLC SERPL-MCNC: 105 MG/DL (CALC)
PH UR STRIP: 6 [PH] (ref 5–8)
PLATELET # BLD AUTO: 231 THOUSAND/UL (ref 140–400)
PMV BLD REES-ECKER: 11.1 FL (ref 7.5–12.5)
POTASSIUM SERPL-SCNC: 4.3 MMOL/L (ref 3.5–5.3)
PROT SERPL-MCNC: 6.3 G/DL (ref 6.1–8.1)
PROT UR QL STRIP: ABNORMAL
RBC # BLD AUTO: 4.36 MILLION/UL (ref 3.8–5.1)
RBC #/AREA URNS HPF: ABNORMAL /HPF
SERVICE CMNT-IMP: ABNORMAL
SODIUM SERPL-SCNC: 141 MMOL/L (ref 135–146)
SP GR UR STRIP: 1.04 (ref 1–1.03)
SQUAMOUS #/AREA URNS HPF: ABNORMAL /HPF
TRIGL SERPL-MCNC: 258 MG/DL
TSH SERPL-ACNC: 2.43 MIU/L (ref 0.4–4.5)
WBC # BLD AUTO: 9.9 THOUSAND/UL (ref 3.8–10.8)
WBC #/AREA URNS HPF: ABNORMAL /HPF

## 2024-02-19 ENCOUNTER — TELEPHONE (OUTPATIENT)
Dept: FAMILY MEDICINE | Facility: CLINIC | Age: 69
End: 2024-02-19
Payer: MEDICARE

## 2024-02-19 DIAGNOSIS — R80.9 MICROALBUMINURIA: ICD-10-CM

## 2024-02-19 DIAGNOSIS — E78.5 DM TYPE 2 WITH DIABETIC DYSLIPIDEMIA: Primary | ICD-10-CM

## 2024-02-19 DIAGNOSIS — E11.69 DM TYPE 2 WITH DIABETIC DYSLIPIDEMIA: Primary | ICD-10-CM

## 2024-02-19 NOTE — TELEPHONE ENCOUNTER
Spoke with Daniel with [facility] in regard to pt's levothyroxine refill. Daniel stated that the reason her levothyroxine was rejected was because of new insurance. He stated that he will give pt a call to get pt's most recent insurance information.     Left message on voice mail for the pt to callback.

## 2024-02-19 NOTE — TELEPHONE ENCOUNTER
Spoke with pt in regards to message sent. Verbalized to the pt that she may need to call the pharmacy, to verify pt's medical insurance. Pt acknowledged understanding. Pt stated that she will give the pharmacy a call.

## 2024-02-19 NOTE — TELEPHONE ENCOUNTER
Spoke with pt in regards to recent lab results. Verbalized per Liv that pt's labs show diabetes is well controlled with A1C 5.7%. Kidney, liver, thyroid and blood count within normal range. Triglycerides are mildly elevated though bad cholesterol LDL is well controlled, Liv recommends low fat diet. No infection or blood in urine though urine shows increased amounts of protein in urine. Liv stated that this can be an indicator for increased risk of cardiovascular disease and she recommends adding jardiance 10 mg which is a diabetes medication that we also use for CKD and CHF to reduce progression of CKD and CHF exacerbations.  Repeat BMP 8 weeks after adding Jardiance. Pt acknowledged understanding. Pt is willing to take the Jardiance.     Reminder set up.

## 2024-02-19 NOTE — TELEPHONE ENCOUNTER
----- Message from Liv Parrish NP sent at 2/16/2024  5:12 PM CST -----  Please call pt and let her know recent labs show diabetes is well controlled with A1C 5.7%.  Kidney, liver, thyroid and blood count within normal range.  Triglycerides are mildly elevated though bad cholesterol LDL is well controlled- recommend low fat diet.  No infection or blood in urine though urine shows increased amounts of protein in urine- this can be an indicator for increased risk of cardiovascular disease and I recommend adding jardiance 10mg which is a diabetes medication that we also use for CKD and CHF to reduce progression of CKD and CHF exacerbations.  Repeat BMP 8 weeks after adding jardiance

## 2024-03-11 ENCOUNTER — TELEPHONE (OUTPATIENT)
Dept: FAMILY MEDICINE | Facility: CLINIC | Age: 69
End: 2024-03-11
Payer: MEDICARE

## 2024-03-11 NOTE — TELEPHONE ENCOUNTER
----- Message from Kristel Daniel sent at 3/11/2024  6:58 AM CDT -----  - 3/8- pt would like to talk to nurse  [phone]      Skyrizi Counseling: I discussed with the patient the risks of risankizumab-rzaa including but not limited to immunosuppression, and serious infections.  The patient understands that monitoring is required including a PPD at baseline and must alert us or the primary physician if symptoms of infection or other concerning signs are noted.

## 2024-03-28 ENCOUNTER — PATIENT MESSAGE (OUTPATIENT)
Dept: ADMINISTRATIVE | Facility: HOSPITAL | Age: 69
End: 2024-03-28
Payer: MEDICARE

## 2024-04-09 ENCOUNTER — TELEPHONE (OUTPATIENT)
Dept: FAMILY MEDICINE | Facility: CLINIC | Age: 69
End: 2024-04-09
Payer: MEDICARE

## 2024-04-09 DIAGNOSIS — R80.9 MICROALBUMINURIA: ICD-10-CM

## 2024-04-09 DIAGNOSIS — E11.69 DM TYPE 2 WITH DIABETIC DYSLIPIDEMIA: Primary | ICD-10-CM

## 2024-04-09 DIAGNOSIS — I10 ESSENTIAL HYPERTENSION: ICD-10-CM

## 2024-04-09 DIAGNOSIS — E78.5 DM TYPE 2 WITH DIABETIC DYSLIPIDEMIA: Primary | ICD-10-CM

## 2024-04-09 DIAGNOSIS — Z79.899 ENCOUNTER FOR LONG-TERM (CURRENT) USE OF OTHER MEDICATIONS: ICD-10-CM

## 2024-04-09 RX ORDER — DAPAGLIFLOZIN 10 MG/1
10 TABLET, FILM COATED ORAL DAILY
Qty: 30 TABLET | Refills: 11 | Status: SHIPPED | OUTPATIENT
Start: 2024-04-09 | End: 2024-05-22

## 2024-04-09 NOTE — TELEPHONE ENCOUNTER
Left mess that fasting lab is due as 8 week f/u from starting Jardiance, order at [facility], gave fasting instructions and asked that she go in the next 2 weeks. Order pended. Updated remind me.

## 2024-04-09 NOTE — TELEPHONE ENCOUNTER
Spoke with pt in regards to recent messages sent. Verbalized verbatim per Liv. Pt acknowledged understanding.         Please call ins about icosapent or brand name vascepa

## 2024-04-09 NOTE — TELEPHONE ENCOUNTER
----- Message from Payton Browne MA sent at 2/19/2024  9:11 AM CST -----  Regarding: Repaet labs   Message from Liv Parrish NP sent at 2/16/2024  5:12 PM CST -----  Please call pt and let her know recent labs show diabetes is well controlled with A1C 5.7%.  Kidney, liver, thyroid and blood count within normal range.  Triglycerides are mildly elevated though bad cholesterol LDL is well controlled- recommend low fat diet.  No infection or blood in urine though urine shows increased amounts of protein in urine- this can be an indicator for increased risk of cardiovascular disease and I recommend adding jardiance 10mg which is a diabetes medication that we also use for CKD and CHF to reduce progression of CKD and CHF exacerbations.  Repeat BMP 8 weeks after adding jardiance      Last collected 02/14/2024.        No

## 2024-04-09 NOTE — TELEPHONE ENCOUNTER
----- Message from Prema Rodriguez sent at 4/9/2024  4:25 PM CDT -----  VM  1:37   The patient is returning a call. She needs to talk to someone about 2 medications.  [phone]

## 2024-04-09 NOTE — TELEPHONE ENCOUNTER
We could see if farxiga would be any cheaper but it's in the same class of meds so may not be- there isn't any other medication in that same class that is generic. Can we see if either the generic icosapent or brand name vascepa is covered by her insurance

## 2024-04-09 NOTE — TELEPHONE ENCOUNTER
Spoke with pt in regards to messages sent. Pt wanted us to know that Jardiance is not cover by her ins, and is going to cost over 600 dollars and icosapent is going to cost her over 700 dollars. Any other option?

## 2024-04-10 ENCOUNTER — TELEPHONE (OUTPATIENT)
Dept: FAMILY MEDICINE | Facility: CLINIC | Age: 69
End: 2024-04-10
Payer: MEDICARE

## 2024-04-10 NOTE — TELEPHONE ENCOUNTER
Please let patient know Jardiance and Farxiga are covered by her insurance but unfortunately she may be in the donut hole which means the cost has gone up significantly.  The only other option would be she could apply for patient assistance for either Farxiga or Jardiance through the company.  We could give her an application to complete

## 2024-04-10 NOTE — TELEPHONE ENCOUNTER
Spoke with pt in regards to messages sent. Verbalized verbatim per Liv. Pt acknowledged understanding. Pt will apply for patient assistants.

## 2024-04-15 ENCOUNTER — OFFICE VISIT (OUTPATIENT)
Dept: URGENT CARE | Facility: CLINIC | Age: 69
End: 2024-04-15
Payer: MEDICARE

## 2024-04-15 ENCOUNTER — TELEPHONE (OUTPATIENT)
Dept: FAMILY MEDICINE | Facility: CLINIC | Age: 69
End: 2024-04-15
Payer: MEDICARE

## 2024-04-15 VITALS
BODY MASS INDEX: 21.71 KG/M2 | WEIGHT: 115 LBS | HEART RATE: 85 BPM | HEIGHT: 61 IN | SYSTOLIC BLOOD PRESSURE: 98 MMHG | OXYGEN SATURATION: 95 % | DIASTOLIC BLOOD PRESSURE: 60 MMHG | TEMPERATURE: 97 F | RESPIRATION RATE: 16 BRPM

## 2024-04-15 DIAGNOSIS — Z86.79 HISTORY OF CHF (CONGESTIVE HEART FAILURE): ICD-10-CM

## 2024-04-15 DIAGNOSIS — R05.9 COUGH, UNSPECIFIED TYPE: Primary | ICD-10-CM

## 2024-04-15 DIAGNOSIS — I25.2 HISTORY OF MI (MYOCARDIAL INFARCTION): ICD-10-CM

## 2024-04-15 DIAGNOSIS — J44.1 COPD EXACERBATION: ICD-10-CM

## 2024-04-15 DIAGNOSIS — R09.89 CHEST CONGESTION: ICD-10-CM

## 2024-04-15 DIAGNOSIS — Z86.39 HISTORY OF TYPE 2 DIABETES MELLITUS: ICD-10-CM

## 2024-04-15 DIAGNOSIS — Z87.09 HISTORY OF COPD: ICD-10-CM

## 2024-04-15 DIAGNOSIS — R06.02 SOB (SHORTNESS OF BREATH): ICD-10-CM

## 2024-04-15 DIAGNOSIS — Z86.79 HISTORY OF HYPERTENSION: ICD-10-CM

## 2024-04-15 DIAGNOSIS — J22 LOWER RESPIRATORY INFECTION: ICD-10-CM

## 2024-04-15 PROBLEM — R73.03 PREDIABETES: Status: ACTIVE | Noted: 2018-03-05

## 2024-04-15 LAB
CTP QC/QA: YES
SARS-COV-2 AG RESP QL IA.RAPID: NEGATIVE

## 2024-04-15 PROCEDURE — 87811 SARS-COV-2 COVID19 W/OPTIC: CPT | Mod: QW,S$GLB,, | Performed by: NURSE PRACTITIONER

## 2024-04-15 PROCEDURE — 96372 THER/PROPH/DIAG INJ SC/IM: CPT | Mod: S$GLB,,, | Performed by: NURSE PRACTITIONER

## 2024-04-15 PROCEDURE — 71046 X-RAY EXAM CHEST 2 VIEWS: CPT | Mod: S$GLB,,, | Performed by: RADIOLOGY

## 2024-04-15 PROCEDURE — 99204 OFFICE O/P NEW MOD 45 MIN: CPT | Mod: 25,S$GLB,, | Performed by: NURSE PRACTITIONER

## 2024-04-15 RX ORDER — DEXAMETHASONE SODIUM PHOSPHATE 4 MG/ML
8 INJECTION, SOLUTION INTRA-ARTICULAR; INTRALESIONAL; INTRAMUSCULAR; INTRAVENOUS; SOFT TISSUE
Status: COMPLETED | OUTPATIENT
Start: 2024-04-15 | End: 2024-04-15

## 2024-04-15 RX ORDER — PROMETHAZINE HYDROCHLORIDE AND DEXTROMETHORPHAN HYDROBROMIDE 6.25; 15 MG/5ML; MG/5ML
5 SYRUP ORAL NIGHTLY PRN
Qty: 118 ML | Refills: 0 | Status: SHIPPED | OUTPATIENT
Start: 2024-04-15

## 2024-04-15 RX ORDER — AMOXICILLIN AND CLAVULANATE POTASSIUM 875; 125 MG/1; MG/1
1 TABLET, FILM COATED ORAL EVERY 12 HOURS
Qty: 14 TABLET | Refills: 0 | Status: SHIPPED | OUTPATIENT
Start: 2024-04-15 | End: 2024-04-22

## 2024-04-15 RX ORDER — PREDNISONE 20 MG/1
20 TABLET ORAL 2 TIMES DAILY
Qty: 8 TABLET | Refills: 0 | Status: SHIPPED | OUTPATIENT
Start: 2024-04-16 | End: 2024-04-20

## 2024-04-15 RX ORDER — GABAPENTIN 100 MG/1
100 CAPSULE ORAL 3 TIMES DAILY
COMMUNITY
Start: 2024-04-06 | End: 2024-05-22

## 2024-04-15 RX ORDER — AZITHROMYCIN 250 MG/1
TABLET, FILM COATED ORAL
Qty: 6 TABLET | Refills: 0 | Status: SHIPPED | OUTPATIENT
Start: 2024-04-15 | End: 2024-05-22

## 2024-04-15 RX ADMIN — DEXAMETHASONE SODIUM PHOSPHATE 8 MG: 4 INJECTION, SOLUTION INTRA-ARTICULAR; INTRALESIONAL; INTRAMUSCULAR; INTRAVENOUS; SOFT TISSUE at 12:04

## 2024-04-15 NOTE — LETTER
April 15, 2024      [facility]  [address]  [place] [address]  Phone: [phone]       Patient: Liv Decker   YOB: 1955  Date of Visit: 04/15/2024    To Whom It May Concern:    Merary Decker  was at [facility] on 04/15/2024. The patient may return to work/school on 04/18/2024 as long as symptoms are improving there has been no fever the preceding 24 hours. If you have any questions or concerns, or if I can be of further assistance, please do not hesitate to contact me.    Sincerely,    Yamilka Rodriguez, NP

## 2024-04-15 NOTE — PROGRESS NOTES
Subjective:      Patient ID: Liv Decker is a 69 y.o. female.    Vitals:  vitals were not taken for this visit.     Chief Complaint: Cough    Cough  Her past medical history is significant for bronchitis and COPD. CHF     Respiratory:  Positive for cough.     Objective:     Physical Exam    Assessment:     No diagnosis found.    Plan:       There are no diagnoses linked to this encounter.

## 2024-04-15 NOTE — TELEPHONE ENCOUNTER
Spoke to pt and she stated starting Friday she had a terrible cough she can get rid of it now she is SOB and when she moves she gets very winded and can't catch her breath. Pt stated when she is sitting she is only a little SOB. Let her know Liv does not have any appointments and we recommend ER or Urgent care to be seen for her symptoms . Pt verbalized understanding and asked what urgent care. Recommended [facility]

## 2024-04-15 NOTE — PATIENT INSTRUCTIONS
Augmentin twice a day for 7 days  Azithromycin as prescribed  Please take a probiotic while you are on antibiotics plus a few weeks. Examples of probiotics include brand names such as Align, Floraster and Culturelle, but generic versions are ok too. Kefir is a milk product that is also an excellent probiotic and can be taken as 1/4 cup three times a day with meals.      Start prednisone pills tomorrow and take as prescribed.  Always take with food  Personal protection against illness for 1-2 weeks due to lowered immune system from steroid therapy.  Please wear a mask and eye protection around others and wash hands often    Albuterol nebulizer/inhaler every 4-6 hours while awake for the next 3 days then just as needed for persistent cough, shortness of breath, wheezing, chest tightness.    Cough and deep breathing exercises every 1-2 hours while awake until symptoms are improving then as needed.    Increase fluid intake significantly to help thin secretions.    Guaifenesin 1200 mg twice a day over-the-counter for 10 days.    Flonase and Zyrtec daily as prescribed for the next 3-4 weeks.    Return to clinic, seek medical re-evaluation if symptoms worsen or fail to improve after 48 hours of the above treatment plan    Phenergan cough syrup at night only to suppress cough so that you are able to rest.    Refrain from taking any decongestants, alcohol, caffeine as this will thicken mucous        Local [employer] companies:    [employer]  [phone]  Located on [address]    [employer]  [phone]  Located of [facility] going toward [address] behind [address].

## 2024-04-15 NOTE — TELEPHONE ENCOUNTER
----- Message from Liv Moody sent at 4/15/2024  9:15 AM CDT -----  Pt needs to be seen asap. Bad cough. Pt #[phone]

## 2024-04-15 NOTE — PROGRESS NOTES
"Subjective:      Patient ID: Liv Decker is a 69 y.o. female.    Vitals:  height is 5' 1" (1.549 m) and weight is 52.2 kg (115 lb). Her oral temperature is 97.4 °F (36.3 °C). Her blood pressure is 98/60 and her pulse is 85. Her respiration is 16 and oxygen saturation is 95%.     Chief Complaint: Cough    Cough X 4 days.   Patient having difficulty breathing.     Cough  This is a new problem. The current episode started in the past 7 days. Associated symptoms include hemoptysis, shortness of breath and wheezing. Pertinent negatives include no chest pain, chills, fever or sore throat. Her past medical history is significant for bronchitis.       Constitution: Positive for appetite change and fatigue. Negative for chills, sweating and fever.   HENT:  Positive for congestion. Negative for sore throat.    Cardiovascular:  Positive for sob on exertion. Negative for chest pain.   Respiratory:  Positive for chest tightness, cough, sputum production, bloody sputum, COPD, shortness of breath and wheezing.    Gastrointestinal:  Negative for vomiting and diarrhea.   Musculoskeletal:  Negative for muscle cramps.      Objective:     Physical Exam   Constitutional: She is oriented to person, place, and time. She is cooperative.  Non-toxic appearance. She does not appear ill. No distress. awake  HENT:   Head: Normocephalic and atraumatic.   Ears:   Right Ear: Tympanic membrane, external ear and ear canal normal.   Left Ear: Tympanic membrane, external ear and ear canal normal.   Nose: Rhinorrhea and congestion present.   Mouth/Throat: Mucous membranes are moist. Posterior oropharyngeal erythema present. No oropharyngeal exudate.   Eyes: Conjunctivae are normal. Right eye exhibits no discharge. Left eye exhibits no discharge.   Neck: Neck supple. No neck rigidity present.   Cardiovascular: Normal rate, regular rhythm and normal heart sounds.   Pulmonary/Chest: Accessory muscle usage (mild suprasternal pull) present. Tachypnea " (mild) noted. No respiratory distress. She has decreased breath sounds in the right lower field and the left lower field. She has wheezes in the right middle field, the right lower field, the left middle field and the left lower field. She has rhonchi in the right middle field, the right lower field and the left lower field. She has no rales. She exhibits no tenderness.   Abdominal: Normal appearance.   Musculoskeletal:      Cervical back: She exhibits no tenderness.   Lymphadenopathy:     She has no cervical adenopathy.   Neurological: no focal deficit. She is alert and oriented to person, place, and time. No sensory deficit.   Skin: Skin is warm, dry, not diaphoretic and no rash. Capillary refill takes 2 to 3 seconds.   Psychiatric: Her behavior is normal. Mood normal.   Nursing note and vitals reviewed.      Assessment:     1. Cough, unspecified type    2. History of type 2 diabetes mellitus    3. History of MI (myocardial infarction)    4. History of hypertension    5. History of COPD    6. History of CHF (congestive heart failure)    7. Chest congestion    8. SOB (shortness of breath)    9. COPD exacerbation    10. Lower respiratory infection      Covid neg  CXR:   FINDINGS:  The heart is not enlarged.  No pleural effusion and decrease of the previously seen effusions.  Mild interstitial prominence more so right infrahilar with small confluent area of opacification suggest anterior to the inferior aspect of the right major fissure on the lateral view.     Impression:     Mild interstitial prominence appearing chronic but findings also suggesting small confluent area of infiltrate in the right middle lobe anteriorly.  Recommend follow-up to be sure that there is complete clearing.      Last A1c 5.4  Plan:       Cough, unspecified type  -     SARS Coronavirus 2 Antigen, POCT Manual Read    History of type 2 diabetes mellitus    History of MI (myocardial infarction)    History of hypertension    History of  COPD    History of CHF (congestive heart failure)    Chest congestion  -     XR CHEST PA AND LATERAL; Future; Expected date: 04/15/2024    SOB (shortness of breath)  -     XR CHEST PA AND LATERAL; Future; Expected date: 04/15/2024    COPD exacerbation  -     dexAMETHasone injection 8 mg  -     amoxicillin-clavulanate 875-125mg (AUGMENTIN) 875-125 mg per tablet; Take 1 tablet by mouth every 12 (twelve) hours. for 7 days  Dispense: 14 tablet; Refill: 0  -     azithromycin (Z-LUDMILA) 250 MG tablet; Take 2 tablets by mouth on day 1; Take 1 tablet by mouth on days 2-5  Dispense: 6 tablet; Refill: 0  -     promethazine-dextromethorphan (PROMETHAZINE-DM) 6.25-15 mg/5 mL Syrp; Take 5 mLs by mouth nightly as needed (cough).  Dispense: 118 mL; Refill: 0  -     predniSONE (DELTASONE) 20 MG tablet; Take 1 tablet (20 mg total) by mouth 2 (two) times daily. for 4 days  Dispense: 8 tablet; Refill: 0  -     NEBULIZER FOR HOME USE    Lower respiratory infection  -     dexAMETHasone injection 8 mg  -     amoxicillin-clavulanate 875-125mg (AUGMENTIN) 875-125 mg per tablet; Take 1 tablet by mouth every 12 (twelve) hours. for 7 days  Dispense: 14 tablet; Refill: 0  -     azithromycin (Z-LUDMILA) 250 MG tablet; Take 2 tablets by mouth on day 1; Take 1 tablet by mouth on days 2-5  Dispense: 6 tablet; Refill: 0  -     promethazine-dextromethorphan (PROMETHAZINE-DM) 6.25-15 mg/5 mL Syrp; Take 5 mLs by mouth nightly as needed (cough).  Dispense: 118 mL; Refill: 0  -     predniSONE (DELTASONE) 20 MG tablet; Take 1 tablet (20 mg total) by mouth 2 (two) times daily. for 4 days  Dispense: 8 tablet; Refill: 0  -     NEBULIZER FOR HOME USE             Additional MDM:     Heart Failure Score:   COPD = Yes

## 2024-04-16 RX ORDER — NEBULIZER AND COMPRESSOR
1 EACH MISCELLANEOUS ONCE
Qty: 1 EACH | Refills: 0 | Status: SHIPPED | OUTPATIENT
Start: 2024-04-16 | End: 2024-04-16

## 2024-04-23 ENCOUNTER — TELEPHONE (OUTPATIENT)
Dept: FAMILY MEDICINE | Facility: CLINIC | Age: 69
End: 2024-04-23
Payer: MEDICARE

## 2024-04-23 NOTE — TELEPHONE ENCOUNTER
----- Message from Payton Browne MA sent at 2/19/2024  9:11 AM CST -----  Regarding: Rep[facility] labs   Message from Liv Parrish NP sent at 2/16/2024  5:12 PM CST -----  Please call pt and let her know recent labs show diabetes is well controlled with A1C 5.7%.  Kidney, liver, thyroid and blood count within normal range.  Triglycerides are mildly elevated though bad cholesterol LDL is well controlled- recommend low fat diet.  No infection or blood in urine though urine shows increased amounts of protein in urine- this can be an indicator for increased risk of cardiovascular disease and I recommend adding jardiance 10mg which is a diabetes medication that we also use for CKD and CHF to reduce progression of CKD and CHF exacerbations.  Repeat BMP 8 weeks after adding jardiance      Last collected 02/14/2024.

## 2024-04-24 ENCOUNTER — TELEPHONE (OUTPATIENT)
Dept: FAMILY MEDICINE | Facility: CLINIC | Age: 69
End: 2024-04-24
Payer: MEDICARE

## 2024-04-24 DIAGNOSIS — Z12.31 OTHER SCREENING MAMMOGRAM: Primary | ICD-10-CM

## 2024-04-24 NOTE — TELEPHONE ENCOUNTER
Spoke with patient who agrees to us scheduling her mammogram. Unable to schedule - monicayl looking into this.

## 2024-05-13 ENCOUNTER — TELEPHONE (OUTPATIENT)
Dept: FAMILY MEDICINE | Facility: CLINIC | Age: 69
End: 2024-05-13
Payer: MEDICARE

## 2024-05-13 DIAGNOSIS — M81.0 AGE-RELATED OSTEOPOROSIS WITHOUT CURRENT PATHOLOGICAL FRACTURE: Primary | ICD-10-CM

## 2024-05-13 NOTE — TELEPHONE ENCOUNTER
----- Message from Estelle Raheem sent at 5/13/2024  6:53 AM CDT -----  Please review patient's Appointment Desk for an upcoming PROLIA INJECTION appointment.       The following are needed for this appointment.   Reminding to please contact patient, if needed:      ORDER.  Current / active in the Epic Therapy Plan, signed within a year.  LABS. Serum Calcium within 6 weeks of treatment.    DEXA SCAN within the last 2 years   DENTAL PRECAUTION CONFIRMED WITH PATIENT. No recent invasive dental procedures within the last month (tooth extraction, etc). A patient will need to bring a Dental Clearance signed by a dental provider if there was any recent dental procedure within the last month.   FOLLOW-UP APPOINTMENT within the last 12 months with the diagnosis mentioned in the visit notes.         Any item unavailable by the day prior to the scheduled appointment will cause the appointment to be CANCELLED.        To reschedule appointments, please contact [facility] INFUSION SCHEDULING POOL or call [phone].       Thank you,  [facility], Infusion Department

## 2024-05-14 NOTE — TELEPHONE ENCOUNTER
Spoke to pt and let her know she needs the Dexa and calcium. She can do both at imaging center. Pt verbalized understanding and is going to call and get it scheduled.

## 2024-05-22 ENCOUNTER — OFFICE VISIT (OUTPATIENT)
Dept: FAMILY MEDICINE | Facility: CLINIC | Age: 69
End: 2024-05-22
Payer: MEDICARE

## 2024-05-22 ENCOUNTER — TELEPHONE (OUTPATIENT)
Dept: FAMILY MEDICINE | Facility: CLINIC | Age: 69
End: 2024-05-22

## 2024-05-22 VITALS
SYSTOLIC BLOOD PRESSURE: 100 MMHG | HEART RATE: 65 BPM | DIASTOLIC BLOOD PRESSURE: 58 MMHG | OXYGEN SATURATION: 99 % | BODY MASS INDEX: 21.28 KG/M2 | WEIGHT: 112.69 LBS | HEIGHT: 61 IN

## 2024-05-22 DIAGNOSIS — F17.210 CIGARETTE SMOKER: ICD-10-CM

## 2024-05-22 DIAGNOSIS — M54.42 CHRONIC LEFT-SIDED LOW BACK PAIN WITH LEFT-SIDED SCIATICA: ICD-10-CM

## 2024-05-22 DIAGNOSIS — M81.0 AGE-RELATED OSTEOPOROSIS WITHOUT CURRENT PATHOLOGICAL FRACTURE: ICD-10-CM

## 2024-05-22 DIAGNOSIS — E03.9 ACQUIRED HYPOTHYROIDISM: ICD-10-CM

## 2024-05-22 DIAGNOSIS — I10 ESSENTIAL HYPERTENSION: ICD-10-CM

## 2024-05-22 DIAGNOSIS — I35.0 SEVERE AORTIC STENOSIS: ICD-10-CM

## 2024-05-22 DIAGNOSIS — Z59.41 FOOD INSECURITY: ICD-10-CM

## 2024-05-22 DIAGNOSIS — G89.29 CHRONIC LEFT-SIDED LOW BACK PAIN WITH LEFT-SIDED SCIATICA: ICD-10-CM

## 2024-05-22 DIAGNOSIS — E78.5 DM TYPE 2 WITH DIABETIC DYSLIPIDEMIA: Primary | ICD-10-CM

## 2024-05-22 DIAGNOSIS — E11.69 DM TYPE 2 WITH DIABETIC DYSLIPIDEMIA: Primary | ICD-10-CM

## 2024-05-22 DIAGNOSIS — R63.4 WEIGHT LOSS: ICD-10-CM

## 2024-05-22 DIAGNOSIS — J44.9 CHRONIC OBSTRUCTIVE PULMONARY DISEASE, UNSPECIFIED COPD TYPE: ICD-10-CM

## 2024-05-22 DIAGNOSIS — F33.0 MILD EPISODE OF RECURRENT MAJOR DEPRESSIVE DISORDER: ICD-10-CM

## 2024-05-22 LAB — HBA1C MFR BLD: 5.5 %

## 2024-05-22 PROCEDURE — 3288F FALL RISK ASSESSMENT DOCD: CPT | Mod: CPTII,S$GLB,, | Performed by: NURSE PRACTITIONER

## 2024-05-22 PROCEDURE — 3060F POS MICROALBUMINURIA REV: CPT | Mod: CPTII,S$GLB,, | Performed by: NURSE PRACTITIONER

## 2024-05-22 PROCEDURE — G0296 VISIT TO DETERM LDCT ELIG: HCPCS | Mod: ,,, | Performed by: NURSE PRACTITIONER

## 2024-05-22 PROCEDURE — 3066F NEPHROPATHY DOC TX: CPT | Mod: CPTII,S$GLB,, | Performed by: NURSE PRACTITIONER

## 2024-05-22 PROCEDURE — 3074F SYST BP LT 130 MM HG: CPT | Mod: CPTII,S$GLB,, | Performed by: NURSE PRACTITIONER

## 2024-05-22 PROCEDURE — 3008F BODY MASS INDEX DOCD: CPT | Mod: CPTII,S$GLB,, | Performed by: NURSE PRACTITIONER

## 2024-05-22 PROCEDURE — 3044F HG A1C LEVEL LT 7.0%: CPT | Mod: CPTII,S$GLB,, | Performed by: NURSE PRACTITIONER

## 2024-05-22 PROCEDURE — 99214 OFFICE O/P EST MOD 30 MIN: CPT | Mod: S$GLB,,, | Performed by: NURSE PRACTITIONER

## 2024-05-22 PROCEDURE — 1101F PT FALLS ASSESS-DOCD LE1/YR: CPT | Mod: CPTII,S$GLB,, | Performed by: NURSE PRACTITIONER

## 2024-05-22 PROCEDURE — 1125F AMNT PAIN NOTED PAIN PRSNT: CPT | Mod: CPTII,S$GLB,, | Performed by: NURSE PRACTITIONER

## 2024-05-22 PROCEDURE — 1159F MED LIST DOCD IN RCRD: CPT | Mod: CPTII,S$GLB,, | Performed by: NURSE PRACTITIONER

## 2024-05-22 PROCEDURE — 1160F RVW MEDS BY RX/DR IN RCRD: CPT | Mod: CPTII,S$GLB,, | Performed by: NURSE PRACTITIONER

## 2024-05-22 PROCEDURE — 83036 HEMOGLOBIN GLYCOSYLATED A1C: CPT | Mod: QW,,, | Performed by: NURSE PRACTITIONER

## 2024-05-22 PROCEDURE — 4010F ACE/ARB THERAPY RXD/TAKEN: CPT | Mod: CPTII,S$GLB,, | Performed by: NURSE PRACTITIONER

## 2024-05-22 PROCEDURE — 3078F DIAST BP <80 MM HG: CPT | Mod: CPTII,S$GLB,, | Performed by: NURSE PRACTITIONER

## 2024-05-22 RX ORDER — LISINOPRIL 2.5 MG/1
2.5 TABLET ORAL DAILY
Qty: 90 TABLET | Refills: 3 | Status: SHIPPED | OUTPATIENT
Start: 2024-05-22

## 2024-05-22 RX ORDER — BUPROPION HYDROCHLORIDE 150 MG/1
150 TABLET ORAL DAILY
Qty: 90 TABLET | Refills: 3 | Status: SHIPPED | OUTPATIENT
Start: 2024-05-22 | End: 2024-06-04 | Stop reason: SDUPTHER

## 2024-05-22 SDOH — SOCIAL DETERMINANTS OF HEALTH (SDOH): FOOD INSECURITY: Z59.41

## 2024-05-22 NOTE — TELEPHONE ENCOUNTER
----- Message from Jacque Queen sent at 5/22/2024 11:32 AM CDT -----  Pt needs to schedule a 6 week f/u.    [phone]

## 2024-05-22 NOTE — PATIENT INSTRUCTIONS
Reduce lisinopril to 2.5mg daily- monitor blood pressure at home and if BP consistently less than 90 or you are having a lot of dizziness please notify the office    Start bupropion 150mg one tablet daily for depression    Call [employer] to ask about food assistance

## 2024-05-22 NOTE — PROGRESS NOTES
SUBJECTIVE:    Patient ID: Liv Decker is a 69 y.o. female.    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 Crain for complaints of lower back pain radiating down left leg- reports she didn't go to PT and tried to call Dr. Crani's office but no one answered (?). Reports her back and leg pain has improved since last visit- taking gabapentin 300mg daily    Reports since she's not working she's been smoking more- smoking about 5 cigs/day. Reports using trelegy daily and using nebs once at night.  Has never scheduled follow-up with Dr. Garcia    Had wellness exam for her insurance and her BP was low at 74/49 and 88/58. Weight is down 9lbs over past 6 months- pt admits she doesn't have enough money for food at times. Reports was given numbers for Meals on Wheels but admits she hasn't called them. Also has application for Medicaid assistance she needs to complete.     Pt has hx of pyloric stenosis= had EGD in 2022 with Dr. Villegas and had dilatation. Was supposed to have repeat EGD in 5 weeks but never followed up. Denies any abd pain, n/v or swallowing issues. Having regular bms, no melena    Cotinues to c/o of chronic depression which is only worsened with not working. Has been on lexapro 20mg for years but doesn't think it's helpign. Has declined referral to behavioral health    Farxiga was added d/t +microalbuminuria but pt reports couldn't afford cost.  Also can not afford Vascepa    Still has never scheduled f/u with Dr. Smart, cards for hx of CHF/valvular heart disease, Echo 10/2022 showed mod severe MR/TR and AS, EF 60%, pulm  HTN. Denies orthopnea or leg swelling.                Office Visit on 05/22/2024   Component Date Value Ref Range Status    Hemoglobin A1C, POC 05/22/2024 5.5  % Final   Office Visit on 04/15/2024   Component Date Value Ref Range Status    SARS Coronavirus 2 Antigen 04/15/2024 Negative  Negative Final     Acceptable 04/15/2024 Yes   Final       Past Medical History:   Diagnosis Date    Anemia     Depression     Diabetes mellitus 2021    Encounter for blood transfusion     GERD (gastroesophageal reflux disease)     Hyperlipidemia     Hypertension     MI (myocardial infarction) 2007    Osteoporosis     Thyroid disease     Ulcer of the stomach and intestine     pos. for h. pylori    Weight loss 2022    food comes up      Past Surgical History:   Procedure Laterality Date    APPENDECTOMY      CHOLECYSTECTOMY      COLONOSCOPY N/A 03/18/2016    Procedure: COLONOSCOPY;  Surgeon: Rober Zelaya Jr., MD;  [facility];  Service: Endoscopy;  Laterality: N/A;    ESOPHAGOGASTRODUODENOSCOPY  04/15/2016    with dil    ESOPHAGOGASTRODUODENOSCOPY N/A 05/20/2022    Procedure: EGD (ESOPHAGOGASTRODUODENOSCOPY);  Surgeon: Justine Villegas MD;  Location: [facility];  Service: Endoscopy;  Laterality: N/A;    EYE SURGERY      as a child    TONSILLECTOMY      TUBAL LIGATION       Family History   Problem Relation Name Age of Onset    Heart disease Mother Bonna     Diabetes Father         Tests to Keep You Healthy    Mammogram: ORDERED BUT NOT SCHEDULED  Eye Exam: DUE  Colon Cancer Screening: Met on 5/5/2021  Last Blood Pressure <= 139/89 (5/22/2024): Yes  Last HbA1c < 8 (05/22/2024): Yes      The CVD Risk score (MERYL'Agostino et al., 2008) failed to calculate for the following reasons:    The patient has a prior MI, stroke, CHF, or peripheral vascular disease diagnosis     Marital Status:   Alcohol History:  reports no history of alcohol use.  Tobacco History:  reports that she has been smoking cigarettes. She  started smoking about 65 years ago. She has a 31.8 pack-year smoking history. She has been exposed to tobacco smoke. She has never used smokeless tobacco.  Drug History:  reports no history of drug use.    Health Maintenance Topics with due status: Not Due       Topic Last Completion Date    TETANUS VACCINE 03/21/2019    Colorectal Cancer Screening 05/05/2021    DEXA Scan 03/30/2022    Foot Exam 11/15/2023    Low Dose Statin 02/14/2024    Diabetes Urine Screening 02/14/2024    Lipid Panel 02/14/2024    Hemoglobin A1c 05/22/2024     Immunization History   Administered Date(s) Administered    COVID-19, MRNA, LN-S, PF (MODERNA FULL 0.5 ML DOSE) 01/15/2021, 02/18/2021    COVID-19, mRNA, LNP-S, bivalent booster, PF (PFIZER OMICRON) 10/19/2022    Influenza 09/09/2009, 10/08/2010, 10/20/2011, 10/02/2012, 10/18/2013, 10/03/2014, 10/15/2015, 10/20/2016    Influenza (FLUAD) - Quadrivalent - Adjuvanted - PF *Preferred* (65+) 11/15/2023    Influenza - Quadrivalent 10/03/2014    Influenza - Quadrivalent - High Dose - PF (65 years and older) 01/25/2022, 10/10/2022    Influenza - Quadrivalent - PF *Preferred* (6 months and older) 10/20/2016, 10/15/2018, 10/16/2019    Influenza A (H1N1) 2009 Monovalent - IM 10/23/2009    Influenza Split 09/09/2009, 10/08/2010, 10/20/2011, 10/02/2012, 10/18/2013, 10/15/2015, 10/15/2018, 10/07/2019    Pneumococcal Conjugate - 13 Valent 03/31/2021    Pneumococcal Polysaccharide - 23 Valent 03/21/2019    Tdap 03/21/2019, 03/21/2019    Zoster 03/21/2019       Review of patient's allergies indicates:  No Known Allergies    Current Outpatient Medications:     gabapentin (NEURONTIN) 300 MG capsule, Take 1 capsule (300 mg total) by mouth 2 (two) times daily., Disp: 60 capsule, Rfl: 5    albuterol (PROAIR HFA) 90 mcg/actuation inhaler, Inhale 2 puffs into the lungs every 6 (six) hours as needed for Wheezing or Shortness of Breath. Rescue, Disp: 18 g, Rfl: 5    albuterol (PROVENTIL) 2.5 mg /3 mL (0.083 %)  nebulizer solution, Take 3 mLs (2.5 mg total) by nebulization every 6 (six) hours as needed for Wheezing or Shortness of Breath. Rescue, Disp: 120 mL, Rfl: 3    atorvastatin (LIPITOR) 10 MG tablet, Take 1 tablet (10 mg total) by mouth once daily., Disp: 90 tablet, Rfl: 3    blood sugar diagnostic Strp, 1 each by In Vitro route once daily., Disp: 100 each, Rfl: 3    buPROPion (WELLBUTRIN XL) 150 MG TB24 tablet, Take 1 tablet (150 mg total) by mouth once daily., Disp: 90 tablet, Rfl: 3    calcium carbonate (OS-JOSELYN) 600 mg calcium (1,500 mg) Tab, Take 600 mg by mouth once daily., Disp: , Rfl:     ergocalciferol (ERGOCALCIFEROL) 50,000 unit Cap, Take 1 capsule (50,000 Units total) by mouth every 7 days., Disp: 12 capsule, Rfl: 3    EScitalopram oxalate (LEXAPRO) 20 MG tablet, Take 1 tablet (20 mg total) by mouth once daily., Disp: 90 tablet, Rfl: 3    fluticasone propionate (FLONASE) 50 mcg/actuation nasal spray, 1 spray (50 mcg total) by Each Nostril route once daily., Disp: 16 g, Rfl: 2    fluticasone-umeclidin-vilanter (TRELEGY ELLIPTA) 100-62.5-25 mcg DsDv, Inhale 1 puff into the lungs once daily., Disp: 60 each, Rfl: 11    furosemide (LASIX) 20 MG tablet, Take 1 tablet (20 mg total) by mouth every other day., Disp: 45 tablet, Rfl: 1    levothyroxine (SYNTHROID) 50 MCG tablet, Take 1 tablet (50 mcg total) by mouth before breakfast., Disp: 90 tablet, Rfl: 1    lisinopriL (PRINIVIL,ZESTRIL) 2.5 MG tablet, Take 1 tablet (2.5 mg total) by mouth once daily., Disp: 90 tablet, Rfl: 3    pantoprazole (PROTONIX) 40 MG tablet, Take 1 tablet (40 mg total) by mouth 2 (two) times daily., Disp: 180 tablet, Rfl: 3    promethazine-dextromethorphan (PROMETHAZINE-DM) 6.25-15 mg/5 mL Syrp, Take 5 mLs by mouth nightly as needed (cough)., Disp: 118 mL, Rfl: 0    propranoloL (INDERAL LA) 80 MG 24 hr capsule, Take 1 capsule (80 mg total) by mouth once daily., Disp: 90 capsule, Rfl: 3    sucralfate (CARAFATE) 1 gram tablet, Take 1 tablet  "(1 g total) by mouth 2 (two) times daily. Take on empty stomach, do not eat or take medication for at least 1 hour, Disp: 60 tablet, Rfl: 2    Review of Systems   Constitutional:  Positive for unexpected weight change. Negative for activity change, chills and fever.   HENT:  Negative for hearing loss, rhinorrhea, sore throat and trouble swallowing.    Eyes:  Negative for discharge and visual disturbance.   Respiratory:  Positive for cough (mild, chronic clear to white) and shortness of breath (with heavy exertion). Negative for chest tightness and wheezing.    Cardiovascular:  Negative for chest pain, palpitations and leg swelling.   Gastrointestinal:  Negative for abdominal pain, blood in stool, constipation, diarrhea and vomiting.   Endocrine: Negative for polydipsia and polyuria.   Genitourinary:  Negative for difficulty urinating, dysuria, hematuria and menstrual problem.   Musculoskeletal:  Positive for arthralgias (left thigh/leg pain imrpoved with gabapentin) and back pain. Negative for joint swelling and neck pain.   Skin:  Negative for rash.   Neurological:  Negative for dizziness, syncope, speech difficulty, weakness, numbness and headaches.   Psychiatric/Behavioral:  Positive for dysphoric mood. Negative for confusion, self-injury and suicidal ideas.           Objective:      Vitals:    05/22/24 1057   BP: (!) 100/58   Pulse: 65   SpO2: 99%   Weight: 51.1 kg (112 lb 11.2 oz)   Height: 5' 1" (1.549 m)     Physical Exam  Vitals and nursing note reviewed.   Constitutional:       General: She is not in acute distress.     Appearance: Normal appearance. She is well-developed. She is not toxic-appearing.      Comments: Thin frail white female, appears older than stated age   HENT:      Head: Normocephalic and atraumatic.      Right Ear: Tympanic membrane and ear canal normal.      Left Ear: Tympanic membrane and ear canal normal.   Neck:      Vascular: No carotid bruit.   Cardiovascular:      Rate and Rhythm: " Normal rate and regular rhythm.      Heart sounds: Murmur heard.      Systolic murmur is present with a grade of 3/6.   Pulmonary:      Effort: Pulmonary effort is normal. No respiratory distress.      Breath sounds: No wheezing or rales.      Comments: Slightly diminished throughout otherwise clear  Abdominal:      General: There is no distension.      Palpations: Abdomen is soft.      Tenderness: There is no abdominal tenderness.   Musculoskeletal:      Cervical back: Neck supple.      Right lower leg: No edema.      Left lower leg: No edema.   Lymphadenopathy:      Cervical: No cervical adenopathy.   Skin:     General: Skin is warm and dry.      Findings: No rash.   Neurological:      General: No focal deficit present.      Mental Status: She is alert and oriented to person, place, and time.      Gait: Gait normal.   Psychiatric:         Mood and Affect: Mood normal.         Behavior: Behavior normal. Behavior is cooperative.         Thought Content: Thought content normal. Thought content does not include suicidal ideation.           Assessment:       1. DM type 2 with diabetic dyslipidemia    2. Mild episode of recurrent major depressive disorder    3. Chronic obstructive pulmonary disease, unspecified COPD type    4. Essential hypertension    5. Acquired hypothyroidism    6. Weight loss    7. Food insecurity    8. Chronic left-sided low back pain with left-sided sciatica    9. Age-related osteoporosis without current pathological fracture    10. Severe aortic stenosis    11. Cigarette smoker           Plan:       1. DM type 2 with diabetic dyslipidemia  -stable with A1c 5.5% today, patient advised given weight loss and A1c would recommend stopping metformin completely for now  -     Hemoglobin A1C, POCT    2. Mild episode of recurrent major depressive disorder  -complains of ongoing depression which is only worsened by losing her job.  She declines referral to behavioral health.  Denies suicidal thoughts or  plans.  Will add bupropion 150 mg daily to the escitalopram though cautioned if her depressive symptoms worsen or she has thoughts of self-harm/suicide she should call the office, suicide hotline or report to the emergency room.  Follow-up 6 weeks  -     buPROPion (WELLBUTRIN XL) 150 MG TB24 tablet; Take 1 tablet (150 mg total) by mouth once daily.  Dispense: 90 tablet; Refill: 3    3. Chronic obstructive pulmonary disease, unspecified COPD type   -stable though continues to smoke, has not followed up with Pulmonary    4. Essential hypertension   -BP on the low side, recommend reducing lisinopril dose to 2.5 mg daily and monitor blood pressure at home  -     lisinopriL (PRINIVIL,ZESTRIL) 2.5 MG tablet; Take 1 tablet (2.5 mg total) by mouth once daily.  Dispense: 90 tablet; Refill: 3    5. Acquired hypothyroidism   -stable on last labs    6. Weight loss   -reviewed patient's weight loss of 9 lb since February, she states she has trouble affording food at times.  She does have a application for meals on wheels which she has not completed.  Encouraged her to complete both meals on wheels and Medicaid assistance application to see if she can get more help.  Discussed high-protein foods    7. Food insecurity     8. Chronic left-sided low back pain with left-sided sciatica   -patient reports back/leg pain improved with gabapentin, did not go to physical therapy or see Dr. Crain as recommended at last visit    9. Age-related osteoporosis without current pathological fracture  -due for repeat DEXA, on Prolia  -     DXA Bone Density Axial Skeleton 1 or more sites; Future; Expected date: 05/29/2024    10. Severe aortic stenosis   -stable, denies chest pain or syncope.  Has not followed up with Cardiology    11. Cigarette smoker  -smoking cessation counseling provided.  Patient is due for annual LDCT also  I reviewed with the patient the [place] Preventative Services Task Force Recommendation on Lung Cancer Screening With  Low-Dose Computed Tomography.  Patient meets eligibility criteria as per current CMS guidelines for subsequent LDCT lung cancer screening (age 50-80; asymptomatic; tobacco smoking hx of at least 30 pack years; current smoker or one who has quit smoking within the last 15 years).  Benefits and harms of screening discussed with patient, including follow-up diagnostic testing, over-diagnosis, false positive rate, and total radiation exposure.  Patient counseled on the importance of adherence to annual lung cancer LDCT screening, impact of comorbidities and ability or willingness to undergo diagnosis and treatment.  Patient counseled on the importance of maintaining cigarette smoking abstinence if former smoker; or the importance of smoking cessation if current smoker and, if appropriate, furnishing of information about tobacco cessation interventions  All questions answered.   Patient wishes to proceed with continued annual surveillance testing.    -     CT Chest Lung Screening Low Dose; Future; Expected date: 05/22/2024      Follow up in about 6 weeks (around 7/3/2024) for depression/wt loss.      Patient encouraged to schedule mammogram    Counseled on age and gender appropriate medical preventative services, including cancer screenings, immunizations, overall nutritional health, need for a consistent exercise regimen and an overall push towards maintaining a vigorous and active lifestyle.      5/22/2024 Liv Parrish NP

## 2024-05-22 NOTE — TELEPHONE ENCOUNTER
Spoke with pt in regards to message sent. Pt scheduled for 07/03/2024. Pt acknowledged understanding.

## 2024-05-24 ENCOUNTER — TELEPHONE (OUTPATIENT)
Dept: INFUSION THERAPY | Facility: HOSPITAL | Age: 69
End: 2024-05-24

## 2024-05-24 NOTE — TELEPHONE ENCOUNTER
LM for patient that Prolia will need to be rescheduled until after she gets her labs and dexa done on may 31st and instructed left to call  at her convience.

## 2024-05-28 ENCOUNTER — TELEPHONE (OUTPATIENT)
Dept: FAMILY MEDICINE | Facility: CLINIC | Age: 69
End: 2024-05-28
Payer: MEDICARE

## 2024-05-28 NOTE — TELEPHONE ENCOUNTER
"Spoke to pt and she was questioning how to take depression pills . Per Liv "Will add bupropion 150 mg daily to the escitalopram though cautioned if her depressive symptoms worsen or she has thoughts of self-harm/suicide she should call the office, suicide hotline or report to the emergency room.  Follow-up 6 weeks  -     buPROPion (WELLBUTRIN XL) 150 MG TB24 tablet; Take 1 tablet (150 mg total) by mouth once daily.  Dispense: 90 tablet; Refill: 3" pt verbalized understanding   "

## 2024-05-28 NOTE — TELEPHONE ENCOUNTER
----- Message from Kristel Daniel sent at 5/28/2024  2:34 PM CDT -----  Vm- 2:09-pt has a question about her medication   [phone]

## 2024-06-04 ENCOUNTER — HOSPITAL ENCOUNTER (OUTPATIENT)
Dept: RADIOLOGY | Facility: HOSPITAL | Age: 69
Discharge: HOME | End: 2024-06-04
Attending: NURSE PRACTITIONER
Payer: MEDICARE

## 2024-06-04 DIAGNOSIS — J44.9 CHRONIC OBSTRUCTIVE PULMONARY DISEASE, UNSPECIFIED COPD TYPE: ICD-10-CM

## 2024-06-04 DIAGNOSIS — M81.0 AGE-RELATED OSTEOPOROSIS WITHOUT CURRENT PATHOLOGICAL FRACTURE: ICD-10-CM

## 2024-06-04 DIAGNOSIS — F33.0 MILD EPISODE OF RECURRENT MAJOR DEPRESSIVE DISORDER: ICD-10-CM

## 2024-06-04 DIAGNOSIS — F17.210 CIGARETTE SMOKER: ICD-10-CM

## 2024-06-04 DIAGNOSIS — Z12.31 OTHER SCREENING MAMMOGRAM: ICD-10-CM

## 2024-06-04 DIAGNOSIS — J44.1 COPD EXACERBATION: ICD-10-CM

## 2024-06-04 DIAGNOSIS — R51.9 SINUS HEADACHE: ICD-10-CM

## 2024-06-04 PROCEDURE — 77080 DXA BONE DENSITY AXIAL: CPT | Mod: 26,,, | Performed by: RADIOLOGY

## 2024-06-04 PROCEDURE — 71271 CT THORAX LUNG CANCER SCR C-: CPT | Mod: 26,,, | Performed by: RADIOLOGY

## 2024-06-04 PROCEDURE — 77080 DXA BONE DENSITY AXIAL: CPT | Mod: TC,PO

## 2024-06-04 PROCEDURE — 77063 BREAST TOMOSYNTHESIS BI: CPT | Mod: 26,,, | Performed by: RADIOLOGY

## 2024-06-04 PROCEDURE — 77067 SCR MAMMO BI INCL CAD: CPT | Mod: TC,PO

## 2024-06-04 PROCEDURE — 77067 SCR MAMMO BI INCL CAD: CPT | Mod: 26,,, | Performed by: RADIOLOGY

## 2024-06-04 PROCEDURE — 71271 CT THORAX LUNG CANCER SCR C-: CPT | Mod: TC,PO

## 2024-06-04 RX ORDER — ALBUTEROL SULFATE 0.83 MG/ML
2.5 SOLUTION RESPIRATORY (INHALATION) EVERY 6 HOURS PRN
Qty: 120 ML | Refills: 3 | Status: SHIPPED | OUTPATIENT
Start: 2024-06-04 | End: 2025-06-04

## 2024-06-04 RX ORDER — FLUTICASONE PROPIONATE 50 MCG
1 SPRAY, SUSPENSION (ML) NASAL DAILY
Qty: 16 G | Refills: 2 | Status: SHIPPED | OUTPATIENT
Start: 2024-06-04

## 2024-06-04 RX ORDER — BUPROPION HYDROCHLORIDE 150 MG/1
150 TABLET ORAL DAILY
Qty: 90 TABLET | Refills: 3 | Status: SHIPPED | OUTPATIENT
Start: 2024-06-04 | End: 2025-06-04

## 2024-06-05 RX ORDER — ALBUTEROL SULFATE 90 UG/1
2 AEROSOL, METERED RESPIRATORY (INHALATION) EVERY 6 HOURS PRN
Qty: 18 G | Refills: 5 | Status: SHIPPED | OUTPATIENT
Start: 2024-06-05

## 2024-06-06 ENCOUNTER — TELEPHONE (OUTPATIENT)
Dept: FAMILY MEDICINE | Facility: CLINIC | Age: 69
End: 2024-06-06
Payer: MEDICARE

## 2024-06-06 NOTE — TELEPHONE ENCOUNTER
----- Message from Kristel Daniel sent at 6/6/2024  8:24 AM CDT -----  -8:02- pt is calling isidro back from yesterday   [phone]

## 2024-06-06 NOTE — TELEPHONE ENCOUNTER
----- Message from Kristel Daniel sent at 6/6/2024  2:53 PM CDT -----  Vm-1:59- pt is calling isidro back from yesterday   [phone]

## 2024-06-06 NOTE — TELEPHONE ENCOUNTER
----- Message from Liv Moody sent at 6/6/2024  8:16 AM CDT -----  Pt is returning the office call. Pt #[phone]

## 2024-06-10 ENCOUNTER — TELEPHONE (OUTPATIENT)
Dept: FAMILY MEDICINE | Facility: CLINIC | Age: 69
End: 2024-06-10
Payer: MEDICARE

## 2024-06-10 NOTE — TELEPHONE ENCOUNTER
----- Message from Rebekah Rosales LPN sent at 6/10/2024  1:40 PM CDT -----    ----- Message -----  From: Kristel Daniel  Sent: 6/10/2024   1:37 PM CDT  To: Liv Parrish Staff    Vm- 12:26-pt is calling back about results of her test   [phone]

## 2024-06-10 NOTE — TELEPHONE ENCOUNTER
----- Message from Liv Parrish NP sent at 6/8/2024 10:16 PM CDT -----  Please call pt and let her know lung CT scan shows a couple nodules in the lungs. There is only small amt of fluid in the lungs and heart is mildly enlarged, likely d/t mild CHF. Clarify how often she's taking the furosemide and if she's only taking QOD then recommend increasing to daily. Repeat BMP in 4 weeks. I would also recommend she schedule f/u appt with Dr. Smart, cardiologist given her hx of CHF.  Repeat chest CT without contrast in 6 months to monitor nodules and borderline enlarged lymph nodes in the chest

## 2024-06-10 NOTE — TELEPHONE ENCOUNTER
Spoke with pt in regards to recent Ct scan results. Verbalized verbatim per Liv. Pt acknowledged understanding. Pt states that she is taken the furosemide daily.     Reminder set up

## 2024-06-10 NOTE — TELEPHONE ENCOUNTER
Let patient know I recommend she increase furosemide to 2 tablets daily for the next 3 days then resume 1 tablet daily and schedule follow-up with Cardiology

## 2024-06-10 NOTE — TELEPHONE ENCOUNTER
----- Message from Liv Parrish NP sent at 6/8/2024 10:12 PM CDT -----  Please let pt know bone density test shows significant improvement in bone strength of both lumbar spine and femur- continue with prolia injections

## 2024-06-10 NOTE — TELEPHONE ENCOUNTER
Spoke with pt in regards to recent Dexa scan results. Verbalized verbatim per Liv. Pt acknowledged understanding.

## 2024-06-10 NOTE — TELEPHONE ENCOUNTER
Spoke with patient and let her know the below per Liv verbatim. Agrees to increase furosemide for 3 days then back to 1 tablet daily.

## 2024-06-10 NOTE — TELEPHONE ENCOUNTER
Spoke with pt already in regards to recent test results. Please see other telephone encounter for today.

## 2024-06-11 ENCOUNTER — TELEPHONE (OUTPATIENT)
Dept: FAMILY MEDICINE | Facility: CLINIC | Age: 69
End: 2024-06-11
Payer: MEDICARE

## 2024-06-11 NOTE — TELEPHONE ENCOUNTER
----- Message from Kristel Daniel sent at 6/11/2024 10:05 AM CDT -----  Vm- 9:38-pt would like to talk to rios about her medications again   [phone]

## 2024-06-18 ENCOUNTER — TELEPHONE (OUTPATIENT)
Dept: FAMILY MEDICINE | Facility: CLINIC | Age: 69
End: 2024-06-18
Payer: MEDICARE

## 2024-06-18 DIAGNOSIS — E78.5 DYSLIPIDEMIA: Primary | ICD-10-CM

## 2024-06-18 DIAGNOSIS — Z79.899 ENCOUNTER FOR LONG-TERM (CURRENT) USE OF OTHER MEDICATIONS: ICD-10-CM

## 2024-06-27 ENCOUNTER — TELEPHONE (OUTPATIENT)
Dept: FAMILY MEDICINE | Facility: CLINIC | Age: 69
End: 2024-06-27
Payer: MEDICARE

## 2024-06-27 NOTE — TELEPHONE ENCOUNTER
----- Message from Sis Rodriguez LPN sent at 2024 11:20 AM CDT -----  Regarding: new care plan needed    ----- Message -----  From: Liv Parrish NP  Sent: 2024  11:05 AM CDT  To: Sis Rodriguez LPN  Subject: RE: Prolia signature needed                      Need updated prolia therapy plan  ----- Message -----  From: Sis Rodriguez LPN  Sent: 2024   9:48 AM CDT  To: Liv Parrish NP  Subject: Prolia signature needed                          Calcium orders are placed  ----- Message -----  From: Duane Dolan  Sent: 2024   9:43 AM CDT  To: Liv Parrish Staff      Patient rescheduled Prolia injection to 24.  It was cancelled for 24 due to no recent labs done at that time.      The following are needed:  - Please request Ms. Parrish  to sign the Prolia order, it has .   - Please check if she has lab orders for calcium, patient is aware to get labs drawn before the Prolia appt, she will come to your office for labs.       Thank you,  Duane

## 2024-07-09 ENCOUNTER — TELEPHONE (OUTPATIENT)
Dept: FAMILY MEDICINE | Facility: CLINIC | Age: 69
End: 2024-07-09
Payer: MEDICARE

## 2024-07-09 NOTE — TELEPHONE ENCOUNTER
Spoke to patient that fasting lab is due. Said she plans to come tomorrow. Updated remind me.     Also, created separate remind me for 6 month CT.

## 2024-07-09 NOTE — TELEPHONE ENCOUNTER
----- Message from Payton Browne MA sent at 6/10/2024  4:37 PM CDT -----   Message from Liv Parrish NP sent at 6/8/2024 10:16 PM CDT -----  Please call pt and let her know lung CT scan shows a couple nodules in the lungs. There is only small amt of fluid in the lungs and heart is mildly enlarged, likely d/t mild CHF. Clarify how often she's taking the furosemide and if she's only taking QOD then recommend increasing to daily. Repeat BMP in 4 weeks. I would also recommend she schedule f/u appt with Dr. Smart, cardiologist given her hx of CHF.  Repeat chest CT without contrast in 6 months to monitor nodules and borderline enlarged lymph nodes in the chest

## 2024-07-11 ENCOUNTER — INFUSION (OUTPATIENT)
Dept: INFUSION THERAPY | Facility: HOSPITAL | Age: 69
End: 2024-07-11
Attending: NURSE PRACTITIONER
Payer: MEDICARE

## 2024-07-11 VITALS
HEART RATE: 73 BPM | DIASTOLIC BLOOD PRESSURE: 67 MMHG | RESPIRATION RATE: 16 BRPM | SYSTOLIC BLOOD PRESSURE: 113 MMHG | OXYGEN SATURATION: 95 % | TEMPERATURE: 98 F | WEIGHT: 109.31 LBS | HEIGHT: 61 IN | BODY MASS INDEX: 20.64 KG/M2

## 2024-07-11 DIAGNOSIS — M81.0 AGE-RELATED OSTEOPOROSIS WITHOUT CURRENT PATHOLOGICAL FRACTURE: Primary | ICD-10-CM

## 2024-07-11 PROCEDURE — 63600175 PHARM REV CODE 636 W HCPCS: Mod: JZ,JG | Performed by: NURSE PRACTITIONER

## 2024-07-11 PROCEDURE — 96372 THER/PROPH/DIAG INJ SC/IM: CPT

## 2024-07-11 RX ADMIN — DENOSUMAB 60 MG: 60 INJECTION SUBCUTANEOUS at 04:07

## 2024-07-11 NOTE — PLAN OF CARE
Problem: Fatigue  Goal: Improved Activity Tolerance  Outcome: Progressing  Intervention: Promote Improved Energy  Flowsheets (Taken 7/11/2024 1609)  Fatigue Management: frequent rest breaks encouraged  Environmental Support: rest periods encouraged

## 2024-07-17 DIAGNOSIS — E78.5 DYSLIPIDEMIA: ICD-10-CM

## 2024-07-17 DIAGNOSIS — J44.1 COPD EXACERBATION: ICD-10-CM

## 2024-07-17 DIAGNOSIS — J44.9 CHRONIC OBSTRUCTIVE PULMONARY DISEASE, UNSPECIFIED COPD TYPE: ICD-10-CM

## 2024-07-17 DIAGNOSIS — E11.69 DM TYPE 2 WITH DIABETIC DYSLIPIDEMIA: ICD-10-CM

## 2024-07-17 DIAGNOSIS — I50.32 CHRONIC HEART FAILURE WITH PRESERVED EJECTION FRACTION: ICD-10-CM

## 2024-07-17 DIAGNOSIS — R51.9 SINUS HEADACHE: ICD-10-CM

## 2024-07-17 DIAGNOSIS — R80.9 MICROALBUMINURIA: Primary | ICD-10-CM

## 2024-07-17 DIAGNOSIS — E78.5 DM TYPE 2 WITH DIABETIC DYSLIPIDEMIA: ICD-10-CM

## 2024-07-17 RX ORDER — FUROSEMIDE 20 MG/1
20 TABLET ORAL EVERY OTHER DAY
Qty: 45 TABLET | Refills: 1 | Status: CANCELLED | OUTPATIENT
Start: 2024-07-17 | End: 2025-07-17

## 2024-07-17 RX ORDER — ALBUTEROL SULFATE 90 UG/1
2 AEROSOL, METERED RESPIRATORY (INHALATION) EVERY 6 HOURS PRN
Qty: 18 G | Refills: 5 | Status: SHIPPED | OUTPATIENT
Start: 2024-07-17

## 2024-07-17 RX ORDER — ATORVASTATIN CALCIUM 10 MG/1
10 TABLET, FILM COATED ORAL DAILY
Qty: 90 TABLET | Refills: 3 | Status: CANCELLED | OUTPATIENT
Start: 2024-07-17

## 2024-07-17 RX ORDER — FLUTICASONE PROPIONATE 50 MCG
1 SPRAY, SUSPENSION (ML) NASAL DAILY
Qty: 16 G | Refills: 2 | Status: CANCELLED | OUTPATIENT
Start: 2024-07-17

## 2024-07-17 RX ORDER — ALBUTEROL SULFATE 0.83 MG/ML
2.5 SOLUTION RESPIRATORY (INHALATION) EVERY 6 HOURS PRN
Qty: 120 ML | Refills: 3 | Status: CANCELLED | OUTPATIENT
Start: 2024-07-17 | End: 2025-07-17

## 2024-07-17 NOTE — TELEPHONE ENCOUNTER
Spoke with pt in regards to message sent. Verbalized verbatim per Liv. Pt acknowledged understanding. Pt will start taken the Jardiance.

## 2024-07-17 NOTE — TELEPHONE ENCOUNTER
Pt is needing a refill on her furosemide and atorvastatin. Last office visit 05/22/2024. Next office visit 07/24/2024.     Spoke with the pt in regards to medication refill request. I stated that she still has refills on her medications at her pharmacy. Recommended that she call her pharmacy for these refills. Pt acknowledged understanding.       Pt also asked about a refill on her Farxiga . Pt stated that she is taken this medication once daily. Would you still like her to continue this medication. Looks like this medication was d/c at pt's last office visit 05/55/2024.

## 2024-07-17 NOTE — TELEPHONE ENCOUNTER
Please let Patient know it looks like Jardiance is on her formulary instead of Farxiga so Jardiance 10 mg prescription sent in- take 1 tablet daily

## 2024-07-24 ENCOUNTER — OFFICE VISIT (OUTPATIENT)
Dept: FAMILY MEDICINE | Facility: CLINIC | Age: 69
End: 2024-07-24
Payer: MEDICARE

## 2024-07-24 VITALS
DIASTOLIC BLOOD PRESSURE: 64 MMHG | HEART RATE: 62 BPM | BODY MASS INDEX: 20.39 KG/M2 | HEIGHT: 61 IN | OXYGEN SATURATION: 99 % | WEIGHT: 108 LBS | SYSTOLIC BLOOD PRESSURE: 118 MMHG

## 2024-07-24 DIAGNOSIS — I35.0 SEVERE AORTIC STENOSIS: ICD-10-CM

## 2024-07-24 DIAGNOSIS — R80.9 MICROALBUMINURIA: ICD-10-CM

## 2024-07-24 DIAGNOSIS — J44.9 CHRONIC OBSTRUCTIVE PULMONARY DISEASE, UNSPECIFIED COPD TYPE: ICD-10-CM

## 2024-07-24 DIAGNOSIS — M81.0 AGE-RELATED OSTEOPOROSIS WITHOUT CURRENT PATHOLOGICAL FRACTURE: ICD-10-CM

## 2024-07-24 DIAGNOSIS — E11.69 DM TYPE 2 WITH DIABETIC DYSLIPIDEMIA: ICD-10-CM

## 2024-07-24 DIAGNOSIS — F33.0 MILD EPISODE OF RECURRENT MAJOR DEPRESSIVE DISORDER: Primary | ICD-10-CM

## 2024-07-24 DIAGNOSIS — E46 MALNUTRITION, UNSPECIFIED TYPE: ICD-10-CM

## 2024-07-24 DIAGNOSIS — I50.32 CHRONIC HEART FAILURE WITH PRESERVED EJECTION FRACTION: ICD-10-CM

## 2024-07-24 DIAGNOSIS — E55.9 VITAMIN D DEFICIENCY: ICD-10-CM

## 2024-07-24 DIAGNOSIS — E78.5 DM TYPE 2 WITH DIABETIC DYSLIPIDEMIA: ICD-10-CM

## 2024-07-24 LAB — HBA1C MFR BLD: 5.3 %

## 2024-07-24 RX ORDER — PROMETHAZINE HYDROCHLORIDE AND DEXTROMETHORPHAN HYDROBROMIDE 6.25; 15 MG/5ML; MG/5ML
5 SYRUP ORAL NIGHTLY PRN
Qty: 118 ML | Refills: 0 | Status: SHIPPED | OUTPATIENT
Start: 2024-07-24

## 2024-07-24 RX ORDER — ERGOCALCIFEROL 1.25 MG/1
50000 CAPSULE ORAL
Qty: 12 CAPSULE | Refills: 3 | Status: SHIPPED | OUTPATIENT
Start: 2024-07-24

## 2024-07-24 NOTE — PROGRESS NOTES
SUBJECTIVE:    Patient ID: Liv Decker is a 69 y.o. female.    Chief Complaint: Follow-up (No Bottles//refills needed//eye exam will scheduled Dr. Barnard//-ERL)    Patient here for f/u depression- DM2, COPD, CHF    Pt seen in May and was c/oing of increase in depression mainly related to loss of job and financial concerns. Bupropion was added at last visit. Noted to have lost 9lbs over the past 6 months which she reported was in part d/t lack of money for food. Pt had been given numbers for Meals on Wheels     Pt reports with addition of bupropion pt reports she feels depression has improved some. Still has a lot of financial stress and family issues.  Reports did get approved for Medicaid which is helpful with her co-pay    Since last visit had LDCT which showed Cardiomegaly, mild smooth interlobular septal thickening and peribronchial cuffing with trace bilateral pleural effusions suggestive of mild/trace interstitial pulmonary edema.  Small ground-glass opacity in the anterior right upper lobe, possibly reactive/inflammatory or from trace edema.  Borderline enlarged mediastinal lymph nodes. Recommend repeat CT in 6 months. Pt advised to increase lasix to daily based on these findings and encouraged to schedule f/u with Dr. Smart however hasn't scheduled appt. Has hx of CHF/valvular heart disease, Echo 10/2022 showed mod severe MR/TR and AS, EF 60%, pulm HTN. Denies orthopnea or leg swelling.  Complaining of shortness of breath if she exerts herself and chronic cough.  She has started Jardiance now that she has Medicaid assistance    COPD- still smoking 3-5 cigs/day, c/os of chronic cough, asking for refills for cough med. Reports using trelegy daily and using albuterol usually once a day.  Has never scheduled follow-up with Dr. Garcia    Pt has hx of pyloric stenosis= had EGD in 2022 with Dr. Villegas and had dilatation. Was supposed to have repeat EGD in 5 weeks but never followed up. Denies any abd pain, n/v or  swallowing issues. Having regular bms, no melena                                Office Visit on 07/24/2024   Component Date Value Ref Range Status    Hemoglobin A1C, POC 07/24/2024 5.3  % Final   Telephone on 06/18/2024   Component Date Value Ref Range Status    Glucose 07/10/2024 91  65 - 99 mg/dL Final    BUN 07/10/2024 6 (L)  7 - 25 mg/dL Final    Creatinine 07/10/2024 0.53  0.50 - 1.05 mg/dL Final    eGFR 07/10/2024 100  > OR = 60 mL/min/1.73m2 Final    BUN/Creatinine Ratio 07/10/2024 11  6 - 22 (calc) Final    Sodium 07/10/2024 143  135 - 146 mmol/L Final    Potassium 07/10/2024 4.1  3.5 - 5.3 mmol/L Final    Chloride 07/10/2024 102  98 - 110 mmol/L Final    CO2 07/10/2024 29  20 - 32 mmol/L Final    Calcium 07/10/2024 9.1  8.6 - 10.4 mg/dL Final    Total Protein 07/10/2024 6.1  6.1 - 8.1 g/dL Final    Albumin 07/10/2024 3.2 (L)  3.6 - 5.1 g/dL Final    Globulin, Total 07/10/2024 2.9  1.9 - 3.7 g/dL (calc) Final    Albumin/Globulin Ratio 07/10/2024 1.1  1.0 - 2.5 (calc) Final    Total Bilirubin 07/10/2024 0.3  0.2 - 1.2 mg/dL Final    Alkaline Phosphatase 07/10/2024 68  37 - 153 U/L Final    AST 07/10/2024 8 (L)  10 - 35 U/L Final    ALT 07/10/2024 4 (L)  6 - 29 U/L Final    Cholesterol 07/10/2024 118  <200 mg/dL Final    HDL 07/10/2024 33 (L)  > OR = 50 mg/dL Final    Triglycerides 07/10/2024 226 (H)  <150 mg/dL Final    LDL Cholesterol 07/10/2024 57  mg/dL (calc) Final    HDL/Cholesterol Ratio 07/10/2024 3.6  <5.0 (calc) Final    Non HDL Chol. (LDL+VLDL) 07/10/2024 85  <130 mg/dL (calc) Final   Office Visit on 05/22/2024   Component Date Value Ref Range Status    Hemoglobin A1C, POC 05/22/2024 5.5  % Final   Office Visit on 04/15/2024   Component Date Value Ref Range Status    SARS Coronavirus 2 Antigen 04/15/2024 Negative  Negative Final     Acceptable 04/15/2024 Yes   Final       Past Medical History:   Diagnosis Date    Anemia     Depression     Diabetes mellitus 2021    Encounter for blood  transfusion     GERD (gastroesophageal reflux disease)     Hyperlipidemia     Hypertension     MI (myocardial infarction) 2007    Osteoporosis     Thyroid disease     Ulcer of the stomach and intestine     pos. for h. pylori    Weight loss 2022    food comes up      Past Surgical History:   Procedure Laterality Date    APPENDECTOMY      CHOLECYSTECTOMY      COLONOSCOPY N/A 03/18/2016    Procedure: COLONOSCOPY;  Surgeon: Rober Zelaya Jr., MD;  Location: [facility] ENDO;  Service: Endoscopy;  Laterality: N/A;    ESOPHAGOGASTRODUODENOSCOPY  04/15/2016    with dil    ESOPHAGOGASTRODUODENOSCOPY N/A 05/20/2022    Procedure: EGD (ESOPHAGOGASTRODUODENOSCOPY);  Surgeon: Justine Villegas MD;  Location: [facility] ENDO;  Service: Endoscopy;  Laterality: N/A;    EYE SURGERY      as a child    TONSILLECTOMY      TUBAL LIGATION       Family History   Problem Relation Name Age of Onset    Heart disease Mother Bonna     Diabetes Father         Tests to Keep You Healthy    Mammogram: Met on 6/4/2024  Eye Exam: DUE  Colon Cancer Screening: Met on 5/5/2021  Last Blood Pressure <= 139/89 (7/24/2024): Yes  Last HbA1c < 8 (07/24/2024): Yes      The CVD Risk score (D'Agostino, et al., 2008) failed to calculate for the following reasons:    The patient has a prior MI, stroke, CHF, or peripheral vascular disease diagnosis     Marital Status:   Alcohol History:  reports no history of alcohol use.  Tobacco History:  reports that she has been smoking cigarettes. She started smoking about 65 years ago. She has a 31.8 pack-year smoking history. She has been exposed to tobacco smoke. She has never used smokeless tobacco.  Drug History:  reports no history of drug use.    Health Maintenance Topics with due status: Not Due       Topic Last Completion Date    TETANUS VACCINE 03/21/2019    Colorectal Cancer Screening 05/05/2021    Influenza Vaccine 11/15/2023    Foot Exam 11/15/2023    Diabetes Urine Screening 02/14/2024    LDCT Lung Screen  06/04/2024    Mammogram 06/04/2024    DEXA Scan 06/04/2024    Lipid Panel 07/10/2024    Low Dose Statin 07/24/2024    Hemoglobin A1c 07/24/2024     Immunization History   Administered Date(s) Administered    COVID-19, MRNA, LN-S, PF (MODERNA FULL 0.5 ML DOSE) 01/15/2021, 02/18/2021    COVID-19, mRNA, LNP-S, bivalent booster, PF (PFIZER OMICRON) 10/19/2022    Influenza 09/09/2009, 10/08/2010, 10/20/2011, 10/02/2012, 10/18/2013, 10/03/2014, 10/15/2015, 10/20/2016    Influenza (FLUAD) - Quadrivalent - Adjuvanted - PF *Preferred* (65+) 11/15/2023    Influenza - Quadrivalent 10/03/2014    Influenza - Quadrivalent - High Dose - PF (65 years and older) 01/25/2022, 10/10/2022    Influenza - Quadrivalent - PF *Preferred* (6 months and older) 10/20/2016, 10/15/2018, 10/16/2019    Influenza A (H1N1) 2009 Monovalent - IM 10/23/2009    Influenza Split 09/09/2009, 10/08/2010, 10/20/2011, 10/02/2012, 10/18/2013, 10/15/2015, 10/15/2018, 10/07/2019    Pneumococcal Conjugate - 13 Valent 03/31/2021    Pneumococcal Polysaccharide - 23 Valent 03/21/2019    Tdap 03/21/2019, 03/21/2019    Zoster 03/21/2019       Review of patient's allergies indicates:  No Known Allergies    Current Outpatient Medications:     albuterol (PROAIR HFA) 90 mcg/actuation inhaler, Inhale 2 puffs into the lungs every 6 (six) hours as needed for Wheezing or Shortness of Breath. Rescue, Disp: 18 g, Rfl: 5    albuterol (PROVENTIL) 2.5 mg /3 mL (0.083 %) nebulizer solution, Take 3 mLs (2.5 mg total) by nebulization every 6 (six) hours as needed for Wheezing or Shortness of Breath. Rescue, Disp: 120 mL, Rfl: 3    atorvastatin (LIPITOR) 10 MG tablet, Take 1 tablet (10 mg total) by mouth once daily., Disp: 90 tablet, Rfl: 3    buPROPion (WELLBUTRIN XL) 150 MG TB24 tablet, Take 1 tablet (150 mg total) by mouth once daily., Disp: 90 tablet, Rfl: 3    calcium carbonate (OS-JOSELYN) 600 mg calcium (1,500 mg) Tab, Take 600 mg by mouth once daily., Disp: , Rfl:     EScitalopram  oxalate (LEXAPRO) 20 MG tablet, Take 1 tablet (20 mg total) by mouth once daily., Disp: 90 tablet, Rfl: 3    fluticasone propionate (FLONASE) 50 mcg/actuation nasal spray, 1 spray (50 mcg total) by Each Nostril route once daily., Disp: 16 g, Rfl: 2    furosemide (LASIX) 20 MG tablet, Take 1 tablet (20 mg total) by mouth every other day., Disp: 45 tablet, Rfl: 1    gabapentin (NEURONTIN) 300 MG capsule, Take 1 capsule (300 mg total) by mouth 2 (two) times daily., Disp: 60 capsule, Rfl: 5    levothyroxine (SYNTHROID) 50 MCG tablet, Take 1 tablet (50 mcg total) by mouth before breakfast., Disp: 90 tablet, Rfl: 1    lisinopriL (PRINIVIL,ZESTRIL) 2.5 MG tablet, Take 1 tablet (2.5 mg total) by mouth once daily., Disp: 90 tablet, Rfl: 3    pantoprazole (PROTONIX) 40 MG tablet, Take 1 tablet (40 mg total) by mouth 2 (two) times daily., Disp: 180 tablet, Rfl: 3    propranoloL (INDERAL LA) 80 MG 24 hr capsule, Take 1 capsule (80 mg total) by mouth once daily., Disp: 90 capsule, Rfl: 3    blood sugar diagnostic Strp, 1 each by In Vitro route once daily. (Patient not taking: Reported on 7/24/2024), Disp: 100 each, Rfl: 3    empagliflozin (JARDIANCE) 10 mg tablet, Take 1 tablet (10 mg total) by mouth once daily., Disp: 30 tablet, Rfl: 11    ergocalciferol (ERGOCALCIFEROL) 50,000 unit Cap, Take 1 capsule (50,000 Units total) by mouth every 7 days., Disp: 12 capsule, Rfl: 3    fluticasone-umeclidin-vilanter (TRELEGY ELLIPTA) 100-62.5-25 mcg DsDv, Inhale 1 puff into the lungs once daily., Disp: 60 each, Rfl: 11    promethazine-dextromethorphan (PROMETHAZINE-DM) 6.25-15 mg/5 mL Syrp, Take 5 mLs by mouth nightly as needed (cough)., Disp: 118 mL, Rfl: 0    sucralfate (CARAFATE) 1 gram tablet, Take 1 tablet (1 g total) by mouth 2 (two) times daily. Take on empty stomach, do not eat or take medication for at least 1 hour, Disp: 60 tablet, Rfl: 2    Review of Systems   Constitutional:  Positive for unexpected weight change (wt down 4lbs  "since May visit, down 11lbs since November). Negative for activity change, chills and fever.   HENT:  Positive for hearing loss. Negative for rhinorrhea, sore throat and trouble swallowing.    Eyes:  Negative for discharge and visual disturbance.   Respiratory:  Positive for cough (mild, chronic clear to white) and shortness of breath (with heavy exertion). Negative for chest tightness and wheezing.    Cardiovascular:  Positive for palpitations. Negative for chest pain and leg swelling.   Gastrointestinal:  Negative for abdominal pain, blood in stool, constipation, diarrhea and vomiting.   Endocrine: Negative for polyuria.   Genitourinary:  Positive for frequency. Negative for difficulty urinating, dysuria, hematuria and menstrual problem.   Musculoskeletal:  Positive for arthralgias and back pain. Negative for joint swelling and neck pain.   Skin:  Negative for rash.   Neurological:  Positive for headaches. Negative for dizziness, syncope, speech difficulty, weakness and numbness.   Psychiatric/Behavioral:  Positive for dysphoric mood (improved with addition of bupropion). Negative for confusion, self-injury and suicidal ideas.           Objective:      Vitals:    07/24/24 0757   BP: 118/64   Pulse: 62   SpO2: 99%   Weight: 49 kg (108 lb)   Height: 5' 1" (1.549 m)     Physical Exam  Vitals and nursing note reviewed.   Constitutional:       General: She is not in acute distress.     Appearance: Normal appearance. She is well-developed. She is not toxic-appearing.      Comments: Thin frail white female, appears older than stated age   HENT:      Head: Normocephalic and atraumatic.      Right Ear: Tympanic membrane and ear canal normal.      Left Ear: Tympanic membrane and ear canal normal.   Neck:      Vascular: No carotid bruit.   Cardiovascular:      Rate and Rhythm: Normal rate and regular rhythm.      Heart sounds: Murmur heard.      Systolic murmur is present with a grade of 3/6.   Pulmonary:      Effort: " Pulmonary effort is normal. No respiratory distress.      Breath sounds: No wheezing or rales.      Comments: Slightly diminished throughout otherwise clear  Abdominal:      General: There is no distension.      Palpations: Abdomen is soft.      Tenderness: There is no abdominal tenderness.   Musculoskeletal:      Cervical back: Neck supple.      Right lower leg: No edema.      Left lower leg: No edema.   Lymphadenopathy:      Cervical: No cervical adenopathy.   Skin:     General: Skin is warm and dry.      Findings: No rash.   Neurological:      General: No focal deficit present.      Mental Status: She is alert and oriented to person, place, and time.      Gait: Gait normal.   Psychiatric:         Mood and Affect: Mood normal.         Behavior: Behavior normal. Behavior is cooperative.         Thought Content: Thought content does not include suicidal ideation.      Comments: Mood seems much more upbeat than last visit           Assessment:       1. Mild episode of recurrent major depressive disorder    2. DM type 2 with diabetic dyslipidemia    3. Malnutrition, unspecified type    4. Chronic obstructive pulmonary disease, unspecified COPD type    5. Chronic heart failure with preserved ejection fraction    6. Severe aortic stenosis    7. Microalbuminuria    8. Age-related osteoporosis without current pathological fracture    9. Vitamin D deficiency           Plan:       1. Mild episode of recurrent major depressive disorder   -patient reports improved with addition of bupropion    2. DM type 2 with diabetic dyslipidemia  -well controlled with A1C 5.3%  -     Hemoglobin A1C, POCT  -     empagliflozin (JARDIANCE) 10 mg tablet; Take 1 tablet (10 mg total) by mouth once daily.  Dispense: 30 tablet; Refill: 11    3. Malnutrition, unspecified type   -reviewed with pt weight loss and low albumin- stressed imp of increasing intake and we discussed referral to GI for wt loss w/u but she would like to try an dimprove her  diet first    4. Chronic obstructive pulmonary disease, unspecified COPD type  -stable though continues to smoke unfortunately and likely severe disease.  Has not followed up with pulmonology  -     promethazine-dextromethorphan (PROMETHAZINE-DM) 6.25-15 mg/5 mL Syrp; Take 5 mLs by mouth nightly as needed (cough).  Dispense: 118 mL; Refill: 0  -     fluticasone-umeclidin-vilanter (TRELEGY ELLIPTA) 100-62.5-25 mcg DsDv; Inhale 1 puff into the lungs once daily.  Dispense: 60 each; Refill: 11    5. Chronic heart failure with preserved ejection fraction  -reviewed CT scan findings with patient, she is now taking Lasix daily and has started Jardiance.  Stressed importance of cardiology follow-up  -     empagliflozin (JARDIANCE) 10 mg tablet; Take 1 tablet (10 mg total) by mouth once daily.  Dispense: 30 tablet; Refill: 11    6. Severe aortic stenosis   -patient advised given severe AS and CHF she needs to follow-up with Dr. Smart    7. Microalbuminuria  -now on Jardiance  -     empagliflozin (JARDIANCE) 10 mg tablet; Take 1 tablet (10 mg total) by mouth once daily.  Dispense: 30 tablet; Refill: 11    8. Age-related osteoporosis without current pathological fracture   -improved on last DEXA on Prolia    9. Vitamin D deficiency  -stable  -     ergocalciferol (ERGOCALCIFEROL) 50,000 unit Cap; Take 1 capsule (50,000 Units total) by mouth every 7 days.  Dispense: 12 capsule; Refill: 3      Follow up in about 3 months (around 10/24/2024).          Counseled on age and gender appropriate medical preventative services, including cancer screenings, immunizations, overall nutritional health, need for a consistent exercise regimen and an overall push towards maintaining a vigorous and active lifestyle.      7/24/2024 Liv Parrish NP

## 2024-07-30 DIAGNOSIS — Z00.00 ENCOUNTER FOR MEDICARE ANNUAL WELLNESS EXAM: ICD-10-CM

## 2024-09-03 ENCOUNTER — PATIENT MESSAGE (OUTPATIENT)
Dept: ADMINISTRATIVE | Facility: HOSPITAL | Age: 69
End: 2024-09-03
Payer: MEDICARE

## 2024-09-12 DIAGNOSIS — M54.42 ACUTE LEFT-SIDED LOW BACK PAIN WITH LEFT-SIDED SCIATICA: ICD-10-CM

## 2024-09-12 DIAGNOSIS — J44.9 CHRONIC OBSTRUCTIVE PULMONARY DISEASE, UNSPECIFIED COPD TYPE: ICD-10-CM

## 2024-09-12 DIAGNOSIS — I50.32 CHRONIC HEART FAILURE WITH PRESERVED EJECTION FRACTION: ICD-10-CM

## 2024-09-12 RX ORDER — FLUTICASONE FUROATE, UMECLIDINIUM BROMIDE AND VILANTEROL TRIFENATATE 100; 62.5; 25 UG/1; UG/1; UG/1
1 POWDER RESPIRATORY (INHALATION) DAILY
Qty: 60 EACH | Refills: 11 | Status: CANCELLED | OUTPATIENT
Start: 2024-09-12

## 2024-09-12 RX ORDER — GABAPENTIN 300 MG/1
300 CAPSULE ORAL 2 TIMES DAILY
Qty: 60 CAPSULE | Refills: 5 | Status: SHIPPED | OUTPATIENT
Start: 2024-09-12

## 2024-09-12 RX ORDER — FUROSEMIDE 20 MG/1
20 TABLET ORAL EVERY OTHER DAY
Qty: 45 TABLET | Refills: 1 | Status: SHIPPED | OUTPATIENT
Start: 2024-09-12 | End: 2025-09-12

## 2024-09-23 ENCOUNTER — TELEPHONE (OUTPATIENT)
Dept: ADMINISTRATIVE | Facility: CLINIC | Age: 69
End: 2024-09-23
Payer: MEDICARE

## 2024-10-21 ENCOUNTER — OFFICE VISIT (OUTPATIENT)
Dept: FAMILY MEDICINE | Facility: CLINIC | Age: 69
End: 2024-10-21
Payer: MEDICARE

## 2024-10-21 VITALS
HEIGHT: 61 IN | BODY MASS INDEX: 19.26 KG/M2 | OXYGEN SATURATION: 100 % | DIASTOLIC BLOOD PRESSURE: 76 MMHG | HEART RATE: 70 BPM | WEIGHT: 102 LBS | SYSTOLIC BLOOD PRESSURE: 138 MMHG

## 2024-10-21 DIAGNOSIS — J44.9 CHRONIC OBSTRUCTIVE PULMONARY DISEASE, UNSPECIFIED COPD TYPE: ICD-10-CM

## 2024-10-21 DIAGNOSIS — I50.32 CHRONIC HEART FAILURE WITH PRESERVED EJECTION FRACTION: ICD-10-CM

## 2024-10-21 DIAGNOSIS — E78.5 DYSLIPIDEMIA: ICD-10-CM

## 2024-10-21 DIAGNOSIS — E78.5 DM TYPE 2 WITH DIABETIC DYSLIPIDEMIA: Primary | ICD-10-CM

## 2024-10-21 DIAGNOSIS — Z23 NEED FOR INFLUENZA VACCINATION: ICD-10-CM

## 2024-10-21 DIAGNOSIS — E11.69 DM TYPE 2 WITH DIABETIC DYSLIPIDEMIA: Primary | ICD-10-CM

## 2024-10-21 DIAGNOSIS — R63.4 UNEXPLAINED WEIGHT LOSS: ICD-10-CM

## 2024-10-21 DIAGNOSIS — F33.0 MILD EPISODE OF RECURRENT MAJOR DEPRESSIVE DISORDER: ICD-10-CM

## 2024-10-21 DIAGNOSIS — I35.0 SEVERE AORTIC STENOSIS: ICD-10-CM

## 2024-10-21 DIAGNOSIS — I70.0 AORTIC ATHEROSCLEROSIS: ICD-10-CM

## 2024-10-21 LAB — HBA1C MFR BLD: 5.5 %

## 2024-10-21 PROCEDURE — 1159F MED LIST DOCD IN RCRD: CPT | Mod: CPTII,S$GLB,, | Performed by: NURSE PRACTITIONER

## 2024-10-21 PROCEDURE — 1126F AMNT PAIN NOTED NONE PRSNT: CPT | Mod: CPTII,S$GLB,, | Performed by: NURSE PRACTITIONER

## 2024-10-21 PROCEDURE — 3288F FALL RISK ASSESSMENT DOCD: CPT | Mod: CPTII,S$GLB,, | Performed by: NURSE PRACTITIONER

## 2024-10-21 PROCEDURE — G0008 ADMIN INFLUENZA VIRUS VAC: HCPCS | Mod: S$GLB,,, | Performed by: NURSE PRACTITIONER

## 2024-10-21 PROCEDURE — 90653 IIV ADJUVANT VACCINE IM: CPT | Mod: S$GLB,,, | Performed by: NURSE PRACTITIONER

## 2024-10-21 PROCEDURE — 4010F ACE/ARB THERAPY RXD/TAKEN: CPT | Mod: CPTII,S$GLB,, | Performed by: NURSE PRACTITIONER

## 2024-10-21 PROCEDURE — 1160F RVW MEDS BY RX/DR IN RCRD: CPT | Mod: CPTII,S$GLB,, | Performed by: NURSE PRACTITIONER

## 2024-10-21 PROCEDURE — 3066F NEPHROPATHY DOC TX: CPT | Mod: CPTII,S$GLB,, | Performed by: NURSE PRACTITIONER

## 2024-10-21 PROCEDURE — 3075F SYST BP GE 130 - 139MM HG: CPT | Mod: CPTII,S$GLB,, | Performed by: NURSE PRACTITIONER

## 2024-10-21 PROCEDURE — 99214 OFFICE O/P EST MOD 30 MIN: CPT | Mod: S$GLB,,, | Performed by: NURSE PRACTITIONER

## 2024-10-21 PROCEDURE — 3078F DIAST BP <80 MM HG: CPT | Mod: CPTII,S$GLB,, | Performed by: NURSE PRACTITIONER

## 2024-10-21 PROCEDURE — 3060F POS MICROALBUMINURIA REV: CPT | Mod: CPTII,S$GLB,, | Performed by: NURSE PRACTITIONER

## 2024-10-21 PROCEDURE — 3044F HG A1C LEVEL LT 7.0%: CPT | Mod: CPTII,S$GLB,, | Performed by: NURSE PRACTITIONER

## 2024-10-21 PROCEDURE — 1101F PT FALLS ASSESS-DOCD LE1/YR: CPT | Mod: CPTII,S$GLB,, | Performed by: NURSE PRACTITIONER

## 2024-10-21 PROCEDURE — 83036 HEMOGLOBIN GLYCOSYLATED A1C: CPT | Mod: QW,,, | Performed by: NURSE PRACTITIONER

## 2024-10-21 PROCEDURE — 3008F BODY MASS INDEX DOCD: CPT | Mod: CPTII,S$GLB,, | Performed by: NURSE PRACTITIONER

## 2024-10-21 RX ORDER — FLUTICASONE FUROATE, UMECLIDINIUM BROMIDE AND VILANTEROL TRIFENATATE 100; 62.5; 25 UG/1; UG/1; UG/1
1 POWDER RESPIRATORY (INHALATION) DAILY
Qty: 60 EACH | Refills: 11 | Status: SHIPPED | OUTPATIENT
Start: 2024-10-21

## 2024-10-21 RX ORDER — PROMETHAZINE HYDROCHLORIDE AND DEXTROMETHORPHAN HYDROBROMIDE 6.25; 15 MG/5ML; MG/5ML
5 SYRUP ORAL NIGHTLY PRN
Qty: 118 ML | Refills: 0 | Status: SHIPPED | OUTPATIENT
Start: 2024-10-21

## 2024-10-21 NOTE — PATIENT INSTRUCTIONS
Golden Garcia MD- pulmonologist  [address]  [address]  [place] [address]  Phone: [phone]    Justine Villegas MD-   [address]  [place] [address]  Phone: [phone]    John Smart MD- cardiology  [address]  [address]  [place] [address]  Phone: [phone]

## 2024-10-21 NOTE — PROGRESS NOTES
SUBJECTIVE:    Patient ID: Liv Decker is a 69 y.o. female.    Chief Complaint: Follow-up (No bottles//Pt here for 3 mo follow up//Will scheduled eye exam//would like flu vacc//JL)      History of Present Illness    CHIEF COMPLAINT:  Liv presents today for a three-month follow-up for depression DM2, COPD, CHF    DEPRESSION AND MENTAL HEALTH:  She reports improvement in mood, attributing this positive change to recently obtaining a part-time job. She continues to take Bupropion as prescribed.    MEDICAL HISTORY:  She has a history of Type 2 Diabetes, COPD, Congestive Heart Failure (CHF) with preserved EF, and severe Aortic Stenosis. She denies chest pain or syncope related to her aortic stenosis. She has not been following up with her pulmonologist or cardiologist as recommended.    RESPIRATORY:  She reports occasional coughing, which she attributes to weather changes. She denies significant shortness of breath with exertion, difficulty breathing while lying flat, or coughing up significant amounts of mucus. She is compliant with her Trelegy inhaler, taking one puff daily for COPD management. She requests a refill for her Trelegy inhaler, which she uses daily. She denies frequent use of albuterol inhaler.    CARDIOVASCULAR:  She confirms daily use of the fluid pill and denies leg swelling. She reports occasional dizziness when stooping down and getting up quickly, but denies falls, daily dizziness, lightheadedness, or palpitations.    DIABETES:  Her A1C level is 5.5. She reports consistent use of Jardiance for diabetes and heart failure management.    WEIGHT:  She reports unexplained weight loss of 6 lbs since July and 13 lbs since April, despite unchanged appetite. She discontinued protein drinks due to cost concerns.    GASTROINTESTINAL:  She reports normal appetite and bowel movements. She denies nausea, vomiting, abdominal pain, black stools, or blood in the stool. A colonoscopy in 2021 revealed  diverticulum but no polyps, with recommendation for repeat screening in 10 years.    MEDICATIONS:  She continues Jardiance, fluid pill, Trelegy inhaler, and Bupropion. She requests a refill for cough syrup, which she uses as a sleep aid.     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 She reports a significant reduction in smoking, currently consuming approximately five cigarettes per week.      ROS:  General: denies fever, denies chills, denies fatigue, denies weight gain,admits weight loss  Eyes: denies vision changes, denies redness, denies discharge  ENT: denies ear pain, denies nasal congestion, denies sore throat  Cardiovascular: denies chest pain, denies palpitations, denies lower extremity edema  Respiratory: complains of cough, denies shortness of breath and orthopnea, occas fine wheeze  Gastrointestinal: denies abdominal pain, denies nausea, denies vomiting, denies diarrhea, denies constipation, denies blood in stool  Genitourinary: denies dysuria, denies hematuria, denies frequency  Musculoskeletal: denies joint pain, denies muscle pain  Skin: denies rash, denies lesion  Neurological: denies headache, complains of dizziness, denies numbness, denies tingling  Psychiatric: denies anxiety, denies depression, denies sleep difficulty, denies mood swings        Answers submitted by the patient for this visit:  Review of Systems Questionnaire (Submitted on 10/20/2024)  activity change: No  unexpected weight change: Yes  neck pain: No  hearing loss: Yes  rhinorrhea: No  trouble swallowing: No  eye discharge: No  visual disturbance: No  chest tightness: No  wheezing: Yes  chest pain: No  palpitations: No  blood in stool: No  constipation: No  vomiting: No  diarrhea: No  polydipsia: Yes  polyuria: No  difficulty urinating: No  hematuria: No  menstrual problem: No  dysuria: No  joint swelling: No  arthralgias: No  headaches:  No  weakness: Yes  confusion: No  dysphoric mood: No        Office Visit on 10/21/2024   Component Date Value Ref Range Status    Hemoglobin A1C, POC 10/21/2024 5.5  % Final   Office Visit on 07/24/2024   Component Date Value Ref Range Status    Hemoglobin A1C, POC 07/24/2024 5.3  % Final   Telephone on 06/18/2024   Component Date Value Ref Range Status    Glucose 07/10/2024 91  65 - 99 mg/dL Final    BUN 07/10/2024 6 (L)  7 - 25 mg/dL Final    Creatinine 07/10/2024 0.53  0.50 - 1.05 mg/dL Final    eGFR 07/10/2024 100  > OR = 60 mL/min/1.73m2 Final    BUN/Creatinine Ratio 07/10/2024 11  6 - 22 (calc) Final    Sodium 07/10/2024 143  135 - 146 mmol/L Final    Potassium 07/10/2024 4.1  3.5 - 5.3 mmol/L Final    Chloride 07/10/2024 102  98 - 110 mmol/L Final    CO2 07/10/2024 29  20 - 32 mmol/L Final    Calcium 07/10/2024 9.1  8.6 - 10.4 mg/dL Final    Total Protein 07/10/2024 6.1  6.1 - 8.1 g/dL Final    Albumin 07/10/2024 3.2 (L)  3.6 - 5.1 g/dL Final    Globulin, Total 07/10/2024 2.9  1.9 - 3.7 g/dL (calc) Final    Albumin/Globulin Ratio 07/10/2024 1.1  1.0 - 2.5 (calc) Final    Total Bilirubin 07/10/2024 0.3  0.2 - 1.2 mg/dL Final    Alkaline Phosphatase 07/10/2024 68  37 - 153 U/L Final    AST 07/10/2024 8 (L)  10 - 35 U/L Final    ALT 07/10/2024 4 (L)  6 - 29 U/L Final    Cholesterol 07/10/2024 118  <200 mg/dL Final    HDL 07/10/2024 33 (L)  > OR = 50 mg/dL Final    Triglycerides 07/10/2024 226 (H)  <150 mg/dL Final    LDL Cholesterol 07/10/2024 57  mg/dL (calc) Final    HDL/Cholesterol Ratio 07/10/2024 3.6  <5.0 (calc) Final    Non HDL Chol. (LDL+VLDL) 07/10/2024 85  <130 mg/dL (calc) Final   Office Visit on 05/22/2024   Component Date Value Ref Range Status    Hemoglobin A1C, POC 05/22/2024 5.5  % Final       Past Medical History:   Diagnosis Date    Anemia     Depression     Diabetes mellitus 2021    Encounter for blood transfusion     GERD (gastroesophageal reflux disease)     Hyperlipidemia     Hypertension      MI (myocardial infarction) 2007    Osteoporosis     Thyroid disease     Ulcer of the stomach and intestine     pos. for h. pylori    Weight loss 2022    food comes up      Past Surgical History:   Procedure Laterality Date    APPENDECTOMY      CHOLECYSTECTOMY      COLONOSCOPY N/A 03/18/2016    Procedure: COLONOSCOPY;  Surgeon: Rober Zelaya Jr., MD;  Location: [facility] ENDO;  Service: Endoscopy;  Laterality: N/A;    ESOPHAGOGASTRODUODENOSCOPY  04/15/2016    with dil    ESOPHAGOGASTRODUODENOSCOPY N/A 05/20/2022    Procedure: EGD (ESOPHAGOGASTRODUODENOSCOPY);  Surgeon: Justine Villegas MD;  Location: [facility] ENDO;  Service: Endoscopy;  Laterality: N/A;    EYE SURGERY      as a child    TONSILLECTOMY      TUBAL LIGATION       Family History   Problem Relation Name Age of Onset    Heart disease Mother Bonna     Diabetes Father         All of your core healthy metrics are met.      The CVD Risk score (BIANCAAgostino et al., 2008) failed to calculate for the following reasons:    The patient has a prior MI, stroke, CHF, or peripheral vascular disease diagnosis     Marital Status:   Alcohol History:  reports no history of alcohol use.  Tobacco History:  reports that she has been smoking cigarettes. She started smoking about 65 years ago. She has a 31.8 pack-year smoking history. She has been exposed to tobacco smoke. She has never used smokeless tobacco.  Drug History:  reports no history of drug use.    Health Maintenance Topics with due status: Not Due       Topic Last Completion Date    TETANUS VACCINE 03/21/2019    Colorectal Cancer Screening 05/05/2021    Diabetes Urine Screening 02/14/2024    LDCT Lung Screen 06/04/2024    Mammogram 06/04/2024    DEXA Scan 06/04/2024    Lipid Panel 07/10/2024    Hemoglobin A1c 10/21/2024     Immunization History   Administered Date(s) Administered    COVID-19, MRNA, LN-S, PF (MODERNA FULL 0.5 ML DOSE) 01/15/2021, 02/18/2021    COVID-19, mRNA, LNP-S, bivalent booster, PF (PFIZER  OMICRON) 10/19/2022    Influenza 09/09/2009, 10/08/2010, 10/20/2011, 10/02/2012, 10/18/2013, 10/03/2014, 10/15/2015, 10/20/2016    Influenza (FLUAD) - Quadrivalent - Adjuvanted - PF *Preferred* (65+) 11/15/2023    Influenza - Quadrivalent 10/03/2014    Influenza - Quadrivalent - High Dose - PF (65 years and older) 01/25/2022, 10/10/2022    Influenza - Quadrivalent - PF *Preferred* (6 months and older) 10/20/2016, 10/15/2018, 10/16/2019    Influenza - Trivalent - Afluria, Fluzone MDV 09/09/2009, 10/08/2010, 10/20/2011, 10/02/2012, 10/18/2013, 10/15/2015, 10/15/2018, 10/07/2019    Influenza - Trivalent - Fluad - Adjuvanted - PF (65 years and older 10/21/2024    Influenza A (H1N1) 2009 Monovalent - IM 10/23/2009    Influenza Split 09/09/2009, 10/08/2010, 10/20/2011, 10/02/2012, 10/18/2013, 10/15/2015, 10/15/2018, 10/07/2019    Pneumococcal Conjugate - 13 Valent 03/31/2021    Pneumococcal Polysaccharide - 23 Valent 03/21/2019    Tdap 03/21/2019, 03/21/2019    Zoster 03/21/2019       Review of patient's allergies indicates:  No Known Allergies    Current Outpatient Medications:     furosemide (LASIX) 20 MG tablet, Take 1 tablet (20 mg total) by mouth every other day. (Patient taking differently: Take 20 mg by mouth once daily.), Disp: 45 tablet, Rfl: 1    albuterol (PROAIR HFA) 90 mcg/actuation inhaler, Inhale 2 puffs into the lungs every 6 (six) hours as needed for Wheezing or Shortness of Breath. Rescue, Disp: 18 g, Rfl: 5    albuterol (PROVENTIL) 2.5 mg /3 mL (0.083 %) nebulizer solution, Take 3 mLs (2.5 mg total) by nebulization every 6 (six) hours as needed for Wheezing or Shortness of Breath. Rescue, Disp: 120 mL, Rfl: 3    atorvastatin (LIPITOR) 10 MG tablet, Take 1 tablet (10 mg total) by mouth once daily., Disp: 90 tablet, Rfl: 3    blood sugar diagnostic Strp, 1 each by In Vitro route once daily. (Patient not taking: Reported on 7/24/2024), Disp: 100 each, Rfl: 3    buPROPion (WELLBUTRIN XL) 150 MG TB24 tablet,  "Take 1 tablet (150 mg total) by mouth once daily., Disp: 90 tablet, Rfl: 3    calcium carbonate (OS-JOSELYN) 600 mg calcium (1,500 mg) Tab, Take 600 mg by mouth once daily., Disp: , Rfl:     empagliflozin (JARDIANCE) 10 mg tablet, Take 1 tablet (10 mg total) by mouth once daily., Disp: 30 tablet, Rfl: 11    ergocalciferol (ERGOCALCIFEROL) 50,000 unit Cap, Take 1 capsule (50,000 Units total) by mouth every 7 days., Disp: 12 capsule, Rfl: 3    EScitalopram oxalate (LEXAPRO) 20 MG tablet, Take 1 tablet (20 mg total) by mouth once daily., Disp: 90 tablet, Rfl: 3    fluticasone propionate (FLONASE) 50 mcg/actuation nasal spray, 1 spray (50 mcg total) by Each Nostril route once daily., Disp: 16 g, Rfl: 2    fluticasone-umeclidin-vilanter (TRELEGY ELLIPTA) 100-62.5-25 mcg DsDv, Inhale 1 puff into the lungs once daily., Disp: 60 each, Rfl: 11    gabapentin (NEURONTIN) 300 MG capsule, Take 1 capsule (300 mg total) by mouth 2 (two) times daily., Disp: 60 capsule, Rfl: 5    levothyroxine (SYNTHROID) 50 MCG tablet, Take 1 tablet (50 mcg total) by mouth before breakfast., Disp: 90 tablet, Rfl: 1    lisinopriL (PRINIVIL,ZESTRIL) 2.5 MG tablet, Take 1 tablet (2.5 mg total) by mouth once daily., Disp: 90 tablet, Rfl: 3    pantoprazole (PROTONIX) 40 MG tablet, Take 1 tablet (40 mg total) by mouth 2 (two) times daily., Disp: 180 tablet, Rfl: 3    promethazine-dextromethorphan (PROMETHAZINE-DM) 6.25-15 mg/5 mL Syrp, Take 5 mLs by mouth nightly as needed (cough)., Disp: 118 mL, Rfl: 0    propranoloL (INDERAL LA) 80 MG 24 hr capsule, Take 1 capsule (80 mg total) by mouth once daily., Disp: 90 capsule, Rfl: 3  No current facility-administered medications for this visit.        Objective:      Vitals:    10/21/24 0757   BP: 138/76   Pulse: 70   SpO2: 100%   Weight: 46.3 kg (102 lb)   Height: 5' 1" (1.549 m)       Physical Exam  Vitals and nursing note reviewed.   Constitutional:       General: She is not in acute distress.     Appearance: " Normal appearance. She is well-developed. She is not toxic-appearing.      Comments: Thin frail white female, appears older than stated age   HENT:      Head: Normocephalic and atraumatic.      Right Ear: Tympanic membrane and ear canal normal.      Left Ear: Tympanic membrane and ear canal normal.   Neck:      Vascular: No carotid bruit.   Cardiovascular:      Rate and Rhythm: Normal rate and regular rhythm.      Heart sounds: Murmur heard.      Systolic murmur is present with a grade of 3/6.   Pulmonary:      Effort: Pulmonary effort is normal. No respiratory distress.      Breath sounds: No wheezing or rales.      Comments: Slightly diminished throughout otherwise clear  Abdominal:      General: There is no distension.      Palpations: Abdomen is soft.      Tenderness: There is no abdominal tenderness.   Musculoskeletal:      Cervical back: Neck supple.      Right lower leg: No edema.      Left lower leg: No edema.   Lymphadenopathy:      Cervical: No cervical adenopathy.   Skin:     General: Skin is warm and dry.      Findings: No rash.   Neurological:      General: No focal deficit present.      Mental Status: She is alert and oriented to person, place, and time.      Gait: Gait normal.   Psychiatric:         Mood and Affect: Mood normal.         Behavior: Behavior normal. Behavior is cooperative.         Thought Content: Thought content does not include suicidal ideation.      Comments: Mood seems much more upbeat than last visit          Assessment:       1. DM type 2 with diabetic dyslipidemia    2. Mild episode of recurrent major depressive disorder    3. Chronic heart failure with preserved ejection fraction    4. Chronic obstructive pulmonary disease, unspecified COPD type    5. Severe aortic stenosis    6. Dyslipidemia    7. Unexplained weight loss    8. Need for influenza vaccination    9. Aortic atherosclerosis           Assessment & Plan    Assessed diabetes type 2 with diabetic dyslipidemia as  well-controlled based on A1c of 5.5  Evaluated mild depression of recurrent major depressive disorder as stable on current medication  Determined chronic heart failure with preserved EF appears well-compensated  Considered COPD stable on current therapy, though pulmonary follow-up needed  Noted severe aortic stenosis as stable, with no reported chest pain or syncope  Assessed dyslipidemia as well-controlled based on last labs  Investigated unexplained weight loss, considering potential causes including advancing COPD and possible malignancy  Reviewed July chest CT showing no evidence of lung cancer  Evaluated 2021 colonoscopy results, noting diverticula but no polyps  Considered GI evaluation for unexplained weight loss to rule out GI malignancy  Emphasized need for pulmonary function tests to assess severity of lung disease    COPD:  - Explained that advancing COPD can cause weight loss due to increased calorie expenditure for breathing.  - Discussed importance of protein and calorie intake for patients with COPD.  - Explained the role of pulmonary function tests in assessing lung disease severity.  - Continued Trelegy inhaler, 1 puff daily; provided refill.  - Referred to Dr. Garcia (pulmonologist) for follow-up and pulmonary function tests.    WEIGHT MANAGEMENT:  - Educated on the significance of unexplained weight loss and potential causes, including advancing lung disease and possible malignancies.  - Liv to resume taking protein shakes or consider purchasing protein powder to make smoothies for increased protein intake.  - Recommend increasing calorie and protein consumption to support body and counteract weight loss.  - Follow up in 3 months for weight check.    DIABETES:  - Continued Jardiance for diabetes and heart failure management.  - Schedule diabetic eye exam.    DEPRESSION:  - Continued bupropion for depression management.    CONGESTIVE HEART FAILURE:  - Referred to Dr. Smart (cardiologist) for  follow-up on congestive heart failure and aortic valve assessment.    GASTROENTEROLOGY REFERRAL:  - Referred to Dr. Villegas (gastroenterologist) for evaluation of unexplained weight loss, including potential upper endoscopy.    MEDICATIONS/SUPPLEMENTS:  - Refilled cough syrup for prn use       Plan:       1. DM type 2 with diabetic dyslipidemia  -     Hemoglobin A1C, POCT    2. Mild episode of recurrent major depressive disorder    3. Chronic heart failure with preserved ejection fraction  -     Ambulatory referral/consult to Cardiology; Future; Expected date: 10/28/2024    4. Chronic obstructive pulmonary disease, unspecified COPD type  -     Ambulatory referral/consult to Pulmonology; Future; Expected date: 10/28/2024  -     fluticasone-umeclidin-vilanter (TRELEGY ELLIPTA) 100-62.5-25 mcg DsDv; Inhale 1 puff into the lungs once daily.  Dispense: 60 each; Refill: 11  -     promethazine-dextromethorphan (PROMETHAZINE-DM) 6.25-15 mg/5 mL Syrp; Take 5 mLs by mouth nightly as needed (cough).  Dispense: 118 mL; Refill: 0    5. Severe aortic stenosis  -     Ambulatory referral/consult to Cardiology; Future; Expected date: 10/28/2024    6. Dyslipidemia    7. Unexplained weight loss  -     Ambulatory referral/consult to Pulmonology; Future; Expected date: 10/28/2024  -     Ambulatory referral/consult to Gastroenterology; Future; Expected date: 10/28/2024    8. Need for influenza vaccination  -     influenza (adjuvanted) (Fluad) 45 mcg/0.5 mL IM vaccine (> or = 66 yo) 0.5 mL    9. Aortic atherosclerosis   -stable, denies intermittent claudication     Follow up in about 3 months (around 1/21/2025).          Counseled on age and gender appropriate medical preventative services, including cancer screenings, immunizations, overall nutritional health, need for a consistent exercise regimen and an overall push towards maintaining a vigorous and active lifestyle.      This note was generated with the assistance of ambient listening  technology. Verbal consent was obtained by the patient and accompanying visitor(s) for the recording of patient appointment to facilitate this note. I attest to having reviewed and edited the generated note for accuracy, though some syntax or spelling errors may persist. Please contact the author of this note for any clarification.       10/21/2024 Liv Parrish NP

## 2024-12-03 ENCOUNTER — TELEPHONE (OUTPATIENT)
Dept: CARDIOLOGY | Facility: CLINIC | Age: 69
End: 2024-12-03
Payer: MEDICARE

## 2024-12-03 NOTE — TELEPHONE ENCOUNTER
----- Message from Sari sent at 12/3/2024  2:56 PM CST -----  Contact: Patient  Type:  Appointment Request    Caller is requesting a sooner appointment.  Caller declined first available appointment listed below.  Caller will not accept being placed on the waitlist and is requesting a message be sent to doctor.    Name of Caller:  Patient  When is the first available appointment?  N/A    Symptoms:  Chronic heart failure with preserved ejection fraction [I50.32]  Severe aortic stenosis [I35.0]     Would the patient rather a call back or a response via [facility]?  Call back  Best Call Back Number:  [phone]    Additional Information:   States she would like to speak with someone about scheduling an appointment with Dr Smart - please call - thank you

## 2024-12-10 ENCOUNTER — TELEPHONE (OUTPATIENT)
Dept: FAMILY MEDICINE | Facility: CLINIC | Age: 69
End: 2024-12-10
Payer: MEDICARE

## 2024-12-10 DIAGNOSIS — R91.8 PULMONARY NODULES: Primary | ICD-10-CM

## 2024-12-10 NOTE — TELEPHONE ENCOUNTER
Spoke to patient that 6 month follow-up CT is due and [facility] will be calling to schedule. Please order. Updated remind me.

## 2024-12-10 NOTE — TELEPHONE ENCOUNTER
----- Message from Dasha Rice sent at 7/9/2024  2:20 PM CDT -----  Regarding: repeat CT due, there was another remind me on lab.  Payton Browne MA [place]phal, Melissa, RT   Message from Liv Parrish NP sent at 6/8/2024 10:16 PM CDT -----  Please call pt and let her know lung CT scan shows a couple nodules in the lungs. There is only small amt of fluid in the lungs and heart is mildly enlarged, likely d/t mild CHF. Clarify how often she's taking the furosemide and if she's only taking QOD then recommend increasing to daily. Repeat BMP in 4 weeks. I would also recommend she schedule f/u appt with Dr. Smart, cardiologist given her hx of CHF.  Repeat chest CT without contrast in 6 months to monitor nodules and borderline enlarged lymph nodes in the chest

## 2025-01-02 ENCOUNTER — TELEPHONE (OUTPATIENT)
Dept: FAMILY MEDICINE | Facility: CLINIC | Age: 70
End: 2025-01-02
Payer: MEDICARE

## 2025-01-02 NOTE — TELEPHONE ENCOUNTER
----- Message from Taylor sent at 1/2/2025  3:02 PM CST -----  Please review patient's Appointment Desk for an upcoming PROLIA INJECTION appointment.       The following are needed for this appointment.   Reminding to please contact patient, if needed:      ORDER.  Current / active in the Epic Therapy Plan, signed within a year.  LABS. Serum Calcium within 6 weeks of treatment.    DEXA SCAN within the last 2 years   DENTAL PRECAUTION CONFIRMED WITH PATIENT. No recent invasive dental procedures within the last month (tooth extraction, etc). A patient will need to bring a Dental Clearance signed by a dental provider if there was any recent dental procedure within the last month.   FOLLOW-UP APPOINTMENT within the last 12 months with the diagnosis mentioned in the visit notes.         Any item unavailable by the day prior to the scheduled appointment will cause the appointment to be CANCELLED.        To reschedule appointments, please contact [facility] INFUSION SCHEDULING POOL or call [phone].       Thank you,  [facility], Infusion Department

## 2025-01-07 ENCOUNTER — TELEPHONE (OUTPATIENT)
Dept: FAMILY MEDICINE | Facility: CLINIC | Age: 70
End: 2025-01-07
Payer: MEDICARE

## 2025-01-07 DIAGNOSIS — E03.9 ACQUIRED HYPOTHYROIDISM: ICD-10-CM

## 2025-01-07 RX ORDER — LEVOTHYROXINE SODIUM 50 UG/1
50 TABLET ORAL
Qty: 90 TABLET | Refills: 1 | Status: SHIPPED | OUTPATIENT
Start: 2025-01-07 | End: 2026-01-07

## 2025-01-08 ENCOUNTER — TELEPHONE (OUTPATIENT)
Dept: FAMILY MEDICINE | Facility: CLINIC | Age: 70
End: 2025-01-08
Payer: MEDICARE

## 2025-01-08 DIAGNOSIS — Z79.899 ENCOUNTER FOR LONG-TERM (CURRENT) USE OF MEDICATIONS: Primary | ICD-10-CM

## 2025-01-08 DIAGNOSIS — E11.69 DM TYPE 2 WITH DIABETIC DYSLIPIDEMIA: ICD-10-CM

## 2025-01-08 DIAGNOSIS — E03.9 ACQUIRED HYPOTHYROIDISM: ICD-10-CM

## 2025-01-08 DIAGNOSIS — E78.5 DM TYPE 2 WITH DIABETIC DYSLIPIDEMIA: ICD-10-CM

## 2025-01-08 DIAGNOSIS — E78.5 DYSLIPIDEMIA: ICD-10-CM

## 2025-01-08 DIAGNOSIS — I10 ESSENTIAL HYPERTENSION: ICD-10-CM

## 2025-01-15 ENCOUNTER — TELEPHONE (OUTPATIENT)
Dept: INFUSION THERAPY | Facility: HOSPITAL | Age: 70
End: 2025-01-15

## 2025-01-15 NOTE — TELEPHONE ENCOUNTER
LM for patient regarding need for calcium level prior to injection tomorrow. Pt has standing orders from ordering provider. Call back number given.

## 2025-01-15 NOTE — TELEPHONE ENCOUNTER
Patient called to reschedule her injection for tomorrow and stated she hasn't gotten her labs done yet, but will call back to reschedule.  notified.

## 2025-01-19 DIAGNOSIS — E78.5 DYSLIPIDEMIA: ICD-10-CM

## 2025-01-20 RX ORDER — ATORVASTATIN CALCIUM 10 MG/1
10 TABLET, FILM COATED ORAL
Qty: 90 TABLET | Refills: 3 | Status: SHIPPED | OUTPATIENT
Start: 2025-01-20

## 2025-01-23 ENCOUNTER — TELEPHONE (OUTPATIENT)
Dept: FAMILY MEDICINE | Facility: CLINIC | Age: 70
End: 2025-01-23
Payer: MEDICARE

## 2025-01-23 NOTE — TELEPHONE ENCOUNTER
Left message on voice mail for the pt to call back, to rescheduled recent cancel appointment due to the weather.

## 2025-01-23 NOTE — TELEPHONE ENCOUNTER
----- Message from Kristel sent at 1/23/2025  4:01 PM CST -----  - 3:55- pt is calling rios cardoso   [phone]

## 2025-01-27 ENCOUNTER — OFFICE VISIT (OUTPATIENT)
Dept: FAMILY MEDICINE | Facility: CLINIC | Age: 70
End: 2025-01-27
Payer: MEDICARE

## 2025-01-27 VITALS
WEIGHT: 101 LBS | OXYGEN SATURATION: 98 % | SYSTOLIC BLOOD PRESSURE: 100 MMHG | DIASTOLIC BLOOD PRESSURE: 58 MMHG | HEIGHT: 61 IN | HEART RATE: 55 BPM | BODY MASS INDEX: 19.07 KG/M2

## 2025-01-27 DIAGNOSIS — K31.1 PYLORIC STENOSIS IN ADULT: ICD-10-CM

## 2025-01-27 DIAGNOSIS — F17.210 CIGARETTE SMOKER: ICD-10-CM

## 2025-01-27 DIAGNOSIS — E78.5 DM TYPE 2 WITH DIABETIC DYSLIPIDEMIA: Primary | ICD-10-CM

## 2025-01-27 DIAGNOSIS — I10 ESSENTIAL HYPERTENSION: ICD-10-CM

## 2025-01-27 DIAGNOSIS — H91.92 HEARING LOSS OF LEFT EAR, UNSPECIFIED HEARING LOSS TYPE: ICD-10-CM

## 2025-01-27 DIAGNOSIS — F33.0 MILD EPISODE OF RECURRENT MAJOR DEPRESSIVE DISORDER: ICD-10-CM

## 2025-01-27 DIAGNOSIS — E11.69 DM TYPE 2 WITH DIABETIC DYSLIPIDEMIA: Primary | ICD-10-CM

## 2025-01-27 DIAGNOSIS — I50.32 CHRONIC HEART FAILURE WITH PRESERVED EJECTION FRACTION: ICD-10-CM

## 2025-01-27 DIAGNOSIS — I35.0 SEVERE AORTIC STENOSIS: ICD-10-CM

## 2025-01-27 DIAGNOSIS — E78.5 DYSLIPIDEMIA: ICD-10-CM

## 2025-01-27 DIAGNOSIS — R91.8 PULMONARY NODULES: ICD-10-CM

## 2025-01-27 DIAGNOSIS — J44.9 CHRONIC OBSTRUCTIVE PULMONARY DISEASE, UNSPECIFIED COPD TYPE: ICD-10-CM

## 2025-01-27 DIAGNOSIS — E03.9 ACQUIRED HYPOTHYROIDISM: ICD-10-CM

## 2025-01-27 LAB — HBA1C MFR BLD: 5.5 %

## 2025-01-27 PROCEDURE — 3288F FALL RISK ASSESSMENT DOCD: CPT | Mod: CPTII,S$GLB,, | Performed by: NURSE PRACTITIONER

## 2025-01-27 PROCEDURE — 3044F HG A1C LEVEL LT 7.0%: CPT | Mod: CPTII,S$GLB,, | Performed by: NURSE PRACTITIONER

## 2025-01-27 PROCEDURE — 3078F DIAST BP <80 MM HG: CPT | Mod: CPTII,S$GLB,, | Performed by: NURSE PRACTITIONER

## 2025-01-27 PROCEDURE — 83036 HEMOGLOBIN GLYCOSYLATED A1C: CPT | Mod: QW,,, | Performed by: NURSE PRACTITIONER

## 2025-01-27 PROCEDURE — 3008F BODY MASS INDEX DOCD: CPT | Mod: CPTII,S$GLB,, | Performed by: NURSE PRACTITIONER

## 2025-01-27 PROCEDURE — 1160F RVW MEDS BY RX/DR IN RCRD: CPT | Mod: CPTII,S$GLB,, | Performed by: NURSE PRACTITIONER

## 2025-01-27 PROCEDURE — 3074F SYST BP LT 130 MM HG: CPT | Mod: CPTII,S$GLB,, | Performed by: NURSE PRACTITIONER

## 2025-01-27 PROCEDURE — 99406 BEHAV CHNG SMOKING 3-10 MIN: CPT | Mod: S$GLB,,, | Performed by: NURSE PRACTITIONER

## 2025-01-27 PROCEDURE — 1125F AMNT PAIN NOTED PAIN PRSNT: CPT | Mod: CPTII,S$GLB,, | Performed by: NURSE PRACTITIONER

## 2025-01-27 PROCEDURE — 1101F PT FALLS ASSESS-DOCD LE1/YR: CPT | Mod: CPTII,S$GLB,, | Performed by: NURSE PRACTITIONER

## 2025-01-27 PROCEDURE — 99214 OFFICE O/P EST MOD 30 MIN: CPT | Mod: 25,S$GLB,, | Performed by: NURSE PRACTITIONER

## 2025-01-27 PROCEDURE — 1159F MED LIST DOCD IN RCRD: CPT | Mod: CPTII,S$GLB,, | Performed by: NURSE PRACTITIONER

## 2025-01-27 RX ORDER — ESCITALOPRAM OXALATE 20 MG/1
20 TABLET ORAL DAILY
Qty: 90 TABLET | Refills: 3 | Status: SHIPPED | OUTPATIENT
Start: 2025-01-27 | End: 2026-01-27

## 2025-01-27 RX ORDER — PROPRANOLOL HYDROCHLORIDE 80 MG/1
80 CAPSULE, EXTENDED RELEASE ORAL DAILY
Qty: 90 CAPSULE | Refills: 3 | Status: SHIPPED | OUTPATIENT
Start: 2025-01-27 | End: 2026-01-27

## 2025-01-27 RX ORDER — PANTOPRAZOLE SODIUM 40 MG/1
40 TABLET, DELAYED RELEASE ORAL 2 TIMES DAILY
Qty: 180 TABLET | Refills: 3 | Status: SHIPPED | OUTPATIENT
Start: 2025-01-27

## 2025-01-27 NOTE — PROGRESS NOTES
SUBJECTIVE:    Patient ID: Liv Decker is a 70 y.o. female.    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  Reports keep him busy helps with her depression    Hx of COPD- Pt reports overall breathing is stable. Denies any significant SOB. Still smoking, about 4 cigs/days. At last visit in October was referred to Dr. Garcia, pulmonary however pt cancelled new pt appt. CT scan of chest in June 2024 showed Cardiomegaly, mild smooth interlobular septal thickening and peribronchial cuffing with trace bilateral pleural effusions suggestive of mild/trace interstitial pulmonary edema. Small ground-glass opacity in the anterior right upper lobe, possibly reactive/inflammatory or from trace edema. Borderline enlarged mediastinal lymph nodes. Recommendation was to repeat in 6 months and CT was ordered last month but pt hasn't had done yet    Also hasn't schedule f/u with Dr. Smart, cards yet- hx of CHF/valvular heart disease. Echo 10/2022 showed mod severe MR/TR and AS, EF 60%, pulm HTN    C/o's of dizziness with position changes- dizziness is brief, lasting less than a minute.  Denies any falls or syncope, no chest pain or palpitations    Pt has hx of pyloric stenosis= had EGD in 2022 with Dr. Villegas and had dilatation. Was supposed to have repeat EGD in 5 weeks but never followed up. Reports recently has been having some epigastric discomfort after certain meals and admits has actually vomited a couple times nearly undigested food. Pt reports she had called and scheduled appt with Dr. Villegas but then cancelled it.  Weight is down 7 lb since July visit                          Office Visit on 01/27/2025   Component Date Value Ref Range  Status    Hemoglobin A1C, POC 01/27/2025 5.5  % Final   Office Visit on 10/21/2024   Component Date Value Ref Range Status    Hemoglobin A1C, POC 10/21/2024 5.5  % Final       Past Medical History:   Diagnosis Date    Anemia     Depression     Diabetes mellitus 2021    Encounter for blood transfusion     GERD (gastroesophageal reflux disease)     Hyperlipidemia     Hypertension     MI (myocardial infarction) 2007    Osteoporosis     Thyroid disease     Ulcer of the stomach and intestine     pos. for h. pylori    Weight loss 2022    food comes up      Past Surgical History:   Procedure Laterality Date    APPENDECTOMY      CHOLECYSTECTOMY      COLONOSCOPY N/A 03/18/2016    Procedure: COLONOSCOPY;  Surgeon: Rober Zelaya Jr., MD;  Location: [facility] ENDO;  Service: Endoscopy;  Laterality: N/A;    ESOPHAGOGASTRODUODENOSCOPY  04/15/2016    with dil    ESOPHAGOGASTRODUODENOSCOPY N/A 05/20/2022    Procedure: EGD (ESOPHAGOGASTRODUODENOSCOPY);  Surgeon: Justine Villegas MD;  Location: [facility] ENDO;  Service: Endoscopy;  Laterality: N/A;    EYE SURGERY      as a child    TONSILLECTOMY      TUBAL LIGATION       Family History   Problem Relation Name Age of Onset    Heart disease Mother Bonna     Diabetes Father         Tests to Keep You Healthy    Tobacco Cessation: NO      The CVD Risk score (D'Agostino, et al., 2008) failed to calculate for the following reasons:    The patient has a prior MI, stroke, CHF, or peripheral vascular disease diagnosis     Marital Status:   Alcohol History:  reports no history of alcohol use.  Tobacco History:  reports that she has been smoking cigarettes. She started smoking about 66 years ago. She has a 31.8 pack-year smoking history. She has been exposed to tobacco smoke. She has never used smokeless tobacco.  Drug History:  reports no history of drug use.    Health Maintenance Topics with due status: Not Due       Topic Last Completion Date    TETANUS VACCINE 03/21/2019    Pneumococcal  Vaccines (Age 50+) 03/31/2021    Colorectal Cancer Screening 05/05/2021    LDCT Lung Screen 06/04/2024    Mammogram 06/04/2024    DEXA Scan 06/04/2024    Lipid Panel 07/10/2024    Foot Exam 01/27/2025    Hemoglobin A1c 01/27/2025     Immunization History   Administered Date(s) Administered    COVID-19, MRNA, LN-S, PF (MODERNA FULL 0.5 ML DOSE) 01/15/2021, 02/18/2021    COVID-19, mRNA, LNP-S, bivalent booster, PF (PFIZER OMICRON) 10/19/2022    Influenza 09/09/2009, 10/08/2010, 10/20/2011, 10/02/2012, 10/18/2013, 10/03/2014, 10/15/2015, 10/20/2016    Influenza (FLUAD) - Quadrivalent - Adjuvanted - PF *Preferred* (65+) 11/15/2023    Influenza - Quadrivalent 10/03/2014    Influenza - Quadrivalent - High Dose - PF (65 years and older) 01/25/2022, 10/10/2022    Influenza - Quadrivalent - PF *Preferred* (6 months and older) 10/20/2016, 10/15/2018, 10/16/2019    Influenza - Trivalent - Afluria, Fluzone MDV 09/09/2009, 10/08/2010, 10/20/2011, 10/02/2012, 10/18/2013, 10/15/2015, 10/15/2018, 10/07/2019    Influenza - Trivalent - Fluad - Adjuvanted - PF (65 years and older 10/21/2024    Influenza A (H1N1) 2009 Monovalent - IM 10/23/2009    Influenza Split 09/09/2009, 10/08/2010, 10/20/2011, 10/02/2012, 10/18/2013, 10/15/2015, 10/15/2018, 10/07/2019    Pneumococcal Conjugate - 13 Valent 03/31/2021    Pneumococcal Polysaccharide - 23 Valent 03/21/2019    Tdap 03/21/2019, 03/21/2019    Zoster 03/21/2019       Review of patient's allergies indicates:  No Known Allergies    Current Outpatient Medications:     albuterol (PROAIR HFA) 90 mcg/actuation inhaler, Inhale 2 puffs into the lungs every 6 (six) hours as needed for Wheezing or Shortness of Breath. Rescue, Disp: 18 g, Rfl: 5    albuterol (PROVENTIL) 2.5 mg /3 mL (0.083 %) nebulizer solution, Take 3 mLs (2.5 mg total) by nebulization every 6 (six) hours as needed for Wheezing or Shortness of Breath. Rescue, Disp: 120 mL, Rfl: 3    atorvastatin (LIPITOR) 10 MG tablet, TAKE 1  TABLET(10 MG) BY MOUTH EVERY DAY, Disp: 90 tablet, Rfl: 3    blood sugar diagnostic Strp, 1 each by In Vitro route once daily. (Patient not taking: Reported on 7/24/2024), Disp: 100 each, Rfl: 3    buPROPion (WELLBUTRIN XL) 150 MG TB24 tablet, Take 1 tablet (150 mg total) by mouth once daily., Disp: 90 tablet, Rfl: 3    calcium carbonate (OS-JOSELYN) 600 mg calcium (1,500 mg) Tab, Take 600 mg by mouth once daily., Disp: , Rfl:     empagliflozin (JARDIANCE) 10 mg tablet, Take 1 tablet (10 mg total) by mouth once daily., Disp: 30 tablet, Rfl: 11    ergocalciferol (ERGOCALCIFEROL) 50,000 unit Cap, Take 1 capsule (50,000 Units total) by mouth every 7 days., Disp: 12 capsule, Rfl: 3    EScitalopram oxalate (LEXAPRO) 20 MG tablet, Take 1 tablet (20 mg total) by mouth once daily., Disp: 90 tablet, Rfl: 3    fluticasone propionate (FLONASE) 50 mcg/actuation nasal spray, 1 spray (50 mcg total) by Each Nostril route once daily., Disp: 16 g, Rfl: 2    fluticasone-umeclidin-vilanter (TRELEGY ELLIPTA) 100-62.5-25 mcg DsDv, Inhale 1 puff into the lungs once daily., Disp: 60 each, Rfl: 11    furosemide (LASIX) 20 MG tablet, Take 1 tablet (20 mg total) by mouth every other day. (Patient taking differently: Take 20 mg by mouth once daily.), Disp: 45 tablet, Rfl: 1    gabapentin (NEURONTIN) 300 MG capsule, Take 1 capsule (300 mg total) by mouth 2 (two) times daily., Disp: 60 capsule, Rfl: 5    levothyroxine (SYNTHROID) 50 MCG tablet, Take 1 tablet (50 mcg total) by mouth before breakfast., Disp: 90 tablet, Rfl: 1    lisinopriL (PRINIVIL,ZESTRIL) 2.5 MG tablet, Take 1 tablet (2.5 mg total) by mouth once daily., Disp: 90 tablet, Rfl: 3    pantoprazole (PROTONIX) 40 MG tablet, Take 1 tablet (40 mg total) by mouth 2 (two) times daily., Disp: 180 tablet, Rfl: 3    promethazine-dextromethorphan (PROMETHAZINE-DM) 6.25-15 mg/5 mL Syrp, Take 5 mLs by mouth nightly as needed (cough)., Disp: 118 mL, Rfl: 0    propranoloL (INDERAL LA) 80 MG 24 hr  "capsule, Take 1 capsule (80 mg total) by mouth once daily., Disp: 90 capsule, Rfl: 3    Review of Systems   Constitutional:  Positive for unexpected weight change (wt down 1lb since Oct but down 7lbs since July). Negative for activity change, appetite change, chills and fever.   HENT:  Positive for hearing loss (left ear). Negative for rhinorrhea, sore throat and trouble swallowing.    Eyes:  Negative for discharge and visual disturbance.   Respiratory:  Positive for cough (mild, chronic clear to white). Negative for chest tightness, shortness of breath and wheezing.    Cardiovascular:  Negative for chest pain, palpitations and leg swelling.   Gastrointestinal:  Positive for abdominal pain and vomiting. Negative for blood in stool, constipation and diarrhea.   Endocrine: Negative for polydipsia and polyuria.   Genitourinary:  Positive for frequency. Negative for difficulty urinating, dysuria, hematuria and menstrual problem.   Musculoskeletal:  Positive for arthralgias and back pain. Negative for joint swelling and neck pain.   Skin:  Negative for rash.   Neurological:  Positive for dizziness. Negative for syncope, speech difficulty, weakness, numbness and headaches.   Psychiatric/Behavioral:  Negative for confusion, dysphoric mood, self-injury and suicidal ideas.           Objective:      Vitals:    01/27/25 1458   BP: (!) 100/58   Pulse: (!) 55   SpO2: 98%   Weight: 45.8 kg (101 lb)   Height: 5' 1" (1.549 m)     Physical Exam  Vitals and nursing note reviewed.   Constitutional:       General: She is not in acute distress.     Appearance: Normal appearance. She is well-developed. She is not toxic-appearing.      Comments: Thin frail white female, appears older than stated age   HENT:      Head: Normocephalic and atraumatic.      Right Ear: Tympanic membrane and ear canal normal.      Left Ear: Tympanic membrane and ear canal normal.   Neck:      Vascular: No carotid bruit.   Cardiovascular:      Rate and Rhythm: " Normal rate and regular rhythm.      Heart sounds: Murmur heard.      Systolic murmur is present with a grade of 3/6.   Pulmonary:      Effort: Pulmonary effort is normal. No respiratory distress.      Breath sounds: No wheezing or rales.      Comments: Slightly diminished throughout otherwise clear  Abdominal:      General: There is no distension.      Palpations: Abdomen is soft.      Tenderness: There is no abdominal tenderness.   Musculoskeletal:      Cervical back: Neck supple.      Right lower leg: No edema.      Left lower leg: No edema.   Lymphadenopathy:      Cervical: No cervical adenopathy.   Skin:     General: Skin is warm and dry.      Findings: No rash.   Neurological:      General: No focal deficit present.      Mental Status: She is alert and oriented to person, place, and time.      Gait: Gait normal.   Psychiatric:         Mood and Affect: Mood normal.         Speech: Speech normal.         Behavior: Behavior normal. Behavior is cooperative.         Thought Content: Thought content normal. Thought content does not include suicidal ideation.           Assessment:       1. DM type 2 with diabetic dyslipidemia    2. Essential hypertension    3. Chronic obstructive pulmonary disease, unspecified COPD type    4. Severe aortic stenosis    5. Chronic heart failure with preserved ejection fraction    6. Pyloric stenosis in adult    7. Dyslipidemia    8. Acquired hypothyroidism    9. Pulmonary nodules    10. Mild episode of recurrent major depressive disorder    11. Cigarette smoker    12. Hearing loss of left ear, unspecified hearing loss type           Plan:       1. DM type 2 with diabetic dyslipidemia  -A1c stable at 5.5%  -     Hemoglobin A1C, POCT  -      DIABETES FOOT EXAM    2. Essential hypertension  -BP stable however she does report some transient dizziness with position changes.  She is on Lasix daily so recommended updated labs to rule out dehydration  -     propranoloL (INDERAL LA) 80 MG 24  hr capsule; Take 1 capsule (80 mg total) by mouth once daily.  Dispense: 90 capsule; Refill: 3    3. Chronic obstructive pulmonary disease, unspecified COPD type  -patient denies any significant shortness of breath recently though I have again encouraged her to call and schedule follow-up Dr. Garcia.  Suspect severe COPD and needs updated PFTs    4. Severe aortic stenosis  -also stressed to patient the need follow-up with Cardiology given moderate to severe AS which could be playing a role in her dizziness.  She needs to call and schedule an appointment with Dr. Smart for updated echo    5. Chronic heart failure with preserved ejection fraction  -appears well compensated fluid wise today, needs updated labs and echo with cards    6. Pyloric stenosis in adult  -history of pyloric stenosis and now complaining of abdominal pain after eating and has not vomited on a couple occasions.  Weight is down 7 lb since July.  Stressed to patient again she needs to follow-up with her specialists and schedule an appointment Dr. Villegas  -     pantoprazole (PROTONIX) 40 MG tablet; Take 1 tablet (40 mg total) by mouth 2 (two) times daily.  Dispense: 180 tablet; Refill: 3    7. Dyslipidemia  -labs ordered    8. Acquired hypothyroidism  -needs updated labs    9. Pulmonary nodules  -due for follow-up CT scan of chest    10. Mild episode of recurrent major depressive disorder  -stable on medication  -     EScitalopram oxalate (LEXAPRO) 20 MG tablet; Take 1 tablet (20 mg total) by mouth once daily.  Dispense: 90 tablet; Refill: 3    11. Cigarette smoker  -continues to smoke few cigarettes a day.  Smoking cessation counseling provided  Assistance with smoking cessation was offered, including:  [x]  Medications  [x]  Counseling  []  Printed Information on Smoking Cessation  [x]  Referral to a Smoking Cessation Program    Patient was counseled regarding smoking for 3-10 minutes.     12. Hearing loss of left ear, unspecified hearing loss  type  -refer for hearing testing  -     Ambulatory referral/consult to Audiology; Future; Expected date: 02/03/2025      Follow up in about 3 months (around 4/27/2025) for COPD, Diabetes.          Counseled on age and gender appropriate medical preventative services, including cancer screenings, immunizations, overall nutritional health, need for a consistent exercise regimen and an overall push towards maintaining a vigorous and active lifestyle.      1/27/2025 Liv Parrish NP

## 2025-01-27 NOTE — PATIENT INSTRUCTIONS
Call Dr. Villegas's office at [phone] to schedule appointment    Come back to have blood work drawn within the next weeks    Call [facility] at [phone] to schedule CT scan of chest    Call to schedule follow up appointments with Dr. Smart, cardiology [phone] and Dr. Garcia, pulmonology [phone]    Audiology, [facility]- call to schedule hearing test  [address] [facility] [id number]  Phone: [phone]

## 2025-02-04 ENCOUNTER — INFUSION (OUTPATIENT)
Dept: INFUSION THERAPY | Facility: HOSPITAL | Age: 70
End: 2025-02-04
Attending: NURSE PRACTITIONER
Payer: MEDICARE

## 2025-02-04 VITALS
WEIGHT: 104 LBS | HEART RATE: 78 BPM | TEMPERATURE: 98 F | HEIGHT: 61 IN | OXYGEN SATURATION: 98 % | DIASTOLIC BLOOD PRESSURE: 68 MMHG | SYSTOLIC BLOOD PRESSURE: 133 MMHG | RESPIRATION RATE: 15 BRPM | BODY MASS INDEX: 19.63 KG/M2

## 2025-02-04 DIAGNOSIS — M81.0 AGE-RELATED OSTEOPOROSIS WITHOUT CURRENT PATHOLOGICAL FRACTURE: Primary | ICD-10-CM

## 2025-02-04 PROCEDURE — 63600175 PHARM REV CODE 636 W HCPCS: Mod: JZ,TB | Performed by: NURSE PRACTITIONER

## 2025-02-04 PROCEDURE — 96372 THER/PROPH/DIAG INJ SC/IM: CPT

## 2025-02-04 RX ADMIN — DENOSUMAB 60 MG: 60 INJECTION SUBCUTANEOUS at 03:02

## 2025-02-04 NOTE — PLAN OF CARE
Problem: Fall Injury Risk  Goal: Absence of Fall and Fall-Related Injury  Outcome: Progressing  Intervention: Identify and Manage Contributors  Flowsheets (Taken 2/4/2025 1546)  Self-Care Promotion:   independence encouraged   BADL personal objects within reach   adaptive equipment use encouraged  Medication Review/Management: medications reviewed  Intervention: Promote Injury-Free Environment  Flowsheets (Taken 2/4/2025 1546)  Safety Promotion/Fall Prevention: medications reviewed

## 2025-02-06 ENCOUNTER — TELEPHONE (OUTPATIENT)
Dept: FAMILY MEDICINE | Facility: CLINIC | Age: 70
End: 2025-02-06
Payer: MEDICARE

## 2025-02-06 NOTE — TELEPHONE ENCOUNTER
----- Message from Nurse Yen sent at 1/23/2025 12:44 PM CST -----  Regarding: FW: repeat CT due, there was another remind me on lab.    ----- Message -----  From: Yovana Escamilla LPN  Sent: 1/23/2025  12:44 PM CST  To: Liv Parrish Staff  Subject: FW: repeat CT due, there was another remind #      ----- Message -----  From: Krystal Medina, RT  Sent: 12/10/2024  12:00 AM CST  To: RT Jose L  Subject: repeat CT due, there was another remind me o#    Payton Browne MA Westphal, Melissa, RT   Message from Liv Parrish NP sent at 6/8/2024 10:16 PM CDT -----  Please call pt and let her know lung CT scan shows a couple nodules in the lungs. There is only small amt of fluid in the lungs and heart is mildly enlarged, likely d/t mild CHF. Clarify how often she's taking the furosemide and if she's only taking QOD then recommend increasing to daily. Repeat BMP in 4 weeks. I would also recommend she schedule f/u appt with Dr. Smart, cardiologist given her hx of CHF.  Repeat chest CT without contrast in 6 months to monitor nodules and borderline enlarged lymph nodes in the chest

## 2025-02-06 NOTE — TELEPHONE ENCOUNTER
Pt was seen on 01/27/2025, with Liv. Looks like Liv did discuss repeating her CT scan at office visit.

## 2025-02-11 ENCOUNTER — PATIENT OUTREACH (OUTPATIENT)
Dept: ADMINISTRATIVE | Facility: HOSPITAL | Age: 70
End: 2025-02-11
Payer: MEDICARE

## 2025-02-18 ENCOUNTER — TELEPHONE (OUTPATIENT)
Dept: FAMILY MEDICINE | Facility: CLINIC | Age: 70
End: 2025-02-18
Payer: MEDICARE

## 2025-02-18 NOTE — TELEPHONE ENCOUNTER
----- Message from Nurse Welch sent at 2/18/2025 11:37 AM CST -----    ----- Message -----  From: Prema Rodriguez  Sent: 2/18/2025  11:37 AM CST  To: Barrtet Dumont Staff    The patient wants a call from Payton. Pt's # [phone] GH

## 2025-02-18 NOTE — TELEPHONE ENCOUNTER
Spoke with pt in regards to recent message sent. Pt stated that she is going to drop off some papers that need to filled out by her provider. Verbalized to pt that she can drop off the paper, we'll call her when the paper work is done. Pt acknowledged understanding.

## 2025-02-19 ENCOUNTER — TELEPHONE (OUTPATIENT)
Dept: CARDIOLOGY | Facility: CLINIC | Age: 70
End: 2025-02-19
Payer: MEDICARE

## 2025-02-19 DIAGNOSIS — I10 ESSENTIAL HYPERTENSION: ICD-10-CM

## 2025-02-19 DIAGNOSIS — R91.8 PULMONARY NODULES: Primary | ICD-10-CM

## 2025-02-19 DIAGNOSIS — M54.42 ACUTE LEFT-SIDED LOW BACK PAIN WITH LEFT-SIDED SCIATICA: ICD-10-CM

## 2025-02-19 RX ORDER — GABAPENTIN 300 MG/1
300 CAPSULE ORAL 2 TIMES DAILY
Qty: 180 CAPSULE | Refills: 3 | Status: SHIPPED | OUTPATIENT
Start: 2025-02-19

## 2025-02-19 RX ORDER — LISINOPRIL 2.5 MG/1
2.5 TABLET ORAL DAILY
Qty: 90 TABLET | Refills: 3 | Status: SHIPPED | OUTPATIENT
Start: 2025-02-19

## 2025-02-19 RX ORDER — LISINOPRIL 2.5 MG/1
TABLET ORAL
Qty: 90 TABLET | Refills: 3 | OUTPATIENT
Start: 2025-02-19

## 2025-02-19 RX ORDER — GABAPENTIN 300 MG/1
300 CAPSULE ORAL 2 TIMES DAILY
Qty: 60 CAPSULE | Refills: 5 | OUTPATIENT
Start: 2025-02-19

## 2025-02-19 NOTE — TELEPHONE ENCOUNTER
----- Message from Osei sent at 2/19/2025 12:20 PM CST -----  Regarding: appt  Type:  Sooner Apoointment RequestName of Caller:jenniferWhelen is the first available appointment?dept booked Symptoms:Chronic heart failure with preserved ejection fraction/ referral on chart Best Call Back Number:[phone] Information: pt is looking to get schedule has a referral from 10/24.  please call to discuss.

## 2025-02-19 NOTE — TELEPHONE ENCOUNTER
----- Message from Kristel sent at 2/19/2025 12:58 PM CST -----  - 12:35- pt needs to have a ct scan but the imaging facility does not have an order [phone]

## 2025-02-19 NOTE — TELEPHONE ENCOUNTER
Can not use the December orders, reordered low dose CT chest and also needed gabapentin refills on file and running out of lisinopril

## 2025-02-20 ENCOUNTER — TELEPHONE (OUTPATIENT)
Dept: FAMILY MEDICINE | Facility: CLINIC | Age: 70
End: 2025-02-20
Payer: MEDICARE

## 2025-02-20 NOTE — TELEPHONE ENCOUNTER
----- Message from Adrian sent at 2/20/2025  3:29 PM CST -----  Pt dropped off form to be completed and faxed back. It's in the folder up front.[phone]

## 2025-02-21 ENCOUNTER — HOSPITAL ENCOUNTER (OUTPATIENT)
Dept: RADIOLOGY | Facility: HOSPITAL | Age: 70
Discharge: HOME | End: 2025-02-21
Attending: NURSE PRACTITIONER
Payer: MEDICARE

## 2025-02-21 DIAGNOSIS — Z00.00 ENCOUNTER FOR MEDICARE ANNUAL WELLNESS EXAM: ICD-10-CM

## 2025-02-21 DIAGNOSIS — R91.8 PULMONARY NODULES: ICD-10-CM

## 2025-02-21 PROCEDURE — 71250 CT THORAX DX C-: CPT | Mod: TC,PO

## 2025-02-21 PROCEDURE — 71250 CT THORAX DX C-: CPT | Mod: 26,,, | Performed by: RADIOLOGY

## 2025-02-23 ENCOUNTER — RESULTS FOLLOW-UP (OUTPATIENT)
Dept: FAMILY MEDICINE | Facility: CLINIC | Age: 70
End: 2025-02-23
Payer: MEDICARE

## 2025-02-24 ENCOUNTER — TELEPHONE (OUTPATIENT)
Dept: FAMILY MEDICINE | Facility: CLINIC | Age: 70
End: 2025-02-24
Payer: MEDICARE

## 2025-02-24 NOTE — TELEPHONE ENCOUNTER
Notified of results and reminder set for 3 month CT chest repeat. Also notified that the [facility] forms have already been faxed back

## 2025-02-24 NOTE — TELEPHONE ENCOUNTER
----- Message from Kristel sent at 2/24/2025  7:19 AM CST -----  - 2/21- 1:31 pm- pt would like to talk to rios [phone]

## 2025-02-24 NOTE — TELEPHONE ENCOUNTER
----- Message from Med Assistant Cordoba sent at 2/24/2025 11:33 AM CST -----  Patient is returning melissa's callPt: [phone]

## 2025-02-24 NOTE — TELEPHONE ENCOUNTER
----- Message from Kristel sent at 2/24/2025 10:50 AM CST -----  - 10:21- pt needs a paper she dropped off, faxed to medicare today. If not done today she will get kicked out of the program [phone]

## 2025-03-08 NOTE — PROGRESS NOTES
Subjective:    Patient ID:  Liv Decker is a 70 y.o. female who presents for follow-up of   Chief Complaint   Patient presents with    Shortness of Breath    Establish Care       HPI:    She is referred by iLv Parrish for valvular heart disease history    She reports she has a history of heart attack years ago around 2010 and was treated medically.  She was smoking at that time and quit smoking for 7 years till her older sisters passed away.  She has started back 2018 she currently smokes about 3 or 4 cigarettes a day.  She has no cardiac symptoms of edema chest pain.  She has recently increased her Lasix to 20 mg once a day from every other day.  She has losing some weight but denies any cardiac symptoms.  Her previous echo was shown below which had moderate severe aortic stenosis tricuspid regurgitation pulmonary hypertension and mitral regurgitation.    She is still physically active and works part-time as a sitter.  She had 4 children, 12 grandchildren and 14 great grandchild and 1 on the way.      Narrative & Impression    EXAMINATION:  CT LOW DOSE CHEST DIAGNOSTIC     CLINICAL HISTORY:  Other nonspecific abnormal finding of lung field Pneumonia;     TECHNIQUE:  Axial imaging obtained through the chest without IV contrast     CMS Mandated Quality Data-CT Radiation Dose-436     All CT scans at this facility dose modulation, iterative reconstruction, and or weight-based dosing when appropriate to reduce radiation dose to as low as reasonably achievable.     COMPARISON:  06/04/2024     FINDINGS:  Biapical pleuroparenchymal scarring, right greater than left.     New 6 mm pulmonary nodule right upper lobe (image 63 series 4) with small central cavitation.     Clustered 2-3 mm nodular opacities involving the lateral aspect of the right middle lobe which are also new compared to prior.     Ground-glass nodule in the anterior right upper lobe described on the reference exam has resolved.     3 mm subpleural  anterior right upper lobe pulmonary nodule on image 95 series 4 is stable.     4 mm medial right lower lobe pulmonary nodule is stable on image 214 series 4.     Calcified granuloma within the lateral aspect of the right lower lobe.     No airspace consolidation.  No pleural effusion or pneumothorax.     Atherosclerotic calcification of the aorta and coronary arteries.  Normal heart size.  Central airways are patent.  Esophagus is unremarkable.  Prominent mediastinal lymph nodes which are stable compared to prior.     Bone window images show no acute or aggressive osseous abnormality.  Imaging through the upper abdomen shows no acute pathology.     Impression:     New 6 mm right upper lobe pulmonary nodule.     LUNG-RADS CATEGORY 4A: SUSPICIOUS FINDINGS     RECOMMENDATION: 3 month follow-up LDCT.        Electronically signed by:Clinton Weiss  Date:                                            02/21/2025  Time:                                           16:53        Review of patient's allergies indicates:  No Known Allergies    Past Medical History:   Diagnosis Date    Anemia     Depression     Diabetes mellitus 2021    Encounter for blood transfusion     GERD (gastroesophageal reflux disease)     Hyperlipidemia     Hypertension     MI (myocardial infarction) 2007    Osteoporosis     Thyroid disease     Ulcer of the stomach and intestine     pos. for h. pylori    Weight loss 2022    food comes up      Past Surgical History:   Procedure Laterality Date    APPENDECTOMY      CHOLECYSTECTOMY      COLONOSCOPY N/A 03/18/2016    Procedure: COLONOSCOPY;  Surgeon: Rober Zelaya Jr., MD;  Location: [facility];  Service: Endoscopy;  Laterality: N/A;    ESOPHAGOGASTRODUODENOSCOPY  04/15/2016    with dil    ESOPHAGOGASTRODUODENOSCOPY N/A 05/20/2022    Procedure: EGD (ESOPHAGOGASTRODUODENOSCOPY);  Surgeon: Justine Villegas MD;  [facility];  Service: Endoscopy;  Laterality: N/A;    EYE SURGERY      as a child     TONSILLECTOMY      TUBAL LIGATION       Social History[1]  Family History   Problem Relation Name Age of Onset    Heart disease Mother John     Diabetes Father          Review of Systems:   Constitution: Negative for diaphoresis and fever.   HEENT: Negative for nosebleeds.    Cardiovascular: Negative for chest pain       No dyspnea on exertion       No leg swelling        No palpitations  Respiratory: Negative for shortness of breath and wheezing.    Hematologic/Lymphatic: Negative for bleeding problem. Does not bruise/bleed easily.   Skin: Negative for color change and rash.   Musculoskeletal: Negative for falls and myalgias.   Gastrointestinal: Negative for hematemesis and hematochezia.   Genitourinary: Negative for hematuria.   Neurological: Negative for dizziness and light-headedness.   Psychiatric/Behavioral: Negative for altered mental status and memory loss.          Objective:        Vitals:    03/10/25 1143   BP: (!) 162/82   Pulse: 65       Lab Results   Component Value Date    WBC 5.0 01/28/2025    HGB 12.5 01/28/2025    HCT 38.0 01/28/2025     (L) 01/28/2025    CHOL 127 01/28/2025    TRIG 143 01/28/2025    HDL 39 (L) 01/28/2025    ALT 9 01/28/2025    AST 9 (L) 01/28/2025     01/28/2025    K 3.8 01/28/2025     01/28/2025    CREATININE 0.81 01/28/2025    BUN 15 01/28/2025    CO2 31 01/28/2025    INR 1.2 10/13/2022    HGBA1C 5.4 01/28/2025    MICROALBUR 0.8 01/28/2025        ECHOCARDIOGRAM RESULTS  Results for orders placed during the hospital encounter of 10/12/22    Echo    Interpretation Summary  · The left ventricle is normal in size with normal systolic function.  · The estimated ejection fraction is 60%.  · Grade II left ventricular diastolic dysfunction.  · Normal right ventricular size with normal right ventricular systolic function.  · Mild left atrial enlargement.  · Mild right atrial enlargement.  · Moderate-to-severe mitral regurgitation.  · Moderate to severe tricuspid  regurgitation.  · There is moderate-to-severe aortic valve stenosis.  · Aortic valve area is 0.92 cm2; peak velocity is m/s; mean gradient is 21 mmHg.  · Mild-to-moderate aortic regurgitation.  · Intermediate central venous pressure (8 mmHg).  · There is pulmonary hypertension.  · The estimated PA systolic pressure is 53 mmHg.        CURRENT/PREVIOUS VISIT EKG  Results for orders placed or performed during the hospital encounter of 10/12/22   EKG 12-lead    Collection Time: 10/13/22 10:07 AM    Narrative    Test Reason : I49.9,    Vent. Rate : 079 BPM     Atrial Rate : 079 BPM     P-R Int : 152 ms          QRS Dur : 084 ms      QT Int : 390 ms       P-R-T Axes : 070 076 075 degrees     QTc Int : 447 ms    Sinus rhythm with Premature atrial complexes with Aberrant conduction  Minimal voltage criteria for LVH, may be normal variant ( Gustavo product )  Borderline Abnormal ECG  When compared with ECG of 12-OCT-2022 19:24,  Aberrant conduction is now Present  Confirmed by Wan FISHMAN, Barrett BOWERS ([id number]) on 10/19/2022 5:19:34 PM    Referred By: CORTES   SELF           Confirmed By:Barrett Blas MD     No valid procedures specified.   Results for orders placed during the hospital encounter of 08/10/19    Treadmill Stress Test    Interpretation Summary    The EKG portion of this study is negative for ischemia.    The patient reported no chest pain during the stress test.      Physical Exam:  CONSTITUTIONAL: No fever, no chills  HEENT: Normocephalic, atraumatic,pupils reactive to light                 NECK:  No JVD no carotid bruit  CVS: S1S2+, RRR, HARSH CRESCENDO DECRESCENDO LATE PEAKING SYSTOLIC EJECTION MURMUR UPPER RIGHT STERNAL BORDER GOES THROUGH TO THE BACK LUNGS: Clear  ABDOMEN: Soft, NT, BS+  EXTREMITIES: No cyanosis, edema  : No lentz catheter  NEURO: AAO X 3  PSY: Normal affect      Medication List with Changes/Refills   Current Medications    ALBUTEROL (PROAIR HFA) 90 MCG/ACTUATION INHALER    Inhale 2 puffs  into the lungs every 6 (six) hours as needed for Wheezing or Shortness of Breath. Rescue    ALBUTEROL (PROVENTIL) 2.5 MG /3 ML (0.083 %) NEBULIZER SOLUTION    Take 3 mLs (2.5 mg total) by nebulization every 6 (six) hours as needed for Wheezing or Shortness of Breath. Rescue    ATORVASTATIN (LIPITOR) 10 MG TABLET    TAKE 1 TABLET(10 MG) BY MOUTH EVERY DAY    BLOOD SUGAR DIAGNOSTIC STRP    1 each by In Vitro route once daily.    BUPROPION (WELLBUTRIN XL) 150 MG TB24 TABLET    Take 1 tablet (150 mg total) by mouth once daily.    CALCIUM CARBONATE (OS-JOSELYN) 600 MG CALCIUM (1,500 MG) TAB    Take 600 mg by mouth once daily.    EMPAGLIFLOZIN (JARDIANCE) 10 MG TABLET    Take 1 tablet (10 mg total) by mouth once daily.    ERGOCALCIFEROL (ERGOCALCIFEROL) 50,000 UNIT CAP    Take 1 capsule (50,000 Units total) by mouth every 7 days.    ESCITALOPRAM OXALATE (LEXAPRO) 20 MG TABLET    Take 1 tablet (20 mg total) by mouth once daily.    FLUTICASONE PROPIONATE (FLONASE) 50 MCG/ACTUATION NASAL SPRAY    1 spray (50 mcg total) by Each Nostril route once daily.    FLUTICASONE-UMECLIDIN-VILANTER (TRELEGY ELLIPTA) 100-62.5-25 MCG DSDV    Inhale 1 puff into the lungs once daily.    GABAPENTIN (NEURONTIN) 300 MG CAPSULE    Take 1 capsule (300 mg total) by mouth 2 (two) times daily.    LEVOTHYROXINE (SYNTHROID) 50 MCG TABLET    Take 1 tablet (50 mcg total) by mouth before breakfast.    LISINOPRIL (PRINIVIL,ZESTRIL) 2.5 MG TABLET    Take 1 tablet (2.5 mg total) by mouth once daily.    PANTOPRAZOLE (PROTONIX) 40 MG TABLET    Take 1 tablet (40 mg total) by mouth 2 (two) times daily.    PROMETHAZINE-DEXTROMETHORPHAN (PROMETHAZINE-DM) 6.25-15 MG/5 ML SYRP    Take 5 mLs by mouth nightly as needed (cough).    PROPRANOLOL (INDERAL LA) 80 MG 24 HR CAPSULE    Take 1 capsule (80 mg total) by mouth once daily.   Changed and/or Refilled Medications    Modified Medication Previous Medication    FUROSEMIDE (LASIX) 20 MG TABLET furosemide (LASIX) 20 MG  tablet       Take 1 tablet (20 mg total) by mouth once daily.    Take 1 tablet (20 mg total) by mouth every other day.             Assessment:       1. Coronary artery calcification    2. Aortic calcification    3. SOB (shortness of breath)    4. Aortic valve stenosis, etiology of cardiac valve disease unspecified    5. Mitral valve insufficiency, unspecified etiology    6. Tricuspid valve insufficiency, unspecified etiology    7. Hypertensive pulmonary vascular disease    8. CHF (congestive heart failure), NYHA class I, chronic, diastolic    9. Body mass index (BMI) of 19.9 or less in adult    10. Chronic heart failure with preserved ejection fraction    11. Tobacco dependence         Plan:     Problem List Items Addressed This Visit          Cardiac/Vascular    Chronic heart failure with preserved ejection fraction    Relevant Medications    furosemide (LASIX) 20 MG tablet     Other Visit Diagnoses         Coronary artery calcification    -  Primary    Relevant Orders    BNP    Basic Metabolic Panel    Echo Saline Bubble? No      Aortic calcification        Relevant Orders    BNP    Basic Metabolic Panel    Echo Saline Bubble? No      SOB (shortness of breath)        Relevant Orders    IN OFFICE EKG 12-LEAD (to Muse)      Aortic valve stenosis, etiology of cardiac valve disease unspecified        Relevant Orders    BNP    Basic Metabolic Panel    Echo Saline Bubble? No      Mitral valve insufficiency, unspecified etiology          Tricuspid valve insufficiency, unspecified etiology          Hypertensive pulmonary vascular disease          CHF (congestive heart failure), NYHA class I, chronic, diastolic          Body mass index (BMI) of 19.9 or less in adult          Tobacco dependence              Valvular heart disease moderate severe aortic stenosis    Pulmonary hypertension    Moderate severe tricuspid regurgitation    Moderate severe mitral regurgitation    Chronic diastolic heart failure secondary above  CONTINUE GOAL-DIRECTED THERAPY    HYPERTENSION SHE DID NOT TAKE HER MEDICATIONS THIS MORNING CONTINUE TO MONITOR    Recommend to check echocardiogram to look at the valvular structures as well as a BNP level and a chest x-ray      Follow up in about 4 weeks (around 2025).    The patients questions were answered, they verbalized understanding, and agreed with the treatment plan.     LEODAN DOMINGO MD  [facility]         [1]   Social History  Tobacco Use    Smoking status: Some Days     Current packs/day: 0.00     Average packs/day: 0.5 packs/day for 63.7 years (31.8 ttl pk-yrs)     Types: Cigarettes     Start date: 1959     Last attempt to quit: 2022     Years since quittin.5     Passive exposure: Past    Smokeless tobacco: Never    Tobacco comments:     current 4ppd, prior use was 1ppd for 45 years   Substance Use Topics    Alcohol use: No    Drug use: No

## 2025-03-10 ENCOUNTER — OFFICE VISIT (OUTPATIENT)
Dept: CARDIOLOGY | Facility: CLINIC | Age: 70
End: 2025-03-10
Payer: MEDICARE

## 2025-03-10 VITALS
WEIGHT: 103 LBS | BODY MASS INDEX: 19.46 KG/M2 | SYSTOLIC BLOOD PRESSURE: 162 MMHG | HEART RATE: 65 BPM | DIASTOLIC BLOOD PRESSURE: 82 MMHG | OXYGEN SATURATION: 98 %

## 2025-03-10 DIAGNOSIS — F17.200 TOBACCO DEPENDENCE: ICD-10-CM

## 2025-03-10 DIAGNOSIS — I25.10 CORONARY ARTERY CALCIFICATION: Primary | ICD-10-CM

## 2025-03-10 DIAGNOSIS — I35.0 AORTIC VALVE STENOSIS, ETIOLOGY OF CARDIAC VALVE DISEASE UNSPECIFIED: ICD-10-CM

## 2025-03-10 DIAGNOSIS — I07.1 TRICUSPID VALVE INSUFFICIENCY, UNSPECIFIED ETIOLOGY: ICD-10-CM

## 2025-03-10 DIAGNOSIS — I34.0 MITRAL VALVE INSUFFICIENCY, UNSPECIFIED ETIOLOGY: ICD-10-CM

## 2025-03-10 DIAGNOSIS — I27.20 HYPERTENSIVE PULMONARY VASCULAR DISEASE: ICD-10-CM

## 2025-03-10 DIAGNOSIS — I50.32 CHRONIC HEART FAILURE WITH PRESERVED EJECTION FRACTION: ICD-10-CM

## 2025-03-10 DIAGNOSIS — I50.32 CHF (CONGESTIVE HEART FAILURE), NYHA CLASS I, CHRONIC, DIASTOLIC: ICD-10-CM

## 2025-03-10 DIAGNOSIS — R06.02 SOB (SHORTNESS OF BREATH): ICD-10-CM

## 2025-03-10 DIAGNOSIS — I70.0 AORTIC CALCIFICATION: ICD-10-CM

## 2025-03-10 PROCEDURE — 3288F FALL RISK ASSESSMENT DOCD: CPT | Mod: CPTII,S$GLB,, | Performed by: GENERAL PRACTICE

## 2025-03-10 PROCEDURE — 3079F DIAST BP 80-89 MM HG: CPT | Mod: CPTII,S$GLB,, | Performed by: GENERAL PRACTICE

## 2025-03-10 PROCEDURE — 3008F BODY MASS INDEX DOCD: CPT | Mod: CPTII,S$GLB,, | Performed by: GENERAL PRACTICE

## 2025-03-10 PROCEDURE — 1101F PT FALLS ASSESS-DOCD LE1/YR: CPT | Mod: CPTII,S$GLB,, | Performed by: GENERAL PRACTICE

## 2025-03-10 PROCEDURE — 4010F ACE/ARB THERAPY RXD/TAKEN: CPT | Mod: CPTII,S$GLB,, | Performed by: GENERAL PRACTICE

## 2025-03-10 PROCEDURE — 99999 PR PBB SHADOW E&M-EST. PATIENT-LVL III: CPT | Mod: PBBFAC,,, | Performed by: GENERAL PRACTICE

## 2025-03-10 PROCEDURE — 3061F NEG MICROALBUMINURIA REV: CPT | Mod: CPTII,S$GLB,, | Performed by: GENERAL PRACTICE

## 2025-03-10 PROCEDURE — 99204 OFFICE O/P NEW MOD 45 MIN: CPT | Mod: 25,S$GLB,, | Performed by: GENERAL PRACTICE

## 2025-03-10 PROCEDURE — 3044F HG A1C LEVEL LT 7.0%: CPT | Mod: CPTII,S$GLB,, | Performed by: GENERAL PRACTICE

## 2025-03-10 PROCEDURE — 1160F RVW MEDS BY RX/DR IN RCRD: CPT | Mod: CPTII,S$GLB,, | Performed by: GENERAL PRACTICE

## 2025-03-10 PROCEDURE — 93000 ELECTROCARDIOGRAM COMPLETE: CPT | Mod: S$GLB,,, | Performed by: GENERAL PRACTICE

## 2025-03-10 PROCEDURE — 3066F NEPHROPATHY DOC TX: CPT | Mod: CPTII,S$GLB,, | Performed by: GENERAL PRACTICE

## 2025-03-10 PROCEDURE — 3077F SYST BP >= 140 MM HG: CPT | Mod: CPTII,S$GLB,, | Performed by: GENERAL PRACTICE

## 2025-03-10 PROCEDURE — 1159F MED LIST DOCD IN RCRD: CPT | Mod: CPTII,S$GLB,, | Performed by: GENERAL PRACTICE

## 2025-03-10 RX ORDER — FUROSEMIDE 20 MG/1
20 TABLET ORAL DAILY
Qty: 45 TABLET | Refills: 1 | Status: SHIPPED | OUTPATIENT
Start: 2025-03-10 | End: 2026-03-10

## 2025-03-11 LAB
OHS QRS DURATION: 82 MS
OHS QTC CALCULATION: 427 MS

## 2025-03-13 ENCOUNTER — TELEPHONE (OUTPATIENT)
Dept: FAMILY MEDICINE | Facility: CLINIC | Age: 70
End: 2025-03-13
Payer: MEDICARE

## 2025-03-13 NOTE — TELEPHONE ENCOUNTER
----- Message from Prema sent at 3/13/2025 12:39 PM CDT -----  Refill Propanolol 80 mg. [facility] on . Pt's # [phone] GH

## 2025-03-20 DIAGNOSIS — R10.12 ABDOMINAL PAIN, LEFT UPPER QUADRANT: Primary | ICD-10-CM

## 2025-03-25 ENCOUNTER — HOSPITAL ENCOUNTER (OUTPATIENT)
Dept: CARDIOLOGY | Facility: HOSPITAL | Age: 70
Discharge: HOME | End: 2025-03-25
Attending: GENERAL PRACTICE
Payer: MEDICARE

## 2025-03-25 ENCOUNTER — HOSPITAL ENCOUNTER (OUTPATIENT)
Dept: RADIOLOGY | Facility: HOSPITAL | Age: 70
Discharge: HOME | End: 2025-03-25
Attending: INTERNAL MEDICINE
Payer: MEDICARE

## 2025-03-25 VITALS — BODY MASS INDEX: 19.43 KG/M2 | HEIGHT: 61 IN | WEIGHT: 102.94 LBS

## 2025-03-25 DIAGNOSIS — I25.10 CORONARY ARTERY CALCIFICATION: ICD-10-CM

## 2025-03-25 DIAGNOSIS — I35.0 AORTIC VALVE STENOSIS, ETIOLOGY OF CARDIAC VALVE DISEASE UNSPECIFIED: ICD-10-CM

## 2025-03-25 DIAGNOSIS — R10.12 ABDOMINAL PAIN, LEFT UPPER QUADRANT: ICD-10-CM

## 2025-03-25 DIAGNOSIS — I70.0 AORTIC CALCIFICATION: ICD-10-CM

## 2025-03-25 PROCEDURE — 93306 TTE W/DOPPLER COMPLETE: CPT | Mod: 26,,, | Performed by: GENERAL PRACTICE

## 2025-03-25 PROCEDURE — 74177 CT ABD & PELVIS W/CONTRAST: CPT | Mod: TC

## 2025-03-25 PROCEDURE — 25500020 PHARM REV CODE 255: Performed by: INTERNAL MEDICINE

## 2025-03-25 PROCEDURE — 93306 TTE W/DOPPLER COMPLETE: CPT

## 2025-03-25 PROCEDURE — 74177 CT ABD & PELVIS W/CONTRAST: CPT | Mod: 26,,, | Performed by: RADIOLOGY

## 2025-03-25 RX ADMIN — IOHEXOL 100 ML: 350 INJECTION, SOLUTION INTRAVENOUS at 02:03

## 2025-03-26 LAB
AORTIC ROOT ANNULUS: 2.3 CM
AORTIC VALVE CUSP SEPERATION: 0.7 CM
APICAL FOUR CHAMBER EJECTION FRACTION: 65 %
APICAL TWO CHAMBER EJECTION FRACTION: 61 %
AV INDEX (PROSTH): 0.23
AV MEAN GRADIENT: 29 MMHG
AV PEAK GRADIENT: 44 MMHG
AV REGURGITATION PRESSURE HALF TIME: 510 MS
AV VALVE AREA BY VELOCITY RATIO: 0.8 CM²
AV VALVE AREA: 0.7 CM²
AV VELOCITY RATIO: 0.24
BSA FOR ECHO PROCEDURE: 1.42 M2
CV ECHO LV RWT: 0.33 CM
DOP CALC AO PEAK VEL: 3.3 M/S
DOP CALC AO VTI: 92.1 CM
DOP CALC LVOT AREA: 3.1 CM2
DOP CALC LVOT DIAMETER: 2 CM
DOP CALC LVOT PEAK VEL: 0.8 M/S
DOP CALC LVOT STROKE VOLUME: 67.8 CM3
DOP CALC MV VTI: 46.7 CM
DOP CALCLVOT PEAK VEL VTI: 21.6 CM
E WAVE DECELERATION TIME: 250 MSEC
E/A RATIO: 1.28
E/E' RATIO: 2 M/S
ECHO LV POSTERIOR WALL: 0.8 CM (ref 0.6–1.1)
FRACTIONAL SHORTENING: 38.8 % (ref 28–44)
INTERVENTRICULAR SEPTUM: 0.8 CM (ref 0.6–1.1)
IVC DIAMETER: 1.4 CM
IVRT: 70 MSEC
LEFT ATRIUM AREA SYSTOLIC (APICAL 2 CHAMBER): 19.9 CM2
LEFT ATRIUM AREA SYSTOLIC (APICAL 4 CHAMBER): 19 CM2
LEFT ATRIUM SIZE: 3.2 CM
LEFT ATRIUM VOLUME INDEX MOD: 41 ML/M2
LEFT ATRIUM VOLUME MOD: 58 ML
LEFT INTERNAL DIMENSION IN SYSTOLE: 3 CM (ref 2.1–4)
LEFT VENTRICLE DIASTOLIC VOLUME INDEX: 79.58 ML/M2
LEFT VENTRICLE DIASTOLIC VOLUME: 113 ML
LEFT VENTRICLE END DIASTOLIC VOLUME APICAL 2 CHAMBER: 66.8 ML
LEFT VENTRICLE END DIASTOLIC VOLUME APICAL 4 CHAMBER: 77 ML
LEFT VENTRICLE END SYSTOLIC VOLUME APICAL 2 CHAMBER: 62.3 ML
LEFT VENTRICLE END SYSTOLIC VOLUME APICAL 4 CHAMBER: 47.4 ML
LEFT VENTRICLE MASS INDEX: 92.4 G/M2
LEFT VENTRICLE SYSTOLIC VOLUME INDEX: 24.6 ML/M2
LEFT VENTRICLE SYSTOLIC VOLUME: 35 ML
LEFT VENTRICULAR INTERNAL DIMENSION IN DIASTOLE: 4.9 CM (ref 3.5–6)
LEFT VENTRICULAR MASS: 131.2 G
LV LATERAL E/E' RATIO: 19.7 M/S
LV SEPTAL E/E' RATIO: 1.3 M/S
LVED V (TEICH): 112.81 ML
LVES V (TEICH): 35 ML
LVOT MG: 1 MMHG
LVOT MV: 0.56 CM/S
MV MEAN GRADIENT: 3 MMHG
MV PEAK A VEL: 1.08 M/S
MV PEAK E VEL: 1.38 M/S
MV PEAK GRADIENT: 8 MMHG
MV VALVE AREA BY CONTINUITY EQUATION: 1.45 CM2
OHS CV RV/LV RATIO: 0.37 CM
OHS LV EJECTION FRACTION SIMPSONS BIPLANE MOD: 63 %
PISA AR MAX VEL: 4 M/S
PISA MRMAX VEL: 5.93 M/S
PISA TR MAX VEL: 2.2 M/S
PV MV: 0.54 M/S
PV PEAK GRADIENT: 3 MMHG
PV PEAK VELOCITY: 0.81 M/S
RIGHT VENTRICLE DIASTOLIC BASEL DIMENSION: 1.8 CM
RIGHT VENTRICULAR END-DIASTOLIC DIMENSION: 1.8 CM
RV TISSUE DOPPLER FREE WALL SYSTOLIC VELOCITY 1 (APICAL 4 CHAMBER VIEW): 11.9 CM/S
TDI LATERAL: 0.07 M/S
TDI SEPTAL: 1.08 M/S
TDI: 0.58 M/S
TR MAX PG: 20 MMHG
TRICUSPID ANNULAR PLANE SYSTOLIC EXCURSION: 2.57 CM
Z-SCORE OF LEFT VENTRICULAR DIMENSION IN END DIASTOLE: 1.17
Z-SCORE OF LEFT VENTRICULAR DIMENSION IN END SYSTOLE: 0.81

## 2025-04-09 DIAGNOSIS — M54.42 ACUTE LEFT-SIDED LOW BACK PAIN WITH LEFT-SIDED SCIATICA: ICD-10-CM

## 2025-04-09 DIAGNOSIS — J44.9 CHRONIC OBSTRUCTIVE PULMONARY DISEASE, UNSPECIFIED COPD TYPE: ICD-10-CM

## 2025-04-09 DIAGNOSIS — F33.0 MILD EPISODE OF RECURRENT MAJOR DEPRESSIVE DISORDER: ICD-10-CM

## 2025-04-09 RX ORDER — GABAPENTIN 300 MG/1
300 CAPSULE ORAL 2 TIMES DAILY
Qty: 180 CAPSULE | Refills: 3 | Status: CANCELLED | OUTPATIENT
Start: 2025-04-09

## 2025-04-09 RX ORDER — ESCITALOPRAM OXALATE 20 MG/1
20 TABLET ORAL DAILY
Qty: 90 TABLET | Refills: 3 | Status: CANCELLED | OUTPATIENT
Start: 2025-04-09 | End: 2026-04-09

## 2025-04-09 RX ORDER — ALBUTEROL SULFATE 90 UG/1
2 INHALANT RESPIRATORY (INHALATION) EVERY 6 HOURS PRN
Qty: 18 G | Refills: 5 | Status: SHIPPED | OUTPATIENT
Start: 2025-04-09

## 2025-04-09 RX ORDER — FLUTICASONE FUROATE, UMECLIDINIUM BROMIDE AND VILANTEROL TRIFENATATE 100; 62.5; 25 UG/1; UG/1; UG/1
1 POWDER RESPIRATORY (INHALATION) DAILY
Qty: 60 EACH | Refills: 11 | Status: CANCELLED | OUTPATIENT
Start: 2025-04-09

## 2025-04-09 NOTE — TELEPHONE ENCOUNTER
Pt is needing a refill on her albuterol, Trelegy, Lexapro and gabapentin. Last office visit 01/27/2025. Next office visit 05/06/2025.         Spoke with pt in regards to recent medication refills request. I let the pt know that the only medication that needs to be refill is her albuterol inhaler. I recommended that she call her pharmacy for the trelegy, lexapro and gabapentin refills. Pt acknowledged understanding.

## 2025-04-11 DIAGNOSIS — J44.9 CHRONIC OBSTRUCTIVE PULMONARY DISEASE, UNSPECIFIED COPD TYPE: ICD-10-CM

## 2025-04-11 RX ORDER — PROMETHAZINE HYDROCHLORIDE AND DEXTROMETHORPHAN HYDROBROMIDE 6.25; 15 MG/5ML; MG/5ML
5 SYRUP ORAL NIGHTLY PRN
Qty: 118 ML | Refills: 0 | Status: SHIPPED | OUTPATIENT
Start: 2025-04-11

## 2025-04-11 NOTE — TELEPHONE ENCOUNTER
Pt is needing a refill on her promethazine cough medication. Last office visit 01/27/2025. Next office visit 05/06/2025.

## 2025-04-21 ENCOUNTER — TELEPHONE (OUTPATIENT)
Dept: FAMILY MEDICINE | Facility: CLINIC | Age: 70
End: 2025-04-21
Payer: MEDICARE

## 2025-04-21 NOTE — TELEPHONE ENCOUNTER
----- Message from Liv sent at 4/21/2025 11:33 AM CDT -----  Pt needs to be seen asap. Not feeling well, cough. Pt #[phone]

## 2025-04-21 NOTE — TELEPHONE ENCOUNTER
Cough a lot x 1 week. Non productive. Very fatigued   No fever, taking promethazine cough meds Liv sent in, and using albuterol. Feels like she needs to be seen. Booked with MIGUEL Ocasio NP tomorrow

## 2025-04-22 ENCOUNTER — OFFICE VISIT (OUTPATIENT)
Dept: FAMILY MEDICINE | Facility: CLINIC | Age: 70
End: 2025-04-22
Payer: MEDICARE

## 2025-04-22 VITALS
WEIGHT: 102 LBS | DIASTOLIC BLOOD PRESSURE: 60 MMHG | SYSTOLIC BLOOD PRESSURE: 124 MMHG | TEMPERATURE: 98 F | BODY MASS INDEX: 19.26 KG/M2 | HEIGHT: 61 IN | OXYGEN SATURATION: 97 % | HEART RATE: 85 BPM

## 2025-04-22 DIAGNOSIS — J44.1 CHRONIC OBSTRUCTIVE PULMONARY DISEASE WITH ACUTE EXACERBATION: Primary | ICD-10-CM

## 2025-04-22 PROCEDURE — 3008F BODY MASS INDEX DOCD: CPT | Mod: CPTII,S$GLB,,

## 2025-04-22 PROCEDURE — 1101F PT FALLS ASSESS-DOCD LE1/YR: CPT | Mod: CPTII,S$GLB,,

## 2025-04-22 PROCEDURE — 96372 THER/PROPH/DIAG INJ SC/IM: CPT | Mod: S$GLB,,,

## 2025-04-22 PROCEDURE — 3074F SYST BP LT 130 MM HG: CPT | Mod: CPTII,S$GLB,,

## 2025-04-22 PROCEDURE — 99214 OFFICE O/P EST MOD 30 MIN: CPT | Mod: 25,S$GLB,,

## 2025-04-22 PROCEDURE — 3078F DIAST BP <80 MM HG: CPT | Mod: CPTII,S$GLB,,

## 2025-04-22 PROCEDURE — 4010F ACE/ARB THERAPY RXD/TAKEN: CPT | Mod: CPTII,S$GLB,,

## 2025-04-22 PROCEDURE — 3066F NEPHROPATHY DOC TX: CPT | Mod: CPTII,S$GLB,,

## 2025-04-22 PROCEDURE — 3061F NEG MICROALBUMINURIA REV: CPT | Mod: CPTII,S$GLB,,

## 2025-04-22 PROCEDURE — 3288F FALL RISK ASSESSMENT DOCD: CPT | Mod: CPTII,S$GLB,,

## 2025-04-22 PROCEDURE — 3044F HG A1C LEVEL LT 7.0%: CPT | Mod: CPTII,S$GLB,,

## 2025-04-22 PROCEDURE — 1159F MED LIST DOCD IN RCRD: CPT | Mod: CPTII,S$GLB,,

## 2025-04-22 RX ORDER — PROMETHAZINE HYDROCHLORIDE AND DEXTROMETHORPHAN HYDROBROMIDE 6.25; 15 MG/5ML; MG/5ML
10 SYRUP ORAL EVERY 6 HOURS PRN
Qty: 400 ML | Refills: 0 | Status: SHIPPED | OUTPATIENT
Start: 2025-04-22

## 2025-04-22 RX ORDER — DEXAMETHASONE SODIUM PHOSPHATE 4 MG/ML
8 INJECTION, SOLUTION INTRA-ARTICULAR; INTRALESIONAL; INTRAMUSCULAR; INTRAVENOUS; SOFT TISSUE
Status: COMPLETED | OUTPATIENT
Start: 2025-04-22 | End: 2025-04-22

## 2025-04-22 RX ORDER — AMOXICILLIN AND CLAVULANATE POTASSIUM 875; 125 MG/1; MG/1
1 TABLET, FILM COATED ORAL 2 TIMES DAILY
Qty: 20 TABLET | Refills: 0 | Status: SHIPPED | OUTPATIENT
Start: 2025-04-22 | End: 2025-05-02

## 2025-04-22 RX ORDER — GUAIFENESIN 600 MG/1
600 TABLET, EXTENDED RELEASE ORAL 2 TIMES DAILY
Qty: 14 TABLET | Refills: 0 | Status: SHIPPED | OUTPATIENT
Start: 2025-04-22 | End: 2025-04-29

## 2025-04-22 RX ADMIN — DEXAMETHASONE SODIUM PHOSPHATE 8 MG: 4 INJECTION, SOLUTION INTRA-ARTICULAR; INTRALESIONAL; INTRAMUSCULAR; INTRAVENOUS; SOFT TISSUE at 11:04

## 2025-04-22 NOTE — LETTER
April 22, 2025      [facility] [address] [place] [address]  Phone: [phone]  Fax: [phone]       Patient: Liv Decker   YOB: 1955  Date of Visit: 04/22/2025    To Whom It May Concern:    Merary Decker  was at [facility] on 04/22/2025. The patient may return to work/school on 4/25/2025 with no restrictions. If you have any questions or concerns, or if I can be of further assistance, please do not hesitate to contact me.    Sincerely,          CORNELL JuaresCNP

## 2025-04-29 ENCOUNTER — TELEPHONE (OUTPATIENT)
Dept: FAMILY MEDICINE | Facility: CLINIC | Age: 70
End: 2025-04-29
Payer: MEDICARE

## 2025-04-29 NOTE — TELEPHONE ENCOUNTER
"----- Message from Dasha Rudy sent at 4/29/2025  2:14 PM CDT -----  Regarding: Follow up imaging  Good afternoon ,It looks like this patient is due for a 3 month follow up in May. I was wondering if a CT Chest Low Dose Diagnostic order could be placed? I'd be happy to get the patient scheduled if needed. I see there's an order under "Finalized Request" but it won't let me reinstate it to schedule it. Thank you,John, Lung Screen Coordinator  "

## 2025-05-07 ENCOUNTER — TELEPHONE (OUTPATIENT)
Dept: FAMILY MEDICINE | Facility: CLINIC | Age: 70
End: 2025-05-07
Payer: MEDICARE

## 2025-05-07 DIAGNOSIS — M54.42 ACUTE LEFT-SIDED LOW BACK PAIN WITH LEFT-SIDED SCIATICA: ICD-10-CM

## 2025-05-07 DIAGNOSIS — J44.9 CHRONIC OBSTRUCTIVE PULMONARY DISEASE, UNSPECIFIED COPD TYPE: ICD-10-CM

## 2025-05-07 DIAGNOSIS — E78.5 DYSLIPIDEMIA: ICD-10-CM

## 2025-05-07 RX ORDER — GABAPENTIN 300 MG/1
300 CAPSULE ORAL 2 TIMES DAILY
Qty: 180 CAPSULE | Refills: 3 | Status: CANCELLED | OUTPATIENT
Start: 2025-05-07

## 2025-05-07 RX ORDER — FLUTICASONE FUROATE, UMECLIDINIUM BROMIDE AND VILANTEROL TRIFENATATE 100; 62.5; 25 UG/1; UG/1; UG/1
1 POWDER RESPIRATORY (INHALATION) DAILY
Qty: 60 EACH | Refills: 11 | Status: CANCELLED | OUTPATIENT
Start: 2025-05-07

## 2025-05-07 RX ORDER — ATORVASTATIN CALCIUM 10 MG/1
10 TABLET, FILM COATED ORAL
Qty: 90 TABLET | Refills: 3 | Status: CANCELLED | OUTPATIENT
Start: 2025-05-07

## 2025-05-07 NOTE — TELEPHONE ENCOUNTER
Pt is needing a refill on her Trelegy, atorvastatin and gabapentin. Last office visit 04/22/2025. No up coming office visit.      Left message on voice mail, verbalizing that she still has refills on her trelegy, atorvastatin and gabapentin. I recommend that she call her pharmacy for these refills request.

## 2025-05-07 NOTE — TELEPHONE ENCOUNTER
----- Message from Kristel sent at 5/7/2025  7:32 AM CDT -----  - 8:55- pt is calling back [phone]

## 2025-06-11 ENCOUNTER — HOSPITAL ENCOUNTER (OUTPATIENT)
Dept: RADIOLOGY | Facility: HOSPITAL | Age: 70
Discharge: HOME | End: 2025-06-11
Attending: NURSE PRACTITIONER
Payer: MEDICARE

## 2025-06-11 VITALS — HEIGHT: 61 IN | WEIGHT: 102 LBS | BODY MASS INDEX: 19.26 KG/M2

## 2025-06-11 DIAGNOSIS — Z12.31 BREAST CANCER SCREENING BY MAMMOGRAM: ICD-10-CM

## 2025-06-11 DIAGNOSIS — Z12.31 BREAST CANCER SCREENING BY MAMMOGRAM: Primary | ICD-10-CM

## 2025-06-11 DIAGNOSIS — R91.1 SOLITARY PULMONARY NODULE: ICD-10-CM

## 2025-06-11 PROCEDURE — 71250 CT THORAX DX C-: CPT | Mod: TC,PO

## 2025-06-11 PROCEDURE — 77063 BREAST TOMOSYNTHESIS BI: CPT | Mod: 26,,, | Performed by: RADIOLOGY

## 2025-06-11 PROCEDURE — 77063 BREAST TOMOSYNTHESIS BI: CPT | Mod: TC,PO

## 2025-06-11 PROCEDURE — 71250 CT THORAX DX C-: CPT | Mod: 26,,, | Performed by: RADIOLOGY

## 2025-06-11 PROCEDURE — 77067 SCR MAMMO BI INCL CAD: CPT | Mod: 26,,, | Performed by: RADIOLOGY

## 2025-06-12 ENCOUNTER — RESULTS FOLLOW-UP (OUTPATIENT)
Dept: FAMILY MEDICINE | Facility: CLINIC | Age: 70
End: 2025-06-12

## 2025-06-13 DIAGNOSIS — J44.1 CHRONIC OBSTRUCTIVE PULMONARY DISEASE WITH ACUTE EXACERBATION: ICD-10-CM

## 2025-06-13 RX ORDER — PROMETHAZINE HYDROCHLORIDE AND DEXTROMETHORPHAN HYDROBROMIDE 6.25; 15 MG/5ML; MG/5ML
10 SYRUP ORAL EVERY 6 HOURS PRN
Qty: 400 ML | Refills: 0 | Status: SHIPPED | OUTPATIENT
Start: 2025-06-13

## 2025-06-13 RX ORDER — AMOXICILLIN AND CLAVULANATE POTASSIUM 875; 125 MG/1; MG/1
1 TABLET, FILM COATED ORAL 2 TIMES DAILY
Status: CANCELLED
Start: 2025-06-13

## 2025-06-13 RX ORDER — AMOXICILLIN AND CLAVULANATE POTASSIUM 875; 125 MG/1; MG/1
1 TABLET, FILM COATED ORAL 2 TIMES DAILY
Qty: 14 TABLET | Refills: 0 | Status: SHIPPED | OUTPATIENT
Start: 2025-06-13

## 2025-06-13 NOTE — TELEPHONE ENCOUNTER
Liv Parrish NP   6/12/25  8:56 PM  Result Note  Please call pt and let her know CT scan of lungs shows new opacities in bases L>R which could indicate possible pneumonia- would recommend treating with her augmentin 875mg BID for 7 days and need to repeat CT scan in 3 months to ensure this is not worsening or growing. She also has numerous lung nodules which have not changed since previous CT scan. Heart is normal size. There are calcifications noted on the heart arteries and around the mitral and aortic valve.    Pt is needing a prescription for augmentin sent to her local pharmacy. She also if we could refill the promethazine cough medication    Last office visit 04/22/2025.  No up coming office visit

## 2025-06-13 NOTE — TELEPHONE ENCOUNTER
----- Message from Jacque sent at 6/13/2025 10:47 AM CDT -----  Vm: [phone]    Pt called to make sure prescriptions were called in.    [phone]

## 2025-06-30 ENCOUNTER — OFFICE VISIT (OUTPATIENT)
Dept: FAMILY MEDICINE | Facility: CLINIC | Age: 70
End: 2025-06-30
Payer: MEDICARE

## 2025-06-30 VITALS
OXYGEN SATURATION: 98 % | WEIGHT: 103.81 LBS | BODY MASS INDEX: 19.6 KG/M2 | HEART RATE: 65 BPM | SYSTOLIC BLOOD PRESSURE: 132 MMHG | HEIGHT: 61 IN | DIASTOLIC BLOOD PRESSURE: 60 MMHG

## 2025-06-30 DIAGNOSIS — M81.0 AGE-RELATED OSTEOPOROSIS WITHOUT CURRENT PATHOLOGICAL FRACTURE: ICD-10-CM

## 2025-06-30 DIAGNOSIS — F33.0 MILD EPISODE OF RECURRENT MAJOR DEPRESSIVE DISORDER: ICD-10-CM

## 2025-06-30 DIAGNOSIS — F17.210 CIGARETTE SMOKER: ICD-10-CM

## 2025-06-30 DIAGNOSIS — R80.9 MICROALBUMINURIA: ICD-10-CM

## 2025-06-30 DIAGNOSIS — I35.0 MODERATE AORTIC STENOSIS: ICD-10-CM

## 2025-06-30 DIAGNOSIS — E78.5 DYSLIPIDEMIA: ICD-10-CM

## 2025-06-30 DIAGNOSIS — E11.69 DM TYPE 2 WITH DIABETIC DYSLIPIDEMIA: Primary | ICD-10-CM

## 2025-06-30 DIAGNOSIS — E03.9 ACQUIRED HYPOTHYROIDISM: ICD-10-CM

## 2025-06-30 DIAGNOSIS — I50.32 CHRONIC HEART FAILURE WITH PRESERVED EJECTION FRACTION: ICD-10-CM

## 2025-06-30 DIAGNOSIS — E78.5 DM TYPE 2 WITH DIABETIC DYSLIPIDEMIA: Primary | ICD-10-CM

## 2025-06-30 DIAGNOSIS — I10 ESSENTIAL HYPERTENSION: ICD-10-CM

## 2025-06-30 DIAGNOSIS — J44.9 CHRONIC OBSTRUCTIVE PULMONARY DISEASE, UNSPECIFIED COPD TYPE: ICD-10-CM

## 2025-06-30 DIAGNOSIS — K31.1 PYLORIC STENOSIS IN ADULT: ICD-10-CM

## 2025-06-30 LAB — HBA1C MFR BLD: 5.2 %

## 2025-06-30 PROCEDURE — 3061F NEG MICROALBUMINURIA REV: CPT | Mod: CPTII,S$GLB,, | Performed by: NURSE PRACTITIONER

## 2025-06-30 PROCEDURE — 4010F ACE/ARB THERAPY RXD/TAKEN: CPT | Mod: CPTII,S$GLB,, | Performed by: NURSE PRACTITIONER

## 2025-06-30 PROCEDURE — 3066F NEPHROPATHY DOC TX: CPT | Mod: CPTII,S$GLB,, | Performed by: NURSE PRACTITIONER

## 2025-06-30 PROCEDURE — 3044F HG A1C LEVEL LT 7.0%: CPT | Mod: CPTII,S$GLB,, | Performed by: NURSE PRACTITIONER

## 2025-06-30 PROCEDURE — 1126F AMNT PAIN NOTED NONE PRSNT: CPT | Mod: CPTII,S$GLB,, | Performed by: NURSE PRACTITIONER

## 2025-06-30 PROCEDURE — 83036 HEMOGLOBIN GLYCOSYLATED A1C: CPT | Mod: QW,,, | Performed by: NURSE PRACTITIONER

## 2025-06-30 PROCEDURE — 1159F MED LIST DOCD IN RCRD: CPT | Mod: CPTII,S$GLB,, | Performed by: NURSE PRACTITIONER

## 2025-06-30 PROCEDURE — 3075F SYST BP GE 130 - 139MM HG: CPT | Mod: CPTII,S$GLB,, | Performed by: NURSE PRACTITIONER

## 2025-06-30 PROCEDURE — 3008F BODY MASS INDEX DOCD: CPT | Mod: CPTII,S$GLB,, | Performed by: NURSE PRACTITIONER

## 2025-06-30 PROCEDURE — 3078F DIAST BP <80 MM HG: CPT | Mod: CPTII,S$GLB,, | Performed by: NURSE PRACTITIONER

## 2025-06-30 PROCEDURE — 3288F FALL RISK ASSESSMENT DOCD: CPT | Mod: CPTII,S$GLB,, | Performed by: NURSE PRACTITIONER

## 2025-06-30 PROCEDURE — 99214 OFFICE O/P EST MOD 30 MIN: CPT | Mod: S$GLB,,, | Performed by: NURSE PRACTITIONER

## 2025-06-30 PROCEDURE — G2211 COMPLEX E/M VISIT ADD ON: HCPCS | Mod: S$GLB,,, | Performed by: NURSE PRACTITIONER

## 2025-06-30 PROCEDURE — 1101F PT FALLS ASSESS-DOCD LE1/YR: CPT | Mod: CPTII,S$GLB,, | Performed by: NURSE PRACTITIONER

## 2025-06-30 PROCEDURE — 1160F RVW MEDS BY RX/DR IN RCRD: CPT | Mod: CPTII,S$GLB,, | Performed by: NURSE PRACTITIONER

## 2025-06-30 RX ORDER — LEVOTHYROXINE SODIUM 50 UG/1
50 TABLET ORAL
Qty: 90 TABLET | Refills: 1 | Status: SHIPPED | OUTPATIENT
Start: 2025-06-30 | End: 2026-06-30

## 2025-06-30 RX ORDER — BUPROPION HYDROCHLORIDE 150 MG/1
150 TABLET ORAL DAILY
Qty: 90 TABLET | Refills: 3 | Status: SHIPPED | OUTPATIENT
Start: 2025-06-30 | End: 2026-06-30

## 2025-06-30 RX ORDER — FUROSEMIDE 20 MG/1
20 TABLET ORAL DAILY
Qty: 90 TABLET | Refills: 1 | Status: SHIPPED | OUTPATIENT
Start: 2025-06-30 | End: 2026-06-30

## 2025-06-30 NOTE — PROGRESS NOTES
SUBJECTIVE:    Patient ID: Liv Decker is a 70 y.o. female.    Chief Complaint: Diabetes (No bottles//Pt is here for a check up and possible medication refills//Pt will scheduled eye exam with Dr. Kiana madsen//OLMAN )      History of Present Illness    Patient presents today for follow up of diabetes mellitus type 2, COPD, and CHF    Patient reports overall she is doing okay.    She reports a persistent cough with green mucus production occasionally.  She had follow-up CT of the chest June 11th which showed new scattered reticular nodular and tree-in-bud opacities in the left greater than right lung base.  She was treated with 7 days of Augmentin after that CAT scan.  She reports cough has not improved but has not completely resolved.  She denies any fever or chills    She has history of COPD though admits still smoking about 3 cigarettes a day.  Admits she never did schedule with Dr. Garcia pulmonology.  She is using Trelegy daily and reports shortness of breath with exertion is stable    She saw Dr. Smart, cardiology in March for valvular disease.  He ordered repeat echo which showed normal heart function with EF 55-60%, moderate to severe aortic stenosis (valve area 0.8, peak gradient 29), moderate to severe mitral regurgitation, and mild to moderate tricuspid regurgitation. She denies leg swelling or orthopnea. She continues Lisinopril 2.5mg and Propranolol.    She reports ongoing difficulty eating and is scheduled for follow-up EGD with Dr. Villegas with history of pyloric stenosis. She denies recent vomiting but continues to experience eating challenges despite being able to consume a variety of foods.  Weight has been stable since October though she is down 10-15 lb from a year ago      ROS:  General: no fever, no chills, no fatigue, no weight gain, no weight loss  Eyes: no vision changes, no redness, no discharge  ENT: no ear pain, no nasal congestion, no sore throat  Cardiovascular: no chest pain, no  palpitations, no lower extremity edema  Respiratory: complains of cough, + shortness of breath with exertion, complains of productive cough  Gastrointestinal: no abdominal pain, no nausea, no vomiting, no diarrhea, no constipation, no blood in stool, complains of feeding difficulties  Genitourinary: no dysuria, no hematuria, no frequency  Musculoskeletal: no joint pain, no muscle pain  Skin: no rash, no lesion  Neurological: no headache, no dizziness, complains of numbness to big toe, no tingling  Psychiatric: no anxiety, no depression, no sleep difficulty              Office Visit on 06/30/2025   Component Date Value Ref Range Status    Hemoglobin A1C, POC 06/30/2025 5.2  % Final   Hospital Outpatient Visit on 03/25/2025   Component Date Value Ref Range Status    BSA 03/25/2025 1.42  m2 Final    Calix's Biplane MOD Ejection Fra* 03/25/2025 63  % Final    A2C EF 03/25/2025 61  % Final    A4C EF 03/25/2025 65  % Final    LVOT stroke volume 03/25/2025 67.8  cm3 Final    LVIDd 03/25/2025 4.9  3.5 - 6.0 cm Final    LV Systolic Volume 03/25/2025 35  mL Final    LV Systolic Volume Index 03/25/2025 24.6  mL/m2 Final    LVIDs 03/25/2025 3.0  2.1 - 4.0 cm Final    LV ESV A2C 03/25/2025 62.30  mL Final    LV Diastolic Volume 03/25/2025 113  mL Final    LV ESV A4C 03/25/2025 47.40  mL Final    LV Diastolic Volume Index 03/25/2025 79.58  mL/m2 Final    LV EDV A2C 03/25/2025 [id number]  mL Final    LV EDV A4C 03/25/2025 77.00  mL Final    Left Ventricular End Systolic Volu* 03/25/2025 35.00  mL Final    Left Ventricular End Diastolic Vol* 03/25/2025 112.81  mL Final    IVS 03/25/2025 0.8  0.6 - 1.1 cm Final    LVOT diameter 03/25/2025 2.0  cm Final    LVOT area 03/25/2025 3.1  cm2 Final    FS 03/25/2025 38.8  28 - 44 % Final    Left Ventricle Relative Wall Thick* 03/25/2025 0.33  cm Final    PW 03/25/2025 0.8  0.6 - 1.1 cm Final    LV mass 03/25/2025 131.2  g Final    LV Mass Index 03/25/2025 92.4  g/m2 Final    MV  Peak E Ed 03/25/2025 1.38  m/s Final    TDI LATERAL 03/25/2025 0.07  m/s Final    TDI SEPTAL 03/25/2025 1.08  m/s Final    E/E' ratio 03/25/2025 2  m/s Final    MV Peak A Ed 03/25/2025 1.08  m/s Final    TR Max Ed 03/25/2025 2.2  m/s Final    E/A ratio 03/25/2025 1.28   Final    IVRT 03/25/2025 70  msec Final    E wave deceleration time 03/25/2025 250  msec Final    LV SEPTAL E/E' RATIO 03/25/2025 1.3  m/s Final    LV LATERAL E/E' RATIO 03/25/2025 19.7  m/s Final    LVOT peak ed 03/25/2025 0.8  m/s Final    Left Ventricular Outflow Tract Tori* 03/25/2025 0.56  cm/s Final    Left Ventricular Outflow Tract Tori* 03/25/2025 1.00  mmHg Final    RV- wall basal diam 03/25/2025 1.8  cm Final    RV S' 03/25/2025 11.90  cm/s Final    TAPSE 03/25/2025 2.57  cm Final    RV/LV Ratio 03/25/2025 0.37  cm Final    LA size 03/25/2025 3.2  cm Final    LA Vol (MOD) 03/25/2025 58  mL Final    HOWARD (MOD) 03/25/2025 41  mL/m2 Final    AV regurgitation pressure 1/2 time 03/25/2025 510  ms Final    AR Max Ed 03/25/2025 4.00  m/s Final    AV mean gradient 03/25/2025 29  mmHg Final    AV peak gradient 03/25/2025 44  mmHg Final    Ao peak ed 03/25/2025 3.3  m/s Final    Ao VTI 03/25/2025 92.1  cm Final    LVOT peak VTI 03/25/2025 21.6  cm Final    AV valve area 03/25/2025 0.7  cm² Final    AV Velocity Ratio 03/25/2025 0.24   Final    AV index (prosthetic) 03/25/2025 0.23   Final    BLAKE by Velocity Ratio 03/25/2025 0.8  cm² Final    Mr max ed 03/25/2025 5.93  m/s Final    MV mean gradient 03/25/2025 3  mmHg Final    MV peak gradient 03/25/2025 8  mmHg Final    MV valve area by continuity eq 03/25/2025 1.45  cm2 Final    MV VTI 03/25/2025 46.7  cm Final    Triscuspid Valve Regurgitation Pea* 03/25/2025 20  mmHg Final    PV PEAK VELOCITY 03/25/2025 0.81  m/s Final    PV peak gradient 03/25/2025 3  mmHg Final    Pulmonary Valve Mean Velocity 03/25/2025 0.54  m/s Final    Ao root annulus 03/25/2025 2.30  cm Final    IVC diameter 03/25/2025  1.40  cm Final    Mean e' 03/25/2025 0.58  m/s Final    ZLVIDS 03/25/2025 0.81   Final    ZLVIDD 03/25/2025 1.17   Final    LA area A4C 03/25/2025 19.00  cm2 Final    LA area A2C 03/25/2025 19.90  cm2 Final    RVDD 03/25/2025 1.80  cm Final    AORTIC VALVE CUSP SEPERATION 03/25/2025 0.70  cm Final   Lab Visit on 03/25/2025   Component Date Value Ref Range Status    BNP 03/25/2025 213 (H)  <=99 pg/mL Final    Sodium 03/25/2025 139  136 - 145 mmol/L Final    Potassium 03/25/2025 4.0  3.5 - 5.1 mmol/L Final    Chloride 03/25/2025 106  95 - 110 mmol/L Final    CO2 03/25/2025 27  23 - 29 mmol/L Final    Glucose 03/25/2025 80  70 - 110 mg/dL Final    BUN 03/25/2025 11  8 - 23 mg/dL Final    Creatinine 03/25/2025 0.9  0.5 - 1.4 mg/dL Final    Calcium 03/25/2025 8.9  8.7 - 10.5 mg/dL Final    Anion Gap 03/25/2025 6 (L)  8 - 16 mmol/L Final    eGFR 03/25/2025 >60  >60 mL/min/1.73/m2 Final   Office Visit on 03/10/2025   Component Date Value Ref Range Status    QRS Duration 03/10/2025 82  ms Final    [facility] QTC Calculation 03/10/2025 427  ms Final   Patient Outreach on 02/11/2025   Component Date Value Ref Range Status    Left Eye DM Retinopathy 03/17/2023 Negative   Final    Right Eye DM Retinopathy 03/17/2023 Negative   Final   Office Visit on 01/27/2025   Component Date Value Ref Range Status    Hemoglobin A1C, POC 01/27/2025 5.5  % Final   Telephone on 01/08/2025   Component Date Value Ref Range Status    WBC 01/28/2025 5.0  3.8 - 10.8 Thousand/uL Final    RBC 01/28/2025 4.22  3.80 - 5.10 Million/uL Final    Hemoglobin 01/28/2025 12.5  11.7 - 15.5 g/dL Final    Hematocrit 01/28/2025 38.0  35.0 - 45.0 % Final    MCV 01/28/2025 90.0  80.0 - 100.0 fL Final    MCH 01/28/2025 29.6  27.0 - 33.0 pg Final    MCHC 01/28/2025 32.9  32.0 - 36.0 g/dL Final    RDW 01/28/2025 14.4  11.0 - 15.0 % Final    Platelets 01/28/2025 132 (L)  140 - 400 Thousand/uL Final    MPV 01/28/2025 12.9 (H)  7.5 - 12.5 fL Final    Neutrophils, Abs 01/28/2025  3,180  1,500 - 7,800 cells/uL Final    Lymph # 01/28/2025 1,210  850 - 3,900 cells/uL Final    Mono # 01/28/2025 480  200 - 950 cells/uL Final    Eos # 01/28/2025 90  15 - 500 cells/uL Final    Baso # 01/28/2025 40  0 - 200 cells/uL Final    Neutrophils Relative 01/28/2025 63.6  % Final    Lymph % 01/28/2025 24.2  % Final    Mono % 01/28/2025 9.6  % Final    Eosinophil % 01/28/2025 1.8  % Final    Basophil % 01/28/2025 0.8  % Final    Glucose 01/28/2025 100 (H)  65 - 99 mg/dL Final    BUN 01/28/2025 15  7 - 25 mg/dL Final    Creatinine 01/28/2025 0.81  0.60 - 1.00 mg/dL Final    eGFR 01/28/2025 78  > OR = 60 mL/min/1.73m2 Final    BUN/Creatinine Ratio 01/28/2025 SEE NOTE:  6 - 22 (calc) Final    Sodium 01/28/2025 142  135 - 146 mmol/L Final    Potassium 01/28/2025 3.8  3.5 - 5.3 mmol/L Final    Chloride 01/28/2025 102  98 - 110 mmol/L Final    CO2 01/28/2025 31  20 - 32 mmol/L Final    Calcium 01/28/2025 9.0  8.6 - 10.4 mg/dL Final    Total Protein 01/28/2025 6.2  6.1 - 8.1 g/dL Final    Albumin 01/28/2025 3.7  3.6 - 5.1 g/dL Final    Globulin, Total 01/28/2025 2.5  1.9 - 3.7 g/dL (calc) Final    Albumin/Globulin Ratio 01/28/2025 1.5  1.0 - 2.5 (calc) Final    Total Bilirubin 01/28/2025 0.3  0.2 - 1.2 mg/dL Final    Alkaline Phosphatase 01/28/2025 57  37 - 153 U/L Final    AST 01/28/2025 9 (L)  10 - 35 U/L Final    ALT 01/28/2025 9  6 - 29 U/L Final    Hemoglobin A1C 01/28/2025 5.4  <5.7 % of total Hgb Final    Cholesterol 01/28/2025 127  <200 mg/dL Final    HDL 01/28/2025 39 (L)  > OR = 50 mg/dL Final    Triglycerides 01/28/2025 143  <150 mg/dL Final    LDL Cholesterol 01/28/2025 65  mg/dL (calc) Final    HDL/Cholesterol Ratio 01/28/2025 3.3  <5.0 (calc) Final    Non HDL Chol. (LDL+VLDL) 01/28/2025 88  <130 mg/dL (calc) Final    Creatinine, Urine 01/28/2025 40  20 - 275 mg/dL Final    Microalb, Ur 01/28/2025 0.8  See Note: mg/dL Final    Microalb/Creat Ratio 01/28/2025 20  <30 mg/g creat Final    TSH w/reflex to  FT4 01/28/2025 3.98  0.40 - 4.50 mIU/L Final    Color, UA 01/28/2025 YELLOW  YELLOW Final    Appearance, UA 01/28/2025 CLEAR  CLEAR Final    Specific Gravity, UA 01/28/2025 1.016  1.001 - 1.035 Final    pH, UA 01/28/2025 < OR = 5.0  5.0 - 8.0 Final    Glucose, UA 01/28/2025 3+ (A)  NEGATIVE Final    Bilirubin, UA 01/28/2025 NEGATIVE  NEGATIVE Final    Ketones, UA 01/28/2025 NEGATIVE  NEGATIVE Final    Occult Blood UA 01/28/2025 NEGATIVE  NEGATIVE Final    Protein, UA 01/28/2025 NEGATIVE  NEGATIVE Final    Nitrite, UA 01/28/2025 NEGATIVE  NEGATIVE Final    Leukocytes, UA 01/28/2025 NEGATIVE  NEGATIVE Final    WBC Casts, UA 01/28/2025 NONE SEEN  < OR = 5 /HPF Final    RBC Casts, UA 01/28/2025 NONE SEEN  < OR = 2 /HPF Final    Squam Epithel, UA 01/28/2025 NONE SEEN  < OR = 5 /HPF Final    Bacteria, UA 01/28/2025 NONE SEEN  NONE SEEN /HPF Final    Hyaline Casts, UA 01/28/2025 NONE SEEN  NONE SEEN /LPF Final    Service Cmt: 01/28/2025 SEE COMMENT   Final    Reflexive Urine Culture 01/28/2025 SEE COMMENT   Final       Past Medical History:   Diagnosis Date    Anemia     Depression     Diabetes mellitus 2021    Encounter for blood transfusion     GERD (gastroesophageal reflux disease)     Hyperlipidemia     Hypertension     MI (myocardial infarction) 2007    Osteoporosis     Thyroid disease     Ulcer of the stomach and intestine     pos. for h. pylori    Weight loss 2022    food comes up      Past Surgical History:   Procedure Laterality Date    APPENDECTOMY      CHOLECYSTECTOMY      COLONOSCOPY N/A 03/18/2016    Procedure: COLONOSCOPY;  Surgeon: Rober Zelaya Jr., MD;  Location: [facility];  Service: Endoscopy;  Laterality: N/A;    ESOPHAGOGASTRODUODENOSCOPY  04/15/2016    with dil    ESOPHAGOGASTRODUODENOSCOPY N/A 05/20/2022    Procedure: EGD (ESOPHAGOGASTRODUODENOSCOPY);  Surgeon: Justine Villegas MD;  Location: [facility];  Service: Endoscopy;  Laterality: N/A;    EYE SURGERY      as a child    TONSILLECTOMY       TUBAL LIGATION       Family History   Problem Relation Name Age of Onset    Heart disease Mother John     Diabetes Father         Tests to Keep You Healthy    Mammogram: Met on 6/11/2025  Eye Exam: DUE  Colon Cancer Screening: Met on 5/5/2021  Last Blood Pressure <= 139/89 (6/30/2025): Yes  Last HbA1c < 8 (06/30/2025): Yes  Tobacco Cessation: NO      The CVD Risk score (MERYL'Agoino et al., 2008) failed to calculate for the following reasons:    The patient has a prior MI, stroke, CHF, or peripheral vascular disease diagnosis     Marital Status:   Alcohol History:  reports no history of alcohol use.  Tobacco History:  reports that she has been smoking cigarettes. She started smoking about 66 years ago. She has a 32.3 pack-year smoking history. She has been exposed to tobacco smoke. She has never used smokeless tobacco.  Drug History:  reports no history of drug use.    Health Maintenance Topics with due status: Not Due       Topic Last Completion Date    TETANUS VACCINE 03/21/2019    Pneumococcal Vaccines (Age 50+) 03/31/2021    Colorectal Cancer Screening 05/05/2021    DEXA Scan 06/04/2024    Foot Exam 01/27/2025    Diabetes Urine Screening 01/28/2025    Lipid Panel 01/28/2025    High Dose Statin 04/22/2025    LDCT Lung Screen 06/11/2025    Mammogram 06/11/2025    Hemoglobin A1c 06/30/2025     Immunization History   Administered Date(s) Administered    COVID-19, MRNA, LN-S, PF (MODERNA FULL 0.5 ML DOSE) 01/15/2021, 02/18/2021    COVID-19, mRNA, LNP-S, bivalent booster, PF (PFIZER OMICRON) 10/19/2022    Influenza 09/09/2009, 10/08/2010, 10/20/2011, 10/02/2012, 10/18/2013, 10/03/2014, 10/15/2015, 10/20/2016    Influenza (FLUAD) - Quadrivalent - Adjuvanted - PF *Preferred* (65+) 11/15/2023    Influenza - Quadrivalent 10/03/2014    Influenza - Quadrivalent - High Dose - PF (65 years and older) 01/25/2022, 10/10/2022    Influenza - Quadrivalent - PF *Preferred* (6 months and older) 10/20/2016, 10/15/2018,  "10/16/2019    Influenza - Trivalent - Afluria, Fluzone MDV 09/09/2009, 10/08/2010, 10/20/2011, 10/02/2012, 10/18/2013, 10/15/2015, 10/15/2018, 10/07/2019    Influenza - Trivalent - Fluad - Adjuvanted - PF (65 years and older 10/21/2024    Influenza A (H1N1) 2009 Monovalent - IM 10/23/2009    Influenza Split 09/09/2009, 10/08/2010, 10/20/2011, 10/02/2012, 10/18/2013, 10/15/2015, 10/15/2018, 10/07/2019    Pneumococcal Conjugate - 13 Valent 03/31/2021    Pneumococcal Polysaccharide - 23 Valent 03/21/2019    Tdap 03/21/2019, 03/21/2019    Zoster 03/21/2019       Review of patient's allergies indicates:  No Known Allergies  Current Medications[1]        Objective:      Vitals:    06/30/25 0820   BP: 132/60   Pulse: 65   SpO2: 98%   Weight: 47.1 kg (103 lb 12.8 oz)   Height: 5' 1" (1.549 m)       Physical Exam  Vitals and nursing note reviewed.   Constitutional:       General: She is not in acute distress.     Appearance: Normal appearance. She is well-developed. She is not toxic-appearing.      Comments: Thin frail white female, appears older than stated age   HENT:      Head: Normocephalic and atraumatic.      Right Ear: Tympanic membrane and ear canal normal.      Left Ear: Tympanic membrane and ear canal normal.   Neck:      Vascular: No carotid bruit.   Cardiovascular:      Rate and Rhythm: Normal rate and regular rhythm.      Heart sounds: Murmur heard.      Systolic murmur is present with a grade of 3/6.   Pulmonary:      Effort: Pulmonary effort is normal. No respiratory distress.      Breath sounds: No wheezing or rales.      Comments: Slightly diminished throughout otherwise clear  Abdominal:      General: There is no distension.      Palpations: Abdomen is soft.      Tenderness: There is no abdominal tenderness.   Musculoskeletal:      Cervical back: Neck supple.      Right lower leg: No edema.      Left lower leg: No edema.   Lymphadenopathy:      Cervical: No cervical adenopathy.   Skin:     General: Skin is " warm and dry.      Findings: No rash.   Neurological:      General: No focal deficit present.      Mental Status: She is alert and oriented to person, place, and time.      Gait: Gait normal.   Psychiatric:         Mood and Affect: Mood normal.         Speech: Speech normal.         Behavior: Behavior normal. Behavior is cooperative.         Thought Content: Thought content normal. Thought content does not include suicidal ideation.          Assessment:       1. DM type 2 with diabetic dyslipidemia    2. Chronic obstructive pulmonary disease, unspecified COPD type    3. Dyslipidemia    4. Mild episode of recurrent major depressive disorder    5. Essential hypertension    6. Moderate aortic stenosis    7. Acquired hypothyroidism    8. Chronic heart failure with preserved ejection fraction    9. Microalbuminuria    10. Pyloric stenosis in adult           Assessment & Plan    TYPE 2 DIABETES MELLITUS:  - Recent diabetes test result of 5.2 indicates good glycemic control and has been off metformin.  - Continue Jardiance 10 mg daily.    CHRONIC OBSTRUCTIVE PULMONARY DISEASE (COPD):  - Continue Trelegy daily for COPD management.  - Current SpO2 is 98% with no fever or chills.  - Underlying COPD is likely contributing to persistent cough. She has not seen pulmonary as previously recommended  - Order repeat CT in 3 months to monitor pulmonary status.  - Strongly advised smoking cessation due to its detrimental effects on COPD.    DYSLIPIDEMIA  -well controlled on last labs    MILD EPISODE OF RECURRENT MAJOR DEPRESSIVE DISORDER  -reports stable on med    ESSENTIAL HYPERTENSION   -BP well controlled    ACQUIRED HYPOTHYROIDISM  -stable on last labs    CHRONIC HEART FAILURE WITH PRESERVED EJECTION FRACTION:  - appears well compensated  -Continue furosemide 1 tablet daily for fluid management.  - Discussed how valve issues contribute to heart failure symptoms and fluid problems.  -f/u with Dr. Smart ,cards    AORTIC VALVE  STENOSIS:  - Recent echocardiogram shows moderate to severe aortic stenosis with peak gradient across aorta of 29 and valve area of 0.8.  - No immediate intervention required at this time.  - continue f/u with Dr. Smart. Discussed may need Transcatheter Aortic Valve Replacement (TAVR) procedure if condition progresses to severe.    PYLORIC STENOSIS:  - Patient continues to experience abdominal pain and difficulty eating.  - scheduled for repeat EGD with Dr. Villegas    OSTEOPOROSIS:  - Continue Prolia treatment, which has shown 14% improvement in bone density.  - Discussed positive response to osteoporosis therapy.    NICOTINE DEPENDENCE:  - Patient currently smokes 3 cigarettes per day despite previously quitting for 1 year before relapsing.  - Continue bupropion for smoking cessation support.  - Emphasized negative health impacts of continued smoking, particularly given current cardiopulmonary conditions.  - Discussed strategies to overcome barriers to smoking cessation.    FOLLOW-UP AND LABS:  - Schedule follow-up with Dr. Smart  - Ordered CBC, CMP, and lipid panel.        Plan:       1. DM type 2 with diabetic dyslipidemia  -     Hemoglobin A1C, POCT  -     empagliflozin (JARDIANCE) 10 mg tablet; Take 1 tablet (10 mg total) by mouth once daily.  Dispense: 30 tablet; Refill: 11  -     Lipid Panel; Future; Expected date: 06/30/2025  -     Comprehensive Metabolic Panel; Future; Expected date: 06/30/2025  -     TSH w/reflex to FT4; Future; Expected date: 06/30/2025    2. Chronic obstructive pulmonary disease, unspecified COPD type    3. Dyslipidemia  -     Lipid Panel; Future; Expected date: 06/30/2025    4. Mild episode of recurrent major depressive disorder  -     buPROPion (WELLBUTRIN XL) 150 MG TB24 tablet; Take 1 tablet (150 mg total) by mouth once daily.  Dispense: 90 tablet; Refill: 3    5. Essential hypertension  -     Lipid Panel; Future; Expected date: 06/30/2025  -     Comprehensive Metabolic Panel; Future;  Expected date: 06/30/2025  -     TSH w/reflex to FT4; Future; Expected date: 06/30/2025    6. Moderate aortic stenosis    7. Acquired hypothyroidism  -     levothyroxine (SYNTHROID) 50 MCG tablet; Take 1 tablet (50 mcg total) by mouth before breakfast.  Dispense: 90 tablet; Refill: 1  -     TSH w/reflex to FT4; Future; Expected date: 06/30/2025    8. Chronic heart failure with preserved ejection fraction  -     empagliflozin (JARDIANCE) 10 mg tablet; Take 1 tablet (10 mg total) by mouth once daily.  Dispense: 30 tablet; Refill: 11  -     furosemide (LASIX) 20 MG tablet; Take 1 tablet (20 mg total) by mouth once daily.  Dispense: 90 tablet; Refill: 1    9. Microalbuminuria  -     empagliflozin (JARDIANCE) 10 mg tablet; Take 1 tablet (10 mg total) by mouth once daily.  Dispense: 30 tablet; Refill: 11    10. Pyloric stenosis in adult      Follow up in about 4 months (around 10/30/2025).          Counseled on age and gender appropriate medical preventative services, including cancer screenings, immunizations, overall nutritional health, need for a consistent exercise regimen and an overall push towards maintaining a vigorous and active lifestyle.      This note was generated with the assistance of ambient listening technology. Verbal consent was obtained by the patient and accompanying visitor(s) for the recording of patient appointment to facilitate this note. I attest to having reviewed and edited the generated note for accuracy, though some syntax or spelling errors may persist. Please contact the author of this note for any clarification.       6/30/2025 Liv Parrish NP           [1]   Current Outpatient Medications:     albuterol (PROAIR HFA) 90 mcg/actuation inhaler, Inhale 2 puffs into the lungs every 6 (six) hours as needed for Wheezing or Shortness of Breath. Rescue, Disp: 18 g, Rfl: 5    amoxicillin-clavulanate 875-125mg (AUGMENTIN) 875-125 mg per tablet, Take 1 tablet by mouth 2 (two) times daily., Disp:  14 tablet, Rfl: 0    atorvastatin (LIPITOR) 10 MG tablet, TAKE 1 TABLET(10 MG) BY MOUTH EVERY DAY, Disp: 90 tablet, Rfl: 3    blood sugar diagnostic Strp, 1 each by In Vitro route once daily., Disp: 100 each, Rfl: 3    buPROPion (WELLBUTRIN XL) 150 MG TB24 tablet, Take 1 tablet (150 mg total) by mouth once daily., Disp: 90 tablet, Rfl: 3    calcium carbonate (OS-JOSELYN) 600 mg calcium (1,500 mg) Tab, Take 600 mg by mouth once daily., Disp: , Rfl:     empagliflozin (JARDIANCE) 10 mg tablet, Take 1 tablet (10 mg total) by mouth once daily., Disp: 30 tablet, Rfl: 11    ergocalciferol (ERGOCALCIFEROL) 50,000 unit Cap, Take 1 capsule (50,000 Units total) by mouth every 7 days. (Patient not taking: Reported on 4/22/2025), Disp: 12 capsule, Rfl: 3    EScitalopram oxalate (LEXAPRO) 20 MG tablet, Take 1 tablet (20 mg total) by mouth once daily., Disp: 90 tablet, Rfl: 3    fluticasone propionate (FLONASE) 50 mcg/actuation nasal spray, 1 spray (50 mcg total) by Each Nostril route once daily., Disp: 16 g, Rfl: 2    fluticasone-umeclidin-vilanter (TRELEGY ELLIPTA) 100-62.5-25 mcg DsDv, Inhale 1 puff into the lungs once daily., Disp: 60 each, Rfl: 11    furosemide (LASIX) 20 MG tablet, Take 1 tablet (20 mg total) by mouth once daily., Disp: 90 tablet, Rfl: 1    gabapentin (NEURONTIN) 300 MG capsule, Take 1 capsule (300 mg total) by mouth 2 (two) times daily., Disp: 180 capsule, Rfl: 3    levothyroxine (SYNTHROID) 50 MCG tablet, Take 1 tablet (50 mcg total) by mouth before breakfast., Disp: 90 tablet, Rfl: 1    lisinopriL (PRINIVIL,ZESTRIL) 2.5 MG tablet, Take 1 tablet (2.5 mg total) by mouth once daily., Disp: 90 tablet, Rfl: 3    pantoprazole (PROTONIX) 40 MG tablet, Take 1 tablet (40 mg total) by mouth 2 (two) times daily., Disp: 180 tablet, Rfl: 3    promethazine-dextromethorphan (PROMETHAZINE-DM) 6.25-15 mg/5 mL Syrp, Take 10 mLs by mouth every 6 (six) hours as needed (take 5 or 10 ml every 6 hours as needed to supress cough).,  Disp: 400 mL, Rfl: 0    propranoloL (INDERAL LA) 80 MG 24 hr capsule, Take 1 capsule (80 mg total) by mouth once daily., Disp: 90 capsule, Rfl: 3

## 2025-07-22 ENCOUNTER — TELEPHONE (OUTPATIENT)
Dept: FAMILY MEDICINE | Facility: CLINIC | Age: 70
End: 2025-07-22
Payer: MEDICARE

## 2025-07-22 NOTE — TELEPHONE ENCOUNTER
----- Message from Taylor sent at 7/22/2025  6:24 AM CDT -----  Regarding: Prolia Reminder  Please review patient's Appointment Desk for an upcoming PROLIA INJECTION appointment.       The following are needed for this appointment.   Note:  Please contact patient and schedule any labs/Dexa/clinic follow up appointments, if due:      ORDER.  Current / active in the Epic Therapy Plan, signed within a year.  LABS. Serum Calcium within 6 weeks of treatment.    DEXA SCAN within the last 2 years   DENTAL PRECAUTION CONFIRMED WITH PATIENT. No recent invasive dental procedures (tooth extraction, etc). A patient will need to bring a Dental Clearance signed by a dental provider if there was any recent dental procedures done.   FOLLOW-UP APPOINTMENT within the last 12 months with the diagnosis mentioned in the visit notes.         Any item unavailable by the day prior to the scheduled appointment will cause the appointment to be CANCELLED.        To reschedule the Prolia appointment, please contact [facility] SCHEDULING POOL or call [phone].       Thank you,  [facility]  In-basket:   [facility] Scheduling Pool

## 2025-08-04 ENCOUNTER — TELEPHONE (OUTPATIENT)
Dept: INFUSION THERAPY | Facility: HOSPITAL | Age: 70
End: 2025-08-04

## 2025-08-08 ENCOUNTER — TELEPHONE (OUTPATIENT)
Dept: INFUSION THERAPY | Facility: HOSPITAL | Age: 70
End: 2025-08-08

## 2025-08-08 NOTE — TELEPHONE ENCOUNTER
Patient notified via phone regarding need for labs prior to injection on Monday. LM with call back number.

## 2025-08-10 ENCOUNTER — RESULTS FOLLOW-UP (OUTPATIENT)
Dept: FAMILY MEDICINE | Facility: CLINIC | Age: 70
End: 2025-08-10
Payer: MEDICARE

## 2025-08-10 DIAGNOSIS — E78.5 DYSLIPIDEMIA: ICD-10-CM

## 2025-08-11 ENCOUNTER — INFUSION (OUTPATIENT)
Dept: INFUSION THERAPY | Facility: HOSPITAL | Age: 70
End: 2025-08-11
Attending: NURSE PRACTITIONER
Payer: MEDICARE

## 2025-08-11 VITALS
OXYGEN SATURATION: 97 % | RESPIRATION RATE: 18 BRPM | TEMPERATURE: 97 F | HEIGHT: 61 IN | WEIGHT: 103 LBS | SYSTOLIC BLOOD PRESSURE: 90 MMHG | BODY MASS INDEX: 19.45 KG/M2 | DIASTOLIC BLOOD PRESSURE: 45 MMHG | HEART RATE: 60 BPM

## 2025-08-11 DIAGNOSIS — M81.0 AGE-RELATED OSTEOPOROSIS WITHOUT CURRENT PATHOLOGICAL FRACTURE: Primary | ICD-10-CM

## 2025-08-11 PROCEDURE — 96372 THER/PROPH/DIAG INJ SC/IM: CPT

## 2025-08-11 PROCEDURE — 63600175 PHARM REV CODE 636 W HCPCS: Mod: JZ,TB | Performed by: NURSE PRACTITIONER

## 2025-08-11 RX ORDER — ATORVASTATIN CALCIUM 20 MG/1
20 TABLET, FILM COATED ORAL DAILY
Qty: 90 TABLET | Refills: 3 | Status: SHIPPED | OUTPATIENT
Start: 2025-08-11

## 2025-08-11 RX ADMIN — DENOSUMAB 60 MG: 60 INJECTION SUBCUTANEOUS at 03:08

## 2025-09-02 ENCOUNTER — TELEPHONE (OUTPATIENT)
Dept: FAMILY MEDICINE | Facility: CLINIC | Age: 70
End: 2025-09-02
Payer: MEDICARE

## 2025-09-02 DIAGNOSIS — J44.9 CHRONIC OBSTRUCTIVE PULMONARY DISEASE, UNSPECIFIED COPD TYPE: Primary | ICD-10-CM

## 2025-09-02 DIAGNOSIS — R93.89 ABNORMAL CT OF THE CHEST: ICD-10-CM
